# Patient Record
Sex: FEMALE | Race: WHITE | NOT HISPANIC OR LATINO | Employment: OTHER | URBAN - METROPOLITAN AREA
[De-identification: names, ages, dates, MRNs, and addresses within clinical notes are randomized per-mention and may not be internally consistent; named-entity substitution may affect disease eponyms.]

---

## 2017-01-03 ENCOUNTER — RESULTS ENCOUNTER (OUTPATIENT)
Dept: FAMILY MEDICINE CLINIC | Facility: CLINIC | Age: 82
End: 2017-01-03

## 2017-01-03 DIAGNOSIS — D63.8 ANEMIA OF CHRONIC ILLNESS: ICD-10-CM

## 2017-01-17 ENCOUNTER — OFFICE VISIT (OUTPATIENT)
Dept: GASTROENTEROLOGY | Facility: CLINIC | Age: 82
End: 2017-01-17

## 2017-01-17 VITALS
SYSTOLIC BLOOD PRESSURE: 118 MMHG | BODY MASS INDEX: 24.92 KG/M2 | DIASTOLIC BLOOD PRESSURE: 74 MMHG | HEIGHT: 64 IN | WEIGHT: 146 LBS

## 2017-01-17 DIAGNOSIS — D50.9 IRON DEFICIENCY ANEMIA, UNSPECIFIED IRON DEFICIENCY ANEMIA TYPE: Primary | ICD-10-CM

## 2017-01-17 PROCEDURE — 99203 OFFICE O/P NEW LOW 30 MIN: CPT | Performed by: INTERNAL MEDICINE

## 2017-01-17 NOTE — MR AVS SNAPSHOT
Kristyn Severino   1/17/2017 10:30 AM   Office Visit    Dept Phone:  [phone]   Encounter #:  [id number]    Provider:  Henry Maurer MD   Department:  [facility] GASTROENTEROLOGY                Your Full Care Plan              Today's Medication Changes          These changes are accurate as of: 1/17/17 11:14 AM.  If you have any questions, ask your nurse or doctor.               Stop taking medication(s)listed here:     fish oil 1000 MG capsule capsule   Stopped by:  Henry Maurer MD           vitamin C 500 MG tablet   Commonly known as:  ASCORBIC ACID   Stopped by:  Henry Maurer MD                      Your Updated Medication List          This list is accurate as of: 1/17/17 11:14 AM.  Always use your most recent med list.                aspirin 81 MG EC tablet       Biotin 1 MG capsule       calcium carbonate 600 MG tablet   Commonly known as:  OS-CAMMY       ezetimibe 10 MG tablet   Commonly known as:  ZETIA   Take 1 tablet (10 mg total) by mouth daily. For cholesterol       ferrous sulfate 325 (65 FE) MG tablet   TWO PO daily with food for anemia       glucosamine-chondroitin 500-400 MG per tablet       multivitamin with minerals tablet       Thyroid 90 MG tablet   Commonly known as:  ARMOUR THYROID   Take 1 tablet by mouth Daily. For thyroid (new dose)       VITAMIN B-6 PO       zolpidem 10 MG tablet   Commonly known as:  AMBIEN   1/2-1 PO Q HS prn insomnia               We Performed the Following     CT Abdomen Pelvis With Contrast     Occult Blood, Fecal By Immunoassay       You Were Diagnosed With        Codes Comments    Iron deficiency anemia, unspecified iron deficiency anemia type    -  Primary ICD-10-CM: D50.9  ICD-9-CM: 280.9       Instructions     None    Patient Instructions History      Upcoming Appointments     Visit Type Date Time Department    NEW PATIENT 1/17/2017 10:30 AM [facility]    FOLLOW UP 2/17/2017 10:30 AM  "[facility]      MyChart Signup     Our records indicate that you have declined [facility] MyChart signup. If you would like to sign up for [employer], please email [email or web address] or call [phone] to obtain an activation code.             Other Info from Your Visit           Your Appointments     Feb 17, 2017 10:30 AM EST   Follow Up with Henry Maurer MD   [facility] GASTROENTEROLOGY (--)    [address] [place] [id number] [phone]           Arrive 15 minutes prior to appointment.              Allergies     No Known Allergies      Reason for Visit     Anemia     Weight Loss           Vital Signs     Blood Pressure Height Weight Body Mass Index Smoking Status       118/74 64\" (162.6 cm) 146 lb (66.2 kg) 25.06 kg/m2 Never Smoker       Problems and Diagnoses Noted     Iron deficiency anemia    -  Primary        "

## 2017-01-17 NOTE — LETTER
January 17, 2017     Cary Espinal PA-C  [id number] [address]  [id number]  [facility] [address]    Patient: Kristyn Severino   YOB: 1924   Date of Visit: 1/17/2017       Dear Dr. Teddy PA-C:    Thank you for referring Kristyn Severino to me for evaluation. Below is a copy of my consult note.    If you have questions, please do not hesitate to call me. I look forward to following Kristyn along with you.         Sincerely,        Henry Maurer MD        CC: No Recipients    Chief Complaint   Patient presents with   • Anemia   • Weight Loss     Subjective      HPI     Kristyn Severino is a 89 y.o. female who presents for evaluation of fatigue and anemia.  Pt has noticed decrease in energy over last 6 mos.   She had a CBC performed by her PCP in mid December which showed a hemoglobin of 9.6 and a ferritin level 10.  Interestingly she had a normal MCV and MCHC.  She was started on oral iron supplementation and repeat hemoglobin 2 weeks later showed increased to 9.8 and a ferritin level of 36.  Patient denies any current GI complaints.  She has no nausea or vomiting.  She's had no diarrhea or constipation.  She has noticed some darkening of her stool since starting the iron therapy but prior to that had had no melena or hematochezia.  She does endorse some unintentional weight loss over the last 6 months but is unable to quantify the amount.  Her weight does appear to be stable over the last month from review of the chart.  She tells me she's never had prior lower endoscopic evaluation.  Her past medical history is significant for hypothyroidism for which she takes Synthroid and had recent dose adjustment due to elevated TSH levels.  In addition to her anemia her recent labs showed evidence of a mild hypercalcemia which is being worked up by her primary care physician.  She has normal kidney function.    Past Medical History   Diagnosis Date   • Anemia of chronic illness    • Hirsutism    • History of  bone density study      few years; normal   • History of colonoscopy      never   • HLD (hyperlipidemia)    • Hypothyroidism    • Insomnia    • Osteoarthritis    • Postmenopausal    • Renal insufficiency    • Seborrheic keratosis    • Vitamin D deficiency        Current Outpatient Prescriptions:   •  aspirin 81 MG EC tablet, Take 81 mg by mouth daily., Disp: , Rfl:   •  Biotin 1 MG capsule, Take by mouth., Disp: , Rfl:   •  calcium carbonate (OS-CAMMY) 600 MG tablet, Take 600 mg by mouth daily., Disp: , Rfl:   •  ezetimibe (ZETIA) 10 MG tablet, Take 1 tablet (10 mg total) by mouth daily. For cholesterol, Disp: 90 tablet, Rfl: 3  •  ferrous sulfate 325 (65 FE) MG tablet, TWO PO daily with food for anemia, Disp: 60 tablet, Rfl: 5  •  glucosamine-chondroitin 500-400 MG per tablet, Take 1 tablet by mouth 3 (three) times a day., Disp: , Rfl:   •  Multiple Vitamins-Minerals (MULTIVITAMIN WITH MINERALS) tablet, Take 1 tablet by mouth daily., Disp: , Rfl:   •  Pyridoxine HCl (VITAMIN B-6 PO), Take 100 mg by mouth daily., Disp: , Rfl:   •  Thyroid (ARMOUR THYROID) 90 MG PO tablet, Take 1 tablet by mouth Daily. For thyroid (new dose), Disp: 90 tablet, Rfl: 3  •  zolpidem (AMBIEN) 10 MG tablet, 1/2-1 PO Q HS prn insomnia, Disp: 30 tablet, Rfl: 2     No Known Allergies  Social History     Social History   • Marital status:      Spouse name: N/A   • Number of children: N/A   • Years of education: N/A     Occupational History   • Not on file.     Social History Main Topics   • Smoking status: Never Smoker   • Smokeless tobacco: Not on file   • Alcohol use Yes   • Drug use: No   • Sexual activity: Defer     Other Topics Concern   • Not on file     Social History Narrative     Family History   Problem Relation Age of Onset   • Family history unknown: Yes     Review of Systems   Constitutional: Positive for fatigue and unexpected weight change.   Gastrointestinal: Negative.    Musculoskeletal: Positive for arthralgias.    Psychiatric/Behavioral: Positive for sleep disturbance.   All other systems reviewed and are negative.       Objective   Vitals:    01/17/17 1031   BP: 118/74     Physical Exam   Constitutional: She is oriented to person, place, and time. She appears well-developed and well-nourished.   Elderly WF   HENT:   Head: Normocephalic and atraumatic.   Mouth/Throat: Oropharynx is clear and moist.   Eyes: EOM are normal. No scleral icterus.   Neck: Normal range of motion. Neck supple. No thyromegaly present.   Cardiovascular: Normal rate, regular rhythm and normal heart sounds.  Exam reveals no gallop and no friction rub.    No murmur heard.  Pulmonary/Chest: Effort normal and breath sounds normal. She has no wheezes. She has no rales. She exhibits no tenderness.   Abdominal: Soft. Bowel sounds are normal. She exhibits no distension. There is no tenderness. There is no rebound and no guarding. No hernia.   Musculoskeletal: Normal range of motion. She exhibits no edema.   Lymphadenopathy:     She has no cervical adenopathy.   Neurological: She is alert and oriented to person, place, and time.   Skin: Skin is warm and dry.   Multiple SKs on abdomen/back   Psychiatric: She has a normal mood and affect. Her behavior is normal. Judgment and thought content normal.   Vitals reviewed.       Assessment/Plan      Kristyn was seen today for anemia and weight loss.    Diagnoses and all orders for this visit:    Iron deficiency anemia, unspecified iron deficiency anemia type  -     CT Abdomen Pelvis With Contrast  -     Occult Blood, Fecal By Immunoassay    This very pleasant elderly lady has what appears to be acute on chronic anemia with some evidence of hypoferritinemia.  She's had no evidence of overt GI bleeding.  She has had a relatively good response to her ferritin levels with only a few weeks of iron supplementation.  I did discuss with her the possibility of proceeding with endoscopic evaluation, but she tells me her family  has been very reluctant in the past for her to have colonoscopy due to her advanced age.  She has asked about possible non-invasive testing.  We discussed the option of assessing stool for microscopic blood and performing at CT scan of the abdomen/pelvis, which she is agreeable to.  In the end I still think we will need to readdress endoscopy with her and possible with her family, but we will start here.  She should continue oral iron supplement and she will follow up in 4 weeks with repeat hemoglobin.

## 2017-01-17 NOTE — PROGRESS NOTES
Chief Complaint   Patient presents with   • Anemia   • Weight Loss     Subjective      HPI     Kristyn Severino is a 89 y.o. female who presents for evaluation of fatigue and anemia.  Pt has noticed decrease in energy over last 6 mos.   She had a CBC performed by her PCP in mid December which showed a hemoglobin of 9.6 and a ferritin level 10.  Interestingly she had a normal MCV and MCHC.  She was started on oral iron supplementation and repeat hemoglobin 2 weeks later showed increased to 9.8 and a ferritin level of 36.  Patient denies any current GI complaints.  She has no nausea or vomiting.  She's had no diarrhea or constipation.  She has noticed some darkening of her stool since starting the iron therapy but prior to that had had no melena or hematochezia.  She does endorse some unintentional weight loss over the last 6 months but is unable to quantify the amount.  Her weight does appear to be stable over the last month from review of the chart.  She tells me she's never had prior lower endoscopic evaluation.  Her past medical history is significant for hypothyroidism for which she takes Synthroid and had recent dose adjustment due to elevated TSH levels.  In addition to her anemia her recent labs showed evidence of a mild hypercalcemia which is being worked up by her primary care physician.  She has normal kidney function.    Past Medical History   Diagnosis Date   • Anemia of chronic illness    • Hirsutism    • History of bone density study      few years; normal   • History of colonoscopy      never   • HLD (hyperlipidemia)    • Hypothyroidism    • Insomnia    • Osteoarthritis    • Postmenopausal    • Renal insufficiency    • Seborrheic keratosis    • Vitamin D deficiency        Current Outpatient Prescriptions:   •  aspirin 81 MG EC tablet, Take 81 mg by mouth daily., Disp: , Rfl:   •  Biotin 1 MG capsule, Take by mouth., Disp: , Rfl:   •  calcium carbonate (OS-CAMMY) 600 MG tablet, Take 600 mg by mouth  daily., Disp: , Rfl:   •  ezetimibe (ZETIA) 10 MG tablet, Take 1 tablet (10 mg total) by mouth daily. For cholesterol, Disp: 90 tablet, Rfl: 3  •  ferrous sulfate 325 (65 FE) MG tablet, TWO PO daily with food for anemia, Disp: 60 tablet, Rfl: 5  •  glucosamine-chondroitin 500-400 MG per tablet, Take 1 tablet by mouth 3 (three) times a day., Disp: , Rfl:   •  Multiple Vitamins-Minerals (MULTIVITAMIN WITH MINERALS) tablet, Take 1 tablet by mouth daily., Disp: , Rfl:   •  Pyridoxine HCl (VITAMIN B-6 PO), Take 100 mg by mouth daily., Disp: , Rfl:   •  Thyroid (ARMOUR THYROID) 90 MG PO tablet, Take 1 tablet by mouth Daily. For thyroid (new dose), Disp: 90 tablet, Rfl: 3  •  zolpidem (AMBIEN) 10 MG tablet, 1/2-1 PO Q HS prn insomnia, Disp: 30 tablet, Rfl: 2     No Known Allergies  Social History     Social History   • Marital status:      Spouse name: N/A   • Number of children: N/A   • Years of education: N/A     Occupational History   • Not on file.     Social History Main Topics   • Smoking status: Never Smoker   • Smokeless tobacco: Not on file   • Alcohol use Yes   • Drug use: No   • Sexual activity: Defer     Other Topics Concern   • Not on file     Social History Narrative     Family History   Problem Relation Age of Onset   • Family history unknown: Yes     Review of Systems   Constitutional: Positive for fatigue and unexpected weight change.   Gastrointestinal: Negative.    Musculoskeletal: Positive for arthralgias.   Psychiatric/Behavioral: Positive for sleep disturbance.   All other systems reviewed and are negative.       Objective   Vitals:    01/17/17 1031   BP: 118/74     Physical Exam   Constitutional: She is oriented to person, place, and time. She appears well-developed and well-nourished.   Elderly WF   HENT:   Head: Normocephalic and atraumatic.   Mouth/Throat: Oropharynx is clear and moist.   Eyes: EOM are normal. No scleral icterus.   Neck: Normal range of motion. Neck supple. No thyromegaly  present.   Cardiovascular: Normal rate, regular rhythm and normal heart sounds.  Exam reveals no gallop and no friction rub.    No murmur heard.  Pulmonary/Chest: Effort normal and breath sounds normal. She has no wheezes. She has no rales. She exhibits no tenderness.   Abdominal: Soft. Bowel sounds are normal. She exhibits no distension. There is no tenderness. There is no rebound and no guarding. No hernia.   Musculoskeletal: Normal range of motion. She exhibits no edema.   Lymphadenopathy:     She has no cervical adenopathy.   Neurological: She is alert and oriented to person, place, and time.   Skin: Skin is warm and dry.   Multiple SKs on abdomen/back   Psychiatric: She has a normal mood and affect. Her behavior is normal. Judgment and thought content normal.   Vitals reviewed.       Assessment/Plan      Kristyn was seen today for anemia and weight loss.    Diagnoses and all orders for this visit:    Iron deficiency anemia, unspecified iron deficiency anemia type  -     CT Abdomen Pelvis With Contrast  -     Occult Blood, Fecal By Immunoassay    This very pleasant elderly lady has what appears to be acute on chronic anemia with some evidence of hypoferritinemia.  She's had no evidence of overt GI bleeding.  She has had a relatively good response to her ferritin levels with only a few weeks of iron supplementation.  I did discuss with her the possibility of proceeding with endoscopic evaluation, but she tells me her family has been very reluctant in the past for her to have colonoscopy due to her advanced age.  She has asked about possible non-invasive testing.  We discussed the option of assessing stool for microscopic blood and performing at CT scan of the abdomen/pelvis, which she is agreeable to.  In the end I still think we will need to readdress endoscopy with her and possible with her family, but we will start here.  She should continue oral iron supplement and she will follow up in 4 weeks with repeat  hemoglobin.

## 2017-01-20 ENCOUNTER — CONVERSION ENCOUNTER (OUTPATIENT)
Dept: GASTROENTEROLOGY | Facility: CLINIC | Age: 82
End: 2017-01-20

## 2017-01-21 LAB — HEMOCCULT STL QL IA: NEGATIVE

## 2017-01-23 ENCOUNTER — HOSPITAL ENCOUNTER (OUTPATIENT)
Dept: CT IMAGING | Facility: HOSPITAL | Age: 82
Discharge: HOME | End: 2017-01-23
Attending: INTERNAL MEDICINE | Admitting: INTERNAL MEDICINE

## 2017-01-23 LAB — CREAT BLDA-MCNC: 1 MG/DL (ref 0.6–1.3)

## 2017-01-23 PROCEDURE — 0 IOPAMIDOL 61 % SOLUTION: Performed by: INTERNAL MEDICINE

## 2017-01-23 PROCEDURE — 25510000001 DIATRIZOATE MEGLUMINE & SODIUM PER 1 ML: Performed by: INTERNAL MEDICINE

## 2017-01-23 PROCEDURE — 74177 CT ABD & PELVIS W/CONTRAST: CPT

## 2017-01-23 PROCEDURE — 82565 ASSAY OF CREATININE: CPT

## 2017-01-23 RX ADMIN — IOPAMIDOL 85 ML: 612 INJECTION, SOLUTION INTRAVENOUS at 10:30

## 2017-01-23 RX ADMIN — DIATRIZOATE MEGLUMINE AND DIATRIZOATE SODIUM 30 ML: 660; 100 LIQUID ORAL; RECTAL at 10:30

## 2017-01-25 RX ORDER — EZETIMIBE 10 MG/1
10 TABLET ORAL DAILY
Qty: 90 TABLET | Refills: 3 | Status: ON HOLD | OUTPATIENT
Start: 2017-01-25 | End: 2018-09-21

## 2017-01-28 ENCOUNTER — RESULTS ENCOUNTER (OUTPATIENT)
Dept: FAMILY MEDICINE CLINIC | Facility: CLINIC | Age: 82
End: 2017-01-28

## 2017-01-28 DIAGNOSIS — E03.9 ACQUIRED HYPOTHYROIDISM: ICD-10-CM

## 2017-01-28 DIAGNOSIS — E83.52 HYPERCALCEMIA: ICD-10-CM

## 2017-01-28 DIAGNOSIS — E55.9 VITAMIN D DEFICIENCY: ICD-10-CM

## 2017-01-28 DIAGNOSIS — G47.00 INSOMNIA, UNSPECIFIED TYPE: ICD-10-CM

## 2017-01-28 DIAGNOSIS — D63.8 ANEMIA OF CHRONIC ILLNESS: ICD-10-CM

## 2017-01-28 DIAGNOSIS — E78.2 MIXED HYPERLIPIDEMIA: ICD-10-CM

## 2017-02-01 ENCOUNTER — TELEPHONE (OUTPATIENT)
Dept: FAMILY MEDICINE CLINIC | Facility: CLINIC | Age: 82
End: 2017-02-01

## 2017-02-01 NOTE — TELEPHONE ENCOUNTER
She can ask what is required from the pharmacy or [facility];  It may require appt for face to face

## 2017-02-09 ENCOUNTER — TELEPHONE (OUTPATIENT)
Dept: GASTROENTEROLOGY | Facility: CLINIC | Age: 82
End: 2017-02-09

## 2017-02-09 NOTE — TELEPHONE ENCOUNTER
Per Dr. Maurer: CT scan was reassuringly normal. We await her fecal occult blood test.  Patient called advised her stool tests for blood were negative and her CT scan was normal.  She verb understanding.

## 2017-02-17 ENCOUNTER — OFFICE VISIT (OUTPATIENT)
Dept: GASTROENTEROLOGY | Facility: CLINIC | Age: 82
End: 2017-02-17

## 2017-02-17 VITALS
DIASTOLIC BLOOD PRESSURE: 74 MMHG | HEIGHT: 64 IN | BODY MASS INDEX: 25.44 KG/M2 | SYSTOLIC BLOOD PRESSURE: 118 MMHG | WEIGHT: 149 LBS

## 2017-02-17 DIAGNOSIS — D50.9 IRON DEFICIENCY ANEMIA, UNSPECIFIED IRON DEFICIENCY ANEMIA TYPE: Primary | ICD-10-CM

## 2017-02-17 LAB
BASOPHILS # BLD AUTO: 0.03 10*3/MM3 (ref 0–0.2)
BASOPHILS NFR BLD AUTO: 0.4 % (ref 0–1.5)
EOSINOPHIL # BLD AUTO: 0.21 10*3/MM3 (ref 0–0.7)
EOSINOPHIL NFR BLD AUTO: 2.9 % (ref 0.3–6.2)
ERYTHROCYTE [DISTWIDTH] IN BLOOD BY AUTOMATED COUNT: 16.1 % (ref 11.7–13)
FERRITIN SERPL-MCNC: 29.1 NG/ML (ref 13–150)
HCT VFR BLD AUTO: 32.8 % (ref 35.6–45.5)
HGB BLD-MCNC: 10.6 G/DL (ref 11.9–15.5)
IMM GRANULOCYTES # BLD: 0 10*3/MM3 (ref 0–0.03)
IMM GRANULOCYTES NFR BLD: 0 % (ref 0–0.5)
LYMPHOCYTES # BLD AUTO: 2.25 10*3/MM3 (ref 0.9–4.8)
LYMPHOCYTES NFR BLD AUTO: 31.5 % (ref 19.6–45.3)
MCH RBC QN AUTO: 30.9 PG (ref 26.9–32)
MCHC RBC AUTO-ENTMCNC: 32.3 G/DL (ref 32.4–36.3)
MCV RBC AUTO: 95.6 FL (ref 80.5–98.2)
MONOCYTES # BLD AUTO: 0.9 10*3/MM3 (ref 0.2–1.2)
MONOCYTES NFR BLD AUTO: 12.6 % (ref 5–12)
NEUTROPHILS # BLD AUTO: 3.76 10*3/MM3 (ref 1.9–8.1)
NEUTROPHILS NFR BLD AUTO: 52.6 % (ref 42.7–76)
PLATELET # BLD AUTO: 376 10*3/MM3 (ref 140–500)
RBC # BLD AUTO: 3.43 10*6/MM3 (ref 3.9–5.2)
WBC # BLD AUTO: 7.15 10*3/MM3 (ref 4.5–10.7)

## 2017-02-17 PROCEDURE — 99213 OFFICE O/P EST LOW 20 MIN: CPT | Performed by: INTERNAL MEDICINE

## 2017-02-17 RX ORDER — LANOLIN ALCOHOL/MO/W.PET/CERES
400 CREAM (GRAM) TOPICAL DAILY
COMMUNITY
End: 2019-07-09

## 2017-02-17 NOTE — PROGRESS NOTES
Chief Complaint   Patient presents with   • Follow-up     Subjective     HPI     Kristyn Severino is a 89 y.o. female who presents for follow-up of her anemia.  Overall she is doing very well since her last visit.  She reports less fatigue.  Weight is up a few pounds.  She continues to deny evidence of overt GI bleeding.  Due to her reluctance to undergo endoscopic evaluation we proceeded after her last visit with a CT scan of the abdomen and pelvis as well as fecal occult blood testing both of which have returned reassuringly normal/negative.  Patient remains on daily iron supplementation.      Past Medical History   Diagnosis Date   • Anemia of chronic illness    • Hirsutism    • History of bone density study      few years; normal   • History of colonoscopy      never   • HLD (hyperlipidemia)    • Hypothyroidism    • Insomnia    • Osteoarthritis    • Postmenopausal    • Renal insufficiency    • Seborrheic keratosis    • Vitamin D deficiency        Social History     Social History   • Marital status:      Spouse name: N/A   • Number of children: N/A   • Years of education: N/A     Social History Main Topics   • Smoking status: Never Smoker   • Smokeless tobacco: None   • Alcohol use Yes   • Drug use: No   • Sexual activity: Defer     Other Topics Concern   • None     Social History Narrative         Current Outpatient Prescriptions:   •  aspirin 81 MG EC tablet, Take 81 mg by mouth daily., Disp: , Rfl:   •  Biotin 1 MG capsule, Take by mouth., Disp: , Rfl:   •  calcium carbonate (OS-CAMMY) 600 MG tablet, Take 600 mg by mouth daily., Disp: , Rfl:   •  ezetimibe (ZETIA) 10 MG tablet, Take 1 tablet by mouth Daily. For cholesterol, Disp: 90 tablet, Rfl: 3  •  ferrous sulfate 325 (65 FE) MG tablet, TWO PO daily with food for anemia, Disp: 60 tablet, Rfl: 5  •  folic acid (FOLVITE) 400 MCG tablet, Take 400 mcg by mouth Daily., Disp: , Rfl:   •  glucosamine-chondroitin 500-400 MG per tablet, Take 1 tablet by mouth  3 (three) times a day., Disp: , Rfl:   •  Lactobacillus (ACIDOPHILUS PO), Take  by mouth., Disp: , Rfl:   •  Multiple Vitamins-Minerals (MULTIVITAMIN WITH MINERALS) tablet, Take 1 tablet by mouth daily., Disp: , Rfl:   •  Pyridoxine HCl (VITAMIN B-6 PO), Take 100 mg by mouth daily., Disp: , Rfl:   •  Thyroid (ARMOUR THYROID) 90 MG PO tablet, Take 1 tablet by mouth Daily. For thyroid (new dose), Disp: 90 tablet, Rfl: 3  •  zolpidem (AMBIEN) 10 MG tablet, 1/2-1 PO Q HS prn insomnia, Disp: 30 tablet, Rfl: 2    Review of Systems   Constitutional: Negative.    Respiratory: Negative.    Cardiovascular: Negative.    Gastrointestinal: Negative.    Psychiatric/Behavioral: Negative.      Objective   Vitals:    02/17/17 1034   BP: 118/74       Physical Exam   Constitutional: She is oriented to person, place, and time. She appears well-developed and well-nourished.   HENT:   Head: Normocephalic and atraumatic.   Cardiovascular: Normal rate, regular rhythm and normal heart sounds.    Pulmonary/Chest: Effort normal and breath sounds normal. No respiratory distress. She has no wheezes. She has no rales.   Abdominal: Soft. Bowel sounds are normal. She exhibits no distension and no mass. There is no tenderness. No hernia.   Neurological: She is alert and oriented to person, place, and time.   Skin: Skin is warm and dry.   Psychiatric: She has a normal mood and affect. Her behavior is normal. Judgment and thought content normal.       Assessment/Plan      Kristyn was seen today for follow-up.    Diagnoses and all orders for this visit:    Iron deficiency anemia, unspecified iron deficiency anemia type  -     CBC & Differential  -     Ferritin    Kristyn has had recent negative fecal occult blood testing and a CT of the abdomen and pelvis which was overall reassuring.  She continues to decline endoscopic evaluation.  I'll arrange to recheck her CBC and ferritin today but suspect she'll probably need at least 3 months of oral iron  supplementation.  If she has recurrence of hypoferritinemia or intolerance to oral iron she could have consideration for IV iron infusions.  Should she like to readdress the option of endoscopic evaluation I would be happy to see her again in the office.  Otherwise she can follow-up with her PCP as scheduled.

## 2017-02-23 NOTE — PROGRESS NOTES
Her Hb is up about 1 point since last check.    I would continue iron and follow up with PCP for repeat hemoglobin in 1 month

## 2017-03-15 ENCOUNTER — TELEPHONE (OUTPATIENT)
Dept: GASTROENTEROLOGY | Facility: CLINIC | Age: 82
End: 2017-03-15

## 2017-03-15 NOTE — TELEPHONE ENCOUNTER
----- Message from Henry Maurer MD sent at 2/22/2017  7:22 PM EST -----  Her Hb is up about 1 point since last check.    I would continue iron and follow up with PCP for repeat hemoglobin in 1 month

## 2017-03-20 NOTE — TELEPHONE ENCOUNTER
Patient returned call. Advised as per Dr. Coffey's note her Hbg is up 1 point from her last check. Advised she will need to repeat her Hgb in one month at her PCP's office.  She verb understanding.

## 2017-03-30 RX ORDER — SPIRONOLACTONE 50 MG/1
TABLET, FILM COATED ORAL
Qty: 90 TABLET | Refills: 0 | Status: SHIPPED | OUTPATIENT
Start: 2017-03-30 | End: 2017-07-25

## 2017-04-28 ENCOUNTER — OFFICE VISIT (OUTPATIENT)
Dept: FAMILY MEDICINE CLINIC | Facility: CLINIC | Age: 82
End: 2017-04-28

## 2017-04-28 VITALS
TEMPERATURE: 97.9 F | WEIGHT: 147 LBS | SYSTOLIC BLOOD PRESSURE: 116 MMHG | HEIGHT: 64 IN | DIASTOLIC BLOOD PRESSURE: 60 MMHG | HEART RATE: 73 BPM | BODY MASS INDEX: 25.1 KG/M2 | RESPIRATION RATE: 16 BRPM | OXYGEN SATURATION: 96 %

## 2017-04-28 DIAGNOSIS — E78.2 MIXED HYPERLIPIDEMIA: ICD-10-CM

## 2017-04-28 DIAGNOSIS — G47.00 INSOMNIA, UNSPECIFIED TYPE: Primary | ICD-10-CM

## 2017-04-28 DIAGNOSIS — D63.8 ANEMIA OF CHRONIC ILLNESS: ICD-10-CM

## 2017-04-28 DIAGNOSIS — R06.09 DYSPNEA ON EXERTION: ICD-10-CM

## 2017-04-28 DIAGNOSIS — N28.9 RENAL INSUFFICIENCY: ICD-10-CM

## 2017-04-28 DIAGNOSIS — R71.0 PRECIPITOUS DROP IN HEMATOCRIT: ICD-10-CM

## 2017-04-28 DIAGNOSIS — E03.9 ACQUIRED HYPOTHYROIDISM: ICD-10-CM

## 2017-04-28 PROCEDURE — 99214 OFFICE O/P EST MOD 30 MIN: CPT | Performed by: PHYSICIAN ASSISTANT

## 2017-04-28 RX ORDER — TRAZODONE HYDROCHLORIDE 50 MG/1
50 TABLET ORAL NIGHTLY
Qty: 30 TABLET | Refills: 5 | Status: SHIPPED | OUTPATIENT
Start: 2017-04-28 | End: 2017-07-24

## 2017-04-28 RX ORDER — ZOLPIDEM TARTRATE 5 MG/1
5 TABLET ORAL NIGHTLY PRN
Qty: 30 TABLET | Refills: 2 | Status: CANCELLED
Start: 2017-04-28

## 2017-04-28 NOTE — PROGRESS NOTES
Subjective   Kristyn Severino is a 89 y.o. female.     History of Present Illness   Kristyn Severino 89 y.o. female presents for follow up of insomnia with onset of symptoms years ago. Patient describes symptoms as early morning awakening, frequent night time awakening, difficulty falling asleep and non-restful sleep. Patient has found complete relief with Ambien and Dr Vigil will agree to Rx only 5mg;  I will have to try Trazodone.  She will have to try non controlled Rx for sleep.. Associated symptoms include: fatigue if unable to take Rx . Patient denies history of addiction Symptoms have been well-controlled with taking current prescription medication.  The patient has failed multiple OTC medications for insomnia.  They are well controlled on current Rx and will continue to try to take Rx PRN.  She will use the lowest effective dose.  The patient has read and signed the [facility] Controlled Substance Contract.  I will continue to see patient for regular follow up appointments and be prescribed the lowest effective dose.  JAXON has been reviewed by me and is updated every 3 months. The patient is aware of the potential for addiction and dependence.  She denies that Ambien cause excessive daytime drowsiness and sleep walking.      I did have her see GI doc for low hgb  I do have her on iron and will do f/u CBC;    Lab Results   Component Value Date    FERRITIN 33.36 04/28/2017     I will let her go down to one iron tab daily  Lab Results   Component Value Date    TSH 0.432 04/28/2017     Need labs to check on thyroid  No results found for: CHOL  Lab Results   Component Value Date    TRIG 98 12/16/2016    TRIG 111 01/12/2016     Lab Results   Component Value Date    HDL 72 (H) 12/16/2016    HDL 68 (H) 01/12/2016     No results found for: LDLCALC  Lab Results   Component Value Date     (H) 12/16/2016     (H) 01/12/2016     No results found for: HDLLDLRATIO  No components found for: CHOLHDL      Having feeling of heart pounding harder and SOA with exertion.  This is more with longer distances.  Her mom had angina.  NO chest pain at rest.  No chest pain with exertion.  Needs cardiac consult  The following portions of the patient's history were reviewed and updated as appropriate: allergies, current medications, past family history, past medical history, past social history, past surgical history and problem list.    Review of Systems   Constitutional: Positive for fatigue. Negative for activity change, appetite change and unexpected weight change.   HENT: Positive for rhinorrhea. Negative for nosebleeds and trouble swallowing.    Eyes: Negative for pain and visual disturbance.   Respiratory: Negative for chest tightness, shortness of breath and wheezing.    Cardiovascular: Negative for chest pain and palpitations.   Gastrointestinal: Negative for abdominal pain and blood in stool.   Endocrine: Negative.    Genitourinary: Negative for difficulty urinating and hematuria.   Musculoskeletal: Positive for back pain. Negative for joint swelling.   Skin: Negative for color change and rash.   Allergic/Immunologic: Negative.    Neurological: Positive for light-headedness. Negative for syncope and speech difficulty.   Hematological: Negative for adenopathy.   Psychiatric/Behavioral: Negative for agitation and confusion.   All other systems reviewed and are negative.      Objective   Physical Exam   Constitutional: She is oriented to person, place, and time. She appears well-developed and well-nourished. No distress.   HENT:   Head: Normocephalic and atraumatic.   Eyes: Conjunctivae and EOM are normal. Pupils are equal, round, and reactive to light. Right eye exhibits no discharge. Left eye exhibits no discharge. No scleral icterus.   Neck: Normal range of motion. Neck supple. No tracheal deviation present. No thyromegaly present.   Cardiovascular: Normal rate, regular rhythm, normal heart sounds, intact distal  pulses and normal pulses.  Exam reveals no gallop.    No murmur heard.  Pulmonary/Chest: Effort normal and breath sounds normal. No respiratory distress. She has no wheezes. She has no rales.   Musculoskeletal: Normal range of motion.   Neurological: She is alert and oriented to person, place, and time. She exhibits normal muscle tone. Coordination normal.   Skin: Skin is warm. No rash noted. No erythema. No pallor.   Psychiatric: She has a normal mood and affect. Her behavior is normal. Judgment and thought content normal.   Nursing note and vitals reviewed.      Assessment/Plan   Problems Addressed this Visit        Cardiovascular and Mediastinum    HLD (hyperlipidemia)    Relevant Orders    Comprehensive Metabolic Panel (Completed)    TSH (Completed)    T4, Free (Completed)    CBC & Differential (Completed)    Ferritin (Completed)       Endocrine    Hypothyroidism    Relevant Orders    Comprehensive Metabolic Panel (Completed)    TSH (Completed)    T4, Free (Completed)    CBC & Differential (Completed)    Ferritin (Completed)    TSH       Genitourinary    Renal insufficiency    Relevant Orders    Comprehensive Metabolic Panel (Completed)    TSH (Completed)    T4, Free (Completed)    CBC & Differential (Completed)    Ferritin (Completed)       Hematopoietic and Hemostatic    Anemia of chronic illness       Other    Insomnia - Primary    Relevant Orders    Comprehensive Metabolic Panel (Completed)    TSH (Completed)    T4, Free (Completed)    CBC & Differential (Completed)    Ferritin (Completed)      Other Visit Diagnoses     Precipitous drop in hematocrit         Relevant Orders    Ferritin (Completed)    Dyspnea on exertion        Relevant Orders    Ambulatory Referral to Cardiology                I have reviewed the notes, assessments, and/or procedures performed by Cary Espinal PA-C, I concur with her/his documentation of Kristyn Severino.

## 2017-04-28 NOTE — PATIENT INSTRUCTIONS
Warned patient that this medication can cause drowsiness and impair them operating machinery, including driving a car.  Caution is advised.    Stop Ambien if you develop excessive daytime drowsiness and/or sleep walking.  Avoid driving if drowsy.  Do not drink alcohol with this medication.  Ambien is a controlled substance and requires you to be seen for an appointment every 3 months for a medication check refill.  Use the lowest effective dose.    Low glycemic index diet  Exercise 30 minutes most days of the week  Make sure you get results on any labs or tests we ordered today  We discussed medications and how to take them as prescribed  Sleep 6-8 hours each night if possible  If you have not signed up for B[employer], please activate your code ASAP from your After Visit Summary today    LDL goal <100  LDL goal if heart disease <70  HDL goal >60  Triglyceride goal <150  BP goal =<130/80  Fasting glucose <100    Call 911 if SOA worse or chest pain  See cardio

## 2017-04-29 LAB
ALBUMIN SERPL-MCNC: 3.9 G/DL (ref 3.5–5.2)
ALBUMIN/GLOB SERPL: 1.2 G/DL
ALP SERPL-CCNC: 51 U/L (ref 39–117)
ALT SERPL-CCNC: 12 U/L (ref 1–33)
AST SERPL-CCNC: 19 U/L (ref 1–32)
BASOPHILS # BLD AUTO: 0.03 10*3/MM3 (ref 0–0.2)
BASOPHILS NFR BLD AUTO: 0.3 % (ref 0–1.5)
BILIRUB SERPL-MCNC: 0.2 MG/DL (ref 0.1–1.2)
BUN SERPL-MCNC: 25 MG/DL (ref 8–23)
BUN/CREAT SERPL: 30.1 (ref 7–25)
CALCIUM SERPL-MCNC: 10 MG/DL (ref 8.2–9.6)
CHLORIDE SERPL-SCNC: 101 MMOL/L (ref 98–107)
CO2 SERPL-SCNC: 26 MMOL/L (ref 22–29)
CREAT SERPL-MCNC: 0.83 MG/DL (ref 0.57–1)
EOSINOPHIL # BLD AUTO: 0.17 10*3/MM3 (ref 0–0.7)
EOSINOPHIL NFR BLD AUTO: 1.9 % (ref 0.3–6.2)
ERYTHROCYTE [DISTWIDTH] IN BLOOD BY AUTOMATED COUNT: 13.9 % (ref 11.7–13)
FERRITIN SERPL-MCNC: 33.36 NG/ML (ref 13–150)
GLOBULIN SER CALC-MCNC: 3.2 GM/DL
GLUCOSE SERPL-MCNC: 130 MG/DL (ref 65–99)
HCT VFR BLD AUTO: 34.4 % (ref 35.6–45.5)
HGB BLD-MCNC: 10.8 G/DL (ref 11.9–15.5)
IMM GRANULOCYTES # BLD: 0.02 10*3/MM3 (ref 0–0.03)
IMM GRANULOCYTES NFR BLD: 0.2 % (ref 0–0.5)
LYMPHOCYTES # BLD AUTO: 2.5 10*3/MM3 (ref 0.9–4.8)
LYMPHOCYTES NFR BLD AUTO: 28.5 % (ref 19.6–45.3)
MCH RBC QN AUTO: 30.3 PG (ref 26.9–32)
MCHC RBC AUTO-ENTMCNC: 31.4 G/DL (ref 32.4–36.3)
MCV RBC AUTO: 96.4 FL (ref 80.5–98.2)
MONOCYTES # BLD AUTO: 0.68 10*3/MM3 (ref 0.2–1.2)
MONOCYTES NFR BLD AUTO: 7.8 % (ref 5–12)
NEUTROPHILS # BLD AUTO: 5.37 10*3/MM3 (ref 1.9–8.1)
NEUTROPHILS NFR BLD AUTO: 61.3 % (ref 42.7–76)
PLATELET # BLD AUTO: 377 10*3/MM3 (ref 140–500)
POTASSIUM SERPL-SCNC: 4.3 MMOL/L (ref 3.5–5.2)
PROT SERPL-MCNC: 7.1 G/DL (ref 6–8.5)
RBC # BLD AUTO: 3.57 10*6/MM3 (ref 3.9–5.2)
SODIUM SERPL-SCNC: 142 MMOL/L (ref 136–145)
T4 FREE SERPL-MCNC: 0.69 NG/DL (ref 0.93–1.7)
TSH SERPL DL<=0.005 MIU/L-ACNC: 0.43 MIU/ML (ref 0.27–4.2)
WBC # BLD AUTO: 8.77 10*3/MM3 (ref 4.5–10.7)

## 2017-05-02 LAB
HBA1C MFR BLD: 5 % (ref 4.8–5.6)
WRITTEN AUTHORIZATION: NORMAL

## 2017-05-30 ENCOUNTER — OFFICE VISIT (OUTPATIENT)
Dept: CARDIOLOGY | Facility: CLINIC | Age: 82
End: 2017-05-30

## 2017-05-30 ENCOUNTER — TRANSCRIBE ORDERS (OUTPATIENT)
Dept: CARDIOLOGY | Facility: CLINIC | Age: 82
End: 2017-05-30

## 2017-05-30 VITALS
BODY MASS INDEX: 24.66 KG/M2 | HEART RATE: 69 BPM | HEIGHT: 65 IN | WEIGHT: 148 LBS | DIASTOLIC BLOOD PRESSURE: 82 MMHG | SYSTOLIC BLOOD PRESSURE: 124 MMHG

## 2017-05-30 DIAGNOSIS — R00.2 PALPITATIONS: Primary | ICD-10-CM

## 2017-05-30 DIAGNOSIS — R06.02 SHORTNESS OF BREATH: Primary | ICD-10-CM

## 2017-05-30 DIAGNOSIS — R00.2 PALPITATION: ICD-10-CM

## 2017-05-30 PROCEDURE — 99204 OFFICE O/P NEW MOD 45 MIN: CPT | Performed by: INTERNAL MEDICINE

## 2017-05-30 PROCEDURE — 93000 ELECTROCARDIOGRAM COMPLETE: CPT | Performed by: INTERNAL MEDICINE

## 2017-06-09 ENCOUNTER — HOSPITAL ENCOUNTER (OUTPATIENT)
Dept: CARDIOLOGY | Facility: HOSPITAL | Age: 82
Discharge: HOME | End: 2017-06-09
Attending: INTERNAL MEDICINE | Admitting: INTERNAL MEDICINE

## 2017-06-09 VITALS
DIASTOLIC BLOOD PRESSURE: 62 MMHG | HEIGHT: 65 IN | HEART RATE: 66 BPM | WEIGHT: 148 LBS | SYSTOLIC BLOOD PRESSURE: 118 MMHG | BODY MASS INDEX: 24.66 KG/M2

## 2017-06-09 DIAGNOSIS — R00.2 PALPITATION: ICD-10-CM

## 2017-06-09 DIAGNOSIS — R06.02 SHORTNESS OF BREATH: ICD-10-CM

## 2017-06-09 LAB
ASCENDING AORTA: 2.6 CM
BH CV ECHO MEAS - ACS: 2.4 CM
BH CV ECHO MEAS - AO MAX PG (FULL): 2.3 MMHG
BH CV ECHO MEAS - AO MAX PG: 3.6 MMHG
BH CV ECHO MEAS - AO MEAN PG (FULL): 1.1 MMHG
BH CV ECHO MEAS - AO MEAN PG: 2 MMHG
BH CV ECHO MEAS - AO ROOT AREA (BSA CORRECTED): 2
BH CV ECHO MEAS - AO ROOT AREA: 9.6 CM^2
BH CV ECHO MEAS - AO ROOT DIAM: 3.5 CM
BH CV ECHO MEAS - AO V2 MAX: 94.5 CM/SEC
BH CV ECHO MEAS - AO V2 MEAN: 64.4 CM/SEC
BH CV ECHO MEAS - AO V2 VTI: 23.1 CM
BH CV ECHO MEAS - AVA(I,A): 2.1 CM^2
BH CV ECHO MEAS - AVA(I,D): 2.1 CM^2
BH CV ECHO MEAS - AVA(V,A): 1.9 CM^2
BH CV ECHO MEAS - AVA(V,D): 1.9 CM^2
BH CV ECHO MEAS - BSA(HAYCOCK): 1.8 M^2
BH CV ECHO MEAS - BSA: 1.7 M^2
BH CV ECHO MEAS - BZI_BMI: 24.6 KILOGRAMS/M^2
BH CV ECHO MEAS - BZI_METRIC_HEIGHT: 165.1 CM
BH CV ECHO MEAS - BZI_METRIC_WEIGHT: 67.1 KG
BH CV ECHO MEAS - CONTRAST EF (2CH): 52.3 ML/M^2
BH CV ECHO MEAS - CONTRAST EF 4CH: 59.1 ML/M^2
BH CV ECHO MEAS - EDV(MOD-SP2): 88 ML
BH CV ECHO MEAS - EDV(MOD-SP4): 88 ML
BH CV ECHO MEAS - EDV(TEICH): 222.9 ML
BH CV ECHO MEAS - EF(CUBED): 49.5 %
BH CV ECHO MEAS - EF(MOD-SP2): 52.3 %
BH CV ECHO MEAS - EF(MOD-SP4): 59.1 %
BH CV ECHO MEAS - EF(TEICH): 40.6 %
BH CV ECHO MEAS - ESV(MOD-SP2): 42 ML
BH CV ECHO MEAS - ESV(MOD-SP4): 36 ML
BH CV ECHO MEAS - ESV(TEICH): 132.3 ML
BH CV ECHO MEAS - FS: 20.4 %
BH CV ECHO MEAS - IVS/LVPW: 1
BH CV ECHO MEAS - IVSD: 1.1 CM
BH CV ECHO MEAS - LAT PEAK E' VEL: 6 CM/SEC
BH CV ECHO MEAS - LV DIASTOLIC VOL/BSA (35-75): 50.6 ML/M^2
BH CV ECHO MEAS - LV MASS(C)D: 311.5 GRAMS
BH CV ECHO MEAS - LV MASS(C)DI: 178.9 GRAMS/M^2
BH CV ECHO MEAS - LV MAX PG: 1.3 MMHG
BH CV ECHO MEAS - LV MEAN PG: 0.83 MMHG
BH CV ECHO MEAS - LV SYSTOLIC VOL/BSA (12-30): 20.7 ML/M^2
BH CV ECHO MEAS - LV V1 MAX: 56.8 CM/SEC
BH CV ECHO MEAS - LV V1 MEAN: 44 CM/SEC
BH CV ECHO MEAS - LV V1 VTI: 15.2 CM
BH CV ECHO MEAS - LVIDD: 6.6 CM
BH CV ECHO MEAS - LVIDS: 5.2 CM
BH CV ECHO MEAS - LVLD AP2: 7.1 CM
BH CV ECHO MEAS - LVLD AP4: 7 CM
BH CV ECHO MEAS - LVLS AP2: 6.3 CM
BH CV ECHO MEAS - LVLS AP4: 6.1 CM
BH CV ECHO MEAS - LVOT AREA (M): 3.1 CM^2
BH CV ECHO MEAS - LVOT AREA: 3.2 CM^2
BH CV ECHO MEAS - LVOT DIAM: 2 CM
BH CV ECHO MEAS - LVPWD: 1.1 CM
BH CV ECHO MEAS - MED PEAK E' VEL: 8 CM/SEC
BH CV ECHO MEAS - MR MAX PG: 142.7 MMHG
BH CV ECHO MEAS - MR MAX VEL: 597.2 CM/SEC
BH CV ECHO MEAS - MV A DUR: 0.12 SEC
BH CV ECHO MEAS - MV A MAX VEL: 59.7 CM/SEC
BH CV ECHO MEAS - MV DEC SLOPE: 641.4 CM/SEC^2
BH CV ECHO MEAS - MV DEC TIME: 0.12 SEC
BH CV ECHO MEAS - MV E MAX VEL: 75.2 CM/SEC
BH CV ECHO MEAS - MV E/A: 1.3
BH CV ECHO MEAS - MV MAX PG: 3.1 MMHG
BH CV ECHO MEAS - MV MEAN PG: 1.1 MMHG
BH CV ECHO MEAS - MV P1/2T MAX VEL: 75.2 CM/SEC
BH CV ECHO MEAS - MV P1/2T: 34.3 MSEC
BH CV ECHO MEAS - MV V2 MAX: 88 CM/SEC
BH CV ECHO MEAS - MV V2 MEAN: 49.1 CM/SEC
BH CV ECHO MEAS - MV V2 VTI: 32.5 CM
BH CV ECHO MEAS - MVA P1/2T LCG: 2.9 CM^2
BH CV ECHO MEAS - MVA(P1/2T): 6.4 CM^2
BH CV ECHO MEAS - MVA(VTI): 1.5 CM^2
BH CV ECHO MEAS - PA ACC TIME: 0.12 SEC
BH CV ECHO MEAS - PA MAX PG (FULL): 1.6 MMHG
BH CV ECHO MEAS - PA MAX PG: 2.4 MMHG
BH CV ECHO MEAS - PA PR(ACCEL): 25.1 MMHG
BH CV ECHO MEAS - PA V2 MAX: 76.9 CM/SEC
BH CV ECHO MEAS - PULM A REVS DUR: 0.13 SEC
BH CV ECHO MEAS - PULM A REVS VEL: 33.6 CM/SEC
BH CV ECHO MEAS - PULM DIAS VEL: 43 CM/SEC
BH CV ECHO MEAS - PULM S/D: 1.1
BH CV ECHO MEAS - PULM SYS VEL: 49.4 CM/SEC
BH CV ECHO MEAS - PVA(V,A): 2.2 CM^2
BH CV ECHO MEAS - PVA(V,D): 2.2 CM^2
BH CV ECHO MEAS - QP/QS: 0.91
BH CV ECHO MEAS - RAP SYSTOLE: 3 MMHG
BH CV ECHO MEAS - RV MAX PG: 0.73 MMHG
BH CV ECHO MEAS - RV MEAN PG: 0.53 MMHG
BH CV ECHO MEAS - RV V1 MAX: 42.7 CM/SEC
BH CV ECHO MEAS - RV V1 MEAN: 34.8 CM/SEC
BH CV ECHO MEAS - RV V1 VTI: 11.2 CM
BH CV ECHO MEAS - RVOT AREA: 3.9 CM^2
BH CV ECHO MEAS - RVOT DIAM: 2.2 CM
BH CV ECHO MEAS - RVSP: 28 MMHG
BH CV ECHO MEAS - SI(AO): 127.7 ML/M^2
BH CV ECHO MEAS - SI(CUBED): 81.4 ML/M^2
BH CV ECHO MEAS - SI(LVOT): 27.8 ML/M^2
BH CV ECHO MEAS - SI(MOD-SP2): 26.4 ML/M^2
BH CV ECHO MEAS - SI(MOD-SP4): 29.9 ML/M^2
BH CV ECHO MEAS - SI(TEICH): 52 ML/M^2
BH CV ECHO MEAS - SV(AO): 222.2 ML
BH CV ECHO MEAS - SV(CUBED): 141.7 ML
BH CV ECHO MEAS - SV(LVOT): 48.3 ML
BH CV ECHO MEAS - SV(MOD-SP2): 46 ML
BH CV ECHO MEAS - SV(MOD-SP4): 52 ML
BH CV ECHO MEAS - SV(RVOT): 43.8 ML
BH CV ECHO MEAS - SV(TEICH): 90.6 ML
BH CV ECHO MEAS - TAPSE (>1.6): 1.9 CM2
BH CV ECHO MEAS - TR MAX VEL: 247.6 CM/SEC
BH CV XLRA - RV BASE: 3.4 CM
BH CV XLRA - TDI S': 13 CM/SEC
E/E' RATIO: 11
LEFT ATRIUM VOLUME INDEX: 47 ML/M2
LV EF 2D ECHO EST: 59 %
SINUS: 3.6 CM
STJ: 2.8 CM

## 2017-06-09 PROCEDURE — 93306 TTE W/DOPPLER COMPLETE: CPT

## 2017-06-09 PROCEDURE — 93306 TTE W/DOPPLER COMPLETE: CPT | Performed by: INTERNAL MEDICINE

## 2017-07-24 ENCOUNTER — OFFICE VISIT (OUTPATIENT)
Dept: FAMILY MEDICINE CLINIC | Facility: CLINIC | Age: 82
End: 2017-07-24

## 2017-07-24 VITALS
RESPIRATION RATE: 16 BRPM | OXYGEN SATURATION: 98 % | WEIGHT: 146 LBS | TEMPERATURE: 97.8 F | DIASTOLIC BLOOD PRESSURE: 80 MMHG | SYSTOLIC BLOOD PRESSURE: 120 MMHG | HEIGHT: 65 IN | HEART RATE: 64 BPM | BODY MASS INDEX: 24.32 KG/M2

## 2017-07-24 DIAGNOSIS — D63.8 ANEMIA OF CHRONIC ILLNESS: ICD-10-CM

## 2017-07-24 DIAGNOSIS — G47.00 INSOMNIA, UNSPECIFIED TYPE: Primary | ICD-10-CM

## 2017-07-24 DIAGNOSIS — E03.9 ACQUIRED HYPOTHYROIDISM: ICD-10-CM

## 2017-07-24 DIAGNOSIS — L68.0 HIRSUTISM: ICD-10-CM

## 2017-07-24 DIAGNOSIS — E83.52 HYPERCALCEMIA: ICD-10-CM

## 2017-07-24 PROCEDURE — 99213 OFFICE O/P EST LOW 20 MIN: CPT | Performed by: PHYSICIAN ASSISTANT

## 2017-07-24 NOTE — PROGRESS NOTES
Subjective   Kristyn Severino is a 89 y.o. female.     History of Present Illness   Kristyn Severino 89 y.o. female presents for follow up of insomnia with onset of symptoms years ago. Patient describes symptoms as early morning awakening, frequent night time awakening, difficulty falling asleep and non-restful sleep. Patient has found no relief with prescription Trazodone, decreasing caffeine consumption, over the counter diphenhydramine and melatonin use. Associated symptoms include: fatigue if unable to take Rx . Patient denies history of addiction Symptoms have been well-controlled with taking current prescription medication.  The patient has failed multiple OTC medications for insomnia.  They are well controlled on current Rx and will continue to try to take Rx PRN.  She will use the lowest effective dose.  The patient has read and signed the [facility] Controlled Substance Contract.  I will continue to see patient for regular follow up appointments and be prescribed the lowest effective dose.  JAXON has been reviewed by me and is updated every 3 months. The patient is aware of the potential for addiction and dependence.  She denies that Ambien cause excessive daytime drowsiness and sleep walking.  She was very dizzy after taking Trazodone and no help.  Had few episodes of diarrhea in last few weeks;  Resolved with Imodium;  She is eating icecream    I have had cardiac work up and was negative  GI was negative also    I will need to update labs and see about iron;  Ca+ was 10 and will want PTH and ionized Ca+  I did these 2015 and in range;  Need this repeated and not done on last labs  She is taking iron once daily  She could have high Ca from Aldactone    She is on this for hair growth  The following portions of the patient's history were reviewed and updated as appropriate: allergies, current medications, past family history, past medical history, past social history, past surgical history and problem  list.    Review of Systems   Constitutional: Negative for activity change, appetite change and unexpected weight change.   HENT: Negative for nosebleeds and trouble swallowing.    Eyes: Negative for pain and visual disturbance.   Respiratory: Negative for chest tightness, shortness of breath and wheezing.    Cardiovascular: Negative for chest pain and palpitations.   Gastrointestinal: Negative for abdominal pain and blood in stool.   Endocrine: Negative.    Genitourinary: Negative for difficulty urinating and hematuria.   Musculoskeletal: Positive for back pain. Negative for joint swelling.   Skin: Negative for color change and rash.   Allergic/Immunologic: Negative.    Neurological: Positive for light-headedness. Negative for syncope and speech difficulty.   Hematological: Negative for adenopathy.   Psychiatric/Behavioral: Negative for agitation and confusion.   All other systems reviewed and are negative.      Objective   Physical Exam   Constitutional: She is oriented to person, place, and time. She appears well-developed and well-nourished. No distress.   HENT:   Head: Normocephalic and atraumatic.   Eyes: Conjunctivae and EOM are normal. Pupils are equal, round, and reactive to light. Right eye exhibits no discharge. Left eye exhibits no discharge. No scleral icterus.   Neck: Normal range of motion. Neck supple. No tracheal deviation present. No thyromegaly present.   Cardiovascular: Normal rate, regular rhythm, normal heart sounds, intact distal pulses and normal pulses.  Exam reveals no gallop.    No murmur heard.  Pulmonary/Chest: Effort normal and breath sounds normal. No respiratory distress. She has no wheezes. She has no rales.   Musculoskeletal: Normal range of motion.   Neurological: She is alert and oriented to person, place, and time. She exhibits normal muscle tone. Coordination normal.   Skin: Skin is warm. No rash noted. No erythema. No pallor.   Psychiatric: She has a normal mood and affect. Her  behavior is normal. Judgment and thought content normal.   Nursing note and vitals reviewed.      Assessment/Plan   Problems Addressed this Visit        Endocrine    Hypothyroidism       Hematopoietic and Hemostatic    Anemia of chronic illness    Relevant Orders    Phosphorus    Comprehensive Metabolic Panel    CBC & Differential    T4, Free    TSH    PTH, Intact    Calcium, Ionized    Ferritin    Iron Profile       Other    Insomnia - Primary    Relevant Orders    Phosphorus    Comprehensive Metabolic Panel    CBC & Differential    T4, Free    TSH    PTH, Intact    Calcium, Ionized    Ferritin    Iron Profile      Other Visit Diagnoses     Hypercalcemia        Relevant Orders    Phosphorus    Comprehensive Metabolic Panel    CBC & Differential    T4, Free    TSH    PTH, Intact    Calcium, Ionized    Ferritin    Iron Profile

## 2017-07-24 NOTE — PATIENT INSTRUCTIONS
Stop Ambien if you develop excessive daytime drowsiness and/or sleep walking.  Avoid driving if drowsy.  Do not drink alcohol with this medication.  Ambien is a controlled substance and requires you to be seen for an appointment every 3 months for a medication check refill.  Use the lowest effective dose.  Will do labs for Ca and think about d/c Aldactone

## 2017-07-25 DIAGNOSIS — R79.89 ABNORMAL TSH: Primary | ICD-10-CM

## 2017-07-25 DIAGNOSIS — E83.52 HYPERCALCEMIA: ICD-10-CM

## 2017-07-25 LAB
ALBUMIN SERPL-MCNC: 4.3 G/DL (ref 3.5–5.2)
ALBUMIN/GLOB SERPL: 1.4 G/DL
ALP SERPL-CCNC: 51 U/L (ref 39–117)
ALT SERPL-CCNC: 11 U/L (ref 1–33)
AST SERPL-CCNC: 19 U/L (ref 1–32)
BASOPHILS # BLD AUTO: 0.03 10*3/MM3 (ref 0–0.2)
BASOPHILS NFR BLD AUTO: 0.5 % (ref 0–1.5)
BILIRUB SERPL-MCNC: 0.4 MG/DL (ref 0.1–1.2)
BUN SERPL-MCNC: 24 MG/DL (ref 8–23)
BUN/CREAT SERPL: 27.6 (ref 7–25)
CA-I SERPL ISE-MCNC: 5.7 MG/DL (ref 4.5–5.6)
CALCIUM SERPL-MCNC: 10.4 MG/DL (ref 8.2–9.6)
CHLORIDE SERPL-SCNC: 103 MMOL/L (ref 98–107)
CO2 SERPL-SCNC: 23.9 MMOL/L (ref 22–29)
CREAT SERPL-MCNC: 0.87 MG/DL (ref 0.57–1)
EOSINOPHIL # BLD AUTO: 0.15 10*3/MM3 (ref 0–0.7)
EOSINOPHIL NFR BLD AUTO: 2.4 % (ref 0.3–6.2)
ERYTHROCYTE [DISTWIDTH] IN BLOOD BY AUTOMATED COUNT: 13.9 % (ref 11.7–13)
FERRITIN SERPL-MCNC: 30.61 NG/ML (ref 13–150)
GLOBULIN SER CALC-MCNC: 3.1 GM/DL
GLUCOSE SERPL-MCNC: 94 MG/DL (ref 65–99)
HCT VFR BLD AUTO: 35.5 % (ref 35.6–45.5)
HGB BLD-MCNC: 11.2 G/DL (ref 11.9–15.5)
IMM GRANULOCYTES # BLD: 0 10*3/MM3 (ref 0–0.03)
IMM GRANULOCYTES NFR BLD: 0 % (ref 0–0.5)
IRON SATN MFR SERPL: 27 % (ref 20–50)
IRON SERPL-MCNC: 106 MCG/DL (ref 37–145)
LYMPHOCYTES # BLD AUTO: 2.42 10*3/MM3 (ref 0.9–4.8)
LYMPHOCYTES NFR BLD AUTO: 38.9 % (ref 19.6–45.3)
MCH RBC QN AUTO: 31.1 PG (ref 26.9–32)
MCHC RBC AUTO-ENTMCNC: 31.5 G/DL (ref 32.4–36.3)
MCV RBC AUTO: 98.6 FL (ref 80.5–98.2)
MONOCYTES # BLD AUTO: 0.63 10*3/MM3 (ref 0.2–1.2)
MONOCYTES NFR BLD AUTO: 10.1 % (ref 5–12)
NEUTROPHILS # BLD AUTO: 2.99 10*3/MM3 (ref 1.9–8.1)
NEUTROPHILS NFR BLD AUTO: 48.1 % (ref 42.7–76)
PHOSPHATE SERPL-MCNC: 3.5 MG/DL (ref 2.5–4.5)
PLATELET # BLD AUTO: 372 10*3/MM3 (ref 140–500)
POTASSIUM SERPL-SCNC: 4.6 MMOL/L (ref 3.5–5.2)
PROT SERPL-MCNC: 7.4 G/DL (ref 6–8.5)
PTH-INTACT SERPL-MCNC: 23 PG/ML (ref 15–65)
RBC # BLD AUTO: 3.6 10*6/MM3 (ref 3.9–5.2)
SODIUM SERPL-SCNC: 142 MMOL/L (ref 136–145)
T4 FREE SERPL-MCNC: 0.89 NG/DL (ref 0.93–1.7)
TIBC SERPL-MCNC: 399 MCG/DL
TSH SERPL DL<=0.005 MIU/L-ACNC: 0.22 MIU/ML (ref 0.27–4.2)
UIBC SERPL-MCNC: 293 MCG/DL
WBC # BLD AUTO: 6.22 10*3/MM3 (ref 4.5–10.7)

## 2017-07-26 RX ORDER — ZOLPIDEM TARTRATE 5 MG/1
5 TABLET ORAL NIGHTLY PRN
Qty: 30 TABLET | Refills: 2
Start: 2017-07-26 | End: 2017-12-07 | Stop reason: SDUPTHER

## 2017-07-27 ENCOUNTER — OFFICE VISIT (OUTPATIENT)
Dept: FAMILY MEDICINE CLINIC | Facility: CLINIC | Age: 82
End: 2017-07-27

## 2017-07-27 VITALS
HEART RATE: 58 BPM | RESPIRATION RATE: 16 BRPM | SYSTOLIC BLOOD PRESSURE: 116 MMHG | HEIGHT: 65 IN | WEIGHT: 144 LBS | DIASTOLIC BLOOD PRESSURE: 65 MMHG | OXYGEN SATURATION: 97 % | BODY MASS INDEX: 23.99 KG/M2 | TEMPERATURE: 97.8 F

## 2017-07-27 DIAGNOSIS — N63.20 LEFT BREAST MASS: Primary | ICD-10-CM

## 2017-07-27 DIAGNOSIS — Z12.31 ENCOUNTER FOR SCREENING MAMMOGRAM FOR BREAST CANCER: ICD-10-CM

## 2017-07-27 PROCEDURE — 99212 OFFICE O/P EST SF 10 MIN: CPT | Performed by: PHYSICIAN ASSISTANT

## 2017-07-27 NOTE — PROGRESS NOTES
Subjective   Kristyn Severino is a 89 y.o. female.     History of Present Illness   Breast Mass  Patient presents for evaluation of lump or mass and pressure. Change was noted 1 year ago. Patient does routinely do self breast exams.  Patient has noted a change on breast exam. Breast cancer risk factors include none--no family hx.  last mammo was 2016 and had u/s.  Patient is . Age of first live birth was at age 23. Patient did breast feed.  Patient reports they are postmenopausal.   Patient denies to previous breast biopsy. Patient denies a personal history of breast cancer.  NO family hx breast cancer    The following portions of the patient's history were reviewed and updated as appropriate: allergies, current medications, past family history, past medical history, past social history, past surgical history and problem list.    Review of Systems   Constitutional: Negative for activity change, appetite change and unexpected weight change.   HENT: Negative for nosebleeds and trouble swallowing.    Eyes: Negative for pain and visual disturbance.   Respiratory: Negative for chest tightness, shortness of breath and wheezing.    Cardiovascular: Negative for chest pain and palpitations.   Gastrointestinal: Negative for abdominal pain and blood in stool.   Endocrine: Negative.    Genitourinary: Negative for difficulty urinating and hematuria.   Musculoskeletal: Positive for back pain. Negative for joint swelling.   Skin: Negative for color change and rash.   Allergic/Immunologic: Negative.    Neurological: Positive for light-headedness. Negative for syncope and speech difficulty.   Hematological: Negative for adenopathy.   Psychiatric/Behavioral: Negative for agitation and confusion.   All other systems reviewed and are negative.      Objective   Physical Exam   Constitutional: She is oriented to person, place, and time. She appears well-developed and well-nourished. No distress.   HENT:   Head: Normocephalic.    Eyes: Conjunctivae and EOM are normal. Pupils are equal, round, and reactive to light.   Neck: Normal range of motion. Neck supple.   Cardiovascular: Normal rate, regular rhythm and normal heart sounds.    No murmur heard.  Pulmonary/Chest: Effort normal and breath sounds normal. Right breast exhibits tenderness. Left breast exhibits mass and tenderness.       Lumpy area LUQ at 12 o'clock 1cm X 5mm; firm, mobile, not red or hot   Musculoskeletal: Normal range of motion.   Lymphadenopathy:     She has no cervical adenopathy.     She has no axillary adenopathy.   Neurological: She is alert and oriented to person, place, and time.   Skin: Skin is warm and dry. No rash noted. She is not diaphoretic.   Psychiatric: She has a normal mood and affect. Her behavior is normal. Judgment and thought content normal. Her affect is not inappropriate. She is not actively hallucinating. Cognition and memory are normal.   Nursing note and vitals reviewed.      Assessment/Plan   Problems Addressed this Visit     None      Visit Diagnoses     Left breast mass    -  Primary    Relevant Orders    Mammo Screening Bilateral With CAD    US Breast Left Complete    Ambulatory Referral to Breast Surgery    Encounter for screening mammogram for breast cancer        Relevant Orders    Mammo Screening Bilateral With CAD    US Breast Left Complete    Ambulatory Referral to Breast Surgery

## 2017-08-08 DIAGNOSIS — N63.0 BREAST MASS: Primary | ICD-10-CM

## 2017-08-09 ENCOUNTER — HOSPITAL ENCOUNTER (OUTPATIENT)
Dept: ULTRASOUND IMAGING | Facility: HOSPITAL | Age: 82
Discharge: HOME | End: 2017-08-09

## 2017-08-09 ENCOUNTER — HOSPITAL ENCOUNTER (OUTPATIENT)
Dept: MAMMOGRAPHY | Facility: HOSPITAL | Age: 82
Discharge: HOME | End: 2017-08-09
Admitting: PHYSICIAN ASSISTANT

## 2017-08-09 DIAGNOSIS — Z12.31 ENCOUNTER FOR SCREENING MAMMOGRAM FOR BREAST CANCER: ICD-10-CM

## 2017-08-09 DIAGNOSIS — N63.20 LEFT BREAST MASS: ICD-10-CM

## 2017-08-09 PROCEDURE — G0204 DX MAMMO INCL CAD BI: HCPCS

## 2017-08-09 PROCEDURE — 76642 ULTRASOUND BREAST LIMITED: CPT

## 2017-08-24 ENCOUNTER — OFFICE VISIT (OUTPATIENT)
Dept: ENDOCRINOLOGY | Age: 82
End: 2017-08-24

## 2017-08-24 VITALS
RESPIRATION RATE: 16 BRPM | BODY MASS INDEX: 24.53 KG/M2 | DIASTOLIC BLOOD PRESSURE: 64 MMHG | WEIGHT: 147.2 LBS | SYSTOLIC BLOOD PRESSURE: 112 MMHG | HEIGHT: 65 IN

## 2017-08-24 DIAGNOSIS — E55.9 VITAMIN D DEFICIENCY: ICD-10-CM

## 2017-08-24 DIAGNOSIS — E78.2 MIXED HYPERLIPIDEMIA: ICD-10-CM

## 2017-08-24 DIAGNOSIS — E06.3 HYPOTHYROIDISM DUE TO HASHIMOTO'S THYROIDITIS: Primary | ICD-10-CM

## 2017-08-24 DIAGNOSIS — E03.8 HYPOTHYROIDISM DUE TO HASHIMOTO'S THYROIDITIS: Primary | ICD-10-CM

## 2017-08-24 DIAGNOSIS — E83.52 HYPERCALCEMIA: ICD-10-CM

## 2017-08-24 PROCEDURE — 99204 OFFICE O/P NEW MOD 45 MIN: CPT | Performed by: INTERNAL MEDICINE

## 2017-08-24 NOTE — PROGRESS NOTES
"Subjective   Kristyn Severino is a 89 y.o. female seen as a new patient for hypothyroidism, hypercalcemia. Patient is having weakness but denies any other problems or concerns.    History of Present Illness 's is a 89-year-old female who has been referred for further evaluation and treatment of her height oh thyroidism as well as recently discovered hypercalcemia.  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  She was placed on [place] thyroid 180 mg but as time went on her need for thyroid hormone replacement and diminished to the point that at this time she is on 90 mg per day of [place] Thyroid.  Her around the symptoms is that she is feeling somewhat tired and weak.  She denies having had any kidney stone or pathologic fracture.  She does not have any siblings and her parents  when they were young and therefore she knows very little about her family history.    /64  Resp 16  Ht 65\" (165.1 cm)  Wt 147 lb 3.2 oz (66.8 kg)  BMI 24.5 kg/m2     No Known Allergies    Current Outpatient Prescriptions:   •  aspirin 81 MG EC tablet, Take 81 mg by mouth daily., Disp: , Rfl:   •  Biotin 1 MG capsule, Take by mouth., Disp: , Rfl:   •  calcium carbonate (OS-CAMMY) 600 MG tablet, Take 600 mg by mouth daily., Disp: , Rfl:   •  ezetimibe (ZETIA) 10 MG tablet, Take 1 tablet by mouth Daily. For cholesterol, Disp: 90 tablet, Rfl: 3  •  ferrous sulfate 325 (65 FE) MG tablet, TWO PO daily with food for anemia, Disp: 60 tablet, Rfl: 5  •  folic acid (FOLVITE) 400 MCG tablet, Take 400 mcg by mouth Daily., Disp: , Rfl:   •  glucosamine-chondroitin 500-400 MG per tablet, Take 1 tablet by mouth 3 (three) times a day., Disp: , Rfl:   •  Lactobacillus (ACIDOPHILUS PO), Take  by mouth., Disp: , Rfl:   •  Multiple Vitamins-Minerals (MULTIVITAMIN WITH MINERALS) tablet, Take 1 tablet by mouth daily., Disp: , Rfl:   •  Pyridoxine HCl (VITAMIN B-6 PO), Take 100 mg " by mouth daily., Disp: , Rfl:   •  Thyroid (ARMOUR THYROID) 90 MG PO tablet, Take 1 tablet by mouth Daily. For thyroid (new dose), Disp: 90 tablet, Rfl: 3  •  zolpidem (AMBIEN) 5 MG tablet, Take 1 tablet by mouth At Night As Needed for Sleep., Disp: 30 tablet, Rfl: 2    The following portions of the patient's history were reviewed and updated as appropriate: allergies, current medications, past family history, past medical history, past social history, past surgical history and problem list.    Review of Systems   HENT: Negative.    Eyes: Negative.    Respiratory: Negative.    Cardiovascular: Negative.    Gastrointestinal: Negative.    Endocrine: Negative.    Genitourinary: Negative.    Musculoskeletal: Negative.    Skin: Negative.    Allergic/Immunologic: Negative.    Neurological: Positive for weakness.   Hematological: Negative.    Psychiatric/Behavioral: Negative.        Objective   Physical Exam   Constitutional: She is oriented to person, place, and time. She appears well-developed and well-nourished. No distress.   HENT:   Head: Normocephalic and atraumatic.   Right Ear: External ear normal.   Left Ear: External ear normal.   Nose: Nose normal.   Mouth/Throat: Oropharynx is clear and moist. No oropharyngeal exudate.   Eyes: Conjunctivae and EOM are normal. Pupils are equal, round, and reactive to light. Right eye exhibits no discharge. Left eye exhibits no discharge. No scleral icterus.   Neck: Normal range of motion. Neck supple. No JVD present. No tracheal deviation present. No thyromegaly present.   Cardiovascular: Normal rate, regular rhythm, normal heart sounds and intact distal pulses.  Exam reveals no gallop and no friction rub.    No murmur heard.  Pulmonary/Chest: Effort normal and breath sounds normal. No stridor. No respiratory distress. She has no wheezes. She has no rales. She exhibits no tenderness.   Abdominal: Soft. Bowel sounds are normal. She exhibits no distension and no mass. There is no  tenderness. There is no rebound and no guarding. No hernia.   Musculoskeletal: Normal range of motion. She exhibits no edema, tenderness or deformity.   Lymphadenopathy:     She has no cervical adenopathy.   Neurological: She is alert and oriented to person, place, and time. She has normal reflexes. She displays normal reflexes. No cranial nerve deficit. She exhibits normal muscle tone. Coordination normal.   Skin: Skin is warm and dry. No rash noted. She is not diaphoretic. No erythema. No pallor.   Psychiatric: She has a normal mood and affect. Her behavior is normal. Judgment and thought content normal.   Nursing note and vitals reviewed.        Assessment/Plan   Diagnoses and all orders for this visit:    Hypothyroidism due to Hashimoto's thyroiditis  -     T3, Free  -     T4 & TSH ([facility])  -     T4, Free  -     Thyroglobulin With Anti-TG  -     Comprehensive Metabolic Panel  -     Comprehensive Thyroglobulin  -     Uric Acid  -     Vitamin D 25 Hydroxy  -     Lipid Panel  -     ACTH  -     Cortisol  -     Calcium, Ionized  -     Phosphorus  -     PTH, Intact  -     Magnesium    Mixed hyperlipidemia  -     T3, Free  -     T4 & TSH ([facility])  -     T4, Free  -     Thyroglobulin With Anti-TG  -     Comprehensive Metabolic Panel  -     Comprehensive Thyroglobulin  -     Uric Acid  -     Vitamin D 25 Hydroxy  -     Lipid Panel  -     ACTH  -     Cortisol  -     Calcium, Ionized  -     Phosphorus  -     PTH, Intact  -     Magnesium    Vitamin D deficiency  -     T3, Free  -     T4 & TSH ([facility])  -     T4, Free  -     Thyroglobulin With Anti-TG  -     Comprehensive Metabolic Panel  -     Comprehensive Thyroglobulin  -     Uric Acid  -     Vitamin D 25 Hydroxy  -     Lipid Panel  -     ACTH  -     Cortisol  -     Calcium, Ionized  -     Phosphorus  -     PTH, Intact  -     Magnesium    Hypercalcemia  -     T3, Free  -     T4 & TSH ([facility])  -     T4, Free  -     Thyroglobulin With Anti-TG  -     Comprehensive  Metabolic Panel  -     Comprehensive Thyroglobulin  -     Uric Acid  -     Vitamin D 25 Hydroxy  -     Lipid Panel  -     ACTH  -     Cortisol  -     Calcium, Ionized  -     Phosphorus  -     PTH, Intact  -     Magnesium               In summary I saw and examined this very bright and alert 89-year-old female.  I reviewed her laboratory evaluation of 07/24/2017 which shows her total calcium home of 10.4 and ionized calcium of 5.7 and a PTH of 23 with a phosphorus of 3.5.  Her TSH is also suppressed at 0.217 with a free T4 of 0.89 both of which are low.  Although this can be seen in patients who are on [place] thyroid we will go ahead and work her up for any pituitary problem that may have caused this.  Additionally since she is on calcium supplement I asked her to stop taking the calcium supplement so in future when we repeat her labs we will have a better idea of her hypercalcemia or absence of it.  I ask her to continue all her current prescriptions.  Once the results of her laboratory evaluation comes back we will contact her for any possible modifications or further evaluations.  She will see Ms. Paz Lozano in 6 months or sooner if needed with laboratory evaluation prior to each office visit.

## 2017-08-28 LAB
25(OH)D3+25(OH)D2 SERPL-MCNC: 28.2 NG/ML (ref 30–100)
ACTH PLAS-MCNC: 8.5 PG/ML (ref 7.2–63.3)
ALBUMIN SERPL-MCNC: 4 G/DL (ref 3.5–5.2)
ALBUMIN/GLOB SERPL: 1.4 G/DL
ALP SERPL-CCNC: 48 U/L (ref 39–117)
ALT SERPL-CCNC: 13 U/L (ref 1–33)
AST SERPL-CCNC: 18 U/L (ref 1–32)
BILIRUB SERPL-MCNC: 0.4 MG/DL (ref 0.1–1.2)
BUN SERPL-MCNC: 19 MG/DL (ref 8–23)
BUN/CREAT SERPL: 21.3 (ref 7–25)
CA-I SERPL ISE-MCNC: 5.4 MG/DL (ref 4.5–5.6)
CALCIUM SERPL-MCNC: 9.7 MG/DL (ref 8.2–9.6)
CHLORIDE SERPL-SCNC: 102 MMOL/L (ref 98–107)
CHOLEST SERPL-MCNC: 207 MG/DL (ref 0–200)
CO2 SERPL-SCNC: 23.1 MMOL/L (ref 22–29)
CORTIS SERPL-MCNC: 7 UG/DL
CREAT SERPL-MCNC: 0.89 MG/DL (ref 0.57–1)
GLOBULIN SER CALC-MCNC: 2.9 GM/DL
GLUCOSE SERPL-MCNC: 95 MG/DL (ref 65–99)
HDLC SERPL-MCNC: 61 MG/DL (ref 40–60)
LDLC SERPL CALC-MCNC: 122 MG/DL (ref 0–100)
MAGNESIUM SERPL-MCNC: 2.2 MG/DL (ref 1.7–2.3)
PHOSPHATE SERPL-MCNC: 3.4 MG/DL (ref 2.5–4.5)
POTASSIUM SERPL-SCNC: 4.7 MMOL/L (ref 3.5–5.2)
PROT SERPL-MCNC: 6.9 G/DL (ref 6–8.5)
PTH-INTACT SERPL-MCNC: 48 PG/ML (ref 15–65)
SODIUM SERPL-SCNC: 140 MMOL/L (ref 136–145)
T3FREE SERPL-MCNC: 4.1 PG/ML (ref 2–4.4)
T4 FREE SERPL-MCNC: 0.94 NG/DL (ref 0.93–1.7)
T4 SERPL-MCNC: 4.27 MCG/DL (ref 4.5–11.7)
THYROGLOB AB SERPL-ACNC: <1 IU/ML
THYROGLOB AB SERPL-ACNC: <1 IU/ML (ref 0–0.9)
THYROGLOB SERPL-MCNC: 0.9 NG/ML (ref 1.5–38.5)
THYROGLOB SERPL-MCNC: 1 NG/ML
THYROGLOB SERPL-MCNC: NORMAL NG/ML
TRIGL SERPL-MCNC: 119 MG/DL (ref 0–150)
TSH SERPL DL<=0.005 MIU/L-ACNC: 0.12 MIU/ML (ref 0.27–4.2)
URATE SERPL-MCNC: 4.6 MG/DL (ref 2.4–5.7)
VLDLC SERPL CALC-MCNC: 23.8 MG/DL (ref 5–40)

## 2017-08-28 RX ORDER — ERGOCALCIFEROL 1.25 MG/1
50000 CAPSULE ORAL WEEKLY
Qty: 13 CAPSULE | Refills: 3 | Status: SHIPPED | OUTPATIENT
Start: 2017-08-28 | End: 2018-11-12 | Stop reason: SDUPTHER

## 2017-09-15 ENCOUNTER — OFFICE VISIT (OUTPATIENT)
Dept: FAMILY MEDICINE CLINIC | Facility: CLINIC | Age: 82
End: 2017-09-15

## 2017-09-15 VITALS
HEART RATE: 77 BPM | DIASTOLIC BLOOD PRESSURE: 60 MMHG | RESPIRATION RATE: 16 BRPM | HEIGHT: 65 IN | TEMPERATURE: 98.6 F | BODY MASS INDEX: 24.16 KG/M2 | SYSTOLIC BLOOD PRESSURE: 120 MMHG | WEIGHT: 145 LBS | OXYGEN SATURATION: 98 %

## 2017-09-15 DIAGNOSIS — E83.52 HYPERCALCEMIA: ICD-10-CM

## 2017-09-15 DIAGNOSIS — N28.9 RENAL INSUFFICIENCY: ICD-10-CM

## 2017-09-15 DIAGNOSIS — D63.8 ANEMIA OF CHRONIC ILLNESS: Primary | ICD-10-CM

## 2017-09-15 DIAGNOSIS — G47.00 INSOMNIA, UNSPECIFIED TYPE: ICD-10-CM

## 2017-09-15 DIAGNOSIS — Z23 ENCOUNTER FOR IMMUNIZATION: ICD-10-CM

## 2017-09-15 DIAGNOSIS — D50.9 IRON DEFICIENCY ANEMIA, UNSPECIFIED IRON DEFICIENCY ANEMIA TYPE: ICD-10-CM

## 2017-09-15 PROCEDURE — G0008 ADMIN INFLUENZA VIRUS VAC: HCPCS | Performed by: PHYSICIAN ASSISTANT

## 2017-09-15 PROCEDURE — 90686 IIV4 VACC NO PRSV 0.5 ML IM: CPT | Performed by: PHYSICIAN ASSISTANT

## 2017-09-15 PROCEDURE — 99213 OFFICE O/P EST LOW 20 MIN: CPT | Performed by: PHYSICIAN ASSISTANT

## 2017-09-15 NOTE — PATIENT INSTRUCTIONS
Flu shot today  Labs again in Oct to check iron and the Calcium  Low glycemic index diet  Exercise 30 minutes most days of the week  Make sure you get results on any labs or tests we ordered today  We discussed medications and how to take them as prescribed  Sleep 6-8 hours each night if possible  If you have not signed up for [employer]Vhart, please activate your code ASAP from your After Visit Summary today    LDL goal <100  LDL goal if heart disease <70  HDL goal >60  Triglyceride goal <150  BP goal =<130/80  Fasting glucose <100    Stop Ambien if you develop excessive daytime drowsiness and/or sleep walking.  Avoid driving if drowsy.  Do not drink alcohol with this medication.  Ambien is a controlled substance and requires you to be seen for an appointment every 3 months for a medication check refill.  Use the lowest effective dose.

## 2017-09-15 NOTE — PROGRESS NOTES
Subjective   Kristyn Severino is a 89 y.o. female.     History of Present Illness   Kristyn Severino 89 y.o. female who presents today for f/u DR Olivo.  I sent her for low TSH and low free T4.   I was worried that TSH was low and not high as you would expect.  He made these notes:     In summary I saw and examined this very bright and alert 89-year-old female.  I reviewed her laboratory evaluation of 07/24/2017 which shows her total calcium home of 10.4 and ionized calcium of 5.7 and a PTH of 23 with a phosphorus of 3.5.  Her TSH is also suppressed at 0.217 with a free T4 of 0.89 both of which are low.  Although this can be seen in patients who are on [place] thyroid we will go ahead and work her up for any pituitary problem that may have caused this.  Additionally since she is on calcium supplement I asked her to stop taking the calcium supplement so in future when we repeat her labs we will have a better idea of her hypercalcemia or absence of it.  I ask her to continue all her current prescriptions.  Once the results of her laboratory evaluation comes back we will contact her for any possible modifications or further evaluations.  She will see MsDeven Paz Changmel in 6 months or sooner if needed with laboratory evaluation prior to each office visit.    He did additional labs to check on thyroid.  He also repeated ionized Ca+ and was normal this time.  I had her stop Aldactone several weeks prior to this appt to see if was effecting her Ca+.  He also says to stop OTC Ca  I agree that she should f/u 6mos    Last ionzed Ca+ was normal 8-24-17 at 5.4  Last free T4 was 0.94    I was also working on anemia and had her see GI:    Henry Maurer MD   Gastroenterology    Iron deficiency anemia, unspecified iron deficiency anemia type   Dx    Follow-up   Reason for visit    Progress Notes      Her Hb is up about 1 point since last check.     I would continue iron and follow up with PCP for repeat hemoglobin in 1  month               she has a history of   Patient Active Problem List   Diagnosis   • Anemia of chronic illness   • Hirsutism   • HLD (hyperlipidemia)   • Hypothyroidism   • Insomnia   • Osteoarthritis   • Renal insufficiency   • Seborrheic keratosis   • Vitamin D deficiency   • Hypercalcemia   • Iron deficiency anemia   .    Needs flu shot  I will have her continue the iron and per GI;  I will order labs to f/u in 2 mos:    She still has Ambien and just did refill  Kristyn A Maycol 89y.o. female presents for follow up of insomnia with onset of symptoms years ago. Patient describes symptoms as early morning awakening, frequent night time awakening, difficulty falling asleep and non-restful sleep. Patient has found no relief with prescription Trazodone, decreasing caffeine consumption, over the counter diphenhydramine and melatonin use. Associated symptoms include: fatigue if unable to take Rx . Patient denies history of addiction Symptoms have been well-controlled with taking current prescription medication.  The patient has failed multiple OTC medications for insomnia.  They are well controlled on current Rx and will continue to try to take Rx PRN.  She will use the lowest effective dose.  The patient has read and signed the [facility] Controlled Substance Contract.  I will continue to see patient for regular follow up appointments and be prescribed the lowest effective dose.  JAXON has been reviewed by me and is updated every 3 months. The patient is aware of the potential for addiction and dependence.  She denies that Ambien cause excessive daytime drowsiness and sleep walking.  She was very dizzy after taking Trazodone and no help.  The following portions of the patient's history were reviewed and updated as appropriate: allergies, current medications, past family history, past medical history, past social history, past surgical history and problem list.    Review of Systems   Constitutional: Negative for  activity change, appetite change and unexpected weight change.   HENT: Negative for nosebleeds and trouble swallowing.    Eyes: Negative for pain and visual disturbance.   Respiratory: Negative for chest tightness, shortness of breath and wheezing.    Cardiovascular: Negative for chest pain and palpitations.   Gastrointestinal: Negative for abdominal pain and blood in stool.   Endocrine: Negative.    Genitourinary: Negative for difficulty urinating and hematuria.   Musculoskeletal: Positive for back pain. Negative for joint swelling.   Skin: Negative for color change and rash.   Allergic/Immunologic: Negative.    Neurological: Positive for light-headedness. Negative for syncope and speech difficulty.   Hematological: Negative for adenopathy.   Psychiatric/Behavioral: Positive for sleep disturbance. Negative for agitation and confusion.   All other systems reviewed and are negative.      Objective   Physical Exam   Constitutional: She is oriented to person, place, and time. She appears well-developed and well-nourished. No distress.   HENT:   Head: Normocephalic and atraumatic.   Eyes: Conjunctivae and EOM are normal. Pupils are equal, round, and reactive to light. Right eye exhibits no discharge. Left eye exhibits no discharge. No scleral icterus.   Neck: Normal range of motion. Neck supple. No tracheal deviation present. No thyromegaly present.   Cardiovascular: Normal rate, regular rhythm, normal heart sounds, intact distal pulses and normal pulses.  Exam reveals no gallop.    No murmur heard.  Pulmonary/Chest: Effort normal and breath sounds normal. No respiratory distress. She has no wheezes. She has no rales.   Musculoskeletal: Normal range of motion.   Neurological: She is alert and oriented to person, place, and time. She exhibits normal muscle tone. Coordination normal.   Skin: Skin is warm. No rash noted. No erythema. No pallor.   Psychiatric: She has a normal mood and affect. Her behavior is normal.  Judgment and thought content normal.   Nursing note and vitals reviewed.      Assessment/Plan   Kristyn was seen today for hypothyroidism.    Diagnoses and all orders for this visit:    Anemia of chronic illness  -     Comprehensive Metabolic Panel; Future  -     Calcium, Ionized; Future  -     Ferritin; Future  -     CBC & Differential; Future  -     Iron Profile; Future    Iron deficiency anemia, unspecified iron deficiency anemia type  -     Comprehensive Metabolic Panel; Future  -     Calcium, Ionized; Future  -     Ferritin; Future  -     CBC & Differential; Future  -     Iron Profile; Future    Hypercalcemia  -     Comprehensive Metabolic Panel; Future  -     Calcium, Ionized; Future  -     Ferritin; Future  -     CBC & Differential; Future  -     Iron Profile; Future    Renal insufficiency  -     Comprehensive Metabolic Panel; Future  -     Calcium, Ionized; Future  -     Ferritin; Future  -     CBC & Differential; Future  -     Iron Profile; Future    Insomnia, unspecified type

## 2017-10-02 ENCOUNTER — RESULTS ENCOUNTER (OUTPATIENT)
Dept: FAMILY MEDICINE CLINIC | Facility: CLINIC | Age: 82
End: 2017-10-02

## 2017-10-02 DIAGNOSIS — N28.9 RENAL INSUFFICIENCY: ICD-10-CM

## 2017-10-02 DIAGNOSIS — E83.52 HYPERCALCEMIA: ICD-10-CM

## 2017-10-02 DIAGNOSIS — D63.8 ANEMIA OF CHRONIC ILLNESS: ICD-10-CM

## 2017-10-02 DIAGNOSIS — D50.9 IRON DEFICIENCY ANEMIA, UNSPECIFIED IRON DEFICIENCY ANEMIA TYPE: ICD-10-CM

## 2017-11-18 ENCOUNTER — TELEPHONE (OUTPATIENT)
Dept: FAMILY MEDICINE CLINIC | Facility: CLINIC | Age: 82
End: 2017-11-18

## 2017-11-18 NOTE — TELEPHONE ENCOUNTER
Pt has been having intermittent abdominal pains. Last night was worse. She has gi appointment. This is to work up anemia and weight change according to patient. Right now she is ok. I told her to go to er if sx worsen over the weekend.

## 2017-12-07 RX ORDER — ZOLPIDEM TARTRATE 5 MG/1
5 TABLET ORAL NIGHTLY PRN
Qty: 30 TABLET | Refills: 2 | Status: SHIPPED | OUTPATIENT
Start: 2017-12-07 | End: 2018-03-04 | Stop reason: SDUPTHER

## 2017-12-07 RX ORDER — ZOLPIDEM TARTRATE 5 MG/1
TABLET ORAL
Qty: 30 TABLET | Refills: 2 | OUTPATIENT
Start: 2017-12-07

## 2017-12-08 ENCOUNTER — OFFICE VISIT (OUTPATIENT)
Dept: FAMILY MEDICINE CLINIC | Facility: CLINIC | Age: 82
End: 2017-12-08

## 2017-12-08 VITALS
BODY MASS INDEX: 22.82 KG/M2 | RESPIRATION RATE: 16 BRPM | HEIGHT: 65 IN | OXYGEN SATURATION: 100 % | SYSTOLIC BLOOD PRESSURE: 120 MMHG | HEART RATE: 69 BPM | DIASTOLIC BLOOD PRESSURE: 72 MMHG | TEMPERATURE: 97.9 F | WEIGHT: 137 LBS

## 2017-12-08 DIAGNOSIS — D63.8 ANEMIA OF CHRONIC ILLNESS: ICD-10-CM

## 2017-12-08 DIAGNOSIS — R14.0 BLOATING SYMPTOM: ICD-10-CM

## 2017-12-08 DIAGNOSIS — N28.9 RENAL INSUFFICIENCY: Primary | ICD-10-CM

## 2017-12-08 DIAGNOSIS — R63.4 WEIGHT LOSS: ICD-10-CM

## 2017-12-08 DIAGNOSIS — G47.00 INSOMNIA, UNSPECIFIED TYPE: ICD-10-CM

## 2017-12-08 PROBLEM — E83.52 HYPERCALCEMIA: Status: RESOLVED | Noted: 2017-08-24 | Resolved: 2017-12-08

## 2017-12-08 PROCEDURE — 71020 XR CHEST PA AND LATERAL: CPT | Performed by: PHYSICIAN ASSISTANT

## 2017-12-08 PROCEDURE — G0438 PPPS, INITIAL VISIT: HCPCS | Performed by: PHYSICIAN ASSISTANT

## 2017-12-08 PROCEDURE — 99213 OFFICE O/P EST LOW 20 MIN: CPT | Performed by: PHYSICIAN ASSISTANT

## 2017-12-08 RX ORDER — SIMETHICONE 125 MG
125 TABLET,CHEWABLE ORAL EVERY 6 HOURS PRN
COMMUNITY
End: 2018-10-17 | Stop reason: HOSPADM

## 2017-12-08 NOTE — PROGRESS NOTES
Subjective   Kristyn Severino is a 89 y.o. female.     History of Present Illness   Kristyn Severino 89 y.o. female who presents today for further weight loss and refill meds.  She has been having abd bloating and has appt Tues with GI.  She had CT abd and pelvis 1-23-17 and no acute issues.   Had mammo 8-8-17 and neg.  I will do CXR today to check.  I will make sure labs are up to date.  she has a history of   Patient Active Problem List   Diagnosis   • Anemia of chronic illness   • Hirsutism   • HLD (hyperlipidemia)   • Hypothyroidism   • Insomnia   • Osteoarthritis   • Renal insufficiency   • Seborrheic keratosis   • Vitamin D deficiency   .  X-Ray  Interpretation report in house X-rays that I personally viewed    Relevant Clinical Issues/Diagnoses/Indications:  Weight loss        Clinical Findings:  No mass or cardiomegaly          Comparative Data:  none          Date of Previous X-ray:  Change on current X-ray    I have her seeing DR Olivo for thyroid and did labs  Lab Results   Component Value Date    BUN 19 08/24/2017    CREATININE 0.89 08/24/2017    EGFRIFNONA 59 (L) 08/24/2017    EGFRIFAFRI 72 08/24/2017    BCR 21.3 08/24/2017    K 4.7 08/24/2017    CO2 23.1 08/24/2017    CALCIUM 9.7 (H) 08/24/2017    PROTENTOTREF 6.9 08/24/2017    ALBUMIN 4.00 08/24/2017    LABIL2 1.4 08/24/2017    AST 18 08/24/2017    ALT 13 08/24/2017     Lab Results   Component Value Date    WBC 6.22 07/24/2017    HGB 11.2 (L) 07/24/2017    HCT 35.5 (L) 07/24/2017    MCV 98.6 (H) 07/24/2017     07/24/2017     Lab Results   Component Value Date    TSH 0.116 (L) 08/24/2017       Her ionized Ca+ was normal per Dr Olivo lab;  Stopped her Aldactone prior to this.    Kristyn Severino 89y.o. female presents for follow up of insomnia with onset of symptoms years ago. Patient describes symptoms as early morning awakening, frequent night time awakening, difficulty falling asleep and non-restful sleep. Patient has found no relief with  prescription Trazodone, decreasing caffeine consumption, over the counter diphenhydramine and melatonin use. Associated symptoms include: fatigue if unable to take Rx . Patient denies history of addiction Symptoms have been well-controlled with taking current prescription medication.  The patient has failed multiple OTC medications for insomnia.  They are well controlled on current Rx and will continue to try to take Rx PRN.  She will use the lowest effective dose.  The patient has read and signed the [facility] Controlled Substance Contract.  I will continue to see patient for regular follow up appointments and be prescribed the lowest effective dose.  JAXON has been reviewed by me and is updated every 3 months. The patient is aware of the potential for addiction and dependence.  She denies that Ambien cause excessive daytime drowsiness and sleep walking.  She was very dizzy after taking Trazodone and no help.  I will have her try Ambien at 2.5mg and watch risk of fall    The following portions of the patient's history were reviewed and updated as appropriate: allergies, current medications, past family history, past medical history, past social history, past surgical history and problem list.    Review of Systems   Constitutional: Negative for activity change, appetite change and unexpected weight change.   HENT: Negative for nosebleeds and trouble swallowing.    Eyes: Negative for pain and visual disturbance.   Respiratory: Negative for chest tightness, shortness of breath and wheezing.    Cardiovascular: Negative for chest pain and palpitations.   Gastrointestinal: Positive for abdominal pain. Negative for blood in stool.   Endocrine: Negative.    Genitourinary: Negative for difficulty urinating and hematuria.   Musculoskeletal: Negative for joint swelling.   Skin: Negative for color change and rash.   Allergic/Immunologic: Negative.    Neurological: Negative for syncope and speech difficulty.    Hematological: Negative for adenopathy.   Psychiatric/Behavioral: Negative for agitation and confusion.   All other systems reviewed and are negative.      Objective   Physical Exam   Constitutional: She is oriented to person, place, and time. She appears well-developed and well-nourished. No distress.   HENT:   Head: Normocephalic and atraumatic.   Eyes: Conjunctivae and EOM are normal. Pupils are equal, round, and reactive to light. Right eye exhibits no discharge. Left eye exhibits no discharge. No scleral icterus.   Neck: Normal range of motion. Neck supple. No tracheal deviation present. No thyromegaly present.   Cardiovascular: Normal rate, regular rhythm, normal heart sounds, intact distal pulses and normal pulses.  Exam reveals no gallop.    No murmur heard.  Pulmonary/Chest: Effort normal and breath sounds normal. No respiratory distress. She has no wheezes. She has no rales.   Musculoskeletal: Normal range of motion.   Neurological: She is alert and oriented to person, place, and time. She exhibits normal muscle tone. Coordination normal.   Skin: Skin is warm. No rash noted. No erythema. No pallor.   Psychiatric: She has a normal mood and affect. Her behavior is normal. Judgment and thought content normal.   Nursing note and vitals reviewed.      Assessment/Plan   Problems Addressed this Visit        Hematopoietic and Hemostatic    Anemia of chronic illness       Other    Insomnia    Renal insufficiency - Primary      Other Visit Diagnoses     Weight loss        Relevant Orders    XR Chest PA & Lateral    Bloating symptom                    I have reviewed the notes, assessments, and/or procedures performed by Cary Espinal PA-C, I concur with her/his documentation of Kristyn Severino.

## 2017-12-08 NOTE — PATIENT INSTRUCTIONS
Low glycemic index diet  Exercise 30 minutes most days of the week  Make sure you get results on any labs or tests we ordered today  We discussed medications and how to take them as prescribed  Sleep 6-8 hours each night if possible  If you have not signed up for [employer], please activate your code ASAP from your After Visit Summary today    LDL goal <100  LDL goal if heart disease <70  HDL goal >60  Triglyceride goal <150  BP goal =<130/80  Fasting glucose <100    'Stop Ambien if you develop excessive daytime drowsiness and/or sleep walking.  Avoid driving if drowsy.  Do not drink alcohol with this medication.  Ambien is a controlled substance and requires you to be seen for an appointment every 3 months for a medication check refill.  Use the lowest effective dose.  \  See GI    Exercising to Stay Healthy  Exercising regularly is important. It has many health benefits, such as:  · Improving your overall fitness, flexibility, and endurance.  · Increasing your bone density.  · Helping with weight control.  · Decreasing your body fat.  · Increasing your muscle strength.  · Reducing stress and tension.  · Improving your overall health.  In order to become healthy and stay healthy, it is recommended that you do moderate-intensity and vigorous-intensity exercise. You can tell that you are exercising at a moderate intensity if you have a higher heart rate and faster breathing, but you are still able to hold a conversation. You can tell that you are exercising at a vigorous intensity if you are breathing much harder and faster and cannot hold a conversation while exercising.  HOW OFTEN SHOULD I EXERCISE?  Choose an activity that you enjoy and set realistic goals. Your health care provider can help you to make an activity plan that works for you. Exercise regularly as directed by your health care provider. This may include:   · Doing resistance training twice each week, such as:    Push-ups.    Sit-ups.    Lifting  weights.    Using resistance bands.  · Doing a given intensity of exercise for a given amount of time. Choose from these options:    150 minutes of moderate-intensity exercise every week.    75 minutes of vigorous-intensity exercise every week.    A mix of moderate-intensity and vigorous-intensity exercise every week.  Children, pregnant women, people who are out of shape, people who are overweight, and older adults may need to consult a health care provider for individual recommendations. If you have any sort of medical condition, be sure to consult your health care provider before starting a new exercise program.   WHAT ARE SOME EXERCISE IDEAS?  Some moderate-intensity exercise ideas include:   · Walking at a rate of 1 mile in 15 minutes.  · Biking.  · Hiking.  · Golfing.  · Dancing.  Some vigorous-intensity exercise ideas include:   · Walking at a rate of at least 4.5 miles per hour.  · Jogging or running at a rate of 5 miles per hour.  · Biking at a rate of at least 10 miles per hour.  · Lap swimming.  · Roller-skating or in-line skating.  · Cross-country skiing.  · Vigorous competitive sports, such as football, basketball, and soccer.  · Jumping rope.  · Aerobic dancing.  WHAT ARE SOME EVERYDAY ACTIVITIES THAT CAN HELP ME TO GET EXERCISE?  · Yard work, such as:    Pushing a .    Raking and bagging leaves.  · Washing and waxing your car.  · Pushing a stroller.  · Shoveling snow.  · Gardening.  · Washing windows or floors.  HOW CAN I BE MORE ACTIVE IN MY DAY-TO-DAY ACTIVITIES?  · Use the stairs instead of the elevator.  · Take a walk during your lunch break.  · If you drive, park your car farther away from work or school.  · If you take public transportation, get off one stop early and walk the rest of the way.  · Make all of your phone calls while standing up and walking around.  · Get up, stretch, and walk around every 30 minutes throughout the day.  WHAT GUIDELINES SHOULD I FOLLOW WHILE  EXERCISING?  · Do not exercise so much that you hurt yourself, feel dizzy, or get very short of breath.  · Consult your health care provider before starting a new exercise program.  · Wear comfortable clothes and shoes with good support.  · Drink plenty of water while you exercise to prevent dehydration or heat stroke. Body water is lost during exercise and must be replaced.  · Work out until you breathe faster and your heart beats faster.     This information is not intended to replace advice given to you by your health care provider. Make sure you discuss any questions you have with your health care provider.     Document Released: 01/20/2012 Document Revised: 01/08/2016 Document Reviewed: 05/21/2015  [email or web address] Interactive Patient Education ©2017 [email or web address]      Fall Prevention in the Home   Falls can cause injuries. They can happen to people of all ages. There are many things you can do to make your home safe and to help prevent falls.   WHAT CAN I DO ON THE OUTSIDE OF MY HOME?  · Regularly fix the edges of walkways and driveways and fix any cracks.  · Remove anything that might make you trip as you walk through a door, such as a raised step or threshold.  · Trim any bushes or trees on the path to your home.  · Use bright outdoor lighting.  · Clear any walking paths of anything that might make someone trip, such as rocks or tools.  · Regularly check to see if handrails are loose or broken. Make sure that both sides of any steps have handrails.  · Any raised decks and porches should have guardrails on the edges.  · Have any leaves, snow, or ice cleared regularly.  · Use sand or salt on walking paths during winter.  · Clean up any spills in your garage right away. This includes oil or grease spills.  WHAT CAN I DO IN THE BATHROOM?   · Use night lights.  · Install grab bars by the toilet and in the tub and shower. Do not use towel bars as grab bars.  · Use non-skid mats or decals in the tub or shower.  · If you  need to sit down in the shower, use a plastic, non-slip stool.  · Keep the floor dry. Clean up any water that spills on the floor as soon as it happens.  · Remove soap buildup in the tub or shower regularly.  · Attach bath mats securely with double-sided non-slip rug tape.  · Do not have throw rugs and other things on the floor that can make you trip.  WHAT CAN I DO IN THE BEDROOM?  · Use night lights.  · Make sure that you have a light by your bed that is easy to reach.  · Do not use any sheets or blankets that are too big for your bed. They should not hang down onto the floor.  · Have a firm chair that has side arms. You can use this for support while you get dressed.  · Do not have throw rugs and other things on the floor that can make you trip.  WHAT CAN I DO IN THE KITCHEN?  · Clean up any spills right away.  · Avoid walking on wet floors.  · Keep items that you use a lot in easy-to-reach places.  · If you need to reach something above you, use a strong step stool that has a grab bar.  · Keep electrical cords out of the way.  · Do not use floor polish or wax that makes floors slippery. If you must use wax, use non-skid floor wax.  · Do not have throw rugs and other things on the floor that can make you trip.  WHAT CAN I DO WITH MY STAIRS?  · Do not leave any items on the stairs.  · Make sure that there are handrails on both sides of the stairs and use them. Fix handrails that are broken or loose. Make sure that handrails are as long as the stairways.  · Check any carpeting to make sure that it is firmly attached to the stairs. Fix any carpet that is loose or worn.  · Avoid having throw rugs at the top or bottom of the stairs. If you do have throw rugs, attach them to the floor with carpet tape.  · Make sure that you have a light switch at the top of the stairs and the bottom of the stairs. If you do not have them, ask someone to add them for you.  WHAT ELSE CAN I DO TO HELP PREVENT FALLS?  · Wear shoes that:     Do not have high heels.    Have rubber bottoms.    Are comfortable and fit you well.    Are closed at the toe. Do not wear sandals.  · If you use a stepladder:    Make sure that it is fully opened. Do not climb a closed stepladder.    Make sure that both sides of the stepladder are locked into place.    Ask someone to hold it for you, if possible.  · Clearly rasheeda and make sure that you can see:    Any grab bars or handrails.    First and last steps.    Where the edge of each step is.  · Use tools that help you move around (mobility aids) if they are needed. These include:    Canes.    Walkers.    Scooters.    Crutches.  · Turn on the lights when you go into a dark area. Replace any light bulbs as soon as they burn out.  · Set up your furniture so you have a clear path. Avoid moving your furniture around.  · If any of your floors are uneven, fix them.  · If there are any pets around you, be aware of where they are.  · Review your medicines with your doctor. Some medicines can make you feel dizzy. This can increase your chance of falling.  Ask your doctor what other things that you can do to help prevent falls.     This information is not intended to replace advice given to you by your health care provider. Make sure you discuss any questions you have with your health care provider.     Document Released: 10/14/2010 Document Revised: 05/03/2016 Document Reviewed: 01/22/2016  [employer] Interactive Patient Education ©2017 [employer]

## 2017-12-08 NOTE — PROGRESS NOTES
QUICK REFERENCE INFORMATION:  The ABCs of the Annual Wellness Visit    Subsequent Medicare Wellness Visit    HEALTH RISK ASSESSMENT    9/7/1924    Recent Hospitalizations:  No hospitalization(s) within the last year..        Current Medical Providers:  Patient Care Team:  Cary Espinal PA-C as PCP - General (Family Medicine)  Henry Maurer MD as Consulting Physician (Gastroenterology)  Marty Gold MD as Consulting Physician (Orthopedic Surgery)  Yumiko Olivo MD as Consulting Physician (Endocrinology)  Viktor Melgar MD as Consulting Physician (Cardiology)        Smoking Status:  History   Smoking Status   • Never Smoker   Smokeless Tobacco   • Never Used       Alcohol Consumption:  History   Alcohol Use   • Yes       Depression Screen:   PHQ-2/PHQ-9 Depression Screening 12/8/2017   Little interest or pleasure in doing things 0   Feeling down, depressed, or hopeless 0   Total Score 0       Health Habits and Functional and Cognitive Screening:  Functional & Cognitive Status 12/8/2017   Do you have difficulty preparing food and eating? No   Do you have difficulty bathing yourself, getting dressed or grooming yourself? No   Do you have difficulty using the toilet? No   Do you have difficulty moving around from place to place? No   Do you have trouble with steps or getting out of a bed or a chair? No   In the past year have you fallen or experienced a near fall? No   Current Diet Well Balanced Diet   Dental Exam Up to date   Eye Exam Up to date   Exercise (times per week) 2 times per week   Current Exercise Activities Include Walking   Do you need help using the phone?  No   Are you deaf or do you have serious difficulty hearing?  No   Do you need help with transportation? No   Do you need help shopping? No   Do you need help preparing meals?  No   Do you need help with housework?  Yes   Do you need help with laundry? No   Do you need help taking your medications? No   Do you need help managing  money? No   Have you felt unusual stress, anger or loneliness in the last month? No   Who do you live with? Alone   If you need help, do you have trouble finding someone available to you? Yes   Have you been bothered in the last four weeks by sexual problems? No   Do you have difficulty concentrating, remembering or making decisions? No           Does the patient have evidence of cognitive impairment? No    Aspirin use counseling: Does not need ASA (and currently is not on it)      Recent Lab Results:  CMP:  Lab Results   Component Value Date    GLU 95 08/24/2017    BUN 19 08/24/2017    CREATININE 0.89 08/24/2017    EGFRIFNONA 59 (L) 08/24/2017    EGFRIFAFRI 72 08/24/2017    BCR 21.3 08/24/2017     08/24/2017    K 4.7 08/24/2017    CO2 23.1 08/24/2017    CALCIUM 9.7 (H) 08/24/2017    PROTENTOTREF 6.9 08/24/2017    ALBUMIN 4.00 08/24/2017    LABGLOBREF 2.9 08/24/2017    LABIL2 1.4 08/24/2017    BILITOT 0.4 08/24/2017    ALKPHOS 48 08/24/2017    AST 18 08/24/2017    ALT 13 08/24/2017     Lipid Panel:  Lab Results   Component Value Date    TRIG 119 08/24/2017    HDL 61 (H) 08/24/2017    VLDL 23.8 08/24/2017     HbA1c:  Lab Results   Component Value Date    HGBA1C 5.00 04/28/2017       Visual Acuity:  No exam data present    Age-appropriate Screening Schedule:  Refer to the list below for future screening recommendations based on patient's age, sex and/or medical conditions. Orders for these recommended tests are listed in the plan section. The patient has been provided with a written plan.    Health Maintenance   Topic Date Due   • PNEUMOCOCCAL VACCINES (65+ LOW/MEDIUM RISK) (2 of 2 - PPSV23) 08/01/2017   • MAMMOGRAM  08/09/2018   • LIPID PANEL  08/24/2018   • DXA SCAN  04/28/2019   • TDAP/TD VACCINES (2 - Td) 08/01/2026   • INFLUENZA VACCINE  Addressed   • ZOSTER VACCINE  Addressed        Subjective   History of Present Illness    Kristyn Severino is a 89 y.o. female who presents for an Subsequent Wellness  Visit.    The following portions of the patient's history were reviewed and updated as appropriate: allergies, current medications, past family history, past medical history, past social history, past surgical history and problem list.    Outpatient Medications Prior to Visit   Medication Sig Dispense Refill   • aspirin 81 MG EC tablet Take 81 mg by mouth daily.     • Biotin 1 MG capsule Take by mouth.     • ergocalciferol (DRISDOL) 94032 units capsule Take 1 capsule by mouth 1 (One) Time Per Week. 13 capsule 3   • ezetimibe (ZETIA) 10 MG tablet Take 1 tablet by mouth Daily. For cholesterol 90 tablet 3   • ferrous sulfate 325 (65 FE) MG tablet TWO PO daily with food for anemia 60 tablet 5   • folic acid (FOLVITE) 400 MCG tablet Take 400 mcg by mouth Daily.     • glucosamine-chondroitin 500-400 MG per tablet Take 1 tablet by mouth 3 (three) times a day.     • Lactobacillus (ACIDOPHILUS PO) Take  by mouth.     • Multiple Vitamins-Minerals (MULTIVITAMIN WITH MINERALS) tablet Take 1 tablet by mouth daily.     • Pyridoxine HCl (VITAMIN B-6 PO) Take 100 mg by mouth daily.     • Thyroid (ARMOUR THYROID) 90 MG PO tablet Take 1 tablet by mouth Daily. For thyroid (new dose) 90 tablet 3   • zolpidem (AMBIEN) 5 MG tablet Take 1 tablet by mouth At Night As Needed for Sleep. 1/2 -1 tabs PO Q HS PRN insomnia 30 tablet 2     No facility-administered medications prior to visit.        Patient Active Problem List   Diagnosis   • Anemia of chronic illness   • Hirsutism   • HLD (hyperlipidemia)   • Hypothyroidism   • Insomnia   • Osteoarthritis   • Renal insufficiency   • Seborrheic keratosis   • Vitamin D deficiency       Advance Care Planning:  has an advance directive - a copy HAS NOT been provided. Have asked the patient to send this to us to add to record.    Identification of Risk Factors:  Risk factors include: hearing limitations.    Review of Systems    Compared to one year ago, the patient feels her physical health is the  "same.  Compared to one year ago, the patient feels her mental health is the same.    Objective     Physical Exam    Vitals:    12/08/17 0943   BP: 120/72   BP Location: Left arm   Patient Position: Sitting   Cuff Size: Large Adult   Pulse: 69   Resp: 16   Temp: 97.9 °F (36.6 °C)   TempSrc: Oral   SpO2: 100%   Weight: 62.1 kg (137 lb)   Height: 165.1 cm (65\")   PainSc: 0-No pain       Body mass index is 22.8 kg/(m^2).  Discussed the patient's BMI with her. BMI is within normal parameters. No follow-up required.    Assessment/Plan   Patient Self-Management and Personalized Health Advice  The patient has been provided with information about: diet, exercise and fall prevention and preventive services including:   · age is 89 and up to her.    Visit Diagnoses:    ICD-10-CM ICD-9-CM   1. Renal insufficiency N28.9 593.9   2. Anemia of chronic illness D63.8 285.29   3. Insomnia, unspecified type G47.00 780.52   4. Weight loss R63.4 783.21   5. Bloating symptom R14.0 787.3       Orders Placed This Encounter   Procedures   • XR Chest PA & Lateral     Order Specific Question:   Reason for Exam:     Answer:   weight loss       Outpatient Encounter Prescriptions as of 12/8/2017   Medication Sig Dispense Refill   • aspirin 81 MG EC tablet Take 81 mg by mouth daily.     • Biotin 1 MG capsule Take by mouth.     • ergocalciferol (DRISDOL) 68178 units capsule Take 1 capsule by mouth 1 (One) Time Per Week. 13 capsule 3   • ezetimibe (ZETIA) 10 MG tablet Take 1 tablet by mouth Daily. For cholesterol 90 tablet 3   • ferrous sulfate 325 (65 FE) MG tablet TWO PO daily with food for anemia 60 tablet 5   • folic acid (FOLVITE) 400 MCG tablet Take 400 mcg by mouth Daily.     • glucosamine-chondroitin 500-400 MG per tablet Take 1 tablet by mouth 3 (three) times a day.     • Lactobacillus (ACIDOPHILUS PO) Take  by mouth.     • Multiple Vitamins-Minerals (MULTIVITAMIN WITH MINERALS) tablet Take 1 tablet by mouth daily.     • Pyridoxine HCl " (VITAMIN B-6 PO) Take 100 mg by mouth daily.     • simethicone (MYLICON) 125 MG chewable tablet Chew 125 mg Every 6 (Six) Hours As Needed for Flatulence.     • Thyroid (ARMOUR THYROID) 90 MG PO tablet Take 1 tablet by mouth Daily. For thyroid (new dose) 90 tablet 3   • zolpidem (AMBIEN) 5 MG tablet Take 1 tablet by mouth At Night As Needed for Sleep. 1/2 -1 tabs PO Q HS PRN insomnia 30 tablet 2     No facility-administered encounter medications on file as of 12/8/2017.        Reviewed use of high risk medication in the elderly: yes  Reviewed for potential of harmful drug interactions in the elderly: yes    Follow Up:  No Follow-up on file.     An After Visit Summary and PPPS with all of these plans were given to the patient.

## 2017-12-09 LAB
ALBUMIN SERPL-MCNC: 4 G/DL (ref 3.5–5.2)
ALBUMIN/GLOB SERPL: 1.3 G/DL
ALP SERPL-CCNC: 54 U/L (ref 39–117)
ALT SERPL-CCNC: 8 U/L (ref 1–33)
AST SERPL-CCNC: 14 U/L (ref 1–32)
BASOPHILS # BLD AUTO: 0.04 10*3/MM3 (ref 0–0.2)
BASOPHILS NFR BLD AUTO: 0.6 % (ref 0–1.5)
BILIRUB SERPL-MCNC: 0.3 MG/DL (ref 0.1–1.2)
BUN SERPL-MCNC: 23 MG/DL (ref 8–23)
BUN/CREAT SERPL: 26.1 (ref 7–25)
CA-I SERPL ISE-MCNC: 5.5 MG/DL (ref 4.5–5.6)
CALCIUM SERPL-MCNC: 9.9 MG/DL (ref 8.2–9.6)
CHLORIDE SERPL-SCNC: 101 MMOL/L (ref 98–107)
CO2 SERPL-SCNC: 25 MMOL/L (ref 22–29)
CREAT SERPL-MCNC: 0.88 MG/DL (ref 0.57–1)
EOSINOPHIL # BLD AUTO: 0.17 10*3/MM3 (ref 0–0.7)
EOSINOPHIL NFR BLD AUTO: 2.4 % (ref 0.3–6.2)
ERYTHROCYTE [DISTWIDTH] IN BLOOD BY AUTOMATED COUNT: 14.3 % (ref 11.7–13)
FERRITIN SERPL-MCNC: 28.51 NG/ML (ref 13–150)
GFR SERPLBLD CREATININE-BSD FMLA CKD-EPI: 60 ML/MIN/1.73
GFR SERPLBLD CREATININE-BSD FMLA CKD-EPI: 73 ML/MIN/1.73
GLOBULIN SER CALC-MCNC: 3.2 GM/DL
GLUCOSE SERPL-MCNC: 93 MG/DL (ref 65–99)
HCT VFR BLD AUTO: 33.6 % (ref 35.6–45.5)
HGB BLD-MCNC: 10.8 G/DL (ref 11.9–15.5)
IMM GRANULOCYTES # BLD: 0.02 10*3/MM3 (ref 0–0.03)
IMM GRANULOCYTES NFR BLD: 0.3 % (ref 0–0.5)
IRON SATN MFR SERPL: 11 % (ref 20–50)
IRON SERPL-MCNC: 49 MCG/DL (ref 37–145)
LYMPHOCYTES # BLD AUTO: 2.61 10*3/MM3 (ref 0.9–4.8)
LYMPHOCYTES NFR BLD AUTO: 36.4 % (ref 19.6–45.3)
MCH RBC QN AUTO: 30.3 PG (ref 26.9–32)
MCHC RBC AUTO-ENTMCNC: 32.1 G/DL (ref 32.4–36.3)
MCV RBC AUTO: 94.4 FL (ref 80.5–98.2)
MONOCYTES # BLD AUTO: 0.72 10*3/MM3 (ref 0.2–1.2)
MONOCYTES NFR BLD AUTO: 10 % (ref 5–12)
NEUTROPHILS # BLD AUTO: 3.61 10*3/MM3 (ref 1.9–8.1)
NEUTROPHILS NFR BLD AUTO: 50.3 % (ref 42.7–76)
PLATELET # BLD AUTO: 409 10*3/MM3 (ref 140–500)
POTASSIUM SERPL-SCNC: 4.3 MMOL/L (ref 3.5–5.2)
PROT SERPL-MCNC: 7.2 G/DL (ref 6–8.5)
RBC # BLD AUTO: 3.56 10*6/MM3 (ref 3.9–5.2)
SODIUM SERPL-SCNC: 139 MMOL/L (ref 136–145)
TIBC SERPL-MCNC: 445 MCG/DL
UIBC SERPL-MCNC: 396 MCG/DL
WBC # BLD AUTO: 7.17 10*3/MM3 (ref 4.5–10.7)

## 2017-12-11 ENCOUNTER — OFFICE VISIT (OUTPATIENT)
Dept: GASTROENTEROLOGY | Facility: CLINIC | Age: 82
End: 2017-12-11

## 2017-12-11 VITALS
TEMPERATURE: 97.8 F | DIASTOLIC BLOOD PRESSURE: 72 MMHG | WEIGHT: 139 LBS | SYSTOLIC BLOOD PRESSURE: 124 MMHG | BODY MASS INDEX: 23.16 KG/M2 | HEIGHT: 65 IN

## 2017-12-11 DIAGNOSIS — R14.1 GAS PAIN: ICD-10-CM

## 2017-12-11 DIAGNOSIS — D63.8 ANEMIA OF CHRONIC ILLNESS: Primary | ICD-10-CM

## 2017-12-11 PROCEDURE — 99213 OFFICE O/P EST LOW 20 MIN: CPT | Performed by: INTERNAL MEDICINE

## 2017-12-11 NOTE — PROGRESS NOTES
Chief Complaint   Patient presents with   • Follow-up     Labs results   • Gas     Subjective     HPI     Kristyn Severino is a 89 y.o. female who presents today for follow up.  Kristyn was seen earlier this year for evaluation of anemia.  She had FOBT testing which was negative and a CT scan of the abdomen and pelvis which was unremarkable.  She remains on oral iron supplementation although her iron indices are more suggestive of anemia of chronic disease.  Her hemoglobin has been relatively stable over this period of time.  She does report that she had a few weeks of some abdominal bloating and cramping as was typically in the evening and sometimes woke her from sleep.  She began taking as needed simethicone and has not really had any symptoms in the last 1-2 weeks.  Her weight tends to fluctuate but does appear to be up a few pounds from her last visit.  She reports her appetite is overall intact.    Past Medical History:   Diagnosis Date   • Anemia of chronic illness    • PALACIOS (dyspnea on exertion)    • Fatigue    • Hirsutism    • History of bone density study     few years; normal   • History of colonoscopy     never   • HLD (hyperlipidemia)    • Hypothyroidism    • Insomnia    • Lightheadedness    • Osteoarthritis    • Postmenopausal    • Renal insufficiency    • Rhinorrhea    • Seborrheic keratosis    • Vitamin D deficiency        Social History     Social History   • Marital status:      Spouse name: N/A   • Number of children: N/A   • Years of education: N/A     Social History Main Topics   • Smoking status: Never Smoker   • Smokeless tobacco: Never Used   • Alcohol use Yes   • Drug use: No   • Sexual activity: Defer     Other Topics Concern   • None     Social History Narrative         Current Outpatient Prescriptions:   •  aspirin 81 MG EC tablet, Take 81 mg by mouth daily., Disp: , Rfl:   •  Biotin 1 MG capsule, Take by mouth., Disp: , Rfl:   •  ergocalciferol (DRISDOL) 45018 units capsule, Take 1  capsule by mouth 1 (One) Time Per Week., Disp: 13 capsule, Rfl: 3  •  ezetimibe (ZETIA) 10 MG tablet, Take 1 tablet by mouth Daily. For cholesterol, Disp: 90 tablet, Rfl: 3  •  ferrous sulfate 325 (65 FE) MG tablet, TWO PO daily with food for anemia, Disp: 60 tablet, Rfl: 5  •  folic acid (FOLVITE) 400 MCG tablet, Take 400 mcg by mouth Daily., Disp: , Rfl:   •  glucosamine-chondroitin 500-400 MG per tablet, Take 1 tablet by mouth 3 (three) times a day., Disp: , Rfl:   •  Lactobacillus (ACIDOPHILUS PO), Take  by mouth., Disp: , Rfl:   •  Multiple Vitamins-Minerals (MULTIVITAMIN WITH MINERALS) tablet, Take 1 tablet by mouth daily., Disp: , Rfl:   •  Pyridoxine HCl (VITAMIN B-6 PO), Take 100 mg by mouth daily., Disp: , Rfl:   •  simethicone (MYLICON) 125 MG chewable tablet, Chew 125 mg Every 6 (Six) Hours As Needed for Flatulence., Disp: , Rfl:   •  Thyroid (ARMOUR THYROID) 90 MG PO tablet, Take 1 tablet by mouth Daily. For thyroid (new dose), Disp: 90 tablet, Rfl: 3  •  zolpidem (AMBIEN) 5 MG tablet, Take 1 tablet by mouth At Night As Needed for Sleep. 1/2 -1 tabs PO Q HS PRN insomnia, Disp: 30 tablet, Rfl: 2    Review of Systems   Constitutional: Negative.    Respiratory: Negative.    Cardiovascular: Negative.    Gastrointestinal: Positive for abdominal pain.       Objective   Vitals:    12/11/17 1330   BP: 124/72   Temp: 97.8 °F (36.6 °C)       Physical Exam   Constitutional: She is oriented to person, place, and time. She appears well-developed and well-nourished.   Elderly WF, appears younger than stated age   HENT:   Head: Normocephalic and atraumatic.   Abdominal: Soft. Bowel sounds are normal. She exhibits no distension and no mass. There is no tenderness. No hernia.   Neurological: She is alert and oriented to person, place, and time.   Skin: Skin is warm and dry.   Psychiatric: She has a normal mood and affect. Her behavior is normal. Judgment and thought content normal.   Vitals  reviewed.      Assessment/Plan   Assessment:     1. Anemia of chronic illness    2. Gas pain        Plan:   Continue as needed simethicone  Trial of IBgard  Continue oral iron supplement although clinical picture is more of AoCD.      She can follow up as needed.  We again discussed the topic of endoscopic evaluation but pt does not wish to undergo any invasive procedures at her advanced age, and I agree with this conservative approach.          Henry Maurer M.D.  [facility]  [address] [place] [address]  Office: [phone]

## 2018-01-17 DIAGNOSIS — D63.8 ANEMIA OF CHRONIC ILLNESS: ICD-10-CM

## 2018-01-17 RX ORDER — FERROUS SULFATE 325(65) MG
TABLET ORAL
Qty: 60 TABLET | Refills: 5 | Status: SHIPPED | OUTPATIENT
Start: 2018-01-17 | End: 2018-06-13 | Stop reason: SDUPTHER

## 2018-02-19 DIAGNOSIS — E03.8 HYPOTHYROIDISM DUE TO HASHIMOTO'S THYROIDITIS: ICD-10-CM

## 2018-02-19 DIAGNOSIS — E55.9 VITAMIN D DEFICIENCY: ICD-10-CM

## 2018-02-19 DIAGNOSIS — E06.3 HYPOTHYROIDISM DUE TO HASHIMOTO'S THYROIDITIS: ICD-10-CM

## 2018-02-19 DIAGNOSIS — E78.2 MIXED HYPERLIPIDEMIA: Primary | ICD-10-CM

## 2018-03-02 RX ORDER — ZOLPIDEM TARTRATE 5 MG/1
TABLET ORAL
Qty: 30 TABLET | Refills: 2 | OUTPATIENT
Start: 2018-03-02

## 2018-03-04 RX ORDER — ZOLPIDEM TARTRATE 5 MG/1
5 TABLET ORAL NIGHTLY PRN
Qty: 30 TABLET | Refills: 2 | Status: SHIPPED | OUTPATIENT
Start: 2018-03-04 | End: 2018-06-01 | Stop reason: SDUPTHER

## 2018-03-06 ENCOUNTER — OFFICE VISIT (OUTPATIENT)
Dept: FAMILY MEDICINE CLINIC | Facility: CLINIC | Age: 83
End: 2018-03-06

## 2018-03-06 VITALS
HEIGHT: 65 IN | RESPIRATION RATE: 16 BRPM | OXYGEN SATURATION: 100 % | WEIGHT: 137 LBS | DIASTOLIC BLOOD PRESSURE: 72 MMHG | SYSTOLIC BLOOD PRESSURE: 120 MMHG | TEMPERATURE: 97.5 F | HEART RATE: 63 BPM | BODY MASS INDEX: 22.82 KG/M2

## 2018-03-06 DIAGNOSIS — D63.8 ANEMIA OF CHRONIC ILLNESS: ICD-10-CM

## 2018-03-06 DIAGNOSIS — G47.00 INSOMNIA, UNSPECIFIED TYPE: Primary | ICD-10-CM

## 2018-03-06 DIAGNOSIS — N28.9 RENAL INSUFFICIENCY: ICD-10-CM

## 2018-03-06 PROCEDURE — 99213 OFFICE O/P EST LOW 20 MIN: CPT | Performed by: PHYSICIAN ASSISTANT

## 2018-03-06 RX ORDER — TRAVOPROST 0.004 %
DROPS OPHTHALMIC (EYE)
Status: ON HOLD | COMMUNITY
Start: 2018-02-18 | End: 2018-09-21

## 2018-03-06 NOTE — PROGRESS NOTES
Subjective   Kristyn Severino is a 89 y.o. female.     History of Present Illness   Kristyn Severino 89 y.o. female who presents today for routine follow up check and medication refills.  she has a history of   Patient Active Problem List   Diagnosis   • Anemia of chronic illness   • Hirsutism   • HLD (hyperlipidemia)   • Hypothyroidism   • Insomnia   • Osteoarthritis   • Renal insufficiency   • Seborrheic keratosis   • Vitamin D deficiency   .  Since the last visit, she has overall felt fairly well.  She has hyperlipidemia and is on Zetia d/t tolerance to statins; she sees Dr Olivo for thyroid.  .  she has been compliant with current medications have reviewed them.  The patient denies medication side effects.    Results for orders placed or performed in visit on 10/02/17   Comprehensive Metabolic Panel   Result Value Ref Range    Glucose 93 65 - 99 mg/dL    BUN 23 8 - 23 mg/dL    Creatinine 0.88 0.57 - 1.00 mg/dL    eGFR Non African Am 60 (L) >60 mL/min/1.73    eGFR African Am 73 >60 mL/min/1.73    BUN/Creatinine Ratio 26.1 (H) 7.0 - 25.0    Sodium 139 136 - 145 mmol/L    Potassium 4.3 3.5 - 5.2 mmol/L    Chloride 101 98 - 107 mmol/L    Total CO2 25.0 22.0 - 29.0 mmol/L    Calcium 9.9 (H) 8.2 - 9.6 mg/dL    Total Protein 7.2 6.0 - 8.5 g/dL    Albumin 4.00 3.50 - 5.20 g/dL    Globulin 3.2 gm/dL    A/G Ratio 1.3 g/dL    Total Bilirubin 0.3 0.1 - 1.2 mg/dL    Alkaline Phosphatase 54 39 - 117 U/L    AST (SGOT) 14 1 - 32 U/L    ALT (SGPT) 8 1 - 33 U/L   Calcium, Ionized   Result Value Ref Range    Ionized Calcium 5.5 4.5 - 5.6 mg/dL   Ferritin   Result Value Ref Range    Ferritin 28.51 13.00 - 150.00 ng/mL   Iron Profile   Result Value Ref Range    TIBC 445 mcg/dL    UIBC 396 mcg/dL    Iron 49 37 - 145 mcg/dL    Iron Saturation 11 (L) 20 - 50 %   CBC & Differential   Result Value Ref Range    WBC 7.17 4.50 - 10.70 10*3/mm3    RBC 3.56 (L) 3.90 - 5.20 10*6/mm3    Hemoglobin 10.8 (L) 11.9 - 15.5 g/dL    Hematocrit 33.6  (L) 35.6 - 45.5 %    MCV 94.4 80.5 - 98.2 fL    MCH 30.3 26.9 - 32.0 pg    MCHC 32.1 (L) 32.4 - 36.3 g/dL    RDW 14.3 (H) 11.7 - 13.0 %    Platelets 409 140 - 500 10*3/mm3    Neutrophil Rel % 50.3 42.7 - 76.0 %    Lymphocyte Rel % 36.4 19.6 - 45.3 %    Monocyte Rel % 10.0 5.0 - 12.0 %    Eosinophil Rel % 2.4 0.3 - 6.2 %    Basophil Rel % 0.6 0.0 - 1.5 %    Neutrophils Absolute 3.61 1.90 - 8.10 10*3/mm3    Lymphocytes Absolute 2.61 0.90 - 4.80 10*3/mm3    Monocytes Absolute 0.72 0.20 - 1.20 10*3/mm3    Eosinophils Absolute 0.17 0.00 - 0.70 10*3/mm3    Basophils Absolute 0.04 0.00 - 0.20 10*3/mm3    Immature Granulocyte Rel % 0.3 0.0 - 0.5 %    Immature Grans Absolute 0.02 0.00 - 0.03 10*3/mm3     She did see GI last year and IBGuard suggested; f/u prn  Her Ca has been normal since stopping Aldactone  She is taking iron  I will do labs next visit;  Just had CBC Dec  She is seeing Endocrine in few weeks  Sees eye doc  Kristyn Severino 89 y.o. female presents for follow up of insomnia with onset of symptoms years ago. Patient describes symptoms as early morning awakening, frequent night time awakening, difficulty falling asleep and non-restful sleep. Patient has found no relief with Trazodone, Melatonin, going to bed at the same time every night and getting up at the same time and avoiding naps. Associated symptoms include: fatigue if unable to take Rx . Patient denies history of addiction Symptoms have been well-controlled with taking current prescription medication.  The patient has failed multiple OTC medications for insomnia.  They are well controlled on current Rx and will continue to try to take Rx PRN.  She will use the lowest effective dose.  The patient has read and signed the [facility] Controlled Substance Contract.  I will continue to see patient for regular follow up appointments and be prescribed the lowest effective dose.  JAXON has been reviewed by me and is updated every 3 months. The patient is  aware of the potential for addiction and dependence.  She denies that Ambien (Zolpidem) causes excessive daytime drowsiness and sleep walking.  Patient voices understanding to take Ambien (Zolpidem) and go straight to bed. Patient must be able to sleep 7 hours or more when taking this.  Patient will hold Rx and contact me if they experience any impaired mental alertness the next day.        The following portions of the patient's history were reviewed and updated as appropriate: allergies, current medications, past family history, past medical history, past social history, past surgical history and problem list.    Review of Systems   Constitutional: Negative for activity change, appetite change and unexpected weight change.   HENT: Positive for hearing loss. Negative for nosebleeds and trouble swallowing.    Eyes: Negative for pain and visual disturbance.   Respiratory: Negative for chest tightness, shortness of breath and wheezing.    Cardiovascular: Negative for chest pain and palpitations.   Gastrointestinal: Positive for abdominal distention and abdominal pain. Negative for blood in stool.   Endocrine: Negative.    Genitourinary: Negative for difficulty urinating and hematuria.   Musculoskeletal: Positive for back pain. Negative for joint swelling.   Skin: Negative for color change and rash.   Allergic/Immunologic: Negative.    Neurological: Positive for light-headedness. Negative for syncope and speech difficulty.   Hematological: Negative for adenopathy.   Psychiatric/Behavioral: Negative for agitation and confusion.   All other systems reviewed and are negative.      Objective   Physical Exam   Constitutional: She is oriented to person, place, and time. She appears well-developed and well-nourished. No distress.   HENT:   Head: Normocephalic and atraumatic.   Eyes: Conjunctivae and EOM are normal. Pupils are equal, round, and reactive to light. Right eye exhibits no discharge. Left eye exhibits no discharge.  No scleral icterus.   Neck: Normal range of motion. Neck supple. No tracheal deviation present. No thyromegaly present.   Cardiovascular: Normal rate, regular rhythm, normal heart sounds, intact distal pulses and normal pulses.  Exam reveals no gallop.    No murmur heard.  Pulmonary/Chest: Effort normal and breath sounds normal. No respiratory distress. She has no wheezes. She has no rales.   Musculoskeletal: Normal range of motion.   Neurological: She is alert and oriented to person, place, and time. She exhibits normal muscle tone. Coordination normal.   Skin: Skin is warm. No rash noted. No erythema. No pallor.   Psychiatric: She has a normal mood and affect. Her behavior is normal. Judgment and thought content normal.   Nursing note and vitals reviewed.      Assessment/Plan   Problems Addressed this Visit        Hematopoietic and Hemostatic    Anemia of chronic illness       Other    Insomnia - Primary    Renal insufficiency

## 2018-03-06 NOTE — PATIENT INSTRUCTIONS
Stop Ambien if you develop excessive daytime drowsiness and/or sleep walking.  Avoid driving if drowsy.  Do not drink alcohol with this medication.  Ambien is a controlled substance and requires you to be seen for an appointment every 3 months for a medication check refill.  Use the lowest effective dose.    Low glycemic index diet  Exercise 30 minutes most days of the week  Make sure you get results on any labs or tests we ordered today  We discussed medications and how to take them as prescribed  Sleep 6-8 hours each night if possible  If you have not signed up for [employer], please activate your code ASAP from your After Visit Summary today    LDL goal <100  LDL goal if heart disease <70  HDL goal >60  Triglyceride goal <150  BP goal =<130/80  Fasting glucose <100

## 2018-03-18 RX ORDER — THYROID,PORK 90 MG
90 TABLET ORAL DAILY
Qty: 90 TABLET | Refills: 3 | Status: SHIPPED | OUTPATIENT
Start: 2018-03-18 | End: 2018-05-07

## 2018-03-19 ENCOUNTER — RESULTS ENCOUNTER (OUTPATIENT)
Dept: ENDOCRINOLOGY | Age: 83
End: 2018-03-19

## 2018-03-19 DIAGNOSIS — E06.3 HYPOTHYROIDISM DUE TO HASHIMOTO'S THYROIDITIS: ICD-10-CM

## 2018-03-19 DIAGNOSIS — E55.9 VITAMIN D DEFICIENCY: ICD-10-CM

## 2018-03-19 DIAGNOSIS — E03.8 HYPOTHYROIDISM DUE TO HASHIMOTO'S THYROIDITIS: ICD-10-CM

## 2018-03-19 DIAGNOSIS — E78.2 MIXED HYPERLIPIDEMIA: ICD-10-CM

## 2018-04-30 LAB
25(OH)D3+25(OH)D2 SERPL-MCNC: 50.1 NG/ML (ref 30–100)
ALBUMIN SERPL-MCNC: 3.9 G/DL (ref 3.5–5.2)
ALBUMIN/GLOB SERPL: 1.4 G/DL
ALP SERPL-CCNC: 49 U/L (ref 39–117)
ALT SERPL-CCNC: 9 U/L (ref 1–33)
AST SERPL-CCNC: 15 U/L (ref 1–32)
BILIRUB SERPL-MCNC: 0.2 MG/DL (ref 0.1–1.2)
BUN SERPL-MCNC: 19 MG/DL (ref 8–23)
BUN/CREAT SERPL: 20.7 (ref 7–25)
CALCIUM SERPL-MCNC: 9.8 MG/DL (ref 8.2–9.6)
CHLORIDE SERPL-SCNC: 104 MMOL/L (ref 98–107)
CHOLEST SERPL-MCNC: 190 MG/DL (ref 0–200)
CO2 SERPL-SCNC: 26.7 MMOL/L (ref 22–29)
CREAT SERPL-MCNC: 0.92 MG/DL (ref 0.57–1)
FT4I SERPL CALC-MCNC: 1 (ref 1.2–4.9)
GFR SERPLBLD CREATININE-BSD FMLA CKD-EPI: 57 ML/MIN/1.73
GFR SERPLBLD CREATININE-BSD FMLA CKD-EPI: 69 ML/MIN/1.73
GLOBULIN SER CALC-MCNC: 2.8 GM/DL
GLUCOSE SERPL-MCNC: 93 MG/DL (ref 65–99)
HDLC SERPL-MCNC: 63 MG/DL (ref 40–60)
INTERPRETATION: NORMAL
LDLC SERPL CALC-MCNC: 107 MG/DL (ref 0–100)
POTASSIUM SERPL-SCNC: 4.7 MMOL/L (ref 3.5–5.2)
PROT SERPL-MCNC: 6.7 G/DL (ref 6–8.5)
SODIUM SERPL-SCNC: 142 MMOL/L (ref 136–145)
T3FREE SERPL-MCNC: 1.9 PG/ML (ref 2–4.4)
T3RU NFR SERPL: 28 % (ref 24–39)
T4 FREE SERPL-MCNC: 0.72 NG/DL (ref 0.93–1.7)
T4 SERPL-MCNC: 3.7 UG/DL (ref 4.5–12)
THYROGLOB AB SERPL-ACNC: <1 IU/ML (ref 0–0.9)
THYROGLOB SERPL-MCNC: <2 NG/ML
TRIGL SERPL-MCNC: 100 MG/DL (ref 0–150)
TSH SERPL DL<=0.005 MIU/L-ACNC: 0.2 UIU/ML (ref 0.45–4.5)
VLDLC SERPL CALC-MCNC: 20 MG/DL (ref 5–40)

## 2018-05-07 ENCOUNTER — OFFICE VISIT (OUTPATIENT)
Dept: ENDOCRINOLOGY | Age: 83
End: 2018-05-07

## 2018-05-07 VITALS
HEIGHT: 65 IN | BODY MASS INDEX: 22.99 KG/M2 | SYSTOLIC BLOOD PRESSURE: 118 MMHG | WEIGHT: 138 LBS | DIASTOLIC BLOOD PRESSURE: 70 MMHG

## 2018-05-07 DIAGNOSIS — E55.9 VITAMIN D DEFICIENCY: ICD-10-CM

## 2018-05-07 DIAGNOSIS — E06.3 HYPOTHYROIDISM DUE TO HASHIMOTO'S THYROIDITIS: Primary | ICD-10-CM

## 2018-05-07 DIAGNOSIS — E83.52 HYPERCALCEMIA: ICD-10-CM

## 2018-05-07 DIAGNOSIS — E03.8 HYPOTHYROIDISM DUE TO HASHIMOTO'S THYROIDITIS: Primary | ICD-10-CM

## 2018-05-07 DIAGNOSIS — E78.2 MIXED HYPERLIPIDEMIA: ICD-10-CM

## 2018-05-07 PROCEDURE — 99214 OFFICE O/P EST MOD 30 MIN: CPT | Performed by: NURSE PRACTITIONER

## 2018-05-07 RX ORDER — LEVOTHYROXINE SODIUM 88 MCG
88 TABLET ORAL DAILY
Qty: 30 TABLET | Refills: 3 | Status: SHIPPED | OUTPATIENT
Start: 2018-05-07 | End: 2018-08-20 | Stop reason: SDUPTHER

## 2018-05-07 NOTE — PROGRESS NOTES
Subjective    Kristyn Severino is a 89 y.o. female. she is here for hypothyroidism.     Hypothyroid secondary to Hashimoto's diagnosed originally at the age of 19 has been on [place] since diagnosis      Hypothyroidism   This is a chronic problem. The current episode started more than 1 month ago. The problem occurs constantly. The problem has been unchanged. Associated symptoms include fatigue and weakness. Pertinent negatives include no headaches, myalgias or rash. Nothing aggravates the symptoms. Treatments tried: Labs and medication. The treatment provided no relief.        Evaluation history:  TSH   Date Value Ref Range Status   04/23/2018 0.200 (L) 0.450 - 4.500 uIU/mL Final     Free T4   Date Value Ref Range Status   04/23/2018 0.72 (L) 0.93 - 1.70 ng/dL Final     T3, Free   Date Value Ref Range Status   04/23/2018 1.9 (L) 2.0 - 4.4 pg/mL Final       Current medications:  Current Outpatient Prescriptions   Medication Sig Dispense Refill   • aspirin 81 MG EC tablet Take 81 mg by mouth daily.     • Biotin 1 MG capsule Take by mouth.     • ergocalciferol (DRISDOL) 51406 units capsule Take 1 capsule by mouth 1 (One) Time Per Week. 13 capsule 3   • ezetimibe (ZETIA) 10 MG tablet Take 1 tablet by mouth Daily. For cholesterol 90 tablet 3   • ferrous sulfate 325 (65 FE) MG tablet TAKE 2 TABLETS BY MOUTH EVERY DAY WITH FOOD 60 tablet 5   • folic acid (FOLVITE) 400 MCG tablet Take 400 mcg by mouth Daily.     • glucosamine-chondroitin 500-400 MG per tablet Take 1 tablet by mouth 3 (three) times a day.     • Lactobacillus (ACIDOPHILUS PO) Take  by mouth.     • Multiple Vitamins-Minerals (MULTIVITAMIN WITH MINERALS) tablet Take 1 tablet by mouth daily.     • Pyridoxine HCl (VITAMIN B-6 PO) Take 100 mg by mouth daily.     • simethicone (MYLICON) 125 MG chewable tablet Chew 125 mg Every 6 (Six) Hours As Needed for Flatulence.     • TRAVATAN Z 0.004 % solution ophthalmic solution      • ultra-purified peppermint oil  (IBGARD) 90 mg capsule capsule Take  by mouth.     • zolpidem (AMBIEN) 5 MG tablet Take 1 tablet by mouth At Night As Needed for Sleep. 1/2 -1 tabs PO Q HS PRN insomnia 30 tablet 2   • SYNTHROID 88 MCG tablet Take 1 tablet by mouth Daily.  on an empty stomach 30 tablet 3     No current facility-administered medications for this visit.        The following portions of the patient's history were reviewed and updated as appropriate:   Past Medical History:   Diagnosis Date   • Anemia of chronic illness    • PALACIOS (dyspnea on exertion)    • Fatigue    • Hirsutism    • History of bone density study     few years; normal   • History of colonoscopy     never   • HLD (hyperlipidemia)    • Hypothyroidism    • Insomnia    • Lightheadedness    • Osteoarthritis    • Postmenopausal    • Renal insufficiency    • Rhinorrhea    • Seborrheic keratosis    • Vitamin D deficiency      Past Surgical History:   Procedure Laterality Date   • APPENDECTOMY  2010    Dr. De Oliveira   • BREAST BIOPSY     • JOINT REPLACEMENT Left 2011    Dr. Gold   • MAMMO BILATERAL      many years ago   • PAP SMEAR      many years ago     Family History   Problem Relation Age of Onset   • Family history unknown: Yes     OB History      Para Term  AB Living    4 4 4       SAB TAB Ectopic Molar Multiple Live Births                 No Known Allergies  Social History     Social History   • Marital status:      Social History Main Topics   • Smoking status: Never Smoker   • Smokeless tobacco: Never Used   • Alcohol use Yes   • Drug use: No   • Sexual activity: Defer     Other Topics Concern   • Not on file       Review of Systems  Review of Systems   Constitutional: Positive for fatigue.   HENT: Negative for trouble swallowing.    Eyes: Negative for visual disturbance.   Respiratory: Negative for choking.    Cardiovascular: Negative for palpitations.   Gastrointestinal: Negative for constipation.   Endocrine: Negative for cold intolerance.  "  Genitourinary: Negative for difficulty urinating.   Musculoskeletal: Negative for myalgias.   Skin: Negative for rash.   Allergic/Immunologic: Negative.    Neurological: Positive for weakness. Negative for headaches.   Hematological: Bruises/bleeds easily.   Psychiatric/Behavioral: Positive for confusion and decreased concentration. The patient is not nervous/anxious.    All other systems reviewed and are negative.       Objective    /70   Ht 165.1 cm (65\")   Wt 62.6 kg (138 lb)   BMI 22.96 kg/m²   Physical Exam   Constitutional: She is oriented to person, place, and time. She appears well-developed and well-nourished.   HENT:   Head: Atraumatic.   Eyes: EOM are normal. Pupils are equal, round, and reactive to light.   Neck: Normal range of motion. Neck supple. No thyromegaly present.   Cardiovascular: Normal rate, regular rhythm, normal heart sounds and intact distal pulses.  Exam reveals no gallop.    No murmur heard.  Pulmonary/Chest: Effort normal and breath sounds normal.   Abdominal: Soft. Bowel sounds are normal.   Musculoskeletal: Normal range of motion.   Neurological: She is alert and oriented to person, place, and time.   Skin: Skin is warm and dry. Capillary refill takes 2 to 3 seconds.   Psychiatric: She has a normal mood and affect. Her behavior is normal. Judgment and thought content normal.   Nursing note and vitals reviewed.      Lab Review  Lab Results   Component Value Date    TSH 0.200 (L) 04/23/2018     Lab Results   Component Value Date    FREET4 0.72 (L) 04/23/2018     Results Encounter on 03/19/2018   Component Date Value Ref Range Status   • Glucose 04/30/2018 93  65 - 99 mg/dL Final   • BUN 04/30/2018 19  8 - 23 mg/dL Final   • Creatinine 04/30/2018 0.92  0.57 - 1.00 mg/dL Final   • eGFR Non African Am 04/30/2018 57* >60 mL/min/1.73 Final    Comment: The MDRD GFR formula is only valid for adults with stable  renal function between ages 18 and 70.     • eGFR  Am 04/30/2018 " 69  >60 mL/min/1.73 Final   • BUN/Creatinine Ratio 04/30/2018 20.7  7.0 - 25.0 Final   • Sodium 04/30/2018 142  136 - 145 mmol/L Final   • Potassium 04/30/2018 4.7  3.5 - 5.2 mmol/L Final   • Chloride 04/30/2018 104  98 - 107 mmol/L Final   • Total CO2 04/30/2018 26.7  22.0 - 29.0 mmol/L Final   • Calcium 04/30/2018 9.8* 8.2 - 9.6 mg/dL Final   • Total Protein 04/30/2018 6.7  6.0 - 8.5 g/dL Final   • Albumin 04/30/2018 3.90  3.50 - 5.20 g/dL Final   • Globulin 04/30/2018 2.8  gm/dL Final   • A/G Ratio 04/30/2018 1.4  g/dL Final   • Total Bilirubin 04/30/2018 0.2  0.1 - 1.2 mg/dL Final   • Alkaline Phosphatase 04/30/2018 49  39 - 117 U/L Final   • AST (SGOT) 04/30/2018 15  1 - 32 U/L Final   • ALT (SGPT) 04/30/2018 9  1 - 33 U/L Final   • Free T4 04/30/2018 0.72* 0.93 - 1.70 ng/dL Final   • T3, Free 04/30/2018 1.9* 2.0 - 4.4 pg/mL Final   • TSH 04/30/2018 0.200* 0.450 - 4.500 uIU/mL Final   • T4, Total 04/30/2018 3.7* 4.5 - 12.0 ug/dL Final   • T3 Uptake 04/30/2018 28  24 - 39 % Final   • Free Thyroxine Index 04/30/2018 1.0* 1.2 - 4.9 Final   • Thyroglobulin (TG-LIBBY) 04/30/2018 <2.0  ng/mL Final    Comment: Reference Range:  Pubertal Children  and Adults: <40  According to the National Academy of Clinical Biochemistry,  the reference interval for Thyroglobulin (TG) should be  related to euthyroid patients and not for patients who  underwent thyroidectomy.  TG reference intervals for these  patients depend on the residual mass of the thyroid tissue  left after surgery.  Establishing a post-operative baseline  is recommended.  The assay quantitation limit is 2.0 ng/mL.     • Thyroglobulin Ab 04/30/2018 <1.0  0.0 - 0.9 IU/mL Final   • 25 Hydroxy, Vitamin D 04/30/2018 50.1  30.0 - 100.0 ng/ml Final    Comment: Reference Range for Total Vitamin D 25(OH)  Deficiency    <20.0 ng/mL  Insufficiency 21-29 ng/mL  Sufficiency    ng/mL  Toxicity      >100 ng/ml        • Total Cholesterol 04/30/2018 190  0 - 200 mg/dL Final    • Triglycerides 04/30/2018 100  0 - 150 mg/dL Final   • HDL Cholesterol 04/30/2018 63* 40 - 60 mg/dL Final   • VLDL Cholesterol 04/30/2018 20  5 - 40 mg/dL Final   • LDL Cholesterol  04/30/2018 107* 0 - 100 mg/dL Final   • Interpretation 04/30/2018 Note   Final        Assessment/Plan      1. Hypothyroidism due to Hashimoto's thyroiditis    2. Vitamin D deficiency    3. Mixed hyperlipidemia    4. Hypercalcemia    . This diagnosis was discussed and reviewed with the patient including the advantages of drug therapy.     1. Orders placed during this encounter include:  Orders Placed This Encounter   Procedures   • TSH     Standing Status:   Future   • Thyroid Antibodies     Standing Status:   Future   • T4, Free     Standing Status:   Future   • T4     Standing Status:   Future   • T3, Free     Standing Status:   Future   • T3     Standing Status:   Future   • Calcium     Standing Status:   Future     Standing Expiration Date:   5/7/2019   • Calcium, Ionized     Standing Status:   Future     Standing Expiration Date:   5/7/2019   • PTH, Intact     Standing Status:   Future     Standing Expiration Date:   5/7/2019   • Vitamin D 25 Hydroxy     Standing Status:   Future     Standing Expiration Date:   5/7/2019   • Comprehensive Metabolic Panel     Standing Status:   Future   • Lipid Panel     Standing Status:   Future     Standing Expiration Date:   5/7/2019     Order Specific Question:   [facility] Has the patient fasted?     Answer:   No   • Microalbumin / Creatinine Urine Ratio - Urine, Clean Catch     Standing Status:   Future   • Uric Acid     Standing Status:   Future   • Vitamin D 25 Hydroxy     Standing Status:   Future   • TSH     Standing Status:   Future   • Thyroid Antibodies     Standing Status:   Future   • T4, Free     Standing Status:   Future   • T3, Free     Standing Status:   Future   • T3     Standing Status:   Future   • T4     Standing Status:   Future   • CBC & Differential     Standing Status:    Future     Order Specific Question:   Manual Differential     Answer:   No       Medications prescribed:  Outpatient Encounter Prescriptions as of 5/7/2018   Medication Sig Dispense Refill   • aspirin 81 MG EC tablet Take 81 mg by mouth daily.     • Biotin 1 MG capsule Take by mouth.     • ergocalciferol (DRISDOL) 24205 units capsule Take 1 capsule by mouth 1 (One) Time Per Week. 13 capsule 3   • ezetimibe (ZETIA) 10 MG tablet Take 1 tablet by mouth Daily. For cholesterol 90 tablet 3   • ferrous sulfate 325 (65 FE) MG tablet TAKE 2 TABLETS BY MOUTH EVERY DAY WITH FOOD 60 tablet 5   • folic acid (FOLVITE) 400 MCG tablet Take 400 mcg by mouth Daily.     • glucosamine-chondroitin 500-400 MG per tablet Take 1 tablet by mouth 3 (three) times a day.     • Lactobacillus (ACIDOPHILUS PO) Take  by mouth.     • Multiple Vitamins-Minerals (MULTIVITAMIN WITH MINERALS) tablet Take 1 tablet by mouth daily.     • Pyridoxine HCl (VITAMIN B-6 PO) Take 100 mg by mouth daily.     • simethicone (MYLICON) 125 MG chewable tablet Chew 125 mg Every 6 (Six) Hours As Needed for Flatulence.     • TRAVATAN Z 0.004 % solution ophthalmic solution      • ultra-purified peppermint oil (IBGARD) 90 mg capsule capsule Take  by mouth.     • zolpidem (AMBIEN) 5 MG tablet Take 1 tablet by mouth At Night As Needed for Sleep. 1/2 -1 tabs PO Q HS PRN insomnia 30 tablet 2   • [DISCONTINUED] ARMOUR THYROID 90 MG tablet TAKE 1 TABLET BY MOUTH DAILY. FOR THYROID (NEW DOSE) 90 tablet 3   • SYNTHROID 88 MCG tablet Take 1 tablet by mouth Daily.  on an empty stomach 30 tablet 3     No facility-administered encounter medications on file as of 5/7/2018.        2. Repeat s-TSH in 6-8 weeks with PTH, CA and ionized CA.    3. The risks and benefits of my recommendations, as well as other treatment options were discussed with the patient today. Questions were answered.  Stop [place] thyroid    Start Synthroid 88 MCG daily and recheck labs in 6 weeks    4. Return in about  3 months (around 8/7/2018), or if symptoms worsen or fail to improve, for Recheck.

## 2018-05-29 RX ORDER — ZOLPIDEM TARTRATE 5 MG/1
TABLET ORAL
Qty: 30 TABLET | Refills: 2 | OUTPATIENT
Start: 2018-05-29

## 2018-06-01 ENCOUNTER — OFFICE VISIT (OUTPATIENT)
Dept: FAMILY MEDICINE CLINIC | Facility: CLINIC | Age: 83
End: 2018-06-01

## 2018-06-01 VITALS
HEIGHT: 65 IN | WEIGHT: 133 LBS | DIASTOLIC BLOOD PRESSURE: 74 MMHG | BODY MASS INDEX: 22.16 KG/M2 | HEART RATE: 74 BPM | TEMPERATURE: 98.1 F | SYSTOLIC BLOOD PRESSURE: 110 MMHG | OXYGEN SATURATION: 98 % | RESPIRATION RATE: 16 BRPM

## 2018-06-01 DIAGNOSIS — G47.00 INSOMNIA, UNSPECIFIED TYPE: Primary | ICD-10-CM

## 2018-06-01 DIAGNOSIS — N28.9 RENAL INSUFFICIENCY: ICD-10-CM

## 2018-06-01 PROCEDURE — 99213 OFFICE O/P EST LOW 20 MIN: CPT | Performed by: PHYSICIAN ASSISTANT

## 2018-06-01 RX ORDER — ZOLPIDEM TARTRATE 5 MG/1
TABLET ORAL
Qty: 30 TABLET | Refills: 2 | Status: SHIPPED | OUTPATIENT
Start: 2018-06-01 | End: 2018-09-04 | Stop reason: SDUPTHER

## 2018-06-01 NOTE — PATIENT INSTRUCTIONS
Stop Ambien if you develop excessive daytime drowsiness and/or sleep walking.  Avoid driving if drowsy.  Do not drink alcohol with this medication.  Ambien is a controlled substance and requires you to be seen for an appointment every 3 months for a medication check refill.  Use the lowest effective dose.  Low glycemic index diet  Exercise 30 minutes most days of the week  Make sure you get results on any labs or tests we ordered today  We discussed medications and how to take them as prescribed  Sleep 6-8 hours each night if possible  If you have not signed up for [employer], please activate your code ASAP from your After Visit Summary today    LDL goal <100  LDL goal if heart disease <70  HDL goal >60  Triglyceride goal <150  BP goal =<130/80  Fasting glucose <100

## 2018-06-01 NOTE — PROGRESS NOTES
Subjective   Kristyn Severino is a 89 y.o. female.     History of Present Illness   Kristyn Severino 89 y.o. female presents for follow up of insomnia with onset of symptoms years ago. Patient describes symptoms as early morning awakening, frequent night time awakening, difficulty falling asleep and non-restful sleep. Patient has found no relief with Trazodone, Melatonin, going to bed at the same time every night and getting up at the same time and avoiding naps. Associated symptoms include: fatigue if unable to take Rx . Patient denies history of addiction Symptoms have been well-controlled with taking current prescription medication.  The patient has failed multiple OTC medications for insomnia.  They are well controlled on current Rx and will continue to try to take Rx PRN.  She will use the lowest effective dose.  The patient has read and signed the [facility] Controlled Substance Contract.  I will continue to see patient for regular follow up appointments and be prescribed the lowest effective dose.  JAXON has been reviewed by me and is updated every 3 months. The patient is aware of the potential for addiction and dependence.  She denies that Ambien (Zolpidem) causes excessive daytime drowsiness and sleep walking.  Patient voices understanding to take Ambien (Zolpidem) and go straight to bed. Patient must be able to sleep 7 hours or more when taking this.  Patient will hold Rx and contact me if they experience any impaired mental alertness the next day.     I really want her to go down to 1/2 tab and stop; worried about fall risk    I want to follow her CBC and does occas take iron;  Has CBC ordered for August    Weight is down some and has had GI virus and getting better; was 131lbs  Sees endocrine for thyroid and watching Ca+  Watching doses of meds d/t impaired renal functions  Est CC 35  The following portions of the patient's history were reviewed and updated as appropriate: allergies, current  medications, past family history, past medical history, past social history, past surgical history and problem list.    Review of Systems   Constitutional: Negative for activity change, appetite change and unexpected weight change.   HENT: Positive for hearing loss. Negative for nosebleeds and trouble swallowing.    Eyes: Negative for pain and visual disturbance.   Respiratory: Negative for chest tightness, shortness of breath and wheezing.    Cardiovascular: Negative for chest pain and palpitations.   Gastrointestinal: Positive for diarrhea. Negative for abdominal pain and blood in stool.   Endocrine: Negative.    Genitourinary: Negative for difficulty urinating and hematuria.   Musculoskeletal: Negative for joint swelling.   Skin: Negative for color change and rash.   Allergic/Immunologic: Negative.    Neurological: Negative for syncope and speech difficulty.   Hematological: Negative for adenopathy.   Psychiatric/Behavioral: Positive for sleep disturbance. Negative for agitation and confusion.   All other systems reviewed and are negative.      Objective   Physical Exam   Constitutional: She is oriented to person, place, and time. She appears well-developed and well-nourished. No distress.   HENT:   Head: Normocephalic and atraumatic.   Eyes: Conjunctivae and EOM are normal. Pupils are equal, round, and reactive to light. Right eye exhibits no discharge. Left eye exhibits no discharge. No scleral icterus.   Neck: Normal range of motion. Neck supple. No tracheal deviation present. No thyromegaly present.   Cardiovascular: Normal rate, regular rhythm, normal heart sounds, intact distal pulses and normal pulses.  Exam reveals no gallop.    No murmur heard.  Pulmonary/Chest: Effort normal and breath sounds normal. No respiratory distress. She has no wheezes. She has no rales.   Musculoskeletal: Normal range of motion.   Neurological: She is alert and oriented to person, place, and time. She exhibits normal muscle  tone. Coordination normal.   Skin: Skin is warm. No rash noted. No erythema. No pallor.   Psychiatric: She has a normal mood and affect. Her behavior is normal. Judgment and thought content normal.   Nursing note and vitals reviewed.      Assessment/Plan   Kristyn was seen today for insomnia.    Diagnoses and all orders for this visit:    Insomnia, unspecified type    Renal insufficiency

## 2018-06-13 ENCOUNTER — TELEPHONE (OUTPATIENT)
Dept: FAMILY MEDICINE CLINIC | Facility: CLINIC | Age: 83
End: 2018-06-13

## 2018-06-13 DIAGNOSIS — D63.8 ANEMIA OF CHRONIC ILLNESS: ICD-10-CM

## 2018-06-13 RX ORDER — FERROUS SULFATE 325(65) MG
650 TABLET ORAL
Qty: 60 TABLET | Refills: 5 | Status: SHIPPED | OUTPATIENT
Start: 2018-06-13 | End: 2018-09-18

## 2018-06-18 ENCOUNTER — RESULTS ENCOUNTER (OUTPATIENT)
Dept: ENDOCRINOLOGY | Age: 83
End: 2018-06-18

## 2018-06-18 DIAGNOSIS — E06.3 HYPOTHYROIDISM DUE TO HASHIMOTO'S THYROIDITIS: ICD-10-CM

## 2018-06-18 DIAGNOSIS — E03.8 HYPOTHYROIDISM DUE TO HASHIMOTO'S THYROIDITIS: ICD-10-CM

## 2018-06-18 DIAGNOSIS — E55.9 VITAMIN D DEFICIENCY: ICD-10-CM

## 2018-07-18 DIAGNOSIS — R63.4 WEIGHT LOSS: Primary | ICD-10-CM

## 2018-07-18 RX ORDER — GASTROSTOMY TUBE 18 FR
1 KIT MISCELLANEOUS 4 TIMES DAILY
Qty: 120 CAN | Refills: 5 | Status: SHIPPED | OUTPATIENT
Start: 2018-07-18 | End: 2021-07-19 | Stop reason: ALTCHOICE

## 2018-07-27 LAB
25(OH)D3+25(OH)D2 SERPL-MCNC: 36.5 NG/ML (ref 30–100)
CA-I SERPL ISE-MCNC: 5.3 MG/DL (ref 4.5–5.6)
CALCIUM SERPL-MCNC: 9.3 MG/DL (ref 8.2–9.6)
PTH-INTACT SERPL-MCNC: 45 PG/ML (ref 15–65)
T3 SERPL-MCNC: 77.5 NG/DL (ref 80–200)
T3FREE SERPL-MCNC: 2.5 PG/ML (ref 2–4.4)
T4 FREE SERPL-MCNC: 1.64 NG/DL (ref 0.93–1.7)
T4 SERPL-MCNC: 6.43 MCG/DL (ref 4.5–11.7)
THYROGLOB AB SERPL-ACNC: <1 IU/ML (ref 0–0.9)
THYROPEROXIDASE AB SERPL-ACNC: 10 IU/ML (ref 0–34)
TSH SERPL DL<=0.005 MIU/L-ACNC: 0.02 MIU/ML (ref 0.27–4.2)

## 2018-08-07 ENCOUNTER — RESULTS ENCOUNTER (OUTPATIENT)
Dept: ENDOCRINOLOGY | Age: 83
End: 2018-08-07

## 2018-08-07 DIAGNOSIS — E83.52 HYPERCALCEMIA: ICD-10-CM

## 2018-08-07 DIAGNOSIS — E78.2 MIXED HYPERLIPIDEMIA: ICD-10-CM

## 2018-08-07 DIAGNOSIS — E55.9 VITAMIN D DEFICIENCY: ICD-10-CM

## 2018-08-07 DIAGNOSIS — E06.3 HYPOTHYROIDISM DUE TO HASHIMOTO'S THYROIDITIS: ICD-10-CM

## 2018-08-07 DIAGNOSIS — E03.8 HYPOTHYROIDISM DUE TO HASHIMOTO'S THYROIDITIS: ICD-10-CM

## 2018-08-20 DIAGNOSIS — E03.8 HYPOTHYROIDISM DUE TO HASHIMOTO'S THYROIDITIS: ICD-10-CM

## 2018-08-20 DIAGNOSIS — E06.3 HYPOTHYROIDISM DUE TO HASHIMOTO'S THYROIDITIS: ICD-10-CM

## 2018-08-20 RX ORDER — LEVOTHYROXINE SODIUM 88 MCG
88 TABLET ORAL DAILY
Qty: 30 TABLET | Refills: 3 | Status: SHIPPED | OUTPATIENT
Start: 2018-08-20 | End: 2018-09-07

## 2018-08-24 ENCOUNTER — LAB (OUTPATIENT)
Dept: ENDOCRINOLOGY | Age: 83
End: 2018-08-24

## 2018-08-24 DIAGNOSIS — E06.3 HYPOTHYROIDISM DUE TO HASHIMOTO'S THYROIDITIS: ICD-10-CM

## 2018-08-24 DIAGNOSIS — E03.8 HYPOTHYROIDISM DUE TO HASHIMOTO'S THYROIDITIS: ICD-10-CM

## 2018-08-27 LAB
25(OH)D3+25(OH)D2 SERPL-MCNC: 41.9 NG/ML (ref 30–100)
ALBUMIN SERPL-MCNC: 4 G/DL (ref 3.5–5.2)
ALBUMIN/CREAT UR: 26 MG/G CREAT (ref 0–30)
ALBUMIN/GLOB SERPL: 1.7 G/DL
ALP SERPL-CCNC: 54 U/L (ref 39–117)
ALT SERPL-CCNC: 7 U/L (ref 1–33)
AST SERPL-CCNC: 14 U/L (ref 1–32)
BASOPHILS # BLD AUTO: 0.03 10*3/MM3 (ref 0–0.2)
BASOPHILS NFR BLD AUTO: 0.6 % (ref 0–1.5)
BILIRUB SERPL-MCNC: 0.3 MG/DL (ref 0.1–1.2)
BUN SERPL-MCNC: 18 MG/DL (ref 8–23)
BUN/CREAT SERPL: 20.9 (ref 7–25)
CALCIUM SERPL-MCNC: 9.1 MG/DL (ref 8.2–9.6)
CHLORIDE SERPL-SCNC: 106 MMOL/L (ref 98–107)
CHOLEST SERPL-MCNC: 167 MG/DL (ref 0–200)
CO2 SERPL-SCNC: 25.9 MMOL/L (ref 22–29)
CREAT SERPL-MCNC: 0.86 MG/DL (ref 0.57–1)
CREAT UR-MCNC: 128.4 MG/DL
EOSINOPHIL # BLD AUTO: 0.19 10*3/MM3 (ref 0–0.7)
EOSINOPHIL NFR BLD AUTO: 3.7 % (ref 0.3–6.2)
ERYTHROCYTE [DISTWIDTH] IN BLOOD BY AUTOMATED COUNT: 14.9 % (ref 11.7–13)
GLOBULIN SER CALC-MCNC: 2.4 GM/DL
GLUCOSE SERPL-MCNC: 111 MG/DL (ref 65–99)
HCT VFR BLD AUTO: 29.3 % (ref 35.6–45.5)
HDLC SERPL-MCNC: 62 MG/DL (ref 40–60)
HGB BLD-MCNC: 9.3 G/DL (ref 11.9–15.5)
IMM GRANULOCYTES # BLD: 0.01 10*3/MM3 (ref 0–0.03)
IMM GRANULOCYTES NFR BLD: 0.2 % (ref 0–0.5)
INTERPRETATION: NORMAL
LDLC SERPL CALC-MCNC: 88 MG/DL (ref 0–100)
LYMPHOCYTES # BLD AUTO: 1.7 10*3/MM3 (ref 0.9–4.8)
LYMPHOCYTES NFR BLD AUTO: 33.1 % (ref 19.6–45.3)
MCH RBC QN AUTO: 30 PG (ref 26.9–32)
MCHC RBC AUTO-ENTMCNC: 31.7 G/DL (ref 32.4–36.3)
MCV RBC AUTO: 94.5 FL (ref 80.5–98.2)
MICROALBUMIN UR-MCNC: 33.4 UG/ML
MONOCYTES # BLD AUTO: 0.6 10*3/MM3 (ref 0.2–1.2)
MONOCYTES NFR BLD AUTO: 11.7 % (ref 5–12)
NEUTROPHILS # BLD AUTO: 2.62 10*3/MM3 (ref 1.9–8.1)
NEUTROPHILS NFR BLD AUTO: 50.9 % (ref 42.7–76)
PLATELET # BLD AUTO: 385 10*3/MM3 (ref 140–500)
POTASSIUM SERPL-SCNC: 4.6 MMOL/L (ref 3.5–5.2)
PROT SERPL-MCNC: 6.4 G/DL (ref 6–8.5)
RBC # BLD AUTO: 3.1 10*6/MM3 (ref 3.9–5.2)
SODIUM SERPL-SCNC: 143 MMOL/L (ref 136–145)
T3 SERPL-MCNC: 180.5 NG/DL (ref 80–200)
T3FREE SERPL-MCNC: 6.3 PG/ML (ref 2–4.4)
T4 FREE SERPL-MCNC: 1.36 NG/DL (ref 0.93–1.7)
T4 SERPL-MCNC: 5.93 MCG/DL (ref 4.5–11.7)
THYROGLOB AB SERPL-ACNC: <1 IU/ML (ref 0–0.9)
THYROPEROXIDASE AB SERPL-ACNC: 8 IU/ML (ref 0–34)
TRIGL SERPL-MCNC: 86 MG/DL (ref 0–150)
TSH SERPL DL<=0.005 MIU/L-ACNC: 0.02 MIU/ML (ref 0.27–4.2)
URATE SERPL-MCNC: 4.6 MG/DL (ref 2.4–5.7)
VLDLC SERPL CALC-MCNC: 17.2 MG/DL (ref 5–40)
WBC # BLD AUTO: 5.14 10*3/MM3 (ref 4.5–10.7)

## 2018-09-04 ENCOUNTER — OFFICE VISIT (OUTPATIENT)
Dept: FAMILY MEDICINE CLINIC | Facility: CLINIC | Age: 83
End: 2018-09-04

## 2018-09-04 VITALS
HEART RATE: 65 BPM | OXYGEN SATURATION: 98 % | HEIGHT: 65 IN | TEMPERATURE: 98 F | DIASTOLIC BLOOD PRESSURE: 60 MMHG | BODY MASS INDEX: 22.33 KG/M2 | WEIGHT: 134 LBS | SYSTOLIC BLOOD PRESSURE: 110 MMHG | RESPIRATION RATE: 16 BRPM

## 2018-09-04 DIAGNOSIS — D63.8 ANEMIA OF CHRONIC ILLNESS: ICD-10-CM

## 2018-09-04 DIAGNOSIS — N28.9 RENAL INSUFFICIENCY: ICD-10-CM

## 2018-09-04 DIAGNOSIS — F51.01 PRIMARY INSOMNIA: Primary | ICD-10-CM

## 2018-09-04 DIAGNOSIS — D64.9 LOW HEMOGLOBIN: ICD-10-CM

## 2018-09-04 PROCEDURE — 99213 OFFICE O/P EST LOW 20 MIN: CPT | Performed by: PHYSICIAN ASSISTANT

## 2018-09-04 RX ORDER — ZOLPIDEM TARTRATE 5 MG/1
TABLET ORAL
Qty: 30 TABLET | Refills: 2 | OUTPATIENT
Start: 2018-09-04

## 2018-09-04 RX ORDER — ZOLPIDEM TARTRATE 5 MG/1
TABLET ORAL
Qty: 30 TABLET | Refills: 2 | Status: ON HOLD | OUTPATIENT
Start: 2018-09-04 | End: 2018-09-26

## 2018-09-04 NOTE — PROGRESS NOTES
Subjective   Kristyn Severino is a 89 y.o. female.     History of Present Illness     Sees Endocrine for thyroid and had work up for anemia; watching Ca+ and renal; she is on iron; saw Dr Erasto Stack and Dx anemia of chronic disease  I see slight drop in hemoglobin and will update ferritin and iron profile    She is on IBGuard; weight stable  Sees derm and to have skin bx Thurs on feet  Intol to statins  Sees eye doc  Kristyn Severino 89 y.o. female presents for follow up of insomnia with onset of symptoms years ago. Patient describes symptoms as early morning awakening, frequent night time awakening, difficulty falling asleep and non-restful sleep. Patient has found no relief with Trazodone, Melatonin, going to bed at the same time every night and getting up at the same time and avoiding naps. Associated symptoms include: fatigue if unable to take Rx . Patient denies history of addiction Symptoms have been well-controlled with taking current prescription medication.  The patient has failed multiple OTC medications for insomnia.  They are well controlled on current Rx and will continue to try to take Rx PRN.  She will use the lowest effective dose.  The patient has read and signed the [facility] Controlled Substance Contract.  I will continue to see patient for regular follow up appointments and be prescribed the lowest effective dose.  JAXON has been reviewed by me and is updated every 3 months. The patient is aware of the potential for addiction and dependence.  She denies that Ambien (Zolpidem) causes excessive daytime drowsiness and sleep walking.  Patient voices understanding to take Ambien (Zolpidem) and go straight to bed. Patient must be able to sleep 7 hours or more when taking this.  Patient will hold Rx and contact me if they experience any impaired mental alertness the next day.    The following portions of the patient's history were reviewed and updated as appropriate: allergies, current  medications, past family history, past medical history, past social history, past surgical history and problem list.    Review of Systems   Constitutional: Negative for activity change, appetite change and unexpected weight change.   HENT: Positive for hearing loss. Negative for nosebleeds and trouble swallowing.    Eyes: Negative for pain and visual disturbance.   Respiratory: Negative for chest tightness, shortness of breath and wheezing.    Cardiovascular: Negative for chest pain and palpitations.   Gastrointestinal: Negative for abdominal pain and blood in stool.   Endocrine: Negative.    Genitourinary: Negative for difficulty urinating and hematuria.   Musculoskeletal: Negative for joint swelling.   Skin: Negative for color change and rash.   Allergic/Immunologic: Negative.    Neurological: Negative for syncope and speech difficulty.   Hematological: Negative for adenopathy.   Psychiatric/Behavioral: Positive for sleep disturbance. Negative for agitation and confusion.   All other systems reviewed and are negative.      Objective   Physical Exam   Constitutional: She is oriented to person, place, and time. She appears well-developed and well-nourished. No distress.   HENT:   Head: Normocephalic and atraumatic.   Eyes: Pupils are equal, round, and reactive to light. Conjunctivae and EOM are normal. Right eye exhibits no discharge. Left eye exhibits no discharge. No scleral icterus.   Neck: Normal range of motion. Neck supple. No tracheal deviation present.   Cardiovascular: Normal rate, regular rhythm, normal heart sounds, intact distal pulses and normal pulses.  Exam reveals no gallop.    No murmur heard.  Pulmonary/Chest: Effort normal and breath sounds normal. No respiratory distress. She has no wheezes. She has no rales.   Musculoskeletal: Normal range of motion.   Neurological: She is alert and oriented to person, place, and time. She exhibits normal muscle tone. Coordination normal.   Skin: Skin is warm.  No rash noted. No erythema. No pallor.   Psychiatric: She has a normal mood and affect. Her behavior is normal. Judgment and thought content normal.   Nursing note and vitals reviewed.      Assessment/Plan   Kristyn was seen today for insomnia.    Diagnoses and all orders for this visit:    Primary insomnia    Renal insufficiency

## 2018-09-04 NOTE — PATIENT INSTRUCTIONS
Low glycemic index diet  Exercise 30 minutes most days of the week  Make sure you get results on any labs or tests we ordered today  We discussed medications and how to take them as prescribed  Sleep 6-8 hours each night if possible  If you have not signed up for [email or web address], please activate your code ASAP from your After Visit Summary today    LDL goal <100  LDL goal if heart disease <70  HDL goal >60  Triglyceride goal <150  BP goal =<130/80  Fasting glucose <100  Stop Ambien if you develop excessive daytime drowsiness and/or sleep walking.  Avoid driving if drowsy.  Do not drink alcohol with this medication.  Ambien is a controlled substance and requires you to be seen for an appointment every 3 months for a medication check refill.  Use the lowest effective dose.    Will update CBC again

## 2018-09-05 LAB
BASOPHILS # BLD AUTO: 0.03 10*3/MM3 (ref 0–0.2)
BASOPHILS NFR BLD AUTO: 0.5 % (ref 0–1.5)
EOSINOPHIL # BLD AUTO: 0.13 10*3/MM3 (ref 0–0.7)
EOSINOPHIL NFR BLD AUTO: 2.1 % (ref 0.3–6.2)
ERYTHROCYTE [DISTWIDTH] IN BLOOD BY AUTOMATED COUNT: 14.7 % (ref 11.7–13)
FERRITIN SERPL-MCNC: 15.33 NG/ML (ref 13–150)
FOLATE SERPL-MCNC: >20 NG/ML (ref 4.78–24.2)
HCT VFR BLD AUTO: 28.8 % (ref 35.6–45.5)
HGB BLD-MCNC: 8.7 G/DL (ref 11.9–15.5)
IMM GRANULOCYTES # BLD: 0 10*3/MM3 (ref 0–0.03)
IMM GRANULOCYTES NFR BLD: 0 % (ref 0–0.5)
IRON SATN MFR SERPL: 83 % (ref 20–50)
IRON SERPL-MCNC: 327 MCG/DL (ref 37–145)
LYMPHOCYTES # BLD AUTO: 2.52 10*3/MM3 (ref 0.9–4.8)
LYMPHOCYTES NFR BLD AUTO: 40.1 % (ref 19.6–45.3)
MCH RBC QN AUTO: 29.1 PG (ref 26.9–32)
MCHC RBC AUTO-ENTMCNC: 30.2 G/DL (ref 32.4–36.3)
MCV RBC AUTO: 96.3 FL (ref 80.5–98.2)
MONOCYTES # BLD AUTO: 0.59 10*3/MM3 (ref 0.2–1.2)
MONOCYTES NFR BLD AUTO: 9.4 % (ref 5–12)
NEUTROPHILS # BLD AUTO: 3.01 10*3/MM3 (ref 1.9–8.1)
NEUTROPHILS NFR BLD AUTO: 47.9 % (ref 42.7–76)
PLATELET # BLD AUTO: 377 10*3/MM3 (ref 140–500)
RBC # BLD AUTO: 2.99 10*6/MM3 (ref 3.9–5.2)
TIBC SERPL-MCNC: 392 MCG/DL
UIBC SERPL-MCNC: 65 MCG/DL
VIT B12 SERPL-MCNC: 450 PG/ML (ref 211–946)
WBC # BLD AUTO: 6.28 10*3/MM3 (ref 4.5–10.7)

## 2018-09-07 ENCOUNTER — OFFICE VISIT (OUTPATIENT)
Dept: ENDOCRINOLOGY | Age: 83
End: 2018-09-07

## 2018-09-07 VITALS — WEIGHT: 129.4 LBS | HEIGHT: 65 IN | BODY MASS INDEX: 21.56 KG/M2

## 2018-09-07 DIAGNOSIS — E78.2 MIXED HYPERLIPIDEMIA: ICD-10-CM

## 2018-09-07 DIAGNOSIS — E55.9 VITAMIN D DEFICIENCY: ICD-10-CM

## 2018-09-07 DIAGNOSIS — E06.3 HYPOTHYROIDISM DUE TO HASHIMOTO'S THYROIDITIS: Primary | ICD-10-CM

## 2018-09-07 DIAGNOSIS — E83.52 HYPERCALCEMIA: ICD-10-CM

## 2018-09-07 DIAGNOSIS — E03.8 HYPOTHYROIDISM DUE TO HASHIMOTO'S THYROIDITIS: Primary | ICD-10-CM

## 2018-09-07 PROCEDURE — 99214 OFFICE O/P EST MOD 30 MIN: CPT | Performed by: NURSE PRACTITIONER

## 2018-09-07 RX ORDER — LEVOTHYROXINE SODIUM 0.07 MG/1
75 TABLET ORAL DAILY
Qty: 90 TABLET | Refills: 1 | Status: SHIPPED | OUTPATIENT
Start: 2018-09-07 | End: 2019-01-07

## 2018-09-07 NOTE — PROGRESS NOTES
Subjective    Kristyn Severino is a 89 y.o. female. she is here to be seen for hypothyroidism.     hypothyroid      Hypothyroidism   This is a chronic problem. The current episode started more than 1 year ago. The problem occurs constantly. The problem has been waxing and waning. Associated symptoms include fatigue. Pertinent negatives include no myalgias or rash. Nothing aggravates the symptoms. Treatments tried: meds and labs. The treatment provided no relief.        Evaluation history:  TSH   Date Value Ref Range Status   08/24/2018 0.020 (L) 0.270 - 4.200 mIU/mL Final     Free T4   Date Value Ref Range Status   08/24/2018 1.36 0.93 - 1.70 ng/dL Final     T3, Free   Date Value Ref Range Status   08/24/2018 6.3 (H) 2.0 - 4.4 pg/mL Final       Current medications:  Current Outpatient Prescriptions   Medication Sig Dispense Refill   • aspirin 81 MG EC tablet Take 81 mg by mouth daily.     • Biotin 1 MG capsule Take by mouth.     • ezetimibe (ZETIA) 10 MG tablet Take 1 tablet by mouth Daily. For cholesterol 90 tablet 3   • ferrous sulfate 325 (65 FE) MG tablet Take 2 tablets by mouth Daily With Breakfast. 60 tablet 5   • folic acid (FOLVITE) 400 MCG tablet Take 400 mcg by mouth Daily.     • glucosamine-chondroitin 500-400 MG per tablet Take 1 tablet by mouth 3 (three) times a day.     • Lactobacillus (ACIDOPHILUS PO) Take  by mouth.     • Multiple Vitamins-Minerals (MULTIVITAMIN WITH MINERALS) tablet Take 1 tablet by mouth daily.     • Nutritional Supplements (ENSURE NUTRA SHAKE HI-CAMMY) liquid Take 1 can by mouth 4 (Four) Times a Day. PRN meal supplementation 120 can 5   • Pyridoxine HCl (VITAMIN B-6 PO) Take 100 mg by mouth daily.     • simethicone (MYLICON) 125 MG chewable tablet Chew 125 mg Every 6 (Six) Hours As Needed for Flatulence.     • TRAVATAN Z 0.004 % solution ophthalmic solution      • ultra-purified peppermint oil (IBGARD) 90 mg capsule capsule Take  by mouth.     • zolpidem (AMBIEN) 5 MG tablet 1/2  -1 tabs PO Q HS PRN insomnia 30 tablet 2   • levothyroxine (UNITHROID) 75 MCG tablet Take 1 tablet by mouth Daily. 90 tablet 1     No current facility-administered medications for this visit.        The following portions of the patient's history were reviewed and updated as appropriate:   Past Medical History:   Diagnosis Date   • Anemia of chronic illness    • PALACIOS (dyspnea on exertion)    • Fatigue    • Hirsutism    • History of bone density study     few years; normal   • History of colonoscopy     never   • HLD (hyperlipidemia)    • Hypothyroidism    • Insomnia    • Lightheadedness    • Osteoarthritis    • Postmenopausal    • Renal insufficiency    • Rhinorrhea    • Seborrheic keratosis    • Vitamin D deficiency      Past Surgical History:   Procedure Laterality Date   • APPENDECTOMY  2010    Dr. De Oliveira   • BREAST BIOPSY     • JOINT REPLACEMENT Left 2011    Dr. Gold   • MAMMO BILATERAL      many years ago   • PAP SMEAR      many years ago     Family History   Problem Relation Age of Onset   • Family history unknown: Yes     OB History      Para Term  AB Living    4 4 4          SAB TAB Ectopic Molar Multiple Live Births                       No Known Allergies  Social History     Social History   • Marital status:      Social History Main Topics   • Smoking status: Never Smoker   • Smokeless tobacco: Never Used   • Alcohol use Yes   • Drug use: No   • Sexual activity: Defer     Other Topics Concern   • Not on file       Review of Systems  Review of Systems   Constitutional: Positive for fatigue.   HENT: Negative for trouble swallowing.    Eyes: Negative for visual disturbance.   Respiratory: Negative for choking.    Cardiovascular: Negative for palpitations.   Gastrointestinal: Positive for constipation and diarrhea.   Endocrine: Negative for cold intolerance.   Genitourinary: Negative for difficulty urinating.   Musculoskeletal: Negative for myalgias.   Skin: Negative for rash.    Allergic/Immunologic: Negative.    Neurological: Positive for light-headedness.   Hematological: Bruises/bleeds easily.   Psychiatric/Behavioral: Negative for sleep disturbance.   All other systems reviewed and are negative.       Objective      Wt Readings from Last 3 Encounters:   09/07/18 58.7 kg (129 lb 6.4 oz)   09/04/18 60.8 kg (134 lb)   06/01/18 60.3 kg (133 lb)     Temp Readings from Last 3 Encounters:   09/04/18 98 °F (36.7 °C) (Oral)   06/01/18 98.1 °F (36.7 °C) (Oral)   03/06/18 97.5 °F (36.4 °C) (Oral)     BP Readings from Last 3 Encounters:   09/04/18 110/60   06/01/18 110/74   05/07/18 118/70     Pulse Readings from Last 3 Encounters:   09/04/18 65   06/01/18 74   03/06/18 63     Physical Exam   Constitutional: She is oriented to person, place, and time. She appears well-nourished.   HENT:   Head: Atraumatic.   Eyes: Pupils are equal, round, and reactive to light. EOM are normal.   Neck: Normal range of motion. Neck supple.   Cardiovascular: Normal rate, regular rhythm, normal heart sounds and intact distal pulses.    No murmur heard.  Pulmonary/Chest: Effort normal and breath sounds normal.   Abdominal: Soft. Bowel sounds are normal.   Musculoskeletal: Normal range of motion.   Neurological: She is oriented to person, place, and time.   Skin: Skin is warm and dry. Capillary refill takes 2 to 3 seconds.   Psychiatric: She has a normal mood and affect. Her behavior is normal. Judgment and thought content normal.   Nursing note and vitals reviewed.      Lab Review    Office Visit on 09/04/2018   Component Date Value Ref Range Status   • WBC 09/04/2018 6.28  4.50 - 10.70 10*3/mm3 Final   • RBC 09/04/2018 2.99* 3.90 - 5.20 10*6/mm3 Final   • Hemoglobin 09/04/2018 8.7* 11.9 - 15.5 g/dL Final   • Hematocrit 09/04/2018 28.8* 35.6 - 45.5 % Final   • MCV 09/04/2018 96.3  80.5 - 98.2 fL Final   • MCH 09/04/2018 29.1  26.9 - 32.0 pg Final   • MCHC 09/04/2018 30.2* 32.4 - 36.3 g/dL Final   • RDW 09/04/2018  14.7* 11.7 - 13.0 % Final   • Platelets 09/04/2018 377  140 - 500 10*3/mm3 Final   • Neutrophil Rel % 09/04/2018 47.9  42.7 - 76.0 % Final   • Lymphocyte Rel % 09/04/2018 40.1  19.6 - 45.3 % Final   • Monocyte Rel % 09/04/2018 9.4  5.0 - 12.0 % Final   • Eosinophil Rel % 09/04/2018 2.1  0.3 - 6.2 % Final   • Basophil Rel % 09/04/2018 0.5  0.0 - 1.5 % Final   • Neutrophils Absolute 09/04/2018 3.01  1.90 - 8.10 10*3/mm3 Final   • Lymphocytes Absolute 09/04/2018 2.52  0.90 - 4.80 10*3/mm3 Final   • Monocytes Absolute 09/04/2018 0.59  0.20 - 1.20 10*3/mm3 Final   • Eosinophils Absolute 09/04/2018 0.13  0.00 - 0.70 10*3/mm3 Final   • Basophils Absolute 09/04/2018 0.03  0.00 - 0.20 10*3/mm3 Final   • Immature Granulocyte Rel % 09/04/2018 0.0  0.0 - 0.5 % Final   • Immature Grans Absolute 09/04/2018 0.00  0.00 - 0.03 10*3/mm3 Final   • Ferritin 09/04/2018 15.33  13.00 - 150.00 ng/mL Final   • TIBC 09/04/2018 392  mcg/dL Final   • UIBC 09/04/2018 65  mcg/dL Final   • Iron 09/04/2018 327* 37 - 145 mcg/dL Final   • Iron Saturation 09/04/2018 83* 20 - 50 % Final   • Vitamin B-12 09/04/2018 450  211 - 946 pg/mL Final   • Folate 09/04/2018 >20.00  4.78 - 24.20 ng/mL Final        Assessment/Plan      1. Hypothyroidism due to Hashimoto's thyroiditis    2. Vitamin D deficiency    3. Mixed hyperlipidemia    4. Hypercalcemia    . This diagnosis was discussed and reviewed with the patient including the advantages of drug therapy.     1. Orders placed during this encounter include:  Orders Placed This Encounter   Procedures   • Comprehensive Metabolic Panel     Standing Status:   Future   • Lipid Panel     Standing Status:   Future     Standing Expiration Date:   9/11/2019     Order Specific Question:   [facility] Has the patient fasted?     Answer:   No   • Uric Acid     Standing Status:   Future   • Vitamin D 25 Hydroxy     Standing Status:   Future   • TSH     Standing Status:   Future   • Thyroid Antibodies     Standing Status:    Future   • T4, Free     Standing Status:   Future   • T3, Free     Standing Status:   Future   • T3     Standing Status:   Future   • T4     Standing Status:   Future       Medications prescribed:  Outpatient Encounter Prescriptions as of 9/7/2018   Medication Sig Dispense Refill   • aspirin 81 MG EC tablet Take 81 mg by mouth daily.     • Biotin 1 MG capsule Take by mouth.     • ezetimibe (ZETIA) 10 MG tablet Take 1 tablet by mouth Daily. For cholesterol 90 tablet 3   • ferrous sulfate 325 (65 FE) MG tablet Take 2 tablets by mouth Daily With Breakfast. 60 tablet 5   • folic acid (FOLVITE) 400 MCG tablet Take 400 mcg by mouth Daily.     • glucosamine-chondroitin 500-400 MG per tablet Take 1 tablet by mouth 3 (three) times a day.     • Lactobacillus (ACIDOPHILUS PO) Take  by mouth.     • Multiple Vitamins-Minerals (MULTIVITAMIN WITH MINERALS) tablet Take 1 tablet by mouth daily.     • Nutritional Supplements (ENSURE NUTRA SHAKE HI-CAMMY) liquid Take 1 can by mouth 4 (Four) Times a Day. PRN meal supplementation 120 can 5   • Pyridoxine HCl (VITAMIN B-6 PO) Take 100 mg by mouth daily.     • simethicone (MYLICON) 125 MG chewable tablet Chew 125 mg Every 6 (Six) Hours As Needed for Flatulence.     • TRAVATAN Z 0.004 % solution ophthalmic solution      • ultra-purified peppermint oil (IBGARD) 90 mg capsule capsule Take  by mouth.     • zolpidem (AMBIEN) 5 MG tablet 1/2 -1 tabs PO Q HS PRN insomnia 30 tablet 2   • [DISCONTINUED] SYNTHROID 88 MCG tablet Take 1 tablet by mouth Daily.  on an empty stomach 30 tablet 3   • levothyroxine (UNITHROID) 75 MCG tablet Take 1 tablet by mouth Daily. 90 tablet 1     No facility-administered encounter medications on file as of 9/7/2018.        2. Repeat s-TSH in before patient's next visit.    3. The risks and benefits of my recommendations, as well as other treatment options were discussed with the patient today. Questions were answered.  In summary: Patient is metabolic stable and doing  well. Reviewed lab work and at this time there will be no medication changes with doses. We will send in a script for unithroid for pricing to assist patient financially.     4. Return in about 4 months (around 1/7/2019), or if symptoms worsen or fail to improve, for Recheck. 4 months with Paz - 2 weeks prior for labs, 8 months with Dr. Olivo - 2 weeks prior for labs.

## 2018-09-18 ENCOUNTER — CONSULT (OUTPATIENT)
Dept: ONCOLOGY | Facility: CLINIC | Age: 83
End: 2018-09-18

## 2018-09-18 ENCOUNTER — INFUSION (OUTPATIENT)
Dept: ONCOLOGY | Facility: HOSPITAL | Age: 83
End: 2018-09-18

## 2018-09-18 ENCOUNTER — LAB (OUTPATIENT)
Dept: OTHER | Facility: HOSPITAL | Age: 83
End: 2018-09-18

## 2018-09-18 VITALS — HEART RATE: 65 BPM | SYSTOLIC BLOOD PRESSURE: 111 MMHG | DIASTOLIC BLOOD PRESSURE: 66 MMHG

## 2018-09-18 VITALS
OXYGEN SATURATION: 99 % | RESPIRATION RATE: 14 BRPM | DIASTOLIC BLOOD PRESSURE: 73 MMHG | HEIGHT: 63 IN | HEART RATE: 78 BPM | WEIGHT: 128.1 LBS | TEMPERATURE: 98.1 F | SYSTOLIC BLOOD PRESSURE: 119 MMHG | BODY MASS INDEX: 22.7 KG/M2

## 2018-09-18 DIAGNOSIS — D50.0 IRON DEFICIENCY ANEMIA DUE TO CHRONIC BLOOD LOSS: Primary | ICD-10-CM

## 2018-09-18 DIAGNOSIS — K58.0 IRRITABLE BOWEL SYNDROME WITH DIARRHEA: ICD-10-CM

## 2018-09-18 DIAGNOSIS — D63.8 ANEMIA OF CHRONIC ILLNESS: Primary | ICD-10-CM

## 2018-09-18 PROBLEM — K58.9 IRRITABLE BOWEL SYNDROME: Status: ACTIVE | Noted: 2018-09-18

## 2018-09-18 LAB
BASOPHILS # BLD AUTO: 0.06 10*3/MM3 (ref 0–0.2)
BASOPHILS NFR BLD AUTO: 0.9 % (ref 0–1.5)
DEPRECATED RDW RBC AUTO: 48.2 FL (ref 37–54)
EOSINOPHIL # BLD AUTO: 0.22 10*3/MM3 (ref 0–0.7)
EOSINOPHIL NFR BLD AUTO: 3.2 % (ref 0.3–6.2)
ERYTHROCYTE [DISTWIDTH] IN BLOOD BY AUTOMATED COUNT: 14.5 % (ref 11.7–13)
HCT VFR BLD AUTO: 29.7 % (ref 35.6–45.5)
HGB BLD-MCNC: 9.7 G/DL (ref 11.9–15.5)
IMM GRANULOCYTES # BLD: 0.01 10*3/MM3 (ref 0–0.03)
IMM GRANULOCYTES NFR BLD: 0.1 % (ref 0–0.5)
LYMPHOCYTES # BLD AUTO: 2.42 10*3/MM3 (ref 0.9–4.8)
LYMPHOCYTES NFR BLD AUTO: 35.7 % (ref 19.6–45.3)
MCH RBC QN AUTO: 29.6 PG (ref 26.9–32)
MCHC RBC AUTO-ENTMCNC: 32.7 G/DL (ref 32.4–36.3)
MCV RBC AUTO: 90.5 FL (ref 80.5–98.2)
MONOCYTES # BLD AUTO: 0.79 10*3/MM3 (ref 0.2–1.2)
MONOCYTES NFR BLD AUTO: 11.7 % (ref 5–12)
NEUTROPHILS # BLD AUTO: 3.28 10*3/MM3 (ref 1.9–8.1)
NEUTROPHILS NFR BLD AUTO: 48.4 % (ref 42.7–76)
NRBC BLD MANUAL-RTO: 0 /100 WBC (ref 0–0)
PLATELET # BLD AUTO: 383 10*3/MM3 (ref 140–500)
PMV BLD AUTO: 9.5 FL (ref 6–12)
RBC # BLD AUTO: 3.28 10*6/MM3 (ref 3.9–5.2)
WBC NRBC COR # BLD: 6.78 10*3/MM3 (ref 4.5–10.7)

## 2018-09-18 PROCEDURE — 96374 THER/PROPH/DIAG INJ IV PUSH: CPT | Performed by: INTERNAL MEDICINE

## 2018-09-18 PROCEDURE — 36415 COLL VENOUS BLD VENIPUNCTURE: CPT

## 2018-09-18 PROCEDURE — 99205 OFFICE O/P NEW HI 60 MIN: CPT | Performed by: INTERNAL MEDICINE

## 2018-09-18 PROCEDURE — 25010000002 FERRIC CARBOXYMALTOSE 750 MG/15ML SOLUTION 15 ML VIAL: Performed by: INTERNAL MEDICINE

## 2018-09-18 PROCEDURE — 85025 COMPLETE CBC W/AUTO DIFF WBC: CPT | Performed by: INTERNAL MEDICINE

## 2018-09-18 RX ORDER — METHYLPREDNISOLONE SODIUM SUCCINATE 125 MG/2ML
125 INJECTION, POWDER, LYOPHILIZED, FOR SOLUTION INTRAMUSCULAR; INTRAVENOUS AS NEEDED
Status: CANCELLED | OUTPATIENT
Start: 2018-09-18

## 2018-09-18 RX ORDER — METHYLPREDNISOLONE SODIUM SUCCINATE 125 MG/2ML
125 INJECTION, POWDER, LYOPHILIZED, FOR SOLUTION INTRAMUSCULAR; INTRAVENOUS AS NEEDED
OUTPATIENT
Start: 2018-09-18

## 2018-09-18 RX ORDER — DIPHENHYDRAMINE HYDROCHLORIDE 50 MG/ML
50 INJECTION INTRAMUSCULAR; INTRAVENOUS AS NEEDED
OUTPATIENT
Start: 2018-09-18

## 2018-09-18 RX ORDER — SODIUM CHLORIDE 9 MG/ML
250 INJECTION, SOLUTION INTRAVENOUS ONCE
Status: CANCELLED | OUTPATIENT
Start: 2018-09-18 | End: 2018-09-18

## 2018-09-18 RX ORDER — SODIUM CHLORIDE 9 MG/ML
250 INJECTION, SOLUTION INTRAVENOUS ONCE
Status: COMPLETED | OUTPATIENT
Start: 2018-09-18 | End: 2018-09-18

## 2018-09-18 RX ORDER — DIPHENHYDRAMINE HYDROCHLORIDE 50 MG/ML
50 INJECTION INTRAMUSCULAR; INTRAVENOUS AS NEEDED
Status: CANCELLED | OUTPATIENT
Start: 2018-09-18

## 2018-09-18 RX ORDER — SODIUM CHLORIDE 9 MG/ML
250 INJECTION, SOLUTION INTRAVENOUS ONCE
OUTPATIENT
Start: 2018-09-18 | End: 2018-09-18

## 2018-09-18 RX ADMIN — FERRIC CARBOXYMALTOSE INJECTION 750 MG: 50 INJECTION, SOLUTION INTRAVENOUS at 10:03

## 2018-09-18 RX ADMIN — SODIUM CHLORIDE 250 ML: 900 INJECTION, SOLUTION INTRAVENOUS at 10:03

## 2018-09-18 NOTE — PROGRESS NOTES
Subjective     REASON FOR CONSULTATION:  Iron deficiency anemia and weight loss  Provide an opinion on any further workup or treatment                             REQUESTING PHYSICIAN:  Cary ALVARES    RECORDS OBTAINED:  Records of the patients history including those obtained from the referring provider were reviewed and summarized in detail.    HISTORY OF PRESENT ILLNESS:  The patient is a 89 y.o. year old female who is here for an opinion about the above issue.  She is referred to us from her primary care office for evaluation of anemia and weight loss.  This is been an issue for her for over a year.  She had iron studies in the past documenting iron deficiency and has been taking oral iron supplementation.  She is having some significant GI issues related to the iron including worsening diarrhea.    She has not seen any visible bleeding and has been evaluated by gastroenterology.  She has had imaging studies including a CT scan of the abdomen and pelvis performed 1/23/2017 with no obvious anatomic abnormalities identified on the scan.  She has not undergone recent endoscopic evaluation due to her advanced age.    Her most recent ferritin level was still low at 15 although her serum iron was elevated showing that she has absorbed some of the oral iron however she is having more GI problems on the oral iron therapy.  She has a history of irritable bowel syndrome and reports that she's had more discomfort and diarrhea and bloating since being on the iron.    I discussed with the patient that I think we need to replace her iron intravenously and then reassess her anemia once her iron is fully replaced.  We will defer to Dr. Espinal and Dr. Aguilar about whether or not she needs to undergo any further GI evaluation.  We told her to discontinue the iron and discontinue her daily aspirin for now.  We plan to see her back next month to assess her response to IV iron and further recommendations may follow from  there.    History of Present Illness     Past Medical History:   Diagnosis Date   • Anemia of chronic illness    • PALACIOS (dyspnea on exertion)    • Fatigue    • Hirsutism    • History of bone density study     few years; normal   • History of colonoscopy     never   • History of prior pregnancies     x4   • HLD (hyperlipidemia)    • Hypothyroidism     Hashimoto's diagnosed age 19   • Insomnia    • Lightheadedness    • Osteoarthritis    • Postmenopausal    • Renal insufficiency    • Rhinorrhea    • Seborrheic keratosis    • Vitamin D deficiency         Past Surgical History:   Procedure Laterality Date   • APPENDECTOMY  03/2010    Dr. De Oliveira   • BREAST BIOPSY     • JOINT REPLACEMENT Left 02/07/2011    Dr. Gold   • MAMMO BILATERAL      many years ago   • PAP SMEAR      many years ago        Current Outpatient Prescriptions on File Prior to Visit   Medication Sig Dispense Refill   • aspirin 81 MG EC tablet Take 81 mg by mouth daily.     • ferrous sulfate 325 (65 FE) MG tablet Take 2 tablets by mouth Daily With Breakfast. 60 tablet 5   • folic acid (FOLVITE) 400 MCG tablet Take 400 mcg by mouth Daily.     • glucosamine-chondroitin 500-400 MG per tablet Take 1 tablet by mouth 3 (three) times a day.     • levothyroxine (UNITHROID) 75 MCG tablet Take 1 tablet by mouth Daily. (Patient taking differently: Take 88 mcg by mouth Daily.) 90 tablet 1   • Multiple Vitamins-Minerals (MULTIVITAMIN WITH MINERALS) tablet Take 1 tablet by mouth daily.     • ultra-purified peppermint oil (IBGARD) 90 mg capsule capsule Take  by mouth.     • zolpidem (AMBIEN) 5 MG tablet 1/2 -1 tabs PO Q HS PRN insomnia 30 tablet 2   • Biotin 1 MG capsule Take by mouth.     • ezetimibe (ZETIA) 10 MG tablet Take 1 tablet by mouth Daily. For cholesterol 90 tablet 3   • Lactobacillus (ACIDOPHILUS PO) Take  by mouth.     • Nutritional Supplements (ENSURE NUTRA SHAKE HI-CAMMY) liquid Take 1 can by mouth 4 (Four) Times a Day. PRN meal supplementation 120 can 5  "  • Pyridoxine HCl (VITAMIN B-6 PO) Take 100 mg by mouth daily.     • simethicone (MYLICON) 125 MG chewable tablet Chew 125 mg Every 6 (Six) Hours As Needed for Flatulence.     • TRAVATAN Z 0.004 % solution ophthalmic solution        No current facility-administered medications on file prior to visit.         ALLERGIES:  No Known Allergies     Social History     Social History   • Marital status:    • Number of children: 4     Occupational History   •  Retired     Social History Main Topics   • Smoking status: Never Smoker   • Smokeless tobacco: Never Used   • Alcohol use Yes   • Drug use: No   • Sexual activity: Defer     Other Topics Concern   • Not on file        Family History   Problem Relation Age of Onset   • Family history unknown: Yes        Review of Systems   Constitutional: Positive for appetite change, fatigue and unexpected weight change.   Gastrointestinal: Positive for abdominal distention, abdominal pain and diarrhea.   Musculoskeletal: Positive for arthralgias and gait problem.   Skin: Positive for pallor.   Neurological: Positive for weakness and light-headedness.   Hematological: Bruises/bleeds easily.        Objective     Vitals:    09/18/18 0908   BP: 119/73   Pulse: 78   Resp: 14   Temp: 98.1 °F (36.7 °C)   TempSrc: Oral   SpO2: 99%   Weight: 58.1 kg (128 lb 1.6 oz)   Height: 159 cm (62.6\")   PainSc: 0-No pain     Current Status 9/18/2018   ECOG score 1       Physical Exam   Constitutional: She is oriented to person, place, and time. She appears well-developed and well-nourished. No distress.   HENT:   Head: Normocephalic.   Eyes: Pupils are equal, round, and reactive to light. Conjunctivae and EOM are normal. No scleral icterus.   Neck: Normal range of motion. Neck supple. No JVD present. No thyromegaly present.   Cardiovascular: Normal rate and regular rhythm.  Exam reveals no gallop and no friction rub.    No murmur heard.  Pulmonary/Chest: Effort normal and breath sounds normal. She " has no wheezes. She has no rales.   Abdominal: Soft. She exhibits no distension and no mass. There is no tenderness.   Musculoskeletal: Normal range of motion. She exhibits no edema or deformity.   Lymphadenopathy:     She has no cervical adenopathy.   Neurological: She is alert and oriented to person, place, and time. She has normal reflexes. No cranial nerve deficit.   Skin: Skin is warm and dry. No rash noted. No erythema.   Psychiatric: She has a normal mood and affect. Her behavior is normal. Judgment normal.         RECENT LABS:  Hematology WBC   Date Value Ref Range Status   09/18/2018 6.78 4.50 - 10.70 10*3/mm3 Final     RBC   Date Value Ref Range Status   09/18/2018 3.28 (L) 3.90 - 5.20 10*6/mm3 Final   09/04/2018 2.99 (L) 3.90 - 5.20 10*6/mm3 Final     Hemoglobin   Date Value Ref Range Status   09/18/2018 9.7 (L) 11.9 - 15.5 g/dL Final     Hematocrit   Date Value Ref Range Status   09/18/2018 29.7 (L) 35.6 - 45.5 % Final     Platelets   Date Value Ref Range Status   09/18/2018 383 140 - 500 10*3/mm3 Final        Lab Results   Component Value Date    TIBC 392 09/04/2018    FERRITIN 15.33 09/04/2018     Lab Results   Component Value Date    SMOIRRLA10 450 09/04/2018     Lab Results   Component Value Date    FOLATE >20.00 09/04/2018     Lab Results   Component Value Date    BUN 18 08/24/2018    CREATININE 0.86 08/24/2018    EGFRIFNONA 62 08/24/2018    EGFRIFAFRI 75 08/24/2018    BCR 20.9 08/24/2018    K 4.6 08/24/2018    CO2 25.9 08/24/2018    CALCIUM 9.1 08/24/2018    PROTENTOTREF 6.4 08/24/2018    ALBUMIN 4.00 08/24/2018    LABIL2 1.7 08/24/2018    AST 14 08/24/2018    ALT 7 08/24/2018         Assessment/Plan     1.  Partially treated iron deficiency anemia with significant GI side effects from oral iron supplementation.  2.  Chronic GI complaints and weight loss.  She does have a history of irritable bowel and has been evaluated by gastroenterology.  She feels her symptoms have been worse since starting on  oral iron therapy.    Recommendations  1.  Although her iron studies indicate that she may be absorbing the oral iron and this is causing her more GI distress and has not been successful in replacing her storage iron and therefore I think she is a good candidate for IV iron therapy with 2 IV doses of Injectafer.   2.  We will plan to deliver the first dose of IV iron and her office today as part of this initial visit and she'll be scheduled to return next week for the second dose of IV iron indices 750 mg each dose).  3.  Because of her GI issues we have asked her to discontinue the oral iron supplement and also to discontinue her daily aspirin therapy.  4.  She will return for M.D. follow-up in 4-6 weeks and at that time will have repeat iron studies in another CBC performed.  5.  We discussed that if her anemia persists after complete iron replacement therapy then we may need to consider further workup including the possibility of a bone marrow aspirate and biopsy to evaluate for myelodysplasia.  6.  We will defer to the referring provider as to whether or not she may need additional GI evaluation at this point.  She has seen Dr. Henry Maurer in the past and it probably would be reasonable for her to at least check in with him again in the near future.    Thanks for allowing us to see this nice patient in consultation.

## 2018-09-18 NOTE — PROGRESS NOTES
Education provided for injectafer with handout given to pt. Side effects reviewed questions answered. Pt vu.  Tolerated injectafer without complications, dcd ambulatory

## 2018-09-19 ENCOUNTER — TELEPHONE (OUTPATIENT)
Dept: GASTROENTEROLOGY | Facility: CLINIC | Age: 83
End: 2018-09-19

## 2018-09-19 NOTE — TELEPHONE ENCOUNTER
----- Message from Dayami Shahid sent at 9/19/2018  9:03 AM EDT -----  Regarding: PT CALLED - GAS, WEIGHT LOSS & IBS  Contact: [phone]  PT STATED SHE WAS UP ALL NIGHT. HAS GAS THAT CAUSES A LUMP IN HER INTESTINE. PT STATED SHE CAN FEEL THE LUMP. VERY NASEOUS AND LOSING WEIGHT. WHAT CAN SHE DO?  PT ASK IF SHE COULD SEE THE DR SOONER THAN OCT

## 2018-09-19 NOTE — TELEPHONE ENCOUNTER
Is she taking simethicone?  I think she has used this in the past with good success.    Please send her xifaxin 550mg TID for 2 weeks  We can overbook her to see me on 9/28 if she can't get in to see her PCP any sooner.

## 2018-09-19 NOTE — TELEPHONE ENCOUNTER
Called pt back. Pt states she has been having issues with her GI tract for about a year and she can normally take IBgard twice daily and keep it under control. She states for the last week or so, her gas pain has gotten really bad and she has had continuous pain since about 9 PM last night. She states she can also feel a lump in her RUQ that feels like gas build-up. She states she can gently push on the lump and push the gas back in to her colon. Pt denies a fever, but she states her head feels hot to touch. She is having nausea, no vomiting. She is having pain in the area especially when the gas builds up and it is painful until she presses gently and moves the air back into the colon. Pt asked what she should do? She cannot be seen until 10/25. Advised she should call her PMD to see if they can work her in to be seen any sooner and will send an update to Dr Maurer for further instructions. Pt verb understanding,

## 2018-09-19 NOTE — TELEPHONE ENCOUNTER
Called pt back. Asked if she is taking simethicone or Gas-X for the pain/gas right now? She states no. Advise she can try taking simethicone along with the IBgard to help with her symptoms. Advise will also call in a medication called Xifaxan that is an antibiotic to help with these symptoms as well. Advised this med will likely need a PA or prior authorization before her insurance will pay for it. Advised MD can see her on 9/28 if her PMD cannot see her any sooner than that. Pt states she has a call into her PMD and is just awaiting a call back. Advised will send her script to her pharmacy and if her PMD cannot see her before 9/28, call back and we will get her an appt with Dr Maurer for that day. Pt verb understanding.

## 2018-09-20 ENCOUNTER — APPOINTMENT (OUTPATIENT)
Dept: GENERAL RADIOLOGY | Facility: HOSPITAL | Age: 83
End: 2018-09-20
Attending: HOSPITALIST

## 2018-09-20 ENCOUNTER — HOSPITAL ENCOUNTER (INPATIENT)
Facility: HOSPITAL | Age: 83
LOS: 6 days | Discharge: NURSING, REHAB OR LONG-TERM CARE | End: 2018-09-26
Attending: INTERNAL MEDICINE | Admitting: HOSPITALIST

## 2018-09-20 ENCOUNTER — APPOINTMENT (OUTPATIENT)
Dept: GENERAL RADIOLOGY | Facility: HOSPITAL | Age: 83
End: 2018-09-20

## 2018-09-20 DIAGNOSIS — R26.81 UNSTEADINESS ON FEET: Primary | ICD-10-CM

## 2018-09-20 PROBLEM — K52.9 COLITIS: Status: ACTIVE | Noted: 2018-09-20

## 2018-09-20 PROBLEM — R11.10 VOMITING: Status: ACTIVE | Noted: 2018-09-20

## 2018-09-20 LAB
ANION GAP SERPL CALCULATED.3IONS-SCNC: 11.9 MMOL/L
BUN BLD-MCNC: 10 MG/DL (ref 8–23)
BUN/CREAT SERPL: 11.9 (ref 7–25)
CALCIUM SPEC-SCNC: 9.6 MG/DL (ref 8.2–9.6)
CHLORIDE SERPL-SCNC: 101 MMOL/L (ref 98–107)
CO2 SERPL-SCNC: 25.1 MMOL/L (ref 22–29)
CREAT BLD-MCNC: 0.84 MG/DL (ref 0.57–1)
D-LACTATE SERPL-SCNC: 1.7 MMOL/L (ref 0.5–2)
GFR SERPL CREATININE-BSD FRML MDRD: 63 ML/MIN/1.73
GLUCOSE BLD-MCNC: 103 MG/DL (ref 65–99)
POTASSIUM BLD-SCNC: 3.8 MMOL/L (ref 3.5–5.2)
PROCALCITONIN SERPL-MCNC: 0.09 NG/ML (ref 0.1–0.25)
SODIUM BLD-SCNC: 138 MMOL/L (ref 136–145)

## 2018-09-20 PROCEDURE — 74018 RADEX ABDOMEN 1 VIEW: CPT

## 2018-09-20 PROCEDURE — 97162 PT EVAL MOD COMPLEX 30 MIN: CPT

## 2018-09-20 PROCEDURE — 25010000002 ONDANSETRON PER 1 MG: Performed by: HOSPITALIST

## 2018-09-20 PROCEDURE — 84145 PROCALCITONIN (PCT): CPT | Performed by: HOSPITALIST

## 2018-09-20 PROCEDURE — 25810000003 DEXTROSE-NACL PER 500 ML: Performed by: HOSPITALIST

## 2018-09-20 PROCEDURE — 99222 1ST HOSP IP/OBS MODERATE 55: CPT | Performed by: INTERNAL MEDICINE

## 2018-09-20 PROCEDURE — 99221 1ST HOSP IP/OBS SF/LOW 40: CPT | Performed by: SURGERY

## 2018-09-20 PROCEDURE — 80048 BASIC METABOLIC PNL TOTAL CA: CPT | Performed by: HOSPITALIST

## 2018-09-20 PROCEDURE — 74019 RADEX ABDOMEN 2 VIEWS: CPT

## 2018-09-20 PROCEDURE — 83605 ASSAY OF LACTIC ACID: CPT | Performed by: HOSPITALIST

## 2018-09-20 RX ORDER — ZOLPIDEM TARTRATE 5 MG/1
2.5 TABLET ORAL NIGHTLY PRN
Status: DISCONTINUED | OUTPATIENT
Start: 2018-09-20 | End: 2018-09-26 | Stop reason: HOSPADM

## 2018-09-20 RX ORDER — LATANOPROST 50 UG/ML
1 SOLUTION/ DROPS OPHTHALMIC NIGHTLY
Status: DISCONTINUED | OUTPATIENT
Start: 2018-09-20 | End: 2018-09-26 | Stop reason: HOSPADM

## 2018-09-20 RX ORDER — LEVOTHYROXINE SODIUM 0.05 MG/1
50 TABLET ORAL
Status: DISCONTINUED | OUTPATIENT
Start: 2018-09-20 | End: 2018-09-26 | Stop reason: HOSPADM

## 2018-09-20 RX ORDER — SODIUM CHLORIDE 9 MG/ML
75 INJECTION, SOLUTION INTRAVENOUS CONTINUOUS
Status: DISCONTINUED | OUTPATIENT
Start: 2018-09-20 | End: 2018-09-20

## 2018-09-20 RX ORDER — HYDROCODONE BITARTRATE AND ACETAMINOPHEN 5; 325 MG/1; MG/1
1 TABLET ORAL EVERY 4 HOURS PRN
Status: DISCONTINUED | OUTPATIENT
Start: 2018-09-20 | End: 2018-09-26 | Stop reason: HOSPADM

## 2018-09-20 RX ORDER — SODIUM CHLORIDE 0.9 % (FLUSH) 0.9 %
1-10 SYRINGE (ML) INJECTION AS NEEDED
Status: DISCONTINUED | OUTPATIENT
Start: 2018-09-20 | End: 2018-09-26 | Stop reason: HOSPADM

## 2018-09-20 RX ORDER — MORPHINE SULFATE 2 MG/ML
2 INJECTION, SOLUTION INTRAMUSCULAR; INTRAVENOUS EVERY 4 HOURS PRN
Status: DISCONTINUED | OUTPATIENT
Start: 2018-09-20 | End: 2018-09-26 | Stop reason: HOSPADM

## 2018-09-20 RX ORDER — ACETAMINOPHEN 325 MG/1
650 TABLET ORAL EVERY 4 HOURS PRN
Status: DISCONTINUED | OUTPATIENT
Start: 2018-09-20 | End: 2018-09-26 | Stop reason: HOSPADM

## 2018-09-20 RX ORDER — SIMETHICONE 80 MG
80 TABLET,CHEWABLE ORAL 4 TIMES DAILY PRN
Status: DISCONTINUED | OUTPATIENT
Start: 2018-09-20 | End: 2018-09-26 | Stop reason: HOSPADM

## 2018-09-20 RX ORDER — ONDANSETRON 4 MG/1
4 TABLET, ORALLY DISINTEGRATING ORAL EVERY 6 HOURS PRN
Status: DISCONTINUED | OUTPATIENT
Start: 2018-09-20 | End: 2018-09-26 | Stop reason: HOSPADM

## 2018-09-20 RX ORDER — DEXTROSE AND SODIUM CHLORIDE 5; .9 G/100ML; G/100ML
75 INJECTION, SOLUTION INTRAVENOUS CONTINUOUS
Status: DISCONTINUED | OUTPATIENT
Start: 2018-09-20 | End: 2018-09-25

## 2018-09-20 RX ORDER — ONDANSETRON 4 MG/1
4 TABLET, FILM COATED ORAL EVERY 6 HOURS PRN
Status: DISCONTINUED | OUTPATIENT
Start: 2018-09-20 | End: 2018-09-26 | Stop reason: HOSPADM

## 2018-09-20 RX ORDER — ONDANSETRON 2 MG/ML
4 INJECTION INTRAMUSCULAR; INTRAVENOUS EVERY 6 HOURS PRN
Status: DISCONTINUED | OUTPATIENT
Start: 2018-09-20 | End: 2018-09-26 | Stop reason: HOSPADM

## 2018-09-20 RX ORDER — LANOLIN ALCOHOL/MO/W.PET/CERES
100 CREAM (GRAM) TOPICAL DAILY
Status: DISCONTINUED | OUTPATIENT
Start: 2018-09-20 | End: 2018-09-26 | Stop reason: HOSPADM

## 2018-09-20 RX ADMIN — DEXTROSE AND SODIUM CHLORIDE 75 ML/HR: 5; 900 INJECTION, SOLUTION INTRAVENOUS at 14:34

## 2018-09-20 RX ADMIN — RIFAXIMIN 550 MG: 550 TABLET ORAL at 21:57

## 2018-09-20 RX ADMIN — SODIUM CHLORIDE 75 ML/HR: 9 INJECTION, SOLUTION INTRAVENOUS at 11:01

## 2018-09-20 RX ADMIN — ZOLPIDEM TARTRATE 2.5 MG: 5 TABLET, FILM COATED ORAL at 22:49

## 2018-09-20 RX ADMIN — LATANOPROST 1 DROP: 50 SOLUTION OPHTHALMIC at 21:57

## 2018-09-20 RX ADMIN — METRONIDAZOLE 500 MG: 500 INJECTION, SOLUTION INTRAVENOUS at 11:01

## 2018-09-20 RX ADMIN — Medication 100 MG: at 11:45

## 2018-09-20 RX ADMIN — METRONIDAZOLE 500 MG: 500 INJECTION, SOLUTION INTRAVENOUS at 19:37

## 2018-09-20 RX ADMIN — RIFAXIMIN 550 MG: 550 TABLET ORAL at 17:18

## 2018-09-20 RX ADMIN — LEVOTHYROXINE SODIUM 50 MCG: 50 TABLET ORAL at 11:45

## 2018-09-20 RX ADMIN — HYDROCODONE BITARTRATE AND ACETAMINOPHEN 1 TABLET: 5; 325 TABLET ORAL at 17:24

## 2018-09-20 RX ADMIN — ONDANSETRON 4 MG: 2 INJECTION INTRAMUSCULAR; INTRAVENOUS at 11:45

## 2018-09-21 LAB
ADV 40+41 DNA STL QL NAA+NON-PROBE: NOT DETECTED
ANION GAP SERPL CALCULATED.3IONS-SCNC: 10.8 MMOL/L
ASTRO TYP 1-8 RNA STL QL NAA+NON-PROBE: NOT DETECTED
BASOPHILS # BLD AUTO: 0.01 10*3/MM3 (ref 0–0.2)
BASOPHILS NFR BLD AUTO: 0.1 % (ref 0–1.5)
BUN BLD-MCNC: 12 MG/DL (ref 8–23)
BUN/CREAT SERPL: 14.3 (ref 7–25)
C CAYETANENSIS DNA STL QL NAA+NON-PROBE: NOT DETECTED
CALCIUM SPEC-SCNC: 9 MG/DL (ref 8.2–9.6)
CAMPY SP DNA.DIARRHEA STL QL NAA+PROBE: NOT DETECTED
CHLORIDE SERPL-SCNC: 104 MMOL/L (ref 98–107)
CO2 SERPL-SCNC: 24.2 MMOL/L (ref 22–29)
CREAT BLD-MCNC: 0.84 MG/DL (ref 0.57–1)
CRYPTOSP STL CULT: NOT DETECTED
DEPRECATED RDW RBC AUTO: 49.9 FL (ref 37–54)
E COLI DNA SPEC QL NAA+PROBE: NOT DETECTED
E HISTOLYT AG STL-ACNC: NOT DETECTED
EAEC PAA PLAS AGGR+AATA ST NAA+NON-PRB: NOT DETECTED
EC STX1 + STX2 GENES STL NAA+PROBE: NOT DETECTED
EOSINOPHIL # BLD AUTO: 0.01 10*3/MM3 (ref 0–0.7)
EOSINOPHIL NFR BLD AUTO: 0.1 % (ref 0.3–6.2)
EPEC EAE GENE STL QL NAA+NON-PROBE: NOT DETECTED
ERYTHROCYTE [DISTWIDTH] IN BLOOD BY AUTOMATED COUNT: 14.5 % (ref 11.7–13)
ETEC LTA+ST1A+ST1B TOX ST NAA+NON-PROBE: NOT DETECTED
G LAMBLIA DNA SPEC QL NAA+PROBE: NOT DETECTED
GFR SERPL CREATININE-BSD FRML MDRD: 63 ML/MIN/1.73
GLUCOSE BLD-MCNC: 132 MG/DL (ref 65–99)
HCT VFR BLD AUTO: 31.8 % (ref 35.6–45.5)
HGB BLD-MCNC: 9.7 G/DL (ref 11.9–15.5)
IMM GRANULOCYTES # BLD: 0.02 10*3/MM3 (ref 0–0.03)
IMM GRANULOCYTES NFR BLD: 0.2 % (ref 0–0.5)
LYMPHOCYTES # BLD AUTO: 0.89 10*3/MM3 (ref 0.9–4.8)
LYMPHOCYTES NFR BLD AUTO: 7.8 % (ref 19.6–45.3)
MCH RBC QN AUTO: 28.9 PG (ref 26.9–32)
MCHC RBC AUTO-ENTMCNC: 30.5 G/DL (ref 32.4–36.3)
MCV RBC AUTO: 94.6 FL (ref 80.5–98.2)
MONOCYTES # BLD AUTO: 1.21 10*3/MM3 (ref 0.2–1.2)
MONOCYTES NFR BLD AUTO: 10.7 % (ref 5–12)
NEUTROPHILS # BLD AUTO: 9.22 10*3/MM3 (ref 1.9–8.1)
NEUTROPHILS NFR BLD AUTO: 81.1 % (ref 42.7–76)
NOROVIRUS GI+II RNA STL QL NAA+NON-PROBE: NOT DETECTED
P SHIGELLOIDES DNA STL QL NAA+NON-PROBE: NOT DETECTED
PLATELET # BLD AUTO: 377 10*3/MM3 (ref 140–500)
PMV BLD AUTO: 9.4 FL (ref 6–12)
POTASSIUM BLD-SCNC: 4.6 MMOL/L (ref 3.5–5.2)
RBC # BLD AUTO: 3.36 10*6/MM3 (ref 3.9–5.2)
RV RNA STL NAA+PROBE: NOT DETECTED
SALMONELLA DNA SPEC QL NAA+PROBE: NOT DETECTED
SAPO I+II+IV+V RNA STL QL NAA+NON-PROBE: NOT DETECTED
SHIGELLA SP+EIEC IPAH STL QL NAA+PROBE: NOT DETECTED
SODIUM BLD-SCNC: 139 MMOL/L (ref 136–145)
V CHOLERAE DNA SPEC QL NAA+PROBE: NOT DETECTED
VIBRIO DNA SPEC NAA+PROBE: NOT DETECTED
WBC NRBC COR # BLD: 11.36 10*3/MM3 (ref 4.5–10.7)
YERSINIA STL CULT: NOT DETECTED

## 2018-09-21 PROCEDURE — 87999 UNLISTED MICROBIOLOGY PX: CPT | Performed by: HOSPITALIST

## 2018-09-21 PROCEDURE — 99232 SBSQ HOSP IP/OBS MODERATE 35: CPT | Performed by: INTERNAL MEDICINE

## 2018-09-21 PROCEDURE — 99232 SBSQ HOSP IP/OBS MODERATE 35: CPT | Performed by: SURGERY

## 2018-09-21 PROCEDURE — 85025 COMPLETE CBC W/AUTO DIFF WBC: CPT | Performed by: HOSPITALIST

## 2018-09-21 PROCEDURE — 25010000002 ONDANSETRON PER 1 MG: Performed by: HOSPITALIST

## 2018-09-21 PROCEDURE — 25810000003 DEXTROSE-NACL PER 500 ML: Performed by: HOSPITALIST

## 2018-09-21 PROCEDURE — 80048 BASIC METABOLIC PNL TOTAL CA: CPT | Performed by: HOSPITALIST

## 2018-09-21 RX ADMIN — Medication 100 MG: at 10:00

## 2018-09-21 RX ADMIN — ONDANSETRON 4 MG: 2 INJECTION INTRAMUSCULAR; INTRAVENOUS at 01:42

## 2018-09-21 RX ADMIN — DEXTROSE AND SODIUM CHLORIDE 75 ML/HR: 5; 900 INJECTION, SOLUTION INTRAVENOUS at 07:00

## 2018-09-21 RX ADMIN — LEVOTHYROXINE SODIUM 50 MCG: 50 TABLET ORAL at 06:21

## 2018-09-21 RX ADMIN — METRONIDAZOLE 500 MG: 500 INJECTION, SOLUTION INTRAVENOUS at 10:01

## 2018-09-21 RX ADMIN — ZOLPIDEM TARTRATE 2.5 MG: 5 TABLET, FILM COATED ORAL at 23:42

## 2018-09-21 RX ADMIN — RIFAXIMIN 550 MG: 550 TABLET ORAL at 21:24

## 2018-09-21 RX ADMIN — LATANOPROST 1 DROP: 50 SOLUTION OPHTHALMIC at 21:24

## 2018-09-21 RX ADMIN — METRONIDAZOLE 500 MG: 500 INJECTION, SOLUTION INTRAVENOUS at 02:24

## 2018-09-21 RX ADMIN — DEXTROSE AND SODIUM CHLORIDE 75 ML/HR: 5; 900 INJECTION, SOLUTION INTRAVENOUS at 21:27

## 2018-09-21 RX ADMIN — RIFAXIMIN 550 MG: 550 TABLET ORAL at 13:30

## 2018-09-21 RX ADMIN — ONDANSETRON 4 MG: 2 INJECTION INTRAMUSCULAR; INTRAVENOUS at 13:30

## 2018-09-21 RX ADMIN — RIFAXIMIN 550 MG: 550 TABLET ORAL at 06:21

## 2018-09-21 RX ADMIN — METRONIDAZOLE 500 MG: 500 INJECTION, SOLUTION INTRAVENOUS at 18:30

## 2018-09-22 ENCOUNTER — INPATIENT HOSPITAL (OUTPATIENT)
Dept: URBAN - METROPOLITAN AREA HOSPITAL 113 | Facility: HOSPITAL | Age: 83
End: 2018-09-22

## 2018-09-22 DIAGNOSIS — K56.609 UNSPECIFIED INTESTINAL OBSTRUCTION, UNSPECIFIED AS TO PARTIA: ICD-10-CM

## 2018-09-22 DIAGNOSIS — K52.9 NONINFECTIVE GASTROENTERITIS AND COLITIS, UNSPECIFIED: ICD-10-CM

## 2018-09-22 LAB
ANION GAP SERPL CALCULATED.3IONS-SCNC: 7.3 MMOL/L
BASOPHILS # BLD AUTO: 0.01 10*3/MM3 (ref 0–0.2)
BASOPHILS NFR BLD AUTO: 0.2 % (ref 0–1.5)
BUN BLD-MCNC: 10 MG/DL (ref 8–23)
BUN/CREAT SERPL: 14.1 (ref 7–25)
CALCIUM SPEC-SCNC: 8.2 MG/DL (ref 8.2–9.6)
CHLORIDE SERPL-SCNC: 106 MMOL/L (ref 98–107)
CO2 SERPL-SCNC: 24.7 MMOL/L (ref 22–29)
CREAT BLD-MCNC: 0.71 MG/DL (ref 0.57–1)
DEPRECATED RDW RBC AUTO: 51 FL (ref 37–54)
EOSINOPHIL # BLD AUTO: 0.06 10*3/MM3 (ref 0–0.7)
EOSINOPHIL NFR BLD AUTO: 1.2 % (ref 0.3–6.2)
ERYTHROCYTE [DISTWIDTH] IN BLOOD BY AUTOMATED COUNT: 15 % (ref 11.7–13)
GFR SERPL CREATININE-BSD FRML MDRD: 77 ML/MIN/1.73
GLUCOSE BLD-MCNC: 106 MG/DL (ref 65–99)
HCT VFR BLD AUTO: 28.8 % (ref 35.6–45.5)
HGB BLD-MCNC: 9.1 G/DL (ref 11.9–15.5)
IMM GRANULOCYTES # BLD: 0.01 10*3/MM3 (ref 0–0.03)
IMM GRANULOCYTES NFR BLD: 0.2 % (ref 0–0.5)
LYMPHOCYTES # BLD AUTO: 1.36 10*3/MM3 (ref 0.9–4.8)
LYMPHOCYTES NFR BLD AUTO: 27.6 % (ref 19.6–45.3)
MCH RBC QN AUTO: 29.6 PG (ref 26.9–32)
MCHC RBC AUTO-ENTMCNC: 31.6 G/DL (ref 32.4–36.3)
MCV RBC AUTO: 93.8 FL (ref 80.5–98.2)
MONOCYTES # BLD AUTO: 0.92 10*3/MM3 (ref 0.2–1.2)
MONOCYTES NFR BLD AUTO: 18.7 % (ref 5–12)
NEUTROPHILS # BLD AUTO: 2.58 10*3/MM3 (ref 1.9–8.1)
NEUTROPHILS NFR BLD AUTO: 52.3 % (ref 42.7–76)
NRBC BLD MANUAL-RTO: 0 /100 WBC (ref 0–0)
PLATELET # BLD AUTO: 375 10*3/MM3 (ref 140–500)
PMV BLD AUTO: 9.4 FL (ref 6–12)
POTASSIUM BLD-SCNC: 3.6 MMOL/L (ref 3.5–5.2)
RBC # BLD AUTO: 3.07 10*6/MM3 (ref 3.9–5.2)
SODIUM BLD-SCNC: 138 MMOL/L (ref 136–145)
WBC NRBC COR # BLD: 4.93 10*3/MM3 (ref 4.5–10.7)

## 2018-09-22 PROCEDURE — 99232 SBSQ HOSP IP/OBS MODERATE 35: CPT | Performed by: SURGERY

## 2018-09-22 PROCEDURE — 80048 BASIC METABOLIC PNL TOTAL CA: CPT | Performed by: HOSPITALIST

## 2018-09-22 PROCEDURE — 85025 COMPLETE CBC W/AUTO DIFF WBC: CPT | Performed by: HOSPITALIST

## 2018-09-22 PROCEDURE — 99231 SBSQ HOSP IP/OBS SF/LOW 25: CPT | Performed by: INTERNAL MEDICINE

## 2018-09-22 PROCEDURE — 25810000003 DEXTROSE-NACL PER 500 ML: Performed by: HOSPITALIST

## 2018-09-22 RX ADMIN — ZOLPIDEM TARTRATE 2.5 MG: 5 TABLET, FILM COATED ORAL at 22:22

## 2018-09-22 RX ADMIN — Medication 100 MG: at 10:02

## 2018-09-22 RX ADMIN — LEVOTHYROXINE SODIUM 50 MCG: 50 TABLET ORAL at 05:30

## 2018-09-22 RX ADMIN — METRONIDAZOLE 500 MG: 500 INJECTION, SOLUTION INTRAVENOUS at 18:56

## 2018-09-22 RX ADMIN — RIFAXIMIN 550 MG: 550 TABLET ORAL at 15:23

## 2018-09-22 RX ADMIN — DEXTROSE AND SODIUM CHLORIDE 75 ML/HR: 5; 900 INJECTION, SOLUTION INTRAVENOUS at 15:23

## 2018-09-22 RX ADMIN — METRONIDAZOLE 500 MG: 500 INJECTION, SOLUTION INTRAVENOUS at 10:04

## 2018-09-22 RX ADMIN — RIFAXIMIN 550 MG: 550 TABLET ORAL at 05:30

## 2018-09-22 RX ADMIN — RIFAXIMIN 550 MG: 550 TABLET ORAL at 21:28

## 2018-09-22 RX ADMIN — METRONIDAZOLE 500 MG: 500 INJECTION, SOLUTION INTRAVENOUS at 03:57

## 2018-09-23 ENCOUNTER — INPATIENT HOSPITAL (OUTPATIENT)
Dept: URBAN - METROPOLITAN AREA HOSPITAL 113 | Facility: HOSPITAL | Age: 83
End: 2018-09-23

## 2018-09-23 ENCOUNTER — APPOINTMENT (OUTPATIENT)
Dept: GENERAL RADIOLOGY | Facility: HOSPITAL | Age: 83
End: 2018-09-23

## 2018-09-23 DIAGNOSIS — K56.609 UNSPECIFIED INTESTINAL OBSTRUCTION, UNSPECIFIED AS TO PARTIA: ICD-10-CM

## 2018-09-23 DIAGNOSIS — K52.9 NONINFECTIVE GASTROENTERITIS AND COLITIS, UNSPECIFIED: ICD-10-CM

## 2018-09-23 DIAGNOSIS — D50.9 IRON DEFICIENCY ANEMIA, UNSPECIFIED: ICD-10-CM

## 2018-09-23 LAB
ANION GAP SERPL CALCULATED.3IONS-SCNC: 9.6 MMOL/L
BASOPHILS # BLD AUTO: 0.02 10*3/MM3 (ref 0–0.2)
BASOPHILS NFR BLD AUTO: 0.3 % (ref 0–1.5)
BUN BLD-MCNC: 8 MG/DL (ref 8–23)
BUN/CREAT SERPL: 11.3 (ref 7–25)
CALCIUM SPEC-SCNC: 8.3 MG/DL (ref 8.2–9.6)
CHLORIDE SERPL-SCNC: 108 MMOL/L (ref 98–107)
CO2 SERPL-SCNC: 22.4 MMOL/L (ref 22–29)
CREAT BLD-MCNC: 0.71 MG/DL (ref 0.57–1)
DEPRECATED RDW RBC AUTO: 51.7 FL (ref 37–54)
EOSINOPHIL # BLD AUTO: 0.16 10*3/MM3 (ref 0–0.7)
EOSINOPHIL NFR BLD AUTO: 2.3 % (ref 0.3–6.2)
ERYTHROCYTE [DISTWIDTH] IN BLOOD BY AUTOMATED COUNT: 15.1 % (ref 11.7–13)
GFR SERPL CREATININE-BSD FRML MDRD: 77 ML/MIN/1.73
GLUCOSE BLD-MCNC: 114 MG/DL (ref 65–99)
HCT VFR BLD AUTO: 29.9 % (ref 35.6–45.5)
HGB BLD-MCNC: 9.5 G/DL (ref 11.9–15.5)
IMM GRANULOCYTES # BLD: 0.01 10*3/MM3 (ref 0–0.03)
IMM GRANULOCYTES NFR BLD: 0.1 % (ref 0–0.5)
LYMPHOCYTES # BLD AUTO: 1.55 10*3/MM3 (ref 0.9–4.8)
LYMPHOCYTES NFR BLD AUTO: 22.6 % (ref 19.6–45.3)
MCH RBC QN AUTO: 30.2 PG (ref 26.9–32)
MCHC RBC AUTO-ENTMCNC: 31.8 G/DL (ref 32.4–36.3)
MCV RBC AUTO: 94.9 FL (ref 80.5–98.2)
MONOCYTES # BLD AUTO: 0.93 10*3/MM3 (ref 0.2–1.2)
MONOCYTES NFR BLD AUTO: 13.6 % (ref 5–12)
NEUTROPHILS # BLD AUTO: 4.2 10*3/MM3 (ref 1.9–8.1)
NEUTROPHILS NFR BLD AUTO: 61.2 % (ref 42.7–76)
PLATELET # BLD AUTO: 359 10*3/MM3 (ref 140–500)
PMV BLD AUTO: 9.4 FL (ref 6–12)
POTASSIUM BLD-SCNC: 3.1 MMOL/L (ref 3.5–5.2)
RBC # BLD AUTO: 3.15 10*6/MM3 (ref 3.9–5.2)
SODIUM BLD-SCNC: 140 MMOL/L (ref 136–145)
WBC NRBC COR # BLD: 6.86 10*3/MM3 (ref 4.5–10.7)

## 2018-09-23 PROCEDURE — 25810000003 DEXTROSE-NACL PER 500 ML: Performed by: HOSPITALIST

## 2018-09-23 PROCEDURE — 99232 SBSQ HOSP IP/OBS MODERATE 35: CPT | Performed by: SURGERY

## 2018-09-23 PROCEDURE — 85025 COMPLETE CBC W/AUTO DIFF WBC: CPT | Performed by: HOSPITALIST

## 2018-09-23 PROCEDURE — 80048 BASIC METABOLIC PNL TOTAL CA: CPT | Performed by: HOSPITALIST

## 2018-09-23 PROCEDURE — 99231 SBSQ HOSP IP/OBS SF/LOW 25: CPT | Performed by: INTERNAL MEDICINE

## 2018-09-23 PROCEDURE — 74018 RADEX ABDOMEN 1 VIEW: CPT

## 2018-09-23 RX ORDER — POTASSIUM CHLORIDE 750 MG/1
40 CAPSULE, EXTENDED RELEASE ORAL ONCE
Status: COMPLETED | OUTPATIENT
Start: 2018-09-23 | End: 2018-09-23

## 2018-09-23 RX ADMIN — RIFAXIMIN 550 MG: 550 TABLET ORAL at 21:43

## 2018-09-23 RX ADMIN — POTASSIUM CHLORIDE 40 MEQ: 750 CAPSULE, EXTENDED RELEASE ORAL at 22:40

## 2018-09-23 RX ADMIN — METRONIDAZOLE 500 MG: 500 INJECTION, SOLUTION INTRAVENOUS at 10:20

## 2018-09-23 RX ADMIN — DEXTROSE AND SODIUM CHLORIDE 75 ML/HR: 5; 900 INJECTION, SOLUTION INTRAVENOUS at 08:59

## 2018-09-23 RX ADMIN — LEVOTHYROXINE SODIUM 50 MCG: 50 TABLET ORAL at 06:22

## 2018-09-23 RX ADMIN — RIFAXIMIN 550 MG: 550 TABLET ORAL at 13:37

## 2018-09-23 RX ADMIN — METRONIDAZOLE 500 MG: 500 INJECTION, SOLUTION INTRAVENOUS at 18:00

## 2018-09-23 RX ADMIN — DEXTROSE AND SODIUM CHLORIDE 75 ML/HR: 5; 900 INJECTION, SOLUTION INTRAVENOUS at 23:53

## 2018-09-23 RX ADMIN — Medication 100 MG: at 08:59

## 2018-09-23 RX ADMIN — METRONIDAZOLE 500 MG: 500 INJECTION, SOLUTION INTRAVENOUS at 04:34

## 2018-09-23 RX ADMIN — LATANOPROST 1 DROP: 50 SOLUTION OPHTHALMIC at 21:43

## 2018-09-23 RX ADMIN — RIFAXIMIN 550 MG: 550 TABLET ORAL at 06:22

## 2018-09-23 RX ADMIN — ZOLPIDEM TARTRATE 2.5 MG: 5 TABLET, FILM COATED ORAL at 21:48

## 2018-09-24 LAB
ANION GAP SERPL CALCULATED.3IONS-SCNC: 9.7 MMOL/L
BASOPHILS # BLD AUTO: 0.01 10*3/MM3 (ref 0–0.2)
BASOPHILS NFR BLD AUTO: 0.1 % (ref 0–1.5)
BUN BLD-MCNC: 3 MG/DL (ref 8–23)
BUN/CREAT SERPL: 4.9 (ref 7–25)
C DIFF TOX GENS STL QL NAA+PROBE: NEGATIVE
CALCIUM SPEC-SCNC: 7.9 MG/DL (ref 8.2–9.6)
CHLORIDE SERPL-SCNC: 112 MMOL/L (ref 98–107)
CO2 SERPL-SCNC: 22.3 MMOL/L (ref 22–29)
CREAT BLD-MCNC: 0.61 MG/DL (ref 0.57–1)
DEPRECATED RDW RBC AUTO: 51.6 FL (ref 37–54)
EOSINOPHIL # BLD AUTO: 0.2 10*3/MM3 (ref 0–0.7)
EOSINOPHIL NFR BLD AUTO: 2.6 % (ref 0.3–6.2)
ERYTHROCYTE [DISTWIDTH] IN BLOOD BY AUTOMATED COUNT: 15 % (ref 11.7–13)
GFR SERPL CREATININE-BSD FRML MDRD: 91 ML/MIN/1.73
GLUCOSE BLD-MCNC: 103 MG/DL (ref 65–99)
HCT VFR BLD AUTO: 29.2 % (ref 35.6–45.5)
HEMOCCULT STL QL: POSITIVE
HGB BLD-MCNC: 9 G/DL (ref 11.9–15.5)
IMM GRANULOCYTES # BLD: 0.02 10*3/MM3 (ref 0–0.03)
IMM GRANULOCYTES NFR BLD: 0.3 % (ref 0–0.5)
LYMPHOCYTES # BLD AUTO: 1.58 10*3/MM3 (ref 0.9–4.8)
LYMPHOCYTES NFR BLD AUTO: 20.9 % (ref 19.6–45.3)
MCH RBC QN AUTO: 29.3 PG (ref 26.9–32)
MCHC RBC AUTO-ENTMCNC: 30.8 G/DL (ref 32.4–36.3)
MCV RBC AUTO: 95.1 FL (ref 80.5–98.2)
MONOCYTES # BLD AUTO: 1.04 10*3/MM3 (ref 0.2–1.2)
MONOCYTES NFR BLD AUTO: 13.7 % (ref 5–12)
NEUTROPHILS # BLD AUTO: 4.72 10*3/MM3 (ref 1.9–8.1)
NEUTROPHILS NFR BLD AUTO: 62.4 % (ref 42.7–76)
PLATELET # BLD AUTO: 332 10*3/MM3 (ref 140–500)
PMV BLD AUTO: 9.6 FL (ref 6–12)
POTASSIUM BLD-SCNC: 3.5 MMOL/L (ref 3.5–5.2)
RBC # BLD AUTO: 3.07 10*6/MM3 (ref 3.9–5.2)
SODIUM BLD-SCNC: 144 MMOL/L (ref 136–145)
WBC NRBC COR # BLD: 7.57 10*3/MM3 (ref 4.5–10.7)

## 2018-09-24 PROCEDURE — 97110 THERAPEUTIC EXERCISES: CPT

## 2018-09-24 PROCEDURE — 87493 C DIFF AMPLIFIED PROBE: CPT | Performed by: NURSE PRACTITIONER

## 2018-09-24 PROCEDURE — 82272 OCCULT BLD FECES 1-3 TESTS: CPT | Performed by: NURSE PRACTITIONER

## 2018-09-24 PROCEDURE — 99231 SBSQ HOSP IP/OBS SF/LOW 25: CPT | Performed by: INTERNAL MEDICINE

## 2018-09-24 PROCEDURE — 25810000003 DEXTROSE-NACL PER 500 ML: Performed by: HOSPITALIST

## 2018-09-24 PROCEDURE — 99232 SBSQ HOSP IP/OBS MODERATE 35: CPT | Performed by: SURGERY

## 2018-09-24 PROCEDURE — 80048 BASIC METABOLIC PNL TOTAL CA: CPT | Performed by: HOSPITALIST

## 2018-09-24 PROCEDURE — 85025 COMPLETE CBC W/AUTO DIFF WBC: CPT | Performed by: HOSPITALIST

## 2018-09-24 RX ORDER — METRONIDAZOLE 500 MG/1
500 TABLET ORAL EVERY 8 HOURS SCHEDULED
Status: DISCONTINUED | OUTPATIENT
Start: 2018-09-24 | End: 2018-09-26 | Stop reason: HOSPADM

## 2018-09-24 RX ORDER — METRONIDAZOLE 500 MG/1
500 TABLET ORAL EVERY 8 HOURS SCHEDULED
Status: DISCONTINUED | OUTPATIENT
Start: 2018-09-24 | End: 2018-09-24

## 2018-09-24 RX ORDER — POTASSIUM CHLORIDE 750 MG/1
40 CAPSULE, EXTENDED RELEASE ORAL ONCE
Status: COMPLETED | OUTPATIENT
Start: 2018-09-24 | End: 2018-09-24

## 2018-09-24 RX ADMIN — RIFAXIMIN 550 MG: 550 TABLET ORAL at 20:25

## 2018-09-24 RX ADMIN — DEXTROSE AND SODIUM CHLORIDE 75 ML/HR: 5; 900 INJECTION, SOLUTION INTRAVENOUS at 14:44

## 2018-09-24 RX ADMIN — RIFAXIMIN 550 MG: 550 TABLET ORAL at 05:42

## 2018-09-24 RX ADMIN — POTASSIUM CHLORIDE 40 MEQ: 750 CAPSULE, EXTENDED RELEASE ORAL at 10:38

## 2018-09-24 RX ADMIN — METRONIDAZOLE 500 MG: 500 TABLET, FILM COATED ORAL at 20:25

## 2018-09-24 RX ADMIN — METRONIDAZOLE 500 MG: 500 INJECTION, SOLUTION INTRAVENOUS at 10:38

## 2018-09-24 RX ADMIN — RIFAXIMIN 550 MG: 550 TABLET ORAL at 14:41

## 2018-09-24 RX ADMIN — ZOLPIDEM TARTRATE 2.5 MG: 5 TABLET, FILM COATED ORAL at 22:37

## 2018-09-24 RX ADMIN — LEVOTHYROXINE SODIUM 50 MCG: 50 TABLET ORAL at 05:42

## 2018-09-24 RX ADMIN — METRONIDAZOLE 500 MG: 500 INJECTION, SOLUTION INTRAVENOUS at 03:18

## 2018-09-24 RX ADMIN — Medication 100 MG: at 08:55

## 2018-09-25 ENCOUNTER — APPOINTMENT (OUTPATIENT)
Dept: ONCOLOGY | Facility: HOSPITAL | Age: 83
End: 2018-09-25

## 2018-09-25 LAB
ANION GAP SERPL CALCULATED.3IONS-SCNC: 7.9 MMOL/L
BASOPHILS # BLD AUTO: 0.02 10*3/MM3 (ref 0–0.2)
BASOPHILS NFR BLD AUTO: 0.3 % (ref 0–1.5)
BUN BLD-MCNC: 3 MG/DL (ref 8–23)
BUN/CREAT SERPL: 4.7 (ref 7–25)
CALCIUM SPEC-SCNC: 7.7 MG/DL (ref 8.2–9.6)
CHLORIDE SERPL-SCNC: 112 MMOL/L (ref 98–107)
CO2 SERPL-SCNC: 22.1 MMOL/L (ref 22–29)
CREAT BLD-MCNC: 0.64 MG/DL (ref 0.57–1)
DEPRECATED RDW RBC AUTO: 52.7 FL (ref 37–54)
EOSINOPHIL # BLD AUTO: 0.34 10*3/MM3 (ref 0–0.7)
EOSINOPHIL NFR BLD AUTO: 5.3 % (ref 0.3–6.2)
ERYTHROCYTE [DISTWIDTH] IN BLOOD BY AUTOMATED COUNT: 15.4 % (ref 11.7–13)
GFR SERPL CREATININE-BSD FRML MDRD: 86 ML/MIN/1.73
GLUCOSE BLD-MCNC: 100 MG/DL (ref 65–99)
HCT VFR BLD AUTO: 27.2 % (ref 35.6–45.5)
HGB BLD-MCNC: 8.3 G/DL (ref 11.9–15.5)
IMM GRANULOCYTES # BLD: 0.02 10*3/MM3 (ref 0–0.03)
IMM GRANULOCYTES NFR BLD: 0.3 % (ref 0–0.5)
LYMPHOCYTES # BLD AUTO: 1.71 10*3/MM3 (ref 0.9–4.8)
LYMPHOCYTES NFR BLD AUTO: 26.4 % (ref 19.6–45.3)
MCH RBC QN AUTO: 29.2 PG (ref 26.9–32)
MCHC RBC AUTO-ENTMCNC: 30.5 G/DL (ref 32.4–36.3)
MCV RBC AUTO: 95.8 FL (ref 80.5–98.2)
MONOCYTES # BLD AUTO: 0.83 10*3/MM3 (ref 0.2–1.2)
MONOCYTES NFR BLD AUTO: 12.8 % (ref 5–12)
NEUTROPHILS # BLD AUTO: 3.55 10*3/MM3 (ref 1.9–8.1)
NEUTROPHILS NFR BLD AUTO: 54.9 % (ref 42.7–76)
PLATELET # BLD AUTO: 307 10*3/MM3 (ref 140–500)
PMV BLD AUTO: 9.3 FL (ref 6–12)
POTASSIUM BLD-SCNC: 3.3 MMOL/L (ref 3.5–5.2)
RBC # BLD AUTO: 2.84 10*6/MM3 (ref 3.9–5.2)
SODIUM BLD-SCNC: 142 MMOL/L (ref 136–145)
WBC NRBC COR # BLD: 6.47 10*3/MM3 (ref 4.5–10.7)

## 2018-09-25 PROCEDURE — 97110 THERAPEUTIC EXERCISES: CPT

## 2018-09-25 PROCEDURE — 80048 BASIC METABOLIC PNL TOTAL CA: CPT | Performed by: HOSPITALIST

## 2018-09-25 PROCEDURE — 99231 SBSQ HOSP IP/OBS SF/LOW 25: CPT | Performed by: INTERNAL MEDICINE

## 2018-09-25 PROCEDURE — 85025 COMPLETE CBC W/AUTO DIFF WBC: CPT | Performed by: HOSPITALIST

## 2018-09-25 RX ORDER — POTASSIUM CHLORIDE 750 MG/1
40 CAPSULE, EXTENDED RELEASE ORAL ONCE
Status: COMPLETED | OUTPATIENT
Start: 2018-09-25 | End: 2018-09-25

## 2018-09-25 RX ADMIN — METRONIDAZOLE 500 MG: 500 TABLET, FILM COATED ORAL at 05:06

## 2018-09-25 RX ADMIN — METRONIDAZOLE 500 MG: 500 TABLET, FILM COATED ORAL at 14:12

## 2018-09-25 RX ADMIN — LEVOTHYROXINE SODIUM 50 MCG: 50 TABLET ORAL at 05:06

## 2018-09-25 RX ADMIN — Medication 100 MG: at 08:39

## 2018-09-25 RX ADMIN — POTASSIUM CHLORIDE 40 MEQ: 750 CAPSULE, EXTENDED RELEASE ORAL at 10:33

## 2018-09-25 RX ADMIN — ZOLPIDEM TARTRATE 2.5 MG: 5 TABLET, FILM COATED ORAL at 23:29

## 2018-09-25 RX ADMIN — RIFAXIMIN 550 MG: 550 TABLET ORAL at 05:06

## 2018-09-25 RX ADMIN — RIFAXIMIN 550 MG: 550 TABLET ORAL at 20:47

## 2018-09-25 RX ADMIN — METRONIDAZOLE 500 MG: 500 TABLET, FILM COATED ORAL at 20:47

## 2018-09-25 RX ADMIN — RIFAXIMIN 550 MG: 550 TABLET ORAL at 14:12

## 2018-09-26 VITALS
RESPIRATION RATE: 16 BRPM | HEART RATE: 68 BPM | OXYGEN SATURATION: 99 % | DIASTOLIC BLOOD PRESSURE: 65 MMHG | TEMPERATURE: 96.8 F | WEIGHT: 130.8 LBS | BODY MASS INDEX: 23.18 KG/M2 | SYSTOLIC BLOOD PRESSURE: 133 MMHG | HEIGHT: 63 IN

## 2018-09-26 LAB
ANION GAP SERPL CALCULATED.3IONS-SCNC: 7.1 MMOL/L
BASOPHILS # BLD AUTO: 0.03 10*3/MM3 (ref 0–0.2)
BASOPHILS NFR BLD AUTO: 0.4 % (ref 0–1.5)
BUN BLD-MCNC: 3 MG/DL (ref 8–23)
BUN/CREAT SERPL: 5.5 (ref 7–25)
CALCIUM SPEC-SCNC: 7.9 MG/DL (ref 8.2–9.6)
CHLORIDE SERPL-SCNC: 109 MMOL/L (ref 98–107)
CO2 SERPL-SCNC: 23.9 MMOL/L (ref 22–29)
CREAT BLD-MCNC: 0.55 MG/DL (ref 0.57–1)
DEPRECATED RDW RBC AUTO: 54.3 FL (ref 37–54)
EOSINOPHIL # BLD AUTO: 0.26 10*3/MM3 (ref 0–0.7)
EOSINOPHIL NFR BLD AUTO: 3.8 % (ref 0.3–6.2)
ERYTHROCYTE [DISTWIDTH] IN BLOOD BY AUTOMATED COUNT: 15.9 % (ref 11.7–13)
GFR SERPL CREATININE-BSD FRML MDRD: 103 ML/MIN/1.73
GLUCOSE BLD-MCNC: 85 MG/DL (ref 65–99)
HCT VFR BLD AUTO: 28.9 % (ref 35.6–45.5)
HGB BLD-MCNC: 9.3 G/DL (ref 11.9–15.5)
IMM GRANULOCYTES # BLD: 0.03 10*3/MM3 (ref 0–0.03)
IMM GRANULOCYTES NFR BLD: 0.4 % (ref 0–0.5)
LYMPHOCYTES # BLD AUTO: 1.99 10*3/MM3 (ref 0.9–4.8)
LYMPHOCYTES NFR BLD AUTO: 29.1 % (ref 19.6–45.3)
MCH RBC QN AUTO: 30.3 PG (ref 26.9–32)
MCHC RBC AUTO-ENTMCNC: 32.2 G/DL (ref 32.4–36.3)
MCV RBC AUTO: 94.1 FL (ref 80.5–98.2)
MONOCYTES # BLD AUTO: 0.78 10*3/MM3 (ref 0.2–1.2)
MONOCYTES NFR BLD AUTO: 11.4 % (ref 5–12)
NEUTROPHILS # BLD AUTO: 3.79 10*3/MM3 (ref 1.9–8.1)
NEUTROPHILS NFR BLD AUTO: 55.3 % (ref 42.7–76)
PLATELET # BLD AUTO: 335 10*3/MM3 (ref 140–500)
PMV BLD AUTO: 9.1 FL (ref 6–12)
POTASSIUM BLD-SCNC: 3.3 MMOL/L (ref 3.5–5.2)
RBC # BLD AUTO: 3.07 10*6/MM3 (ref 3.9–5.2)
SODIUM BLD-SCNC: 140 MMOL/L (ref 136–145)
WBC NRBC COR # BLD: 6.85 10*3/MM3 (ref 4.5–10.7)

## 2018-09-26 PROCEDURE — 93005 ELECTROCARDIOGRAM TRACING: CPT | Performed by: HOSPITALIST

## 2018-09-26 PROCEDURE — 99232 SBSQ HOSP IP/OBS MODERATE 35: CPT | Performed by: INTERNAL MEDICINE

## 2018-09-26 PROCEDURE — 93010 ELECTROCARDIOGRAM REPORT: CPT | Performed by: INTERNAL MEDICINE

## 2018-09-26 PROCEDURE — 85025 COMPLETE CBC W/AUTO DIFF WBC: CPT | Performed by: HOSPITALIST

## 2018-09-26 PROCEDURE — 80048 BASIC METABOLIC PNL TOTAL CA: CPT | Performed by: HOSPITALIST

## 2018-09-26 RX ORDER — ZOLPIDEM TARTRATE 5 MG/1
TABLET ORAL
Qty: 5 TABLET | Refills: 0 | Status: ON HOLD | OUTPATIENT
Start: 2018-09-26 | End: 2018-10-17

## 2018-09-26 RX ORDER — METRONIDAZOLE 500 MG/1
500 TABLET ORAL EVERY 8 HOURS SCHEDULED
Qty: 12 TABLET | Refills: 0
Start: 2018-09-26 | End: 2018-09-30

## 2018-09-26 RX ORDER — TRAVOPROST 0.004 %
1 DROPS OPHTHALMIC (EYE) NIGHTLY
Start: 2018-09-26 | End: 2019-07-09

## 2018-09-26 RX ADMIN — Medication 100 MG: at 09:32

## 2018-09-26 RX ADMIN — METRONIDAZOLE 500 MG: 500 TABLET, FILM COATED ORAL at 06:17

## 2018-09-26 RX ADMIN — RIFAXIMIN 550 MG: 550 TABLET ORAL at 06:17

## 2018-09-26 RX ADMIN — LEVOTHYROXINE SODIUM 50 MCG: 50 TABLET ORAL at 06:17

## 2018-09-27 ENCOUNTER — EPISODE CHANGES (OUTPATIENT)
Dept: CASE MANAGEMENT | Facility: OTHER | Age: 83
End: 2018-09-27

## 2018-09-27 ENCOUNTER — PATIENT OUTREACH (OUTPATIENT)
Dept: CASE MANAGEMENT | Facility: OTHER | Age: 83
End: 2018-09-27

## 2018-09-27 NOTE — OUTREACH NOTE
Skilled Nursing Facility Discharge Flowsheet:     Skilled Nursing Facility Discharge Assessment 9/27/2018   Acute Facility Discharged From [facility]   Acute Discharge Date 9/26/2018   Name of the Skilled Nursing Facility? [facility]   Tier Level of the Skilled Nursing Facility 3   Purpose of SNF Admission PT;SN   Estimated length of stay for the patient? TBD   Who is the insurance provider or payor of patient stay? Medicare

## 2018-10-03 ENCOUNTER — PATIENT OUTREACH (OUTPATIENT)
Dept: CASE MANAGEMENT | Facility: OTHER | Age: 83
End: 2018-10-03

## 2018-10-03 NOTE — OUTREACH NOTE
Skilled Nursing Facility Discharge Flowsheet:     Skilled Nursing Facility Discharge Assessment 10/3/2018   Acute Facility Discharged From [facility]   Acute Discharge Date 9/26/2018   Name of the Skilled Nursing Facility? [facility]   Tier Level of the Skilled Nursing Facility 3   Purpose of SNF Admission PT;SN   Estimated length of stay for the patient? TBD   Who is the insurance provider or payor of patient stay? Medicare   Progression of Patient? Daily therapies continue

## 2018-10-06 ENCOUNTER — APPOINTMENT (OUTPATIENT)
Dept: CT IMAGING | Facility: HOSPITAL | Age: 83
End: 2018-10-06

## 2018-10-06 ENCOUNTER — HOSPITAL ENCOUNTER (INPATIENT)
Facility: HOSPITAL | Age: 83
LOS: 11 days | Discharge: NURSING, REHAB OR LONG-TERM CARE | End: 2018-10-17
Attending: INTERNAL MEDICINE | Admitting: SURGERY

## 2018-10-06 DIAGNOSIS — R53.1 GENERALIZED WEAKNESS: ICD-10-CM

## 2018-10-06 DIAGNOSIS — K56.699 OTHER SPECIFIED INTESTINAL OBSTRUCTION, UNSPECIFIED WHETHER PARTIAL OR COMPLETE (HCC): ICD-10-CM

## 2018-10-06 DIAGNOSIS — K56.609 SMALL BOWEL OBSTRUCTION (HCC): Primary | ICD-10-CM

## 2018-10-06 LAB
ALBUMIN SERPL-MCNC: 3.7 G/DL (ref 3.5–5.2)
ALBUMIN/GLOB SERPL: 1.2 G/DL
ALP SERPL-CCNC: 36 U/L (ref 39–117)
ALT SERPL W P-5'-P-CCNC: 15 U/L (ref 1–33)
ANION GAP SERPL CALCULATED.3IONS-SCNC: 13.2 MMOL/L
AST SERPL-CCNC: 23 U/L (ref 1–32)
BASOPHILS # BLD AUTO: 0.01 10*3/MM3 (ref 0–0.2)
BASOPHILS NFR BLD AUTO: 0.1 % (ref 0–1.5)
BILIRUB SERPL-MCNC: 0.6 MG/DL (ref 0.1–1.2)
BUN BLD-MCNC: 10 MG/DL (ref 8–23)
BUN/CREAT SERPL: 15.6 (ref 7–25)
CALCIUM SPEC-SCNC: 9.5 MG/DL (ref 8.2–9.6)
CHLORIDE SERPL-SCNC: 100 MMOL/L (ref 98–107)
CO2 SERPL-SCNC: 26.8 MMOL/L (ref 22–29)
CREAT BLD-MCNC: 0.64 MG/DL (ref 0.57–1)
DEPRECATED RDW RBC AUTO: 63 FL (ref 37–54)
EOSINOPHIL # BLD AUTO: 0 10*3/MM3 (ref 0–0.7)
EOSINOPHIL NFR BLD AUTO: 0 % (ref 0.3–6.2)
ERYTHROCYTE [DISTWIDTH] IN BLOOD BY AUTOMATED COUNT: 18 % (ref 11.7–13)
GFR SERPL CREATININE-BSD FRML MDRD: 86 ML/MIN/1.73
GLOBULIN UR ELPH-MCNC: 3 GM/DL
GLUCOSE BLD-MCNC: 126 MG/DL (ref 65–99)
HCT VFR BLD AUTO: 36.7 % (ref 35.6–45.5)
HGB BLD-MCNC: 11.4 G/DL (ref 11.9–15.5)
IMM GRANULOCYTES # BLD: 0 10*3/MM3 (ref 0–0.03)
IMM GRANULOCYTES NFR BLD: 0 % (ref 0–0.5)
LIPASE SERPL-CCNC: 19 U/L (ref 13–60)
LYMPHOCYTES # BLD AUTO: 0.93 10*3/MM3 (ref 0.9–4.8)
LYMPHOCYTES NFR BLD AUTO: 9.8 % (ref 19.6–45.3)
MCH RBC QN AUTO: 29.8 PG (ref 26.9–32)
MCHC RBC AUTO-ENTMCNC: 31.1 G/DL (ref 32.4–36.3)
MCV RBC AUTO: 96.1 FL (ref 80.5–98.2)
MONOCYTES # BLD AUTO: 0.56 10*3/MM3 (ref 0.2–1.2)
MONOCYTES NFR BLD AUTO: 5.9 % (ref 5–12)
NEUTROPHILS # BLD AUTO: 8.02 10*3/MM3 (ref 1.9–8.1)
NEUTROPHILS NFR BLD AUTO: 84.2 % (ref 42.7–76)
PLATELET # BLD AUTO: 433 10*3/MM3 (ref 140–500)
PMV BLD AUTO: 9.8 FL (ref 6–12)
POTASSIUM BLD-SCNC: 3.9 MMOL/L (ref 3.5–5.2)
PROT SERPL-MCNC: 6.7 G/DL (ref 6–8.5)
RBC # BLD AUTO: 3.82 10*6/MM3 (ref 3.9–5.2)
SODIUM BLD-SCNC: 140 MMOL/L (ref 136–145)
WBC NRBC COR # BLD: 9.52 10*3/MM3 (ref 4.5–10.7)

## 2018-10-06 PROCEDURE — 25010000002 IOPAMIDOL 61 % SOLUTION: Performed by: EMERGENCY MEDICINE

## 2018-10-06 PROCEDURE — 83690 ASSAY OF LIPASE: CPT | Performed by: EMERGENCY MEDICINE

## 2018-10-06 PROCEDURE — 25010000002 ONDANSETRON PER 1 MG: Performed by: EMERGENCY MEDICINE

## 2018-10-06 PROCEDURE — 99284 EMERGENCY DEPT VISIT MOD MDM: CPT

## 2018-10-06 PROCEDURE — 85025 COMPLETE CBC W/AUTO DIFF WBC: CPT | Performed by: EMERGENCY MEDICINE

## 2018-10-06 PROCEDURE — 80053 COMPREHEN METABOLIC PANEL: CPT | Performed by: EMERGENCY MEDICINE

## 2018-10-06 PROCEDURE — 74177 CT ABD & PELVIS W/CONTRAST: CPT

## 2018-10-06 RX ORDER — ONDANSETRON 4 MG/1
4 TABLET, ORALLY DISINTEGRATING ORAL EVERY 6 HOURS PRN
Status: DISCONTINUED | OUTPATIENT
Start: 2018-10-06 | End: 2018-10-17 | Stop reason: HOSPADM

## 2018-10-06 RX ORDER — ONDANSETRON 2 MG/ML
4 INJECTION INTRAMUSCULAR; INTRAVENOUS ONCE
Status: COMPLETED | OUTPATIENT
Start: 2018-10-06 | End: 2018-10-06

## 2018-10-06 RX ORDER — SODIUM CHLORIDE 9 MG/ML
75 INJECTION, SOLUTION INTRAVENOUS CONTINUOUS
Status: DISCONTINUED | OUTPATIENT
Start: 2018-10-06 | End: 2018-10-11

## 2018-10-06 RX ORDER — SODIUM CHLORIDE 0.9 % (FLUSH) 0.9 %
3 SYRINGE (ML) INJECTION EVERY 12 HOURS SCHEDULED
Status: DISCONTINUED | OUTPATIENT
Start: 2018-10-06 | End: 2018-10-09

## 2018-10-06 RX ORDER — MORPHINE SULFATE 2 MG/ML
1 INJECTION, SOLUTION INTRAMUSCULAR; INTRAVENOUS EVERY 4 HOURS PRN
Status: DISCONTINUED | OUTPATIENT
Start: 2018-10-06 | End: 2018-10-12

## 2018-10-06 RX ORDER — NITROGLYCERIN 0.4 MG/1
0.4 TABLET SUBLINGUAL
Status: DISCONTINUED | OUTPATIENT
Start: 2018-10-06 | End: 2018-10-17 | Stop reason: HOSPADM

## 2018-10-06 RX ORDER — ONDANSETRON 4 MG/1
4 TABLET, FILM COATED ORAL EVERY 6 HOURS PRN
Status: DISCONTINUED | OUTPATIENT
Start: 2018-10-06 | End: 2018-10-17 | Stop reason: HOSPADM

## 2018-10-06 RX ORDER — FAMOTIDINE 10 MG/ML
20 INJECTION, SOLUTION INTRAVENOUS EVERY 12 HOURS SCHEDULED
Status: DISCONTINUED | OUTPATIENT
Start: 2018-10-06 | End: 2018-10-08

## 2018-10-06 RX ORDER — NALOXONE HCL 0.4 MG/ML
0.4 VIAL (ML) INJECTION
Status: DISCONTINUED | OUTPATIENT
Start: 2018-10-06 | End: 2018-10-12

## 2018-10-06 RX ORDER — ONDANSETRON 2 MG/ML
4 INJECTION INTRAMUSCULAR; INTRAVENOUS EVERY 6 HOURS PRN
Status: DISCONTINUED | OUTPATIENT
Start: 2018-10-06 | End: 2018-10-17 | Stop reason: HOSPADM

## 2018-10-06 RX ORDER — SODIUM CHLORIDE 0.9 % (FLUSH) 0.9 %
10 SYRINGE (ML) INJECTION AS NEEDED
Status: DISCONTINUED | OUTPATIENT
Start: 2018-10-06 | End: 2018-10-12

## 2018-10-06 RX ORDER — SODIUM CHLORIDE 0.9 % (FLUSH) 0.9 %
3-10 SYRINGE (ML) INJECTION AS NEEDED
Status: DISCONTINUED | OUTPATIENT
Start: 2018-10-06 | End: 2018-10-09

## 2018-10-06 RX ORDER — ACETAMINOPHEN 325 MG/1
650 TABLET ORAL EVERY 4 HOURS PRN
Status: DISCONTINUED | OUTPATIENT
Start: 2018-10-06 | End: 2018-10-17 | Stop reason: HOSPADM

## 2018-10-06 RX ADMIN — SODIUM CHLORIDE 125 ML/HR: 9 INJECTION, SOLUTION INTRAVENOUS at 19:22

## 2018-10-06 RX ADMIN — ONDANSETRON 4 MG: 2 INJECTION INTRAMUSCULAR; INTRAVENOUS at 19:22

## 2018-10-06 RX ADMIN — IOPAMIDOL 85 ML: 612 INJECTION, SOLUTION INTRAVENOUS at 20:28

## 2018-10-06 NOTE — ED PROVIDER NOTES
" EMERGENCY DEPARTMENT ENCOUNTER    CHIEF COMPLAINT  Chief Complaint: Vomiting and constipation  History given by: Pt  History limited by: Nothing  Room Number: 22/22  PMD: Cary Espinal PA-C      HPI:  Pt is a 89 y.o. female who presents complaining of vomiting that began yesterday, and continued this morning, which the pt states looked like bile. The pt states that she will have bursts of \"a lot of gas\". The pt has not had a normal BM in 3 days, but passed a very small amount of stool yesterday. The pt confirms abd distension and denies fever.     Duration:  3 days  Onset: Gradual  Timing: Constant  Location: GI  Radiation: None  Quality: Like bile  Intensity/Severity: Moderate  Progression: No change  Associated Symptoms: Abd distension, gas  Aggravating Factors: Unknown  Alleviating Factors: Unknown  Previous Episodes: Unknown  Treatment before arrival: None    PAST MEDICAL HISTORY  Active Ambulatory Problems     Diagnosis Date Noted   • Anemia of chronic illness    • Hirsutism    • HLD (hyperlipidemia)    • Hypothyroidism    • Insomnia    • Osteoarthritis    • Renal insufficiency    • Seborrheic keratosis    • Vitamin D deficiency    • Iron deficiency anemia due to chronic blood loss 09/18/2018   • Irritable bowel syndrome 09/18/2018   • Colitis 09/20/2018   • Vomiting 09/20/2018     Resolved Ambulatory Problems     Diagnosis Date Noted   • Hypercalcemia 08/24/2017     Past Medical History:   Diagnosis Date   • Anemia of chronic illness    • PALACIOS (dyspnea on exertion)    • Fatigue    • Hirsutism    • History of bone density study    • History of colonoscopy    • History of prior pregnancies    • HLD (hyperlipidemia)    • Hypothyroidism    • Insomnia    • Lightheadedness    • Osteoarthritis    • Postmenopausal    • Renal insufficiency    • Rhinorrhea    • Seborrheic keratosis    • Vitamin D deficiency        PAST SURGICAL HISTORY  Past Surgical History:   Procedure Laterality Date   • APPENDECTOMY  03/2010    Dr." Yennifer   • BREAST BIOPSY     • JOINT REPLACEMENT Left 02/07/2011    Dr. Gold   • MAMMO BILATERAL      many years ago   • PAP SMEAR      many years ago       FAMILY HISTORY  Family History   Problem Relation Age of Onset   • Basal cell carcinoma Mother         Ages 35-96   • Nephrolithiasis Mother    • Diabetes Son        SOCIAL HISTORY  Social History     Social History   • Marital status:      Spouse name: N/A   • Number of children: 3   • Years of education: Some College     Occupational History   • RN Retired     Social History Main Topics   • Smoking status: Never Smoker   • Smokeless tobacco: Never Used   • Alcohol use Yes   • Drug use: No   • Sexual activity: Defer     Other Topics Concern   • Not on file     Social History Narrative   • No narrative on file       ALLERGIES  Patient has no known allergies.    REVIEW OF SYSTEMS  Review of Systems   Constitutional: Negative for fever.   HENT: Negative for sore throat.    Eyes: Negative.    Respiratory: Negative for cough and shortness of breath.    Cardiovascular: Negative for chest pain.   Gastrointestinal: Positive for abdominal distention, constipation and vomiting. Negative for abdominal pain and diarrhea.        Gas   Genitourinary: Negative for dysuria.   Musculoskeletal: Negative for neck pain.   Skin: Negative for rash.   Allergic/Immunologic: Negative.    Neurological: Negative for weakness, numbness and headaches.   Hematological: Negative.    Psychiatric/Behavioral: Negative.    All other systems reviewed and are negative.      PHYSICAL EXAM  ED Triage Vitals [10/06/18 1739]   Temp Heart Rate Resp BP SpO2   98.6 °F (37 °C) 88 14 120/62 97 %      Temp src Heart Rate Source Patient Position BP Location FiO2 (%)   Oral -- -- -- --       Physical Exam   Constitutional: She is oriented to person, place, and time. No distress.   HENT:   Head: Normocephalic and atraumatic.   Eyes: Pupils are equal, round, and reactive to light. EOM are normal.    Neck: Normal range of motion. Neck supple.   Cardiovascular: Normal rate, regular rhythm and normal heart sounds.    Pulmonary/Chest: Effort normal and breath sounds normal. No respiratory distress.   Abdominal: Soft. She exhibits distension. Bowel sounds are hypoactive. There is tenderness (MIld) in the right lower quadrant and left lower quadrant. There is no rebound and no guarding.   Musculoskeletal: Normal range of motion. She exhibits no edema.   Neurological: She is alert and oriented to person, place, and time. She has normal sensation and normal strength.   Skin: Skin is warm and dry. No rash noted.   Psychiatric: Mood and affect normal.   Nursing note and vitals reviewed.      LAB RESULTS  Lab Results (last 24 hours)     Procedure Component Value Units Date/Time    CBC & Differential [[id number]] Collected:  10/06/18 1756    Specimen:  Blood Updated:  10/06/18 1814    Narrative:       The following orders were created for panel order CBC & Differential.  Procedure                               Abnormality         Status                     ---------                               -----------         ------                     CBC Auto Differential[[id number]]        Abnormal            Final result                 Please view results for these tests on the individual orders.    Comprehensive Metabolic Panel [[id number]]  (Abnormal) Collected:  10/06/18 1756    Specimen:  Blood Updated:  10/06/18 1836     Glucose 126 (H) mg/dL      BUN 10 mg/dL      Creatinine 0.64 mg/dL      Sodium 140 mmol/L      Potassium 3.9 mmol/L      Chloride 100 mmol/L      CO2 26.8 mmol/L      Calcium 9.5 mg/dL      Total Protein 6.7 g/dL      Albumin 3.70 g/dL      ALT (SGPT) 15 U/L      AST (SGOT) 23 U/L      Alkaline Phosphatase 36 (L) U/L      Total Bilirubin 0.6 mg/dL      eGFR Non African Amer 86 mL/min/1.73      Globulin 3.0 gm/dL      A/G Ratio 1.2 g/dL      BUN/Creatinine Ratio 15.6     Anion Gap 13.2 mmol/L     Narrative:        The MDRD GFR formula is only valid for adults with stable renal function between ages 18 and 70.    Lipase [[id number]]  (Normal) Collected:  10/06/18 1756    Specimen:  Blood Updated:  10/06/18 1827     Lipase 19 U/L     CBC Auto Differential [[id number]]  (Abnormal) Collected:  10/06/18 1756    Specimen:  Blood Updated:  10/06/18 1814     WBC 9.52 10*3/mm3      RBC 3.82 (L) 10*6/mm3      Hemoglobin 11.4 (L) g/dL      Hematocrit 36.7 %      MCV 96.1 fL      MCH 29.8 pg      MCHC 31.1 (L) g/dL      RDW 18.0 (H) %      RDW-SD 63.0 (H) fl      MPV 9.8 fL      Platelets 433 10*3/mm3      Neutrophil % 84.2 (H) %      Lymphocyte % 9.8 (L) %      Monocyte % 5.9 %      Eosinophil % 0.0 (L) %      Basophil % 0.1 %      Immature Grans % 0.0 %      Neutrophils, Absolute 8.02 10*3/mm3      Lymphocytes, Absolute 0.93 10*3/mm3      Monocytes, Absolute 0.56 10*3/mm3      Eosinophils, Absolute 0.00 10*3/mm3      Basophils, Absolute 0.01 10*3/mm3      Immature Grans, Absolute 0.00 10*3/mm3           I ordered the above labs and reviewed the results    RADIOLOGY  CT Abdomen Pelvis With Contrast   Final Result       1. Multiple dilated and fluid-filled loops of small bowel are seen. The   patient's cecum is also distended, as is the ascending colon. Apparent   transition point appears to be at the level of the hepatic flexure,   findings are concerning for colonic obstruction at this level.  While   benign stricture would be a possibility, I'm suspicious there may be an   underlying neoplastic lesion, although no adjacent adenopathy is seen..   While no pneumatosis or free air is seen, patient is noted to have free   fluid throughout the abdomen.        Radiation dose reduction techniques were utilized, including automated   exposure control and exposure modulation based on body size.       This report was finalized on 10/6/2018 9:09 PM by Dr. Leanna Martinez M.D.               I ordered the above noted radiological studies.  Interpreted by radiologist. Discussed with radiologist (Dr. Martinez). Reviewed by me in PACS.       PROCEDURES  Procedures      PROGRESS AND CONSULTS     1903 Ordered CT abd pelvis for further evaluation. Ordered Zofran for nausea.     2109 Placed call to [facility] for admission. Placed call to surgery. Ordered med surg bed.     2110 Pt is resting comfortably. Vital signs are stable. No emesis.     2128 Discussed the pt with Dr. Ch (Surgery) who agrees to see the pt.     2134 Discussed the pt with Dr. Roberts ([facility]) who agrees to admit the pt.     2136 Ordered inpatient admission and med surg bed request.     MEDICAL DECISION MAKING  Results were reviewed/discussed with the patient and they were also made aware of online access. Pt also made aware that some labs, such as cultures, will not be resulted during ER visit and follow up with PMD is necessary.     MDM  Number of Diagnoses or Management Options     Amount and/or Complexity of Data Reviewed  Clinical lab tests: reviewed and ordered (Hemoglobin: 11.4)  Tests in the radiology section of CPT®: ordered and reviewed (CT abd pelvis: Bowel obstruction at level of hepatic flexure )  Discussion of test results with the performing providers: yes (Dr. Martinez (radiology))  Decide to obtain previous medical records or to obtain history from someone other than the patient: yes  Review and summarize past medical records: yes (The pt was discharged from here on 9/26 for infectious enteritis. The pt did not have C. Diff.)  Discuss the patient with other providers: yes (Dr. Ch (surgery), Dr. Roberts ([facility]) )    Patient Progress  Patient progress: stable         DIAGNOSIS  Final diagnoses:   Small bowel obstruction (CMS/HCC)       DISPOSITION  ADMISSION    Discussed treatment plan and reason for admission with pt/family and admitting physician.  Pt/family voiced understanding of the plan for admission for further testing/treatment as needed.       Latest Documented  Vital Signs:  As of 9:36 PM  BP- 138/81 HR- 98 Temp- 98.6 °F (37 °C) (Oral) O2 sat- 98%    --  Documentation assistance provided by janeth Lopez for Dr. Miner.  Information recorded by the scribe was done at my direction and has been verified and validated by me.     Sho Lopez  10/06/18 1945       Sho Lopez  10/06/18 2136       Addy Miner MD  10/06/18 2201

## 2018-10-06 NOTE — ED NOTES
Pt comes to ER for constipation x3 days. Pt states she started feeling ill yesterday and started vomiting this morning. She states that every time she goes to drink anything she cannot keep it down.       Beronica Blevins RN  10/06/18 5293

## 2018-10-07 PROBLEM — Z66 DNR (DO NOT RESUSCITATE): Status: ACTIVE | Noted: 2018-10-07

## 2018-10-07 PROBLEM — R10.9 ABDOMINAL PAIN: Status: ACTIVE | Noted: 2018-10-07

## 2018-10-07 LAB
ANION GAP SERPL CALCULATED.3IONS-SCNC: 7.5 MMOL/L
BASOPHILS # BLD AUTO: 0.02 10*3/MM3 (ref 0–0.2)
BASOPHILS NFR BLD AUTO: 0.3 % (ref 0–1.5)
BUN BLD-MCNC: 8 MG/DL (ref 8–23)
BUN/CREAT SERPL: 12.7 (ref 7–25)
CALCIUM SPEC-SCNC: 8.8 MG/DL (ref 8.2–9.6)
CEA SERPL-MCNC: 3.26 NG/ML
CHLORIDE SERPL-SCNC: 104 MMOL/L (ref 98–107)
CO2 SERPL-SCNC: 28.5 MMOL/L (ref 22–29)
CREAT BLD-MCNC: 0.63 MG/DL (ref 0.57–1)
DEPRECATED RDW RBC AUTO: 64.8 FL (ref 37–54)
EOSINOPHIL # BLD AUTO: 0.05 10*3/MM3 (ref 0–0.7)
EOSINOPHIL NFR BLD AUTO: 0.7 % (ref 0.3–6.2)
ERYTHROCYTE [DISTWIDTH] IN BLOOD BY AUTOMATED COUNT: 18.4 % (ref 11.7–13)
GFR SERPL CREATININE-BSD FRML MDRD: 88 ML/MIN/1.73
GLUCOSE BLD-MCNC: 103 MG/DL (ref 65–99)
HCT VFR BLD AUTO: 32.5 % (ref 35.6–45.5)
HGB BLD-MCNC: 10 G/DL (ref 11.9–15.5)
IMM GRANULOCYTES # BLD: 0 10*3/MM3 (ref 0–0.03)
IMM GRANULOCYTES NFR BLD: 0 % (ref 0–0.5)
LYMPHOCYTES # BLD AUTO: 1.45 10*3/MM3 (ref 0.9–4.8)
LYMPHOCYTES NFR BLD AUTO: 19.8 % (ref 19.6–45.3)
MCH RBC QN AUTO: 29.9 PG (ref 26.9–32)
MCHC RBC AUTO-ENTMCNC: 30.8 G/DL (ref 32.4–36.3)
MCV RBC AUTO: 97.3 FL (ref 80.5–98.2)
MONOCYTES # BLD AUTO: 1.05 10*3/MM3 (ref 0.2–1.2)
MONOCYTES NFR BLD AUTO: 14.4 % (ref 5–12)
NEUTROPHILS # BLD AUTO: 4.74 10*3/MM3 (ref 1.9–8.1)
NEUTROPHILS NFR BLD AUTO: 64.8 % (ref 42.7–76)
PLATELET # BLD AUTO: 371 10*3/MM3 (ref 140–500)
PMV BLD AUTO: 9.7 FL (ref 6–12)
POTASSIUM BLD-SCNC: 4.2 MMOL/L (ref 3.5–5.2)
RBC # BLD AUTO: 3.34 10*6/MM3 (ref 3.9–5.2)
SODIUM BLD-SCNC: 140 MMOL/L (ref 136–145)
WBC NRBC COR # BLD: 7.31 10*3/MM3 (ref 4.5–10.7)

## 2018-10-07 PROCEDURE — 85025 COMPLETE CBC W/AUTO DIFF WBC: CPT | Performed by: INTERNAL MEDICINE

## 2018-10-07 PROCEDURE — 99222 1ST HOSP IP/OBS MODERATE 55: CPT | Performed by: SURGERY

## 2018-10-07 PROCEDURE — 25010000002 ONDANSETRON PER 1 MG: Performed by: INTERNAL MEDICINE

## 2018-10-07 PROCEDURE — 82378 CARCINOEMBRYONIC ANTIGEN: CPT | Performed by: SURGERY

## 2018-10-07 PROCEDURE — 25010000002 MORPHINE PER 10 MG: Performed by: INTERNAL MEDICINE

## 2018-10-07 PROCEDURE — 80048 BASIC METABOLIC PNL TOTAL CA: CPT | Performed by: INTERNAL MEDICINE

## 2018-10-07 RX ORDER — LEVOTHYROXINE SODIUM 88 UG/1
88 TABLET ORAL
Status: DISCONTINUED | OUTPATIENT
Start: 2018-10-07 | End: 2018-10-17 | Stop reason: HOSPADM

## 2018-10-07 RX ORDER — LATANOPROST 50 UG/ML
1 SOLUTION/ DROPS OPHTHALMIC NIGHTLY
Status: DISCONTINUED | OUTPATIENT
Start: 2018-10-07 | End: 2018-10-13

## 2018-10-07 RX ORDER — ZOLPIDEM TARTRATE 5 MG/1
2.5 TABLET ORAL NIGHTLY PRN
Status: DISPENSED | OUTPATIENT
Start: 2018-10-07 | End: 2018-10-17

## 2018-10-07 RX ADMIN — LEVOTHYROXINE SODIUM 88 MCG: 88 TABLET ORAL at 05:46

## 2018-10-07 RX ADMIN — ONDANSETRON HYDROCHLORIDE 4 MG: 2 SOLUTION INTRAMUSCULAR; INTRAVENOUS at 00:31

## 2018-10-07 RX ADMIN — FAMOTIDINE 20 MG: 10 INJECTION, SOLUTION INTRAVENOUS at 12:53

## 2018-10-07 RX ADMIN — Medication 3 ML: at 22:14

## 2018-10-07 RX ADMIN — FAMOTIDINE 20 MG: 10 INJECTION, SOLUTION INTRAVENOUS at 21:15

## 2018-10-07 RX ADMIN — Medication 3 ML: at 04:29

## 2018-10-07 RX ADMIN — Medication 3 ML: at 10:10

## 2018-10-07 RX ADMIN — MORPHINE SULFATE 1 MG: 2 INJECTION, SOLUTION INTRAMUSCULAR; INTRAVENOUS at 17:48

## 2018-10-07 RX ADMIN — SODIUM CHLORIDE 75 ML/HR: 9 INJECTION, SOLUTION INTRAVENOUS at 21:16

## 2018-10-07 RX ADMIN — ZOLPIDEM TARTRATE 2.5 MG: 5 TABLET ORAL at 23:15

## 2018-10-07 RX ADMIN — FAMOTIDINE 20 MG: 10 INJECTION, SOLUTION INTRAVENOUS at 05:45

## 2018-10-07 RX ADMIN — ONDANSETRON HYDROCHLORIDE 4 MG: 2 SOLUTION INTRAMUSCULAR; INTRAVENOUS at 21:15

## 2018-10-07 RX ADMIN — MORPHINE SULFATE 1 MG: 2 INJECTION, SOLUTION INTRAMUSCULAR; INTRAVENOUS at 23:15

## 2018-10-07 NOTE — H&P
Patient Name:  Kristyn Severino  YOB: 1924  MRN:  [id number]  Admit Date:  10/6/2018  Patient Care Team:  Cary Espinal PA-C as PCP - General (Family Medicine)  Cary Espinal PA-C as PCP - Claims Attributed  Henry Maurer MD as Consulting Physician (Gastroenterology)  Marty Gold MD as Consulting Physician (Orthopedic Surgery)  Yumiko Olivo MD as Consulting Physician (Endocrinology)  Viktor Melgar MD as Consulting Physician (Cardiology)  Cary Espinal PA-C as Referring Physician (Family Medicine)  Melvin Kenny MD as Consulting Physician (Hematology and Oncology)  Hanna Beltre RN as Care Coordinator (Population Health)      Chief Complaint   Patient presents with   • Vomiting     with constipation for 3 days. Sent from [facility] rehab to rule out obstruction.      Subjective   Ms. Severino is a 89 y.o. female with a history of colitis recently treated with antibiotics and discharged from this facility that presents to [facility] from rehab complaining of abdominal discomfort and bilious vomiting starting this morning.  She states that she has been constipated for the past 3-4d.  She has had associated abdominal distension.  She was found to have evidence of obstruction on abdominal CT scan. She had had SBO on previous admission which had resolved with conservative management.    History of Present Illness    Past Medical History:   Diagnosis Date   • Anemia of chronic illness    • PALACIOS (dyspnea on exertion)    • Fatigue    • Hirsutism    • History of bone density study     few years; normal   • History of colonoscopy     never   • History of prior pregnancies     x4   • HLD (hyperlipidemia)    • Hypothyroidism     Hashimoto's diagnosed age 19   • Insomnia    • Lightheadedness    • Osteoarthritis    • Postmenopausal    • Renal insufficiency    • Rhinorrhea    • Seborrheic keratosis    • Vitamin D deficiency      Past Surgical History:   Procedure  Laterality Date   • APPENDECTOMY  03/2010    Dr. De Oliveira   • BREAST BIOPSY     • JOINT REPLACEMENT Left 02/07/2011    Dr. Gold   • MAMMO BILATERAL      many years ago   • PAP SMEAR      many years ago     Family History   Problem Relation Age of Onset   • Basal cell carcinoma Mother         Ages 35-96   • Nephrolithiasis Mother    • Diabetes Son      Social History   Substance Use Topics   • Smoking status: Never Smoker   • Smokeless tobacco: Never Used   • Alcohol use Yes     Prescriptions Prior to Admission   Medication Sig Dispense Refill Last Dose   • B Complex-C (SUPER B COMPLEX PO) Take  by mouth.   Unknown at Unknown time   • folic acid (FOLVITE) 400 MCG tablet Take 400 mcg by mouth Daily.   Unknown at Unknown time   • hyoscyamine (LEVSIN) 0.125 MG SL tablet Take 1 tablet by mouth Every 4 (Four) Hours As Needed for Cramping.      • Lactobacillus (ACIDOPHILUS PO) Take  by mouth.   Unknown at Unknown time   • levothyroxine (UNITHROID) 75 MCG tablet Take 1 tablet by mouth Daily. (Patient taking differently: Take 88 mcg by mouth Daily.) 90 tablet 1 Unknown at Unknown time   • Multiple Vitamins-Minerals (MULTIVITAMIN WITH MINERALS) tablet Take 1 tablet by mouth daily.   Unknown at Unknown time   • Nutritional Supplements (ENSURE NUTRA SHAKE HI-CAMMY) liquid Take 1 can by mouth 4 (Four) Times a Day. PRN meal supplementation 120 can 5 Unknown at Unknown time   • Pyridoxine HCl (VITAMIN B-6 PO) Take 100 mg by mouth daily.   Unknown at Unknown time   • simethicone (MYLICON) 125 MG chewable tablet Chew 125 mg Every 6 (Six) Hours As Needed for Flatulence.   Unknown at Unknown time   • TRAVATAN Z 0.004 % solution ophthalmic solution Administer 1 drop to both eyes Every Night.      • zolpidem (AMBIEN) 5 MG tablet 1/2 -1 tabs PO Q HS PRN insomnia 5 tablet 0      Allergies:  No Known Allergies    Review of Systems   Constitutional: Positive for appetite change (decreased). Negative for chills and fever.   HENT: Negative for  congestion, nosebleeds, sore throat and trouble swallowing.    Eyes: Negative for photophobia and visual disturbance.   Respiratory: Negative for cough, choking, chest tightness and shortness of breath.    Cardiovascular: Negative for chest pain, palpitations and leg swelling.   Gastrointestinal: Positive for abdominal distention, abdominal pain, constipation, nausea and vomiting.   Endocrine: Negative for cold intolerance and heat intolerance.   Genitourinary: Negative for difficulty urinating, dysuria and hematuria.   Musculoskeletal: Negative for arthralgias and myalgias.   Skin: Negative for pallor and rash.   Neurological: Negative for dizziness, light-headedness and headaches.   Hematological: Negative for adenopathy. Does not bruise/bleed easily.   Psychiatric/Behavioral: Negative for confusion and decreased concentration.        Objective    Vital Signs  Temp:  [98.6 °F (37 °C)] 98.6 °F (37 °C)  Heart Rate:  [77-98] 89  Resp:  [14] 14  BP: (117-148)/() 117/108  SpO2:  [94 %-100 %] 94 %  on   ;      Body mass index is 23.86 kg/m².    Physical Exam   Constitutional: She is oriented to person, place, and time. No distress.   HENT:   Head: Normocephalic and atraumatic.   Mouth/Throat: Oropharynx is clear and moist.   Eyes: Pupils are equal, round, and reactive to light. Conjunctivae are normal.   Neck: Normal range of motion. Neck supple.   Cardiovascular: Normal rate, regular rhythm and intact distal pulses.    Pulmonary/Chest: Effort normal and breath sounds normal.   Abdominal: Soft. Bowel sounds are normal.   Musculoskeletal: She exhibits no edema or tenderness.   Neurological: She is alert and oriented to person, place, and time.   Skin: Skin is warm and dry. She is not diaphoretic.   Psychiatric: She has a normal mood and affect. Her behavior is normal.   Nursing note and vitals reviewed.      Results Review:  I reviewed the patient's new clinical results.  I reviewed the patient's new imaging  results and agree with the interpretation.  I reviewed the patient's other test results and agree with the interpretation  I personally viewed and interpreted the patient's EKG/Telemetry data  Discussed with ED provider.    Lab Results (last 24 hours)     Procedure Component Value Units Date/Time    CBC & Differential [[id number]] Collected:  10/06/18 1756    Specimen:  Blood Updated:  10/06/18 1814    Narrative:       The following orders were created for panel order CBC & Differential.  Procedure                               Abnormality         Status                     ---------                               -----------         ------                     CBC Auto Differential[[id number]]        Abnormal            Final result                 Please view results for these tests on the individual orders.    Comprehensive Metabolic Panel [[id number]]  (Abnormal) Collected:  10/06/18 1756    Specimen:  Blood Updated:  10/06/18 1836     Glucose 126 (H) mg/dL      BUN 10 mg/dL      Creatinine 0.64 mg/dL      Sodium 140 mmol/L      Potassium 3.9 mmol/L      Chloride 100 mmol/L      CO2 26.8 mmol/L      Calcium 9.5 mg/dL      Total Protein 6.7 g/dL      Albumin 3.70 g/dL      ALT (SGPT) 15 U/L      AST (SGOT) 23 U/L      Alkaline Phosphatase 36 (L) U/L      Total Bilirubin 0.6 mg/dL      eGFR Non African Amer 86 mL/min/1.73      Globulin 3.0 gm/dL      A/G Ratio 1.2 g/dL      BUN/Creatinine Ratio 15.6     Anion Gap 13.2 mmol/L     Narrative:       The MDRD GFR formula is only valid for adults with stable renal function between ages 18 and 70.    Lipase [[id number]]  (Normal) Collected:  10/06/18 1756    Specimen:  Blood Updated:  10/06/18 1827     Lipase 19 U/L     CBC Auto Differential [[id number]]  (Abnormal) Collected:  10/06/18 1756    Specimen:  Blood Updated:  10/06/18 1814     WBC 9.52 10*3/mm3      RBC 3.82 (L) 10*6/mm3      Hemoglobin 11.4 (L) g/dL      Hematocrit 36.7 %      MCV 96.1 fL      MCH 29.8 pg      MCHC  31.1 (L) g/dL      RDW 18.0 (H) %      RDW-SD 63.0 (H) fl      MPV 9.8 fL      Platelets 433 10*3/mm3      Neutrophil % 84.2 (H) %      Lymphocyte % 9.8 (L) %      Monocyte % 5.9 %      Eosinophil % 0.0 (L) %      Basophil % 0.1 %      Immature Grans % 0.0 %      Neutrophils, Absolute 8.02 10*3/mm3      Lymphocytes, Absolute 0.93 10*3/mm3      Monocytes, Absolute 0.56 10*3/mm3      Eosinophils, Absolute 0.00 10*3/mm3      Basophils, Absolute 0.01 10*3/mm3      Immature Grans, Absolute 0.00 10*3/mm3           CT Abdomen Pelvis With Contrast   Final Result       1. Multiple dilated and fluid-filled loops of small bowel are seen. The   patient's cecum is also distended, as is the ascending colon. Apparent   transition point appears to be at the level of the hepatic flexure,   findings are concerning for colonic obstruction at this level.  While   benign stricture would be a possibility, I'm suspicious there may be an   underlying neoplastic lesion, although no adjacent adenopathy is seen..   While no pneumatosis or free air is seen, patient is noted to have free   fluid throughout the abdomen.        Radiation dose reduction techniques were utilized, including automated   exposure control and exposure modulation based on body size.       This report was finalized on 10/6/2018 9:09 PM by Dr. Leanna Martinez M.D.            Assessment/Plan   Active Hospital Problems    Diagnosis Date Noted   • Abdominal pain [R10.9] 10/07/2018   • DNR (do not resuscitate) [Z66] 10/07/2018   • Bowel obstruction (CMS/HCC) [K56.609] 10/06/2018   • Iron deficiency anemia due to chronic blood loss [D50.0] 09/18/2018   • Hypothyroidism [E03.9]    • Anemia of chronic illness [D63.8]       Resolved Hospital Problems    Diagnosis Date Noted Date Resolved   No resolved problems to display.   Bowel Obstruction  - NPO  - patient is no longer nauseated or vomiting so will hold off on NG  - pain control, IVF  - will ask general surgery to  evaluate    Hypothyroidism  - resume synthroid    Anemia  - due to inflammation and Fe deficiency  - Hgb improved since last admission but is probably a little concentrated  - will monitor with IVF    DVT Prophylaxis  - SCDs    Code Status  I discussed with the patient and she is DNR.    I discussed the patients findings and my recommendations with patient and nursing staff.      Jonnathan Roberts MD  [facility]  10/07/18  5:05 AM

## 2018-10-07 NOTE — PROGRESS NOTES
"DAILY PROGRESS NOTE  [facility]    Patient Identification:  Name: Kristyn Severino  Age: 89 y.o.  Sex: female  :  1924  MRN: [id number]         Primary Care Physician: Cary Espinal PA-C    Subjective:  Interval History:She is bloated.     Objective:    Scheduled Meds:  famotidine 20 mg Intravenous Q12H   latanoprost 1 drop Both Eyes Nightly   levothyroxine 88 mcg Oral Q AM   sodium chloride 3 mL Intravenous Q12H     Continuous Infusions:  sodium chloride 75 mL/hr Last Rate: 75 mL/hr (10/07/18 0429)       Vital signs in last 24 hours:  Temp:  [98.5 °F (36.9 °C)-98.6 °F (37 °C)] 98.5 °F (36.9 °C)  Heart Rate:  [77-98] 89  Resp:  [14-18] 18  BP: (116-148)/() 116/60    Intake/Output:    Intake/Output Summary (Last 24 hours) at 10/07/18 1345  Last data filed at 10/07/18 0429   Gross per 24 hour   Intake          1139.58 ml   Output                0 ml   Net          1139.58 ml       Exam:  /60 (BP Location: Left arm, Patient Position: Lying)   Pulse 89   Temp 98.5 °F (36.9 °C) (Oral)   Resp 18   Ht 162.6 cm (64\")   Wt 63 kg (139 lb)   LMP  (LMP Unknown)   SpO2 94%   BMI 23.86 kg/m²     General Appearance:    Alert, cooperative, no distress   Head:    Normocephalic, without obvious abnormality, atraumatic   Eyes:       Throat:   Lips, tongue, gums normal   Neck:   Supple, symmetrical, trachea midline, no JVD   Lungs:     Clear to auscultation bilaterally, respirations unlabored   Chest Wall:    No tenderness or deformity    Heart:    Regular rate and rhythm, S1 and S2 normal, no murmur,no  Rub or gallop   Abdomen:     Soft, distended, bowel sounds active, no masses, no organomegaly    Extremities:   Extremities normal, atraumatic, no cyanosis or edema   Pulses:      Skin:   Skin is warm and dry,  no rashes or palpable lesions   Neurologic:   no focal deficits noted      Lab Results (last 72 hours)     Procedure Component Value Units Date/Time    Basic Metabolic Panel " [[id number]]  (Abnormal) Collected:  10/07/18 0547    Specimen:  Blood Updated:  10/07/18 0625     Glucose 103 (H) mg/dL      BUN 8 mg/dL      Creatinine 0.63 mg/dL      Sodium 140 mmol/L      Potassium 4.2 mmol/L      Chloride 104 mmol/L      CO2 28.5 mmol/L      Calcium 8.8 mg/dL      eGFR Non African Amer 88 mL/min/1.73      BUN/Creatinine Ratio 12.7     Anion Gap 7.5 mmol/L     Narrative:       The MDRD GFR formula is only valid for adults with stable renal function between ages 18 and 70.    CBC & Differential [[id number]] Collected:  10/07/18 0547    Specimen:  Blood Updated:  10/07/18 0606    Narrative:       The following orders were created for panel order CBC & Differential.  Procedure                               Abnormality         Status                     ---------                               -----------         ------                     CBC Auto Differential[[id number]]        Abnormal            Final result                 Please view results for these tests on the individual orders.    CBC Auto Differential [[id number]]  (Abnormal) Collected:  10/07/18 0547    Specimen:  Blood Updated:  10/07/18 0606     WBC 7.31 10*3/mm3      RBC 3.34 (L) 10*6/mm3      Hemoglobin 10.0 (L) g/dL      Hematocrit 32.5 (L) %      MCV 97.3 fL      MCH 29.9 pg      MCHC 30.8 (L) g/dL      RDW 18.4 (H) %      RDW-SD 64.8 (H) fl      MPV 9.7 fL      Platelets 371 10*3/mm3      Neutrophil % 64.8 %      Lymphocyte % 19.8 %      Monocyte % 14.4 (H) %      Eosinophil % 0.7 %      Basophil % 0.3 %      Immature Grans % 0.0 %      Neutrophils, Absolute 4.74 10*3/mm3      Lymphocytes, Absolute 1.45 10*3/mm3      Monocytes, Absolute 1.05 10*3/mm3      Eosinophils, Absolute 0.05 10*3/mm3      Basophils, Absolute 0.02 10*3/mm3      Immature Grans, Absolute 0.00 10*3/mm3     Comprehensive Metabolic Panel [[id number]]  (Abnormal) Collected:  10/06/18 1756    Specimen:  Blood Updated:  10/06/18 1836     Glucose 126 (H) mg/dL      BUN  10 mg/dL      Creatinine 0.64 mg/dL      Sodium 140 mmol/L      Potassium 3.9 mmol/L      Chloride 100 mmol/L      CO2 26.8 mmol/L      Calcium 9.5 mg/dL      Total Protein 6.7 g/dL      Albumin 3.70 g/dL      ALT (SGPT) 15 U/L      AST (SGOT) 23 U/L      Alkaline Phosphatase 36 (L) U/L      Total Bilirubin 0.6 mg/dL      eGFR Non African Amer 86 mL/min/1.73      Globulin 3.0 gm/dL      A/G Ratio 1.2 g/dL      BUN/Creatinine Ratio 15.6     Anion Gap 13.2 mmol/L     Narrative:       The MDRD GFR formula is only valid for adults with stable renal function between ages 18 and 70.    Lipase [[id number]]  (Normal) Collected:  10/06/18 1756    Specimen:  Blood Updated:  10/06/18 1827     Lipase 19 U/L     CBC & Differential [[id number]] Collected:  10/06/18 1756    Specimen:  Blood Updated:  10/06/18 1814    Narrative:       The following orders were created for panel order CBC & Differential.  Procedure                               Abnormality         Status                     ---------                               -----------         ------                     CBC Auto Differential[[id number]]        Abnormal            Final result                 Please view results for these tests on the individual orders.    CBC Auto Differential [[id number]]  (Abnormal) Collected:  10/06/18 1756    Specimen:  Blood Updated:  10/06/18 1814     WBC 9.52 10*3/mm3      RBC 3.82 (L) 10*6/mm3      Hemoglobin 11.4 (L) g/dL      Hematocrit 36.7 %      MCV 96.1 fL      MCH 29.8 pg      MCHC 31.1 (L) g/dL      RDW 18.0 (H) %      RDW-SD 63.0 (H) fl      MPV 9.8 fL      Platelets 433 10*3/mm3      Neutrophil % 84.2 (H) %      Lymphocyte % 9.8 (L) %      Monocyte % 5.9 %      Eosinophil % 0.0 (L) %      Basophil % 0.1 %      Immature Grans % 0.0 %      Neutrophils, Absolute 8.02 10*3/mm3      Lymphocytes, Absolute 0.93 10*3/mm3      Monocytes, Absolute 0.56 10*3/mm3      Eosinophils, Absolute 0.00 10*3/mm3      Basophils, Absolute 0.01 10*3/mm3       Immature Grans, Absolute 0.00 10*3/mm3         Data Review:    Results from last 7 days  Lab Units 10/07/18  0547 10/06/18  1756   SODIUM mmol/L 140 140   POTASSIUM mmol/L 4.2 3.9   CHLORIDE mmol/L 104 100   CO2 mmol/L 28.5 26.8   BUN mg/dL 8 10   CREATININE mg/dL 0.63 0.64   GLUCOSE mg/dL 103* 126*   CALCIUM mg/dL 8.8 9.5       Results from last 7 days  Lab Units 10/07/18  0547 10/06/18  1756   WBC 10*3/mm3 7.31 9.52   HEMOGLOBIN g/dL 10.0* 11.4*   HEMATOCRIT % 32.5* 36.7   PLATELETS 10*3/mm3 371 433             No results found for: TROPONINT        Results from last 7 days  Lab Units 10/06/18  1756   ALK PHOS U/L 36*   BILIRUBIN mg/dL 0.6   ALT (SGPT) U/L 15   AST (SGOT) U/L 23             No results found for: POCGLU        Patient Active Problem List   Diagnosis Code   • Anemia of chronic illness D63.8   • Hirsutism L68.0   • HLD (hyperlipidemia) E78.5   • Hypothyroidism E03.9   • Insomnia G47.00   • Osteoarthritis M19.90   • Renal insufficiency N28.9   • Seborrheic keratosis L82.1   • Vitamin D deficiency E55.9   • Iron deficiency anemia due to chronic blood loss D50.0   • Irritable bowel syndrome K58.9   • Colitis K52.9   • Vomiting R11.10   • Bowel obstruction (CMS/HCC) K56.609   • Abdominal pain R10.9   • DNR (do not resuscitate) Z66       Assessment:  Active Hospital Problems    Diagnosis Date Noted   • Abdominal pain [R10.9] 10/07/2018   • DNR (do not resuscitate) [Z66] 10/07/2018   • Bowel obstruction (CMS/HCC) [K56.609] 10/06/2018   • Iron deficiency anemia due to chronic blood loss [D50.0] 09/18/2018   • Hypothyroidism [E03.9]    • Anemia of chronic illness [D63.8]       Resolved Hospital Problems    Diagnosis Date Noted Date Resolved   No resolved problems to display.       Plan:  Continue IV fluids, Surgery consult noted.     Edgar Andrews MD  10/7/2018  1:45 PM

## 2018-10-07 NOTE — CONSULTS
"SURGERY  Date of Visit: 10/07/18  Date of Admission:10/6/2018  5:42 PM     Reason for Consult/Admission:  Colonic obstruction    Patient's Chief Complaint: abdominal discomfort    HPI    Patient is a delightful 89 y.o. female readmitted after being discharged 9/26/18 after being treated for colitis, with her symptoms being right sided \"lumps\" of contained gas.  She describes a 10# weight loss over the last 4 months and loose stools.  Her fecal occult was +.   Plain films were suggestive of SBO.  She had been seen at an outside ER and perhaps additional studies were done there that suggested colitis.    She went home and now says she's been unable to have a bowel movement for the last 5 days and only a small amount of gas.  She is very distended.  She threw up yesterday but not today.  She came to the ER with CT suggestive of ascending colon obstruction.  Clearly the ascending colon is extremely dilated, the transverse completely decompressed.  There is stranding around the ascending, i believe likely from chronic distension.      Past Medical History:   Diagnosis Date   • Anemia of chronic illness    • PALACIOS (dyspnea on exertion)    • Fatigue    • Hirsutism    • History of bone density study     few years; normal   • History of colonoscopy     never   • History of prior pregnancies     x4   • HLD (hyperlipidemia)    • Hypothyroidism     Hashimoto's diagnosed age 19   • Insomnia    • Lightheadedness    • Osteoarthritis    • Postmenopausal    • Renal insufficiency    • Rhinorrhea    • Seborrheic keratosis    • Vitamin D deficiency      Past Surgical History:   Procedure Laterality Date   • APPENDECTOMY  03/2010    Dr. De Oliveira   • BREAST BIOPSY     • JOINT REPLACEMENT Left 02/07/2011    Dr. Gold   • MAMMO BILATERAL      many years ago   • PAP SMEAR      many years ago     Family History   Problem Relation Age of Onset   • Basal cell carcinoma Mother         Ages 35-96   • Nephrolithiasis Mother    • Diabetes Son  "     Social History     Social History   • Marital status:      Spouse name: N/A   • Number of children: 3   • Years of education: Some College     Occupational History   • RN Retired     Social History Main Topics   • Smoking status: Never Smoker   • Smokeless tobacco: Never Used   • Alcohol use Yes   • Drug use: No   • Sexual activity: Defer     Other Topics Concern   • Not on file     Social History Narrative   • No narrative on file     Occupation/Additional Social Hx: daughter is at bedside      Current Facility-Administered Medications:   •  acetaminophen (TYLENOL) tablet 650 mg, 650 mg, Oral, Q4H PRN, Jonnathan Roberts MD  •  famotidine (PEPCID) injection 20 mg, 20 mg, Intravenous, Q12H, Jonnathan Roberts MD, 20 mg at 10/07/18 1253  •  latanoprost (XALATAN) 0.005 % ophthalmic solution 1 drop, 1 drop, Both Eyes, Nightly, Jonnathan Roberts MD  •  levothyroxine (SYNTHROID, LEVOTHROID) tablet 88 mcg, 88 mcg, Oral, Q AM, Jonnathan Roberts MD, 88 mcg at 10/07/18 0546  •  morphine injection 1 mg, 1 mg, Intravenous, Q4H PRN **AND** naloxone (NARCAN) injection 0.4 mg, 0.4 mg, Intravenous, Q5 Min PRN, Jonnathan Roberts MD  •  nitroglycerin (NITROSTAT) SL tablet 0.4 mg, 0.4 mg, Sublingual, Q5 Min PRN, Jonnathan Roberts MD  •  ondansetron (ZOFRAN) tablet 4 mg, 4 mg, Oral, Q6H PRN **OR** ondansetron ODT (ZOFRAN-ODT) disintegrating tablet 4 mg, 4 mg, Oral, Q6H PRN **OR** ondansetron (ZOFRAN) injection 4 mg, 4 mg, Intravenous, Q6H PRN, Jonnathan Roberts MD, 4 mg at 10/07/18 0031  •  Insert peripheral IV, , , Once **AND** sodium chloride 0.9 % flush 10 mL, 10 mL, Intravenous, PRN, Iván Peterson MD  •  sodium chloride 0.9 % flush 3 mL, 3 mL, Intravenous, Q12H, Jonnathan Roberts MD, 3 mL at 10/07/18 1010  •  sodium chloride 0.9 % flush 3-10 mL, 3-10 mL, Intravenous, PRN, Jonnathan Roberts MD  •  sodium chloride 0.9 % infusion, 75 mL/hr, Intravenous, Continuous, Jonnathan Roberts MD, Last Rate: 75 mL/hr at  "10/07/18 0429, 75 mL/hr at 10/07/18 0429  •  zolpidem (AMBIEN) tablet 2.5 mg, 2.5 mg, Oral, Nightly PRN, Jonnathan Roberts MD    No Known Allergies    Preventative Medicine  Colonoscopy: none    Review of Systems   Constitutional: Positive for activity change, appetite change, fatigue and unexpected weight change.   Gastrointestinal: Positive for abdominal distention, abdominal pain, constipation and diarrhea.   All other systems reviewed and are negative.      Vitals:    10/06/18 2103 10/06/18 2133 10/06/18 2318 10/07/18 0755   BP: 136/71 138/81 (!) 117/108 116/60   BP Location:   Right arm Left arm   Patient Position:   Lying Lying   Pulse:   89    Resp:    18   Temp:    98.5 °F (36.9 °C)   TempSrc:    Oral   SpO2: 95% 98% 94%    Weight:       Height:           PHYSICAL EXAM:    /60 (BP Location: Left arm, Patient Position: Lying)   Pulse 89   Temp 98.5 °F (36.9 °C) (Oral)   Resp 18   Ht 162.6 cm (64\")   Wt 63 kg (139 lb)   LMP  (LMP Unknown)   SpO2 94%   BMI 23.86 kg/m²   Body mass index is 23.86 kg/m².    Constitutional: well developed, well nourished, located on 6 east, appears younger than stated age  Eyes: sclera nonicteric, conjunctiva not injected   ENMT: Hearing barely diminished, trachea midline, thyroid without masses  CVS: RRR, no murmur, peripheral edema not present  Respiratory: CTA, normal respiratory effort   Gastrointestinal: no hepatosplenomegaly, abdomen very distended with the appearance of a basket ball, abdominal hernia not detected, tender in the right lower quadrant  Genitourinary: inguinal hernia not detected  Musculoskeletal: gait not witnessed, muscle mass normal  Skin: warm and dry, no rashes visible  Neurological: awake and alert, seems to have reasonable capacity for understanding for medical decision making  Psychiatric: appears to have reasonable judgement, pleasant  Lymphatics: no cervical adenopathy    Radiographic Findings: as above    Lab Findings: hgb 10, likely " in dehydrated state    IMPRESSION:  · Obstructing colon cancer  · Anemia from obstructed cancer.  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  · Prior obstructive overflow diarrhea, now with obstructing constipation  · Advanced age but functional.    PLAN:  · Since she's passing gas and no further emesis, would not place NGT.  Would allow ice chips and see if she will decompress to any degree.  · Doubt c scope will be possible.  · Will check CEA level  · Will need a right hemicolectomy without prep.  Dr Barillas saw patient at last admission.  He'll be back tomorrow and will take over.      Carmela Ch MD  10/07/18  1:17 PM

## 2018-10-07 NOTE — PLAN OF CARE
Problem: Fall Risk (Adult)  Goal: Identify Related Risk Factors and Signs and Symptoms  Outcome: Ongoing (interventions implemented as appropriate)    Goal: Absence of Fall  Outcome: Ongoing (interventions implemented as appropriate)      Problem: Patient Care Overview  Goal: Plan of Care Review  Outcome: Ongoing (interventions implemented as appropriate)   10/07/18 1700   Coping/Psychosocial   Plan of Care Reviewed With patient;daughter   OTHER   Outcome Summary VSS; no tele pt; slight c/o pain but refused any pain meds; no s/s distress noted; NS 75mL/h continuous; will continue to monitor       Problem: Skin Injury Risk (Adult)  Goal: Identify Related Risk Factors and Signs and Symptoms  Outcome: Ongoing (interventions implemented as appropriate)    Goal: Skin Health and Integrity  Outcome: Ongoing (interventions implemented as appropriate)

## 2018-10-08 PROBLEM — K56.609 INTESTINAL OBSTRUCTION (HCC): Status: ACTIVE | Noted: 2018-10-06

## 2018-10-08 LAB
ABO GROUP BLD: NORMAL
ANION GAP SERPL CALCULATED.3IONS-SCNC: 11.8 MMOL/L
BASOPHILS # BLD AUTO: 0.03 10*3/MM3 (ref 0–0.2)
BASOPHILS NFR BLD AUTO: 0.7 % (ref 0–1.5)
BLD GP AB SCN SERPL QL: NEGATIVE
BUN BLD-MCNC: 10 MG/DL (ref 8–23)
BUN/CREAT SERPL: 17.2 (ref 7–25)
CALCIUM SPEC-SCNC: 8.5 MG/DL (ref 8.2–9.6)
CHLORIDE SERPL-SCNC: 105 MMOL/L (ref 98–107)
CO2 SERPL-SCNC: 21.2 MMOL/L (ref 22–29)
CREAT BLD-MCNC: 0.58 MG/DL (ref 0.57–1)
DEPRECATED RDW RBC AUTO: 65 FL (ref 37–54)
EOSINOPHIL # BLD AUTO: 0.03 10*3/MM3 (ref 0–0.7)
EOSINOPHIL NFR BLD AUTO: 0.7 % (ref 0.3–6.2)
ERYTHROCYTE [DISTWIDTH] IN BLOOD BY AUTOMATED COUNT: 18.3 % (ref 11.7–13)
GFR SERPL CREATININE-BSD FRML MDRD: 97 ML/MIN/1.73
GLUCOSE BLD-MCNC: 68 MG/DL (ref 65–99)
HCT VFR BLD AUTO: 34.9 % (ref 35.6–45.5)
HGB BLD-MCNC: 10.6 G/DL (ref 11.9–15.5)
IMM GRANULOCYTES # BLD: 0 10*3/MM3 (ref 0–0.03)
IMM GRANULOCYTES NFR BLD: 0 % (ref 0–0.5)
LYMPHOCYTES # BLD AUTO: 0.74 10*3/MM3 (ref 0.9–4.8)
LYMPHOCYTES NFR BLD AUTO: 16.2 % (ref 19.6–45.3)
MCH RBC QN AUTO: 30.2 PG (ref 26.9–32)
MCHC RBC AUTO-ENTMCNC: 30.4 G/DL (ref 32.4–36.3)
MCV RBC AUTO: 99.4 FL (ref 80.5–98.2)
MONOCYTES # BLD AUTO: 0.86 10*3/MM3 (ref 0.2–1.2)
MONOCYTES NFR BLD AUTO: 18.8 % (ref 5–12)
NEUTROPHILS # BLD AUTO: 2.92 10*3/MM3 (ref 1.9–8.1)
NEUTROPHILS NFR BLD AUTO: 63.6 % (ref 42.7–76)
PLATELET # BLD AUTO: 325 10*3/MM3 (ref 140–500)
PMV BLD AUTO: 10.1 FL (ref 6–12)
POTASSIUM BLD-SCNC: 3.9 MMOL/L (ref 3.5–5.2)
RBC # BLD AUTO: 3.51 10*6/MM3 (ref 3.9–5.2)
RH BLD: POSITIVE
SODIUM BLD-SCNC: 138 MMOL/L (ref 136–145)
T&S EXPIRATION DATE: NORMAL
WBC NRBC COR # BLD: 4.58 10*3/MM3 (ref 4.5–10.7)

## 2018-10-08 PROCEDURE — 25010000002 MORPHINE PER 10 MG: Performed by: INTERNAL MEDICINE

## 2018-10-08 PROCEDURE — 86850 RBC ANTIBODY SCREEN: CPT | Performed by: SURGERY

## 2018-10-08 PROCEDURE — 86900 BLOOD TYPING SEROLOGIC ABO: CPT | Performed by: SURGERY

## 2018-10-08 PROCEDURE — 80048 BASIC METABOLIC PNL TOTAL CA: CPT | Performed by: INTERNAL MEDICINE

## 2018-10-08 PROCEDURE — 25010000002 ONDANSETRON PER 1 MG: Performed by: INTERNAL MEDICINE

## 2018-10-08 PROCEDURE — 36415 COLL VENOUS BLD VENIPUNCTURE: CPT | Performed by: INTERNAL MEDICINE

## 2018-10-08 PROCEDURE — 99232 SBSQ HOSP IP/OBS MODERATE 35: CPT | Performed by: SURGERY

## 2018-10-08 PROCEDURE — 85025 COMPLETE CBC W/AUTO DIFF WBC: CPT | Performed by: INTERNAL MEDICINE

## 2018-10-08 PROCEDURE — 86901 BLOOD TYPING SEROLOGIC RH(D): CPT | Performed by: SURGERY

## 2018-10-08 RX ORDER — FAMOTIDINE 10 MG/ML
20 INJECTION, SOLUTION INTRAVENOUS DAILY
Status: DISCONTINUED | OUTPATIENT
Start: 2018-10-08 | End: 2018-10-17

## 2018-10-08 RX ADMIN — MORPHINE SULFATE 1 MG: 2 INJECTION, SOLUTION INTRAMUSCULAR; INTRAVENOUS at 22:58

## 2018-10-08 RX ADMIN — MORPHINE SULFATE 1 MG: 2 INJECTION, SOLUTION INTRAMUSCULAR; INTRAVENOUS at 13:38

## 2018-10-08 RX ADMIN — MORPHINE SULFATE 1 MG: 2 INJECTION, SOLUTION INTRAMUSCULAR; INTRAVENOUS at 19:14

## 2018-10-08 RX ADMIN — Medication 3 ML: at 07:17

## 2018-10-08 RX ADMIN — LEVOTHYROXINE SODIUM 88 MCG: 88 TABLET ORAL at 06:04

## 2018-10-08 RX ADMIN — Medication 3 ML: at 21:11

## 2018-10-08 RX ADMIN — ZOLPIDEM TARTRATE 2.5 MG: 5 TABLET ORAL at 22:58

## 2018-10-08 RX ADMIN — ONDANSETRON HYDROCHLORIDE 4 MG: 2 SOLUTION INTRAMUSCULAR; INTRAVENOUS at 22:58

## 2018-10-08 RX ADMIN — SODIUM CHLORIDE 75 ML/HR: 9 INJECTION, SOLUTION INTRAVENOUS at 22:58

## 2018-10-08 RX ADMIN — SODIUM CHLORIDE 75 ML/HR: 9 INJECTION, SOLUTION INTRAVENOUS at 12:58

## 2018-10-08 RX ADMIN — FAMOTIDINE 20 MG: 10 INJECTION, SOLUTION INTRAVENOUS at 09:03

## 2018-10-08 NOTE — PLAN OF CARE
Problem: Fall Risk (Adult)  Goal: Absence of Fall  Outcome: Ongoing (interventions implemented as appropriate)      Problem: Patient Care Overview  Goal: Plan of Care Review  Outcome: Ongoing (interventions implemented as appropriate)   10/08/18 0320 10/08/18 1340 10/08/18 1742   Coping/Psychosocial   Plan of Care Reviewed With --  patient --    OTHER   Outcome Summary --  --  vss; no s/s distress noted; c/o pain one time today and administered IV pain med which controlled pt's pain; NS at 75ml/hour; pt to have Right Hemicolectomy procedure rene 10/9/18; pt has been NPO except for ice chips since admission date of 10/6/18; removed left AC IV today due to infiltration and new one was placed in right forearm; will continue to monito pt   Plan of Care Review   Progress no change --  --        Problem: Skin Injury Risk (Adult)  Goal: Identify Related Risk Factors and Signs and Symptoms  Outcome: Ongoing (interventions implemented as appropriate)    Goal: Skin Health and Integrity  Outcome: Ongoing (interventions implemented as appropriate)

## 2018-10-08 NOTE — PROGRESS NOTES
Name: Kristyn Severino ADMIT: 10/6/2018   : 1924  PCP: Cary Espinal PA-C    MRN: [id number] LOS: 2 days   AGE/SEX: 89 y.o. female  ROOM: [facility]   Subjective   About the same. No new complaints.    Objective   Vital Signs  Temp:  [97.8 °F (36.6 °C)-98.4 °F (36.9 °C)] 98.4 °F (36.9 °C)  Heart Rate:  [73-94] 94  Resp:  [16-18] 16  BP: (117-134)/(52-73) 134/73  SpO2:  [94 %-98 %] 98 %  on   ;   Device (Oxygen Therapy): room air  Body mass index is 23.86 kg/m².    Physical Exam   Constitutional: She is oriented to person, place, and time. No distress.   Cardiovascular: Normal rate and regular rhythm.    No murmur heard.  Pulmonary/Chest: Effort normal and breath sounds normal.   Abdominal: Soft. She exhibits distension. Bowel sounds are decreased. There is no tenderness.   Musculoskeletal: Normal range of motion. She exhibits no edema.   Neurological: She is alert and oriented to person, place, and time.   Skin: Skin is warm and dry. She is not diaphoretic.       Results Review:       I reviewed the patient's new clinical results.    Results from last 7 days  Lab Units 10/08/18  0641 10/07/18  0547 10/06/18  1756   WBC 10*3/mm3 4.58 7.31 9.52   HEMOGLOBIN g/dL 10.6* 10.0* 11.4*   PLATELETS 10*3/mm3 325 371 433       Results from last 7 days  Lab Units 10/08/18  0641 10/07/18  0547 10/06/18  1756   SODIUM mmol/L 138 140 140   POTASSIUM mmol/L 3.9 4.2 3.9   CHLORIDE mmol/L 105 104 100   CO2 mmol/L 21.2* 28.5 26.8   BUN mg/dL 10 8 10   CREATININE mg/dL 0.58 0.63 0.64   GLUCOSE mg/dL 68 103* 126*   Estimated Creatinine Clearance: 42.8 mL/min (by C-G formula based on SCr of 0.58 mg/dL).    Results from last 7 days  Lab Units 10/08/18  0641 10/07/18  0547 10/06/18  1756   CALCIUM mg/dL 8.5 8.8 9.5   ALBUMIN g/dL  --   --  3.70         ertapenem 1 g Intravenous 30 Min Pre-Op   famotidine 20 mg Intravenous Daily   latanoprost 1 drop Both Eyes Nightly   levothyroxine 88 mcg Oral Q AM   sodium chloride 3 mL  Intravenous Q12H       sodium chloride 75 mL/hr Last Rate: 75 mL/hr (10/08/18 1258)   NPO Diet NPO Except: Ice Chips, Sips With Meds      Assessment/Plan      Active Hospital Problems    Diagnosis Date Noted   • **Intestinal obstruction (CMS/HCC) [K56.609] 10/06/2018   • Abdominal pain [R10.9] 10/07/2018   • DNR (do not resuscitate) [Z66] 10/07/2018   • Iron deficiency anemia due to chronic blood loss [D50.0] 09/18/2018   • Hypothyroidism [E03.9]    • Anemia of chronic illness [D63.8]    • HLD (hyperlipidemia) [E78.5]       Resolved Hospital Problems    Diagnosis Date Noted Date Resolved   No resolved problems to display.       · Currently NPO. Continue IV fluids.  · Pain is controlled with IV morphine. Zofran ordered for nausea.  · CEA is normal.  · Surgery tomorrow for ex lap and probable right hemicolectomy.      Garth Dalton MD  [facility]  10/08/18  1:09 PM

## 2018-10-08 NOTE — PROGRESS NOTES
Follow-up large bowel obstruction    Subjective:  Patient planes of mild abdominal discomfort but mostly fullness.  She denies any nausea currently.  She did pass a small amount of flatus last night.    Review of systems:  Respiratory: Patient denies hemoptysis, cough, shortness of air    Physical exam:  Temperature 98.4 heart rate 94 respiratory rate 16 breast for minute blood pressure 134/73 oxygenation 98% on room air  Body mass index 23.8  Gen.: Awake alert and oriented, no acute distress, answers questions appropriately  Eyes: Extraocular was intact, no scleral icterus  Neck: Supple, trachea midline, no lymphadenopathy  Gastrointestinal: Abdomen is distended but soft, minimal tenderness, no hernia or hepatosplenomegaly.  Positive tympany, particularly on the right side.  No mass noted.    Laboratory data shows a white blood cell count of 4.5 with a hemoglobin of 10.6.  Platelets 325.  Chemistries look okay.    CT reviewed by me.  Cecum and right colon are grossly distended along with distended small bowel loops and stomach.  Transverse colon and all colon distal to that appears decompressed.  I cannot clearly appreciate a mass at the hepatic flexure.    Assessment and plan:  Large bowel obstruction, apparently near the level of the hepatic flexure.  She does not appear to be completely obstructed, but given the current clinical circumstances I agree with Dr. Ch that exploration and likely right colectomy are indicated.  I do not think there is anything particularly urgent and I cannot get to her today but I have placed her on the schedule for tomorrow.  I detailed with the patient the risks including bleeding, infection, hernia, pain, leak and abscess as well as the possibility of changing plans if we find something different intraoperatively.  She also understands there is a possible need for diverting ostomy formation.    Jung Barillas MD  General and Endoscopic Surgery  [facility]    [address], [address]  [place], [place], [address]  P: [phone]  F: [phone]

## 2018-10-08 NOTE — PROGRESS NOTES
Discharge Planning Assessment  [facility]     Patient Name: Kristyn Severino  MRN: [id number]  Today's Date: 10/8/2018    Admit Date: 10/6/2018          Discharge Needs Assessment     Row Name 10/08/18 1026       Living Environment    Lives With alone    Current Living Arrangements home/apartment/condo    Primary Care Provided by self    Provides Primary Care For no one    Family Caregiver if Needed child(geronimo), adult    Family Caregiver Names Daughter  (Kya Dunn)    Able to Return to Prior Arrangements yes    Living Arrangement Comments Pt lives alone in a single story house.        Resource/Environmental Concerns    Resource/Environmental Concerns none    Transportation Concerns car, none       Transition Planning    Patient/Family Anticipates Transition to home with family    Patient/Family Anticipated Services at Transition none    Transportation Anticipated family or friend will provide       Discharge Needs Assessment    Concerns to be Addressed denies needs/concerns at this time    Equipment Currently Used at Home cane, straight;walker, rolling    Anticipated Changes Related to Illness none    Equipment Needed After Discharge cane, straight;walker, rolling    Offered/Gave Vendor List no            Discharge Plan     Row Name 10/08/18 1027       Plan    Plan CCP to follow for discharge needs.   JAMIL Leonard    Patient/Family in Agreement with Plan yes    Plan Comments FACE SHEET VERIFIED/ IM LETTER SIGNED.  Spoke to pt at bedside.  Pt's PCP is DIA Kwan.  Pt lives alone in a single story house.  Pt independent with ADL's.   Pt has a straight cane and rolling walker for home use.  Pt gets prescriptions at [facility] in [facility].  Pt denies any issues affording medications.  Pt is not current with .  Pt has recently been in [facility].  CCP to follow for discharge needs.  JAMIL Leonard        Destination     No service coordination in this encounter.      Durable Medical Equipment      No service coordination in this encounter.      Dialysis/Infusion     No service coordination in this encounter.      Home Medical Care     No service coordination in this encounter.      Social Care     No service coordination in this encounter.                Demographic Summary     Row Name 10/08/18 1026       General Information    Admission Type inpatient    Arrived From emergency department    Required Notices Provided Important Message from Medicare    Referral Source admission list    Reason for Consult discharge planning    Preferred Language English     Used During This Interaction no            Functional Status     Row Name 10/08/18 1026       Functional Status    Usual Activity Tolerance good    Current Activity Tolerance moderate       Functional Status, IADL    Medications independent    Meal Preparation independent    Housekeeping independent    Laundry independent    Shopping independent       Mental Status    General Appearance WDL WDL       Mental Status Summary    Recent Changes in Mental Status/Cognitive Functioning no changes            Psychosocial    No documentation.           Abuse/Neglect    No documentation.           Legal    No documentation.           Substance Abuse    No documentation.           Patient Forms    No documentation.         Hanna Hobbs RN

## 2018-10-08 NOTE — PLAN OF CARE
Problem: Fall Risk (Adult)  Goal: Identify Related Risk Factors and Signs and Symptoms  Outcome: Ongoing (interventions implemented as appropriate)    Goal: Absence of Fall  Outcome: Ongoing (interventions implemented as appropriate)      Problem: Patient Care Overview  Goal: Plan of Care Review  Outcome: Ongoing (interventions implemented as appropriate)   10/08/18 0320   Coping/Psychosocial   Plan of Care Reviewed With patient   OTHER   Outcome Summary no complications pt slept most of the night will continue to monitor    Plan of Care Review   Progress no change     Goal: Individualization and Mutuality  Outcome: Ongoing (interventions implemented as appropriate)    Goal: Discharge Needs Assessment  Outcome: Ongoing (interventions implemented as appropriate)    Goal: Interprofessional Rounds/Family Conf  Outcome: Ongoing (interventions implemented as appropriate)      Problem: Skin Injury Risk (Adult)  Goal: Identify Related Risk Factors and Signs and Symptoms  Outcome: Ongoing (interventions implemented as appropriate)    Goal: Skin Health and Integrity  Outcome: Ongoing (interventions implemented as appropriate)

## 2018-10-09 ENCOUNTER — ANESTHESIA (OUTPATIENT)
Dept: PERIOP | Facility: HOSPITAL | Age: 83
End: 2018-10-09

## 2018-10-09 ENCOUNTER — ANESTHESIA EVENT (OUTPATIENT)
Dept: PERIOP | Facility: HOSPITAL | Age: 83
End: 2018-10-09

## 2018-10-09 LAB
ANION GAP SERPL CALCULATED.3IONS-SCNC: 13.8 MMOL/L
ANISOCYTOSIS BLD QL: NORMAL
BASOPHILS # BLD AUTO: 0.01 10*3/MM3 (ref 0–0.2)
BASOPHILS NFR BLD AUTO: 0.4 % (ref 0–1.5)
BUN BLD-MCNC: 11 MG/DL (ref 8–23)
BUN/CREAT SERPL: 19.6 (ref 7–25)
CALCIUM SPEC-SCNC: 8 MG/DL (ref 8.2–9.6)
CHLORIDE SERPL-SCNC: 105 MMOL/L (ref 98–107)
CO2 SERPL-SCNC: 21.2 MMOL/L (ref 22–29)
CREAT BLD-MCNC: 0.56 MG/DL (ref 0.57–1)
DEPRECATED RDW RBC AUTO: 63.5 FL (ref 37–54)
EOSINOPHIL # BLD AUTO: 0.02 10*3/MM3 (ref 0–0.7)
EOSINOPHIL NFR BLD AUTO: 0.7 % (ref 0.3–6.2)
ERYTHROCYTE [DISTWIDTH] IN BLOOD BY AUTOMATED COUNT: 17.9 % (ref 11.7–13)
GFR SERPL CREATININE-BSD FRML MDRD: 101 ML/MIN/1.73
GLUCOSE BLD-MCNC: 70 MG/DL (ref 65–99)
GLUCOSE BLDC GLUCOMTR-MCNC: 97 MG/DL (ref 70–130)
HCT VFR BLD AUTO: 30.1 % (ref 35.6–45.5)
HGB BLD-MCNC: 9.3 G/DL (ref 11.9–15.5)
IMM GRANULOCYTES # BLD: 0 10*3/MM3 (ref 0–0.03)
IMM GRANULOCYTES NFR BLD: 0 % (ref 0–0.5)
LYMPHOCYTES # BLD AUTO: 0.58 10*3/MM3 (ref 0.9–4.8)
LYMPHOCYTES NFR BLD AUTO: 20.4 % (ref 19.6–45.3)
MCH RBC QN AUTO: 30.4 PG (ref 26.9–32)
MCHC RBC AUTO-ENTMCNC: 30.9 G/DL (ref 32.4–36.3)
MCV RBC AUTO: 98.4 FL (ref 80.5–98.2)
MONOCYTES # BLD AUTO: 0.76 10*3/MM3 (ref 0.2–1.2)
MONOCYTES NFR BLD AUTO: 26.8 % (ref 5–12)
NEUTROPHILS # BLD AUTO: 1.47 10*3/MM3 (ref 1.9–8.1)
NEUTROPHILS NFR BLD AUTO: 51.7 % (ref 42.7–76)
OVALOCYTES BLD QL SMEAR: NORMAL
PLAT MORPH BLD: NORMAL
PLATELET # BLD AUTO: 299 10*3/MM3 (ref 140–500)
PMV BLD AUTO: 9.7 FL (ref 6–12)
POTASSIUM BLD-SCNC: 3.8 MMOL/L (ref 3.5–5.2)
RBC # BLD AUTO: 3.06 10*6/MM3 (ref 3.9–5.2)
SODIUM BLD-SCNC: 140 MMOL/L (ref 136–145)
SPHEROCYTES BLD QL SMEAR: NORMAL
WBC MORPH BLD: NORMAL
WBC NRBC COR # BLD: 2.84 10*3/MM3 (ref 4.5–10.7)

## 2018-10-09 PROCEDURE — 25010000002 PROPOFOL 10 MG/ML EMULSION: Performed by: NURSE ANESTHETIST, CERTIFIED REGISTERED

## 2018-10-09 PROCEDURE — 44205 LAP COLECTOMY PART W/ILEUM: CPT | Performed by: SURGERY

## 2018-10-09 PROCEDURE — 25010000002 FENTANYL CITRATE (PF) 100 MCG/2ML SOLUTION: Performed by: NURSE ANESTHETIST, CERTIFIED REGISTERED

## 2018-10-09 PROCEDURE — 25010000002 FENTANYL CITRATE (PF) 100 MCG/2ML SOLUTION

## 2018-10-09 PROCEDURE — 25010000002 HYDROMORPHONE PER 4 MG: Performed by: SURGERY

## 2018-10-09 PROCEDURE — 85025 COMPLETE CBC W/AUTO DIFF WBC: CPT | Performed by: INTERNAL MEDICINE

## 2018-10-09 PROCEDURE — 80048 BASIC METABOLIC PNL TOTAL CA: CPT | Performed by: INTERNAL MEDICINE

## 2018-10-09 PROCEDURE — 25010000002 DEXAMETHASONE PER 1 MG: Performed by: NURSE ANESTHETIST, CERTIFIED REGISTERED

## 2018-10-09 PROCEDURE — 25010000002 MORPHINE PER 10 MG: Performed by: INTERNAL MEDICINE

## 2018-10-09 PROCEDURE — 82962 GLUCOSE BLOOD TEST: CPT

## 2018-10-09 PROCEDURE — 85007 BL SMEAR W/DIFF WBC COUNT: CPT | Performed by: INTERNAL MEDICINE

## 2018-10-09 PROCEDURE — 88309 TISSUE EXAM BY PATHOLOGIST: CPT | Performed by: SURGERY

## 2018-10-09 PROCEDURE — 25010000002 ONDANSETRON PER 1 MG: Performed by: INTERNAL MEDICINE

## 2018-10-09 PROCEDURE — 44205 LAP COLECTOMY PART W/ILEUM: CPT | Performed by: PHYSICIAN ASSISTANT

## 2018-10-09 PROCEDURE — 25010000002 PHENYLEPHRINE PER 1 ML: Performed by: NURSE ANESTHETIST, CERTIFIED REGISTERED

## 2018-10-09 PROCEDURE — C1765 ADHESION BARRIER: HCPCS | Performed by: SURGERY

## 2018-10-09 PROCEDURE — 25010000002 HYDROMORPHONE PER 4 MG: Performed by: NURSE ANESTHETIST, CERTIFIED REGISTERED

## 2018-10-09 PROCEDURE — 0DTF0ZZ RESECTION OF RIGHT LARGE INTESTINE, OPEN APPROACH: ICD-10-PCS | Performed by: SURGERY

## 2018-10-09 RX ORDER — MAGNESIUM HYDROXIDE 1200 MG/15ML
LIQUID ORAL AS NEEDED
Status: DISCONTINUED | OUTPATIENT
Start: 2018-10-09 | End: 2018-10-09 | Stop reason: HOSPADM

## 2018-10-09 RX ORDER — HYDROMORPHONE HCL 110MG/55ML
PATIENT CONTROLLED ANALGESIA SYRINGE INTRAVENOUS AS NEEDED
Status: DISCONTINUED | OUTPATIENT
Start: 2018-10-09 | End: 2018-10-09 | Stop reason: SURG

## 2018-10-09 RX ORDER — DEXAMETHASONE SODIUM PHOSPHATE 10 MG/ML
INJECTION INTRAMUSCULAR; INTRAVENOUS AS NEEDED
Status: DISCONTINUED | OUTPATIENT
Start: 2018-10-09 | End: 2018-10-09 | Stop reason: SURG

## 2018-10-09 RX ORDER — EPHEDRINE SULFATE 50 MG/ML
5 INJECTION, SOLUTION INTRAVENOUS ONCE AS NEEDED
Status: DISCONTINUED | OUTPATIENT
Start: 2018-10-09 | End: 2018-10-09 | Stop reason: HOSPADM

## 2018-10-09 RX ORDER — DIPHENHYDRAMINE HYDROCHLORIDE 50 MG/ML
12.5 INJECTION INTRAMUSCULAR; INTRAVENOUS
Status: DISCONTINUED | OUTPATIENT
Start: 2018-10-09 | End: 2018-10-09 | Stop reason: HOSPADM

## 2018-10-09 RX ORDER — OXYCODONE AND ACETAMINOPHEN 7.5; 325 MG/1; MG/1
1 TABLET ORAL ONCE AS NEEDED
Status: DISCONTINUED | OUTPATIENT
Start: 2018-10-09 | End: 2018-10-09 | Stop reason: HOSPADM

## 2018-10-09 RX ORDER — PROMETHAZINE HYDROCHLORIDE 25 MG/1
25 TABLET ORAL ONCE AS NEEDED
Status: DISCONTINUED | OUTPATIENT
Start: 2018-10-09 | End: 2018-10-09 | Stop reason: HOSPADM

## 2018-10-09 RX ORDER — PROMETHAZINE HYDROCHLORIDE 25 MG/ML
12.5 INJECTION, SOLUTION INTRAMUSCULAR; INTRAVENOUS ONCE AS NEEDED
Status: DISCONTINUED | OUTPATIENT
Start: 2018-10-09 | End: 2018-10-09 | Stop reason: HOSPADM

## 2018-10-09 RX ORDER — SODIUM CHLORIDE, SODIUM LACTATE, POTASSIUM CHLORIDE, CALCIUM CHLORIDE 600; 310; 30; 20 MG/100ML; MG/100ML; MG/100ML; MG/100ML
9 INJECTION, SOLUTION INTRAVENOUS CONTINUOUS
Status: DISCONTINUED | OUTPATIENT
Start: 2018-10-09 | End: 2018-10-11

## 2018-10-09 RX ORDER — HYDROMORPHONE HYDROCHLORIDE 1 MG/ML
0.5 INJECTION, SOLUTION INTRAMUSCULAR; INTRAVENOUS; SUBCUTANEOUS
Status: DISCONTINUED | OUTPATIENT
Start: 2018-10-09 | End: 2018-10-09 | Stop reason: HOSPADM

## 2018-10-09 RX ORDER — FLUMAZENIL 0.1 MG/ML
0.2 INJECTION INTRAVENOUS AS NEEDED
Status: DISCONTINUED | OUTPATIENT
Start: 2018-10-09 | End: 2018-10-09 | Stop reason: HOSPADM

## 2018-10-09 RX ORDER — LIDOCAINE HYDROCHLORIDE 20 MG/ML
INJECTION, SOLUTION INFILTRATION; PERINEURAL AS NEEDED
Status: DISCONTINUED | OUTPATIENT
Start: 2018-10-09 | End: 2018-10-09 | Stop reason: SURG

## 2018-10-09 RX ORDER — HYDROMORPHONE HYDROCHLORIDE 1 MG/ML
0.5 INJECTION, SOLUTION INTRAMUSCULAR; INTRAVENOUS; SUBCUTANEOUS
Status: DISCONTINUED | OUTPATIENT
Start: 2018-10-09 | End: 2018-10-12 | Stop reason: SDUPTHER

## 2018-10-09 RX ORDER — SODIUM CHLORIDE 0.9 % (FLUSH) 0.9 %
3-10 SYRINGE (ML) INJECTION AS NEEDED
Status: DISCONTINUED | OUTPATIENT
Start: 2018-10-09 | End: 2018-10-09 | Stop reason: HOSPADM

## 2018-10-09 RX ORDER — FENTANYL CITRATE 50 UG/ML
50 INJECTION, SOLUTION INTRAMUSCULAR; INTRAVENOUS
Status: DISCONTINUED | OUTPATIENT
Start: 2018-10-09 | End: 2018-10-09 | Stop reason: HOSPADM

## 2018-10-09 RX ORDER — PROPOFOL 10 MG/ML
VIAL (ML) INTRAVENOUS AS NEEDED
Status: DISCONTINUED | OUTPATIENT
Start: 2018-10-09 | End: 2018-10-09 | Stop reason: SURG

## 2018-10-09 RX ORDER — FENTANYL CITRATE 50 UG/ML
INJECTION, SOLUTION INTRAMUSCULAR; INTRAVENOUS AS NEEDED
Status: DISCONTINUED | OUTPATIENT
Start: 2018-10-09 | End: 2018-10-09 | Stop reason: SURG

## 2018-10-09 RX ORDER — ROCURONIUM BROMIDE 10 MG/ML
INJECTION, SOLUTION INTRAVENOUS AS NEEDED
Status: DISCONTINUED | OUTPATIENT
Start: 2018-10-09 | End: 2018-10-09 | Stop reason: SURG

## 2018-10-09 RX ORDER — PROMETHAZINE HYDROCHLORIDE 25 MG/1
12.5 TABLET ORAL ONCE AS NEEDED
Status: DISCONTINUED | OUTPATIENT
Start: 2018-10-09 | End: 2018-10-09 | Stop reason: HOSPADM

## 2018-10-09 RX ORDER — HYDROCODONE BITARTRATE AND ACETAMINOPHEN 7.5; 325 MG/1; MG/1
1 TABLET ORAL ONCE AS NEEDED
Status: DISCONTINUED | OUTPATIENT
Start: 2018-10-09 | End: 2018-10-09 | Stop reason: HOSPADM

## 2018-10-09 RX ORDER — NALOXONE HCL 0.4 MG/ML
0.2 VIAL (ML) INJECTION AS NEEDED
Status: DISCONTINUED | OUTPATIENT
Start: 2018-10-09 | End: 2018-10-09 | Stop reason: HOSPADM

## 2018-10-09 RX ORDER — ONDANSETRON 2 MG/ML
4 INJECTION INTRAMUSCULAR; INTRAVENOUS ONCE AS NEEDED
Status: DISCONTINUED | OUTPATIENT
Start: 2018-10-09 | End: 2018-10-09 | Stop reason: HOSPADM

## 2018-10-09 RX ORDER — LABETALOL HYDROCHLORIDE 5 MG/ML
5 INJECTION, SOLUTION INTRAVENOUS
Status: DISCONTINUED | OUTPATIENT
Start: 2018-10-09 | End: 2018-10-09 | Stop reason: HOSPADM

## 2018-10-09 RX ORDER — PROMETHAZINE HYDROCHLORIDE 25 MG/1
25 SUPPOSITORY RECTAL ONCE AS NEEDED
Status: DISCONTINUED | OUTPATIENT
Start: 2018-10-09 | End: 2018-10-09 | Stop reason: HOSPADM

## 2018-10-09 RX ORDER — FENTANYL CITRATE 50 UG/ML
INJECTION, SOLUTION INTRAMUSCULAR; INTRAVENOUS
Status: COMPLETED
Start: 2018-10-09 | End: 2018-10-09

## 2018-10-09 RX ORDER — SODIUM CHLORIDE 0.9 % (FLUSH) 0.9 %
3 SYRINGE (ML) INJECTION EVERY 12 HOURS SCHEDULED
Status: DISCONTINUED | OUTPATIENT
Start: 2018-10-09 | End: 2018-10-09 | Stop reason: HOSPADM

## 2018-10-09 RX ADMIN — HYDROMORPHONE HYDROCHLORIDE 0.5 MG: 1 INJECTION, SOLUTION INTRAMUSCULAR; INTRAVENOUS; SUBCUTANEOUS at 19:53

## 2018-10-09 RX ADMIN — FENTANYL CITRATE 50 MCG: 50 INJECTION, SOLUTION INTRAMUSCULAR; INTRAVENOUS at 12:29

## 2018-10-09 RX ADMIN — PHENYLEPHRINE HYDROCHLORIDE 100 MCG: 10 INJECTION INTRAVENOUS at 10:10

## 2018-10-09 RX ADMIN — MORPHINE SULFATE 1 MG: 2 INJECTION, SOLUTION INTRAMUSCULAR; INTRAVENOUS at 04:00

## 2018-10-09 RX ADMIN — ONDANSETRON HYDROCHLORIDE 4 MG: 2 SOLUTION INTRAMUSCULAR; INTRAVENOUS at 11:20

## 2018-10-09 RX ADMIN — LEVOTHYROXINE SODIUM 88 MCG: 88 TABLET ORAL at 05:53

## 2018-10-09 RX ADMIN — HYDROMORPHONE HYDROCHLORIDE 0.5 MG: 1 INJECTION, SOLUTION INTRAMUSCULAR; INTRAVENOUS; SUBCUTANEOUS at 14:34

## 2018-10-09 RX ADMIN — SUGAMMADEX 200 MG: 100 INJECTION, SOLUTION INTRAVENOUS at 11:35

## 2018-10-09 RX ADMIN — SODIUM CHLORIDE 75 ML/HR: 9 INJECTION, SOLUTION INTRAVENOUS at 20:14

## 2018-10-09 RX ADMIN — LIDOCAINE HYDROCHLORIDE 100 MG: 20 INJECTION, SOLUTION INFILTRATION; PERINEURAL at 09:31

## 2018-10-09 RX ADMIN — FENTANYL CITRATE 50 MCG: 50 INJECTION, SOLUTION INTRAMUSCULAR; INTRAVENOUS at 13:05

## 2018-10-09 RX ADMIN — PHENYLEPHRINE HYDROCHLORIDE 100 MCG: 10 INJECTION INTRAVENOUS at 10:32

## 2018-10-09 RX ADMIN — FENTANYL CITRATE 50 MCG: 50 INJECTION INTRAMUSCULAR; INTRAVENOUS at 09:31

## 2018-10-09 RX ADMIN — DEXAMETHASONE SODIUM PHOSPHATE 8 MG: 10 INJECTION INTRAMUSCULAR; INTRAVENOUS at 10:08

## 2018-10-09 RX ADMIN — ROCURONIUM BROMIDE 50 MG: 10 INJECTION INTRAVENOUS at 09:31

## 2018-10-09 RX ADMIN — HYDROMORPHONE HYDROCHLORIDE 0.5 MG: 1 INJECTION, SOLUTION INTRAMUSCULAR; INTRAVENOUS; SUBCUTANEOUS at 23:53

## 2018-10-09 RX ADMIN — ROCURONIUM BROMIDE 10 MG: 10 INJECTION INTRAVENOUS at 10:10

## 2018-10-09 RX ADMIN — HYDROMORPHONE HYDROCHLORIDE 0.5 MG: 2 INJECTION INTRAMUSCULAR; INTRAVENOUS; SUBCUTANEOUS at 10:16

## 2018-10-09 RX ADMIN — PHENYLEPHRINE HYDROCHLORIDE 100 MCG: 10 INJECTION INTRAVENOUS at 10:38

## 2018-10-09 RX ADMIN — SODIUM CHLORIDE, POTASSIUM CHLORIDE, SODIUM LACTATE AND CALCIUM CHLORIDE 9 ML/HR: 600; 310; 30; 20 INJECTION, SOLUTION INTRAVENOUS at 09:06

## 2018-10-09 RX ADMIN — SODIUM CHLORIDE, POTASSIUM CHLORIDE, SODIUM LACTATE AND CALCIUM CHLORIDE: 600; 310; 30; 20 INJECTION, SOLUTION INTRAVENOUS at 11:09

## 2018-10-09 RX ADMIN — FAMOTIDINE 20 MG: 10 INJECTION, SOLUTION INTRAVENOUS at 07:26

## 2018-10-09 RX ADMIN — PHENYLEPHRINE HYDROCHLORIDE 100 MCG: 10 INJECTION INTRAVENOUS at 10:26

## 2018-10-09 RX ADMIN — HYDROMORPHONE HYDROCHLORIDE 0.5 MG: 2 INJECTION INTRAMUSCULAR; INTRAVENOUS; SUBCUTANEOUS at 11:35

## 2018-10-09 RX ADMIN — FENTANYL CITRATE 50 MCG: 50 INJECTION INTRAMUSCULAR; INTRAVENOUS at 09:53

## 2018-10-09 RX ADMIN — PROPOFOL 140 MG: 10 INJECTION, EMULSION INTRAVENOUS at 09:31

## 2018-10-09 NOTE — PERIOPERATIVE NURSING NOTE
Assessment and documentation entered by JOEY Marlow RN but when she badged in computer it came up under JOEY Pena's name in error.

## 2018-10-09 NOTE — OP NOTE
Operative Note :   Jung Barillas MD    Kristyn Severino  9/7/1924    Procedure Date: 10/09/18    Pre-op Diagnosis:  Obstructing right colon cancer    Post-Op Diagnosis:  Same    Procedure:   · Right hemicolectomy    Surgeon: Jung Barillas MD    Assistant: Jenny OSORIO    Anesthesia:  General (general endotracheal tube)    EBL:   minimal    Specimens:   Terminal ileum and right colon    Indications:  · During nice, relatively functional 89-year-old lady who presented with what looked like a large bowel obstruction with transition point around the level of the hepatic flexure.  She had a history of anemia and weight loss over the last year.    Findings:   · Obstructing mass near the level of the hepatic flexure    Recommendations:   · Routine post op care    Description of procedure:    After obtaining informed consent, patient was brought to the operating room and placed under a general anesthetic.  Wynn catheter was placed in the patient's abdomen was then sterilely prepped and draped.  Midline incision was carried out through the skin and subcutaneous continues tissues starting above the umbilicus and extending this to several centimeters below the umbilicus.  The fascia was incised in the midline and the preperitoneal tissues were  and the peritoneum was then widely opened.  The bowel was obviously grossly dilated throughout.  A large Lam wound retractor was inserted.  Exploration was undertaken.  Small bowel was examined and I ran back towards the ligament of Treitz and then began milking some of this back towards the patient's nasogastric tube.  Once we have made a little bit of space I then ran the bowel all the way to the terminal ileum.  There were no kinks or adhesions within the loops of the small bowel.  The cecum was identified and it was obviously grossly distended.  I followed this up and around the level of where the gallbladder was the colon and a hard mass in this area.   Distal to this it was obviously decompressed and appeared healthy.  The cecum was stuck down as well as the terminal ileum little bit where the appendectomy abdomen performed previously.  I began to free this up and freed up the right colon along the white line of Toldt.  I worked my way up towards the hepatic flexure.  I then took the greater omentum off of the transverse colon starting at about the level of the middle colic vessels and then worked my way proximally.  There were some adhesions to the gallbladder which were also taken down and I then began to get the hepatic flexure mobilized out of the right upper quadrant.  I then divided the terminal ileum approximately 4 cm before the ileocecal valve.  The mesentery of the colon was then gradually mobilized medially and the duodenum was visualized and  from this mesentery.  I then used the LigaSure to take the mesentery staying relatively close to the base of the mesentery first dividing the ileocolic vessels and then working my way proximally.  At about the level of the hepatic flexure stopped and then identified in the mid transverse colon where the middle colic vessels were.  I then made a space underneath the colon just proximal to this and then again divided the colon with the 75 mm JAGRUTI stapler just proximal to the ileocolic vessels.  The mesentery was divided down and then again the mesentery was divided connecting the dots with the artery completed mesenteric division using the LigaSure device.  The specimen was passed off.  The colon was opened on the back table and there appeared to be a near obstructing mass with evidence of good margins proximally and distally.  The abdomen was then thoroughly irrigated.  The bowel appeared healthy and I saw no reason not to perform an ileocolic anastomosis.  I then fashioned a side to side functional end to and ileocolic anastomosis by approximating the bowel with 2-0 silk sutures.  I then opened the bowel  with the Bovie electrocautery and then fashioned our staple anastomosis using the 75 mm JAGRUTI stapler.  The common enterotomy was closed with 3-0 chromic sutures and then the entirety of the staple line and common enterotomy was reinforced with 2-0 silk sutures in the fashion of Larry.  The common mesenteric defect was also closed with 2-0 silk sutures.  I was able to easily pass succus through the anastomosis and there is no evidence of leak or obstruction.  The abdomen was then irrigated again.  We then changed gloves and then I fashioned the omentum over our anastomosis.  I placed 6 pieces of Seprafilm throughout the abdomen and then the fascia was closed with two running 0 PDS sutures.  Subcutaneous continues tissues were closed with 3-0 Vicryl.  Skin was closed with staples.  Sterile dressing was applied and the patient was awakened and brought back to recovery in stable condition.    Jung Barillas MD  General and Endoscopic Surgery  [facility]    [address], [address]  [place], [place], [address]  P: [phone]  F: [phone]

## 2018-10-09 NOTE — DISCHARGE PLACEMENT REQUEST
"Kristyn Coreas (89 y.o. Female)     Date of Birth Social Security Number Address Home Phone MRN    09/07/1924  [id number] [address] [address] [facility] [place] [address] [phone] [id number]    Holiness Marital Status          None        Admission Date Admission Type Admitting Provider Attending Provider Department, Room/Bed    10/6/18 Emergency Jonnathan Roberts MD Ray, Jonathan, MD [facility] MAIN OR, [facility] Main OR/MAIN OR    Discharge Date Discharge Disposition Discharge Destination                       Attending Provider:  Garth Dalton MD    Allergies:  No Known Allergies    Isolation:  None   Infection:  None   Code Status:  No CPR    Ht:  162.6 cm (64\")   Wt:  63 kg (139 lb)    Admission Cmt:  None   Principal Problem:  Intestinal obstruction (CMS/HCC) [K56.609] More...                 Active Insurance as of 10/6/2018     Primary Coverage     Payor Plan Insurance Group Employer/Plan Group    MEDICARE MEDICARE A & B      Payor Plan Address Payor Plan Phone Number Effective From Effective To    [address] [phone] 9/1/1989     [facility] [id number]       Subscriber Name Subscriber Birth Date Member ID       KRISTYN COREAS 9/7/1924 [id number]           Secondary Coverage     Payor Plan Insurance Group Employer/Plan Group    ANTH BLUE CROSS ANTHEM BLUE CROSS BLUE [facility] PPO [id number]     Payor Plan Address Payor Plan Phone Number Effective From Effective To    [address] [phone] 1/1/2012     [facility] [id number]       Subscriber Name Subscriber Birth Date Member ID       KRISTYN COREAS 9/7/1924 [id number]                 Emergency Contacts      (Rel.) Home Phone Work Phone Mobile Phone    Kya Dunn (Daughter) [phone] -- [phone]    Rosa Coreas (Daughter) [phone] -- --              "

## 2018-10-09 NOTE — ANESTHESIA POSTPROCEDURE EVALUATION
Patient: Kristyn Severino    Procedure Summary     Date:  10/09/18 Room / Location:  [facility] OR  / [facility] MAIN OR    Anesthesia Start:  0921 Anesthesia Stop:  1154    Procedure:  right hemicolectomy (N/A Abdomen) Diagnosis:       Other specified intestinal obstruction, unspecified whether partial or complete (CMS/HCC)      (Other specified intestinal obstruction, unspecified whether partial or complete (CMS/HCC) [K56.699])    Surgeon:  Jung Barillas MD Provider:  Jose Antonio Ramires MD    Anesthesia Type:  general ASA Status:  3          Anesthesia Type: general  Last vitals  BP   105/48 (10/09/18 1300)   Temp   36.4 °C (97.5 °F) (10/09/18 1151)   Pulse   65 (10/09/18 1300)   Resp   14 (10/09/18 1300)     SpO2   93 % (10/09/18 1300)     Post Anesthesia Care and Evaluation    Patient location during evaluation: PACU  Patient participation: complete - patient participated  Level of consciousness: awake and alert  Pain management: adequate  Airway patency: patent  Anesthetic complications: No anesthetic complications    Cardiovascular status: acceptable  Respiratory status: acceptable  Hydration status: acceptable    Comments: --------------------            10/09/18               1300     --------------------   BP:       105/48     Pulse:      65       Resp:       14       Temp:                SpO2:      93%      --------------------

## 2018-10-09 NOTE — PROGRESS NOTES
Name: Kristyn Severino ADMIT: 10/6/2018   : 1924  PCP: Cary Espinal PA-C    MRN: [id number] LOS: 3 days   AGE/SEX: 89 y.o. female  ROOM: [facility]   Subjective   About the same. No new complaints.    Objective   Vital Signs  Temp:  [97.5 °F (36.4 °C)-98.9 °F (37.2 °C)] 97.7 °F (36.5 °C)  Heart Rate:  [61-84] 84  Resp:  [14-20] 20  BP: (105-128)/(42-81) 116/50  SpO2:  [92 %-98 %] 92 %  on  Flow (L/min):  [2-4] 2;   Device (Oxygen Therapy): nasal cannula  Body mass index is 23.86 kg/m².    Physical Exam   Constitutional: She is oriented to person, place, and time. No distress.   HENT:   +NGT   Cardiovascular: Normal rate and regular rhythm.    No murmur heard.  Pulmonary/Chest: Effort normal and breath sounds normal.   Abdominal: Soft. She exhibits distension. Bowel sounds are decreased. There is tenderness.   Musculoskeletal: Normal range of motion. She exhibits no edema.   Neurological: She is alert and oriented to person, place, and time.   Skin: Skin is warm and dry. She is not diaphoretic.       Results Review:       I reviewed the patient's new clinical results.    Results from last 7 days  Lab Units 10/09/18  0558 10/08/18  0641 10/07/18  0547 10/06/18  1756   WBC 10*3/mm3 2.84* 4.58 7.31 9.52   HEMOGLOBIN g/dL 9.3* 10.6* 10.0* 11.4*   PLATELETS 10*3/mm3 299 325 371 433       Results from last 7 days  Lab Units 10/09/18  0558 10/08/18  0641 10/07/18  0547 10/06/18  1756   SODIUM mmol/L 140 138 140 140   POTASSIUM mmol/L 3.8 3.9 4.2 3.9   CHLORIDE mmol/L 105 105 104 100   CO2 mmol/L 21.2* 21.2* 28.5 26.8   BUN mg/dL 11 10 8 10   CREATININE mg/dL 0.56* 0.58 0.63 0.64   GLUCOSE mg/dL 70 68 103* 126*   Estimated Creatinine Clearance: 42.8 mL/min (A) (by C-G formula based on SCr of 0.56 mg/dL (L)).    Results from last 7 days  Lab Units 10/09/18  0558 10/08/18  0641 10/07/18  0547 10/06/18  1756   CALCIUM mg/dL 8.0* 8.5 8.8 9.5   ALBUMIN g/dL  --   --   --  3.70         famotidine 20 mg Intravenous Daily    latanoprost 1 drop Both Eyes Nightly   levothyroxine 88 mcg Oral Q AM       lactated ringers 9 mL/hr Last Rate: 9 mL/hr (10/09/18 0906)   sodium chloride 75 mL/hr Last Rate: 75 mL/hr (10/08/18 9220)   NPO Diet NPO Except: Ice Chips, Sips With Meds      Assessment/Plan      Active Hospital Problems    Diagnosis Date Noted   • **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 [K56.609] 10/06/2018   • Abdominal pain [R10.9] 10/07/2018   • DNR (do not resuscitate) [Z66] 10/07/2018   • Iron deficiency anemia due to chronic blood loss [D50.0] 09/18/2018   • Hypothyroidism [E03.9]    • Anemia of chronic illness [D63.8]    • HLD (hyperlipidemia) [E78.5]       Resolved Hospital Problems    Diagnosis Date Noted Date Resolved   No resolved problems to display.       · Currently NPO following surgery. Continue IV fluids.  · Pain is controlled with IV morphine. Zofran ordered for nausea.  · CEA is normal.  · Postoperative care per Dr. Barillas.  · Recheck WBC in am.      Garth Dalton MD  [facility]  10/09/18  4:45 PM

## 2018-10-09 NOTE — PLAN OF CARE
Problem: Fall Risk (Adult)  Goal: Identify Related Risk Factors and Signs and Symptoms  Outcome: Ongoing (interventions implemented as appropriate)    Goal: Absence of Fall  Outcome: Ongoing (interventions implemented as appropriate)      Problem: Patient Care Overview  Goal: Plan of Care Review  Outcome: Ongoing (interventions implemented as appropriate)   10/09/18 0308   Coping/Psychosocial   Plan of Care Reviewed With patient   OTHER   Outcome Summary pt chief complaint is that of pain. morphine used to mange that. consent is signed and in the chart. no complications pt rested for most the night    Plan of Care Review   Progress no change     Goal: Individualization and Mutuality  Outcome: Ongoing (interventions implemented as appropriate)    Goal: Discharge Needs Assessment  Outcome: Ongoing (interventions implemented as appropriate)    Goal: Interprofessional Rounds/Family Conf  Outcome: Ongoing (interventions implemented as appropriate)      Problem: Skin Injury Risk (Adult)  Goal: Identify Related Risk Factors and Signs and Symptoms  Outcome: Ongoing (interventions implemented as appropriate)    Goal: Skin Health and Integrity  Outcome: Ongoing (interventions implemented as appropriate)

## 2018-10-09 NOTE — ANESTHESIA PREPROCEDURE EVALUATION
Anesthesia Evaluation     no history of anesthetic complications:               Airway   Mallampati: II  no difficulty expected  Dental - normal exam     Pulmonary - normal exam   (+) shortness of breath,   (-) COPD, asthma, sleep apnea, not a smoker    PE comment: nonlabored  Cardiovascular - normal exam    Rhythm: regular  Rate: normal    (+) PALACIOS, hyperlipidemia,   (-) hypertension, valvular problems/murmurs, past MI, CAD, dysrhythmias, angina      Neuro/Psych- negative ROS  (-) seizures, TIA, CVA  GI/Hepatic/Renal/Endo    (+)   renal disease (CKD), hypothyroidism (Hashimoto's Thyroiditis),   (-) GERD, liver disease, diabetes, hyperthyroidism    ROS Comment: Colon obstruction;  Colitis    Musculoskeletal     (+) arthralgias,   Abdominal    Substance History      OB/GYN          Other   (+) blood dyscrasia (Anemia ), arthritis                     Anesthesia Plan    ASA 3     general     intravenous induction   Anesthetic plan, all risks, benefits, and alternatives have been provided, discussed and informed consent has been obtained with: patient.

## 2018-10-09 NOTE — ANESTHESIA PROCEDURE NOTES
Airway  Urgency: elective    Airway not difficult    General Information and Staff    Patient location during procedure: OR  CRNA: CAILIN SOL    Indications and Patient Condition  Indications for airway management: airway protection    Preoxygenated: yes  MILS maintained throughout  Mask difficulty assessment: 1 - vent by mask    Final Airway Details  Final airway type: endotracheal airway      Successful airway: ETT  Cuffed: yes   Successful intubation technique: direct laryngoscopy  Blade: Dianne  Blade size: 3  ETT size: 7.0 mm  Cormack-Lehane Classification: grade I - full view of glottis  Placement verified by: chest auscultation   Cuff volume (mL): 7  Measured from: teeth  ETT to teeth (cm): 20  Number of attempts at approach: 1

## 2018-10-10 ENCOUNTER — PATIENT OUTREACH (OUTPATIENT)
Dept: CASE MANAGEMENT | Facility: OTHER | Age: 83
End: 2018-10-10

## 2018-10-10 ENCOUNTER — EPISODE CHANGES (OUTPATIENT)
Dept: CASE MANAGEMENT | Facility: OTHER | Age: 83
End: 2018-10-10

## 2018-10-10 PROBLEM — E87.20 ACIDOSIS: Status: ACTIVE | Noted: 2018-10-10

## 2018-10-10 LAB
ANION GAP SERPL CALCULATED.3IONS-SCNC: 18.5 MMOL/L
BASOPHILS # BLD AUTO: 0.01 10*3/MM3 (ref 0–0.2)
BASOPHILS NFR BLD AUTO: 0.1 % (ref 0–1.5)
BUN BLD-MCNC: 17 MG/DL (ref 8–23)
BUN/CREAT SERPL: 20.7 (ref 7–25)
CALCIUM SPEC-SCNC: 8.1 MG/DL (ref 8.2–9.6)
CHLORIDE SERPL-SCNC: 104 MMOL/L (ref 98–107)
CO2 SERPL-SCNC: 16.5 MMOL/L (ref 22–29)
CREAT BLD-MCNC: 0.82 MG/DL (ref 0.57–1)
CYTO UR: NORMAL
DEPRECATED RDW RBC AUTO: 69 FL (ref 37–54)
EOSINOPHIL # BLD AUTO: 0 10*3/MM3 (ref 0–0.7)
EOSINOPHIL NFR BLD AUTO: 0 % (ref 0.3–6.2)
ERYTHROCYTE [DISTWIDTH] IN BLOOD BY AUTOMATED COUNT: 18.8 % (ref 11.7–13)
GFR SERPL CREATININE-BSD FRML MDRD: 65 ML/MIN/1.73
GLUCOSE BLD-MCNC: 101 MG/DL (ref 65–99)
HCT VFR BLD AUTO: 36.4 % (ref 35.6–45.5)
HGB BLD-MCNC: 11.2 G/DL (ref 11.9–15.5)
IMM GRANULOCYTES # BLD: 0.03 10*3/MM3 (ref 0–0.03)
IMM GRANULOCYTES NFR BLD: 0.4 % (ref 0–0.5)
LAB AP CASE REPORT: NORMAL
LYMPHOCYTES # BLD AUTO: 0.78 10*3/MM3 (ref 0.9–4.8)
LYMPHOCYTES NFR BLD AUTO: 9.9 % (ref 19.6–45.3)
MCH RBC QN AUTO: 30.9 PG (ref 26.9–32)
MCHC RBC AUTO-ENTMCNC: 30.8 G/DL (ref 32.4–36.3)
MCV RBC AUTO: 100.6 FL (ref 80.5–98.2)
MONOCYTES # BLD AUTO: 1.25 10*3/MM3 (ref 0.2–1.2)
MONOCYTES NFR BLD AUTO: 15.9 % (ref 5–12)
NEUTROPHILS # BLD AUTO: 5.82 10*3/MM3 (ref 1.9–8.1)
NEUTROPHILS NFR BLD AUTO: 74.1 % (ref 42.7–76)
PATH REPORT.FINAL DX SPEC: NORMAL
PATH REPORT.GROSS SPEC: NORMAL
PLATELET # BLD AUTO: 280 10*3/MM3 (ref 140–500)
PMV BLD AUTO: 10.4 FL (ref 6–12)
POTASSIUM BLD-SCNC: 4.2 MMOL/L (ref 3.5–5.2)
RBC # BLD AUTO: 3.62 10*6/MM3 (ref 3.9–5.2)
SODIUM BLD-SCNC: 139 MMOL/L (ref 136–145)
WBC NRBC COR # BLD: 7.86 10*3/MM3 (ref 4.5–10.7)

## 2018-10-10 PROCEDURE — 25010000002 HYDROMORPHONE PER 4 MG: Performed by: SURGERY

## 2018-10-10 PROCEDURE — 99024 POSTOP FOLLOW-UP VISIT: CPT | Performed by: SURGERY

## 2018-10-10 PROCEDURE — 85025 COMPLETE CBC W/AUTO DIFF WBC: CPT | Performed by: INTERNAL MEDICINE

## 2018-10-10 PROCEDURE — 80048 BASIC METABOLIC PNL TOTAL CA: CPT | Performed by: INTERNAL MEDICINE

## 2018-10-10 RX ADMIN — FAMOTIDINE 20 MG: 10 INJECTION, SOLUTION INTRAVENOUS at 07:49

## 2018-10-10 RX ADMIN — ZOLPIDEM TARTRATE 2.5 MG: 5 TABLET ORAL at 23:28

## 2018-10-10 RX ADMIN — HYDROMORPHONE HYDROCHLORIDE 0.5 MG: 1 INJECTION, SOLUTION INTRAMUSCULAR; INTRAVENOUS; SUBCUTANEOUS at 23:36

## 2018-10-10 RX ADMIN — HYDROMORPHONE HYDROCHLORIDE 0.5 MG: 1 INJECTION, SOLUTION INTRAMUSCULAR; INTRAVENOUS; SUBCUTANEOUS at 21:23

## 2018-10-10 RX ADMIN — SODIUM CHLORIDE 75 ML/HR: 9 INJECTION, SOLUTION INTRAVENOUS at 21:23

## 2018-10-10 RX ADMIN — HYDROMORPHONE HYDROCHLORIDE 0.5 MG: 1 INJECTION, SOLUTION INTRAMUSCULAR; INTRAVENOUS; SUBCUTANEOUS at 16:18

## 2018-10-10 RX ADMIN — HYDROMORPHONE HYDROCHLORIDE 0.5 MG: 1 INJECTION, SOLUTION INTRAMUSCULAR; INTRAVENOUS; SUBCUTANEOUS at 05:14

## 2018-10-10 RX ADMIN — SODIUM CHLORIDE 75 ML/HR: 9 INJECTION, SOLUTION INTRAVENOUS at 07:53

## 2018-10-10 RX ADMIN — LEVOTHYROXINE SODIUM 88 MCG: 88 TABLET ORAL at 05:14

## 2018-10-10 NOTE — PROGRESS NOTES
Chief Complaint:    S/P Right hemicolectomy, POD 1    Subjective:    The patient is feeling well with adequate postoperative pain control.  She is not having any nausea or vomiting.  Has not passed flatus nor had a bowel movement.  The NG output is somewhat bilious.    Objective:    Temp:  [97.4 °F (36.3 °C)-98.9 °F (37.2 °C)] 97.4 °F (36.3 °C)  Heart Rate:  [64-90] 73  Resp:  [14-20] 18  BP: (104-141)/(48-84) 114/60    Physical Exam   Constitutional: She is cooperative. She does not appear ill. No distress.   Pulmonary/Chest: Effort normal. No respiratory distress.   Abdominal: Soft. She exhibits no distension. Bowel sounds are absent. There is generalized tenderness (Appropriate postop tenderness).   Incision: Dressing is in place and clean.   Neurological: She is alert.       Results:    WBC is 7.86, H/H is 11.2/36.4.  Electrolytes are normal.    Impression/Plan:    The patient is POD 1 from a right hemicolectomy.  She is clinically stable but has an expected postop ileus.  The NG tube will be continued for now.    The Wynn catheter will be removed.    Slowly increase activity.    Jose Contreras Jr., M.D.

## 2018-10-10 NOTE — PROGRESS NOTES
Name: Kristyn Severino ADMIT: 10/6/2018   : 1924  PCP: Cary Espinal PA-C    MRN: [id number] LOS: 4 days   AGE/SEX: 89 y.o. female  ROOM: [facility]   Subjective   About the same. No new complaints.    Objective   Vital Signs  Temp:  [97.4 °F (36.3 °C)-98.9 °F (37.2 °C)] 97.4 °F (36.3 °C)  Heart Rate:  [61-90] 73  Resp:  [14-20] 18  BP: (104-141)/(42-84) 114/60  SpO2:  [92 %-99 %] 99 %  on  Flow (L/min):  [2-4] 2;   Device (Oxygen Therapy): nasal cannula  Body mass index is 23.86 kg/m².    Physical Exam   Constitutional: She is oriented to person, place, and time. No distress.   HENT:   +NGT   Cardiovascular: Normal rate and regular rhythm.    No murmur heard.  Pulmonary/Chest: Effort normal and breath sounds normal.   Abdominal: Soft. She exhibits distension. Bowel sounds are decreased. There is tenderness.   Musculoskeletal: Normal range of motion. She exhibits no edema.   Neurological: She is alert and oriented to person, place, and time.   Skin: Skin is warm and dry. She is not diaphoretic.       Results Review:       I reviewed the patient's new clinical results.    Results from last 7 days  Lab Units 10/10/18  0631 10/09/18  0558 10/08/18  0641 10/07/18  0547   WBC 10*3/mm3 7.86 2.84* 4.58 7.31   HEMOGLOBIN g/dL 11.2* 9.3* 10.6* 10.0*   PLATELETS 10*3/mm3 280 299 325 371       Results from last 7 days  Lab Units 10/10/18  0631 10/09/18  0558 10/08/18  0641 10/07/18  0547   SODIUM mmol/L 139 140 138 140   POTASSIUM mmol/L 4.2 3.8 3.9 4.2   CHLORIDE mmol/L 104 105 105 104   CO2 mmol/L 16.5* 21.2* 21.2* 28.5   BUN mg/dL 17 11 10 8   CREATININE mg/dL 0.82 0.56* 0.58 0.63   GLUCOSE mg/dL 101* 70 68 103*   Estimated Creatinine Clearance: 41.8 mL/min (by C-G formula based on SCr of 0.82 mg/dL).    Results from last 7 days  Lab Units 10/10/18  0631 10/09/18  0558 10/08/18  0641 10/07/18  0547 10/06/18  1756   CALCIUM mg/dL 8.1* 8.0* 8.5 8.8 9.5   ALBUMIN g/dL  --   --   --   --  3.70         famotidine 20 mg  Intravenous Daily   latanoprost 1 drop Both Eyes Nightly   levothyroxine 88 mcg Oral Q AM       lactated ringers 9 mL/hr Last Rate: 9 mL/hr (10/09/18 0906)   sodium chloride 75 mL/hr Last Rate: 75 mL/hr (10/10/18 0753)   NPO Diet NPO Except: Ice Chips, Sips With Meds      Assessment/Plan      Active Hospital Problems    Diagnosis Date Noted   • **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 [K56.609] 10/06/2018   • Acidosis [E87.2] 10/10/2018   • DNR (do not resuscitate) [Z66] 10/07/2018   • Iron deficiency anemia due to chronic blood loss [D50.0] 09/18/2018   • Hypothyroidism [E03.9]    • Anemia of chronic illness [D63.8]    • HLD (hyperlipidemia) [E78.5]       Resolved Hospital Problems    Diagnosis Date Noted Date Resolved   No resolved problems to display.       · Currently NPO following surgery. Continue IV fluids.  · Pain is controlled with IV morphine. Zofran ordered for nausea.  · CEA is normal.  · Postoperative care per Dr. Barillas.  · Recheck WBC is normal this am. Lab error yesterday?  · Monitor acidosis.      Garth Dalton MD  [facility]  10/10/18  8:46 AM

## 2018-10-10 NOTE — PROGRESS NOTES
Continued Stay Note  [facility]     Patient Name: Kristyn Severino  MRN: [id number]  Today's Date: 10/10/2018    Admit Date: 10/6/2018          Discharge Plan     Row Name 10/10/18 1039       Plan    Plan Plan return to [facility].  JAMIL Leonard    Plan Comments Note from 10/8 that was not added to progress notes   :FACE SHEET VERIFIED/ IM LETTER SIGNED.  Spoke to pt at bedside.  Pt's PCP is DIA Kwan.  Pt lives alone in a single story house.  Pt independent with ADL's.   Pt has a straight cane and rolling walker for home use.  Pt gets prescriptions at [facility] in [facility].  Pt denies any issues affording medications.  Pt is not current with .  Pt has recently been in [facility].  CCP to follow for discharge needs.  JAMIL Leonard    Row Name 10/10/18 1168       Plan    Plan Plan return to [facility].   JAMIL Leonard    Plan Comments Per Joe  ( [phone]) pt was admitted from skilled care at [facility].  Plan return to [facility].   JAMIL Leonard              Discharge Codes    No documentation.           Hanna Hobbs RN

## 2018-10-10 NOTE — PLAN OF CARE
Problem: Fall Risk (Adult)  Goal: Identify Related Risk Factors and Signs and Symptoms  Outcome: Ongoing (interventions implemented as appropriate)    Goal: Absence of Fall  Outcome: Ongoing (interventions implemented as appropriate)      Problem: Patient Care Overview  Goal: Plan of Care Review  Outcome: Ongoing (interventions implemented as appropriate)   10/10/18 3727   Coping/Psychosocial   Plan of Care Reviewed With patient   OTHER   Outcome Summary F/C removed about 1400 today. Pt with a brief on. Pt has had pain medication once on day shift. No c/o nausea. VSS. Will cont to monitor.   Plan of Care Review   Progress no change       Problem: Skin Injury Risk (Adult)  Goal: Identify Related Risk Factors and Signs and Symptoms  Outcome: Ongoing (interventions implemented as appropriate)    Goal: Skin Health and Integrity  Outcome: Ongoing (interventions implemented as appropriate)      Problem: Bowel Obstruction (Adult)  Goal: Signs and Symptoms of Listed Potential Problems Will be Absent, Minimized or Managed (Bowel Obstruction)  Outcome: Ongoing (interventions implemented as appropriate)

## 2018-10-10 NOTE — PROGRESS NOTES
Adult Nutrition  Assessment/PES    Patient Name:  Kristyn Severino  YOB: 1924  MRN: [id number]  Admit Date:  10/6/2018    Assessment Date:  10/10/2018    Comments:  Assessment triggered by MST score of 2 per nurse admission screen and NPO status x 4 days.  Patient with intestinal obstruction, s/p R hemicolectomy yesterday.  NGT to LWS.      Will continue to follow.          Reason for Assessment     Row Name 10/10/18 1359          Reason for Assessment    Reason For Assessment identified at risk by screening criteria     Diagnosis endocrine conditions;renal disease;gastrointestinal disease   hypothyroid, anemia, OA, renal insufficiency; admitted with intestinal obstruction, s/p R hemicolectomy yest     Identified At Risk by Screening Criteria MST SCORE 2+;other (see comments)   NPO x 4 days             Nutrition/Diet History     Row Name 10/10/18 1401          Nutrition/Diet History    Typical Food/Fluid Intake NPO x 4 days, s/p R hemicolectomy yesterday             Anthropometrics     Row Name 10/10/18 1401          Admit Weight    Admit Weight --   139# 10/6        Usual Body Weight (UBW)    Weight Loss Time Frame does not appear to have lost weight per chart weight report        Body Mass Index (BMI)    BMI Assessment BMI 18.5-24.9: normal             Labs/Tests/Procedures/Meds     Row Name 10/10/18 1401          Labs/Procedures/Meds    Lab Results Reviewed reviewed, pertinent     Lab Results Comments Gluc, Ca, Hgb        Diagnostic Tests/Procedures    Diagnostic Test/Procedure Reviewed reviewed, pertinent     Diagnostic Test/Procedures Comments s/p R hemicolectomy yesterday        Medications    Pertinent Medications Reviewed reviewed, pertinent     Pertinent Medications Comments pepcid, synthroid             Physical Findings     Row Name 10/10/18 1402          Physical Findings    Tubes nasogastric tube   to LWS     Skin other (see comments)   B=18, abd inc             Estimated/Assessed Needs      Row Name 10/10/18 1402          Calculation Measurements    Weight Used For Calculations 63 kg (138 lb 14.2 oz)        Estimated/Assessed Needs    Additional Documentation KCAL/KG (Group);Protein Requirements (Group);Fluid Requirements (Group)        KCAL/KG    14 Kcal/Kg (kcal) 882     15 Kcal/Kg (kcal) 945     18 Kcal/Kg (kcal) 1134     20 Kcal/Kg (kcal) 1260     25 Kcal/Kg (kcal) 1575     30 Kcal/Kg (kcal) 1890     35 Kcal/Kg (kcal) 2205     40 Kcal/Kg (kcal) 2520     45 Kcal/Kg (kcal) 2835     50 Kcal/Kg (kcal) 3150     kcal/kg (Specify) --   1575        Gassaway-St. Jeor Equation    RMR (Gassaway-St. Jeor Equation) 1015        Protein Requirements    Est Protein Requirement Amount (gms/kg) 1.0 gm protein     Estimated Protein Requirements (gms/day) 63        Fluid Requirements    Estimated Fluid Requirements (mL/day) 1890     Estimated Fluid Requirement Method RDA Method     RDA Method (mL) 1890     Ward-Dimas Method (over 20 kg) 2760             Nutrition Prescription Ordered     Row Name 10/10/18 1403          Nutrition Prescription PO    Current PO Diet NPO             Evaluation of Received Nutrient/Fluid Intake     Row Name 10/10/18 1402          Calculation Measurements    Weight Used For Calculations 63 kg (138 lb 14.2 oz)             Evaluation of Prescribed Nutrient/Fluid Intake     Row Name 10/10/18 1402          Calculation Measurements    Weight Used For Calculations 63 kg (138 lb 14.2 oz)           Problem/Interventions:        Problem 1     Row Name 10/10/18 1403          Nutrition Diagnoses Problem 1    Problem 1 Inadequate Intake/Infusion     Macronutrient Kcal;Protein     Etiology (related to) Medical Diagnosis     Gastrointestinal Colectomy;SBO     Signs/Symptoms (evidenced by) Report/Observation;NPO                     Intervention Goal     Row Name 10/10/18 1403          Intervention Goal    General Maintain nutrition;Disease management/therapy     PO Initiate feeding     Weight No  significant weight loss             Nutrition Intervention     Row Name 10/10/18 1404          Nutrition Intervention    RD/Tech Action Follow Tx progress;Care plan reviewd;Await begin PO               Education/Evaluation     Row Name 10/10/18 1404          Education    Education Will Instruct as appropriate        Monitor/Evaluation    Monitor Per protocol;I&O;Pertinent labs;Weight;Skin status;Symptoms         Electronically signed by:  Sheila Ballard RD  10/10/18 2:04 PM

## 2018-10-10 NOTE — PROGRESS NOTES
Continued Stay Note  [facility]     Patient Name: Kristyn Severino  MRN: [id number]  Today's Date: 10/10/2018    Admit Date: 10/6/2018          Discharge Plan     Row Name 10/10/18 1035       Plan    Plan [facility]   JAMIL Leonard    Plan Comments Per Joe  ( [phone]) pt was admitted from skilled care at [facility].  Plan return to [facility].   JAMIL Leonard              Discharge Codes    No documentation.           Hanna Hobbs, RN

## 2018-10-10 NOTE — PLAN OF CARE
Problem: Fall Risk (Adult)  Goal: Identify Related Risk Factors and Signs and Symptoms  Outcome: Ongoing (interventions implemented as appropriate)    Goal: Absence of Fall  Outcome: Ongoing (interventions implemented as appropriate)      Problem: Patient Care Overview  Goal: Plan of Care Review  Outcome: Ongoing (interventions implemented as appropriate)   10/10/18 0339   Coping/Psychosocial   Plan of Care Reviewed With patient   OTHER   Outcome Summary pt. chief complaint his that of pain. medicated as needed. no nausea. dressing dry and intact. will continue to monitor pt    Plan of Care Review   Progress no change     Goal: Individualization and Mutuality  Outcome: Ongoing (interventions implemented as appropriate)    Goal: Discharge Needs Assessment  Outcome: Ongoing (interventions implemented as appropriate)    Goal: Interprofessional Rounds/Family Conf  Outcome: Ongoing (interventions implemented as appropriate)      Problem: Skin Injury Risk (Adult)  Goal: Identify Related Risk Factors and Signs and Symptoms  Outcome: Ongoing (interventions implemented as appropriate)    Goal: Skin Health and Integrity  Outcome: Ongoing (interventions implemented as appropriate)

## 2018-10-10 NOTE — OUTREACH NOTE
Skilled Nursing Facility Discharge Flowsheet:     Skilled Nursing Facility Discharge Assessment 10/10/2018   Acute Facility Discharged From [facility]   Acute Discharge Date 9/26/2018   Name of the Skilled Nursing Facility? [facility]   Tier Level of the Skilled Nursing Facility 3   Purpose of SNF Admission PT;SN   Estimated length of stay for the patient? TBD   Who is the insurance provider or payor of patient stay? -   Progression of Patient? Daily therapies continue   Skilled Nursing Discharge Date? 10/6/2018

## 2018-10-11 PROBLEM — E87.20 ACIDOSIS: Status: RESOLVED | Noted: 2018-10-10 | Resolved: 2018-10-11

## 2018-10-11 LAB
ANION GAP SERPL CALCULATED.3IONS-SCNC: 7.7 MMOL/L
BASOPHILS # BLD AUTO: 0 10*3/MM3 (ref 0–0.2)
BASOPHILS NFR BLD AUTO: 0 % (ref 0–1.5)
BUN BLD-MCNC: 19 MG/DL (ref 8–23)
BUN/CREAT SERPL: 27.1 (ref 7–25)
CALCIUM SPEC-SCNC: 8.2 MG/DL (ref 8.2–9.6)
CHLORIDE SERPL-SCNC: 110 MMOL/L (ref 98–107)
CO2 SERPL-SCNC: 24.3 MMOL/L (ref 22–29)
CREAT BLD-MCNC: 0.7 MG/DL (ref 0.57–1)
DEPRECATED RDW RBC AUTO: 64.2 FL (ref 37–54)
EOSINOPHIL # BLD AUTO: 0 10*3/MM3 (ref 0–0.7)
EOSINOPHIL NFR BLD AUTO: 0 % (ref 0.3–6.2)
ERYTHROCYTE [DISTWIDTH] IN BLOOD BY AUTOMATED COUNT: 18.3 % (ref 11.7–13)
GFR SERPL CREATININE-BSD FRML MDRD: 78 ML/MIN/1.73
GLUCOSE BLD-MCNC: 94 MG/DL (ref 65–99)
HCT VFR BLD AUTO: 28.4 % (ref 35.6–45.5)
HGB BLD-MCNC: 8.7 G/DL (ref 11.9–15.5)
IMM GRANULOCYTES # BLD: 0.02 10*3/MM3 (ref 0–0.03)
IMM GRANULOCYTES NFR BLD: 0.3 % (ref 0–0.5)
LYMPHOCYTES # BLD AUTO: 0.72 10*3/MM3 (ref 0.9–4.8)
LYMPHOCYTES NFR BLD AUTO: 11.4 % (ref 19.6–45.3)
MCH RBC QN AUTO: 29.7 PG (ref 26.9–32)
MCHC RBC AUTO-ENTMCNC: 30.6 G/DL (ref 32.4–36.3)
MCV RBC AUTO: 96.9 FL (ref 80.5–98.2)
MONOCYTES # BLD AUTO: 1.01 10*3/MM3 (ref 0.2–1.2)
MONOCYTES NFR BLD AUTO: 16 % (ref 5–12)
NEUTROPHILS # BLD AUTO: 4.58 10*3/MM3 (ref 1.9–8.1)
NEUTROPHILS NFR BLD AUTO: 72.3 % (ref 42.7–76)
PLATELET # BLD AUTO: 238 10*3/MM3 (ref 140–500)
PMV BLD AUTO: 10 FL (ref 6–12)
POTASSIUM BLD-SCNC: 3.5 MMOL/L (ref 3.5–5.2)
RBC # BLD AUTO: 2.93 10*6/MM3 (ref 3.9–5.2)
SODIUM BLD-SCNC: 142 MMOL/L (ref 136–145)
WBC NRBC COR # BLD: 6.33 10*3/MM3 (ref 4.5–10.7)

## 2018-10-11 PROCEDURE — 25010000002 HYDROMORPHONE PER 4 MG: Performed by: SURGERY

## 2018-10-11 PROCEDURE — 99024 POSTOP FOLLOW-UP VISIT: CPT | Performed by: SURGERY

## 2018-10-11 PROCEDURE — 85025 COMPLETE CBC W/AUTO DIFF WBC: CPT | Performed by: INTERNAL MEDICINE

## 2018-10-11 PROCEDURE — 80048 BASIC METABOLIC PNL TOTAL CA: CPT | Performed by: INTERNAL MEDICINE

## 2018-10-11 RX ORDER — DEXTROSE, SODIUM CHLORIDE, AND POTASSIUM CHLORIDE 5; .45; .15 G/100ML; G/100ML; G/100ML
75 INJECTION INTRAVENOUS CONTINUOUS
Status: DISCONTINUED | OUTPATIENT
Start: 2018-10-11 | End: 2018-10-17 | Stop reason: HOSPADM

## 2018-10-11 RX ADMIN — FAMOTIDINE 20 MG: 10 INJECTION, SOLUTION INTRAVENOUS at 08:03

## 2018-10-11 RX ADMIN — ZOLPIDEM TARTRATE 2.5 MG: 5 TABLET ORAL at 23:40

## 2018-10-11 RX ADMIN — LEVOTHYROXINE SODIUM 88 MCG: 88 TABLET ORAL at 06:11

## 2018-10-11 RX ADMIN — HYDROMORPHONE HYDROCHLORIDE 0.5 MG: 1 INJECTION, SOLUTION INTRAMUSCULAR; INTRAVENOUS; SUBCUTANEOUS at 23:30

## 2018-10-11 RX ADMIN — POTASSIUM CHLORIDE, DEXTROSE MONOHYDRATE AND SODIUM CHLORIDE 75 ML/HR: 150; 5; 450 INJECTION, SOLUTION INTRAVENOUS at 17:11

## 2018-10-11 NOTE — PROGRESS NOTES
Name: Kristyn Severino ADMIT: 10/6/2018   : 1924  PCP: Cary Espinal PA-C    MRN: [id number] LOS: 5 days   AGE/SEX: 89 y.o. female  ROOM: [facility]   Subjective   No new complaints. Sat in a chair some this afternoon.    Objective   Vital Signs  Temp:  [97.4 °F (36.3 °C)-97.9 °F (36.6 °C)] 97.4 °F (36.3 °C)  Heart Rate:  [65-92] 65  Resp:  [18] 18  BP: (106-128)/(55-76) 120/57  SpO2:  [90 %-96 %] 96 %  on  Flow (L/min):  [2] 2;   Device (Oxygen Therapy): nasal cannula  Body mass index is 23.86 kg/m².    Physical Exam   Constitutional: She is oriented to person, place, and time. No distress.   HENT:   +NGT   Cardiovascular: Normal rate and regular rhythm.    No murmur heard.  Pulmonary/Chest: Effort normal and breath sounds normal.   Abdominal: Soft. She exhibits distension. Bowel sounds are decreased. There is tenderness.   Musculoskeletal: Normal range of motion. She exhibits no edema.   Neurological: She is alert and oriented to person, place, and time.   Skin: Skin is warm and dry. She is not diaphoretic.       Results Review:       I reviewed the patient's new clinical results.    Results from last 7 days  Lab Units 10/11/18  0542 10/10/18  0631 10/09/18  0558 10/08/18  0641   WBC 10*3/mm3 6.33 7.86 2.84* 4.58   HEMOGLOBIN g/dL 8.7* 11.2* 9.3* 10.6*   PLATELETS 10*3/mm3 238 280 299 325       Results from last 7 days  Lab Units 10/11/18  0542 10/10/18  0631 10/09/18  0558 10/08/18  0641   SODIUM mmol/L 142 139 140 138   POTASSIUM mmol/L 3.5 4.2 3.8 3.9   CHLORIDE mmol/L 110* 104 105 105   CO2 mmol/L 24.3 16.5* 21.2* 21.2*   BUN mg/dL 19 17 11 10   CREATININE mg/dL 0.70 0.82 0.56* 0.58   GLUCOSE mg/dL 94 101* 70 68   Estimated Creatinine Clearance: 42.8 mL/min (by C-G formula based on SCr of 0.7 mg/dL).    Results from last 7 days  Lab Units 10/11/18  0542 10/10/18  0631 10/09/18  0558 10/08/18  0641  10/06/18  1756   CALCIUM mg/dL 8.2 8.1* 8.0* 8.5  < > 9.5   ALBUMIN g/dL  --   --   --   --   --  3.70    < > = values in this interval not displayed.      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       · Currently NPO following surgery. Continue IV fluids. Will add potassium.  · Pain is controlled with IV morphine. Zofran ordered for nausea.  · Postoperative care per Dr. Barillas.  · Monitor acidosis. Improved today.  · Pathology noted. Invasive colonic adenocarcinoma.      Garth Dalton MD  [facility]  10/11/18  4:41 PM

## 2018-10-11 NOTE — PLAN OF CARE
Problem: Fall Risk (Adult)  Goal: Identify Related Risk Factors and Signs and Symptoms  Outcome: Ongoing (interventions implemented as appropriate)    Goal: Absence of Fall  Outcome: Ongoing (interventions implemented as appropriate)      Problem: Patient Care Overview  Goal: Plan of Care Review  Outcome: Ongoing (interventions implemented as appropriate)   10/11/18 0421   Coping/Psychosocial   Plan of Care Reviewed With patient   OTHER   Outcome Summary incontient of urine x 2, bladderscanned with 55cc pvr, no c/o nausea, ng patent, med for pain x 2 with relief stated, eyes closed at long interval, resting quielty   Plan of Care Review   Progress no change     Goal: Individualization and Mutuality  Outcome: Ongoing (interventions implemented as appropriate)    Goal: Discharge Needs Assessment  Outcome: Ongoing (interventions implemented as appropriate)    Goal: Interprofessional Rounds/Family Conf  Outcome: Ongoing (interventions implemented as appropriate)      Problem: Skin Injury Risk (Adult)  Goal: Identify Related Risk Factors and Signs and Symptoms  Outcome: Ongoing (interventions implemented as appropriate)    Goal: Skin Health and Integrity  Outcome: Ongoing (interventions implemented as appropriate)      Problem: Bowel Obstruction (Adult)  Goal: Signs and Symptoms of Listed Potential Problems Will be Absent, Minimized or Managed (Bowel Obstruction)  Outcome: Ongoing (interventions implemented as appropriate)

## 2018-10-11 NOTE — PROGRESS NOTES
Chief Complaint:    S/P Right hemicolectomy, POD 2    Subjective:    The patient is generally feeling well.  She has mild abdominal pain.  The NG tube continues to have bilious output.  She has not passed flatus nor had a bowel movement.    Objective:    Temp:  [97.7 °F (36.5 °C)-97.9 °F (36.6 °C)] 97.8 °F (36.6 °C)  Heart Rate:  [87-92] 87  Resp:  [18] 18  BP: (106-139)/(55-76) 106/55    Physical Exam   Constitutional: She is cooperative. She does not appear ill. No distress.   Abdominal: Soft. She exhibits no distension. Bowel sounds are decreased. There is generalized tenderness (Appropriate postop tenderness).   Incision: Intact with no evidence of infection.   Neurological: She is alert.       Results:    WBC is 6.33.  H/H is 8.7/28.4 which is decreased from yesterday but stable from previous draws.    Impression/Plan:    The patient is POD 2 from a right hemicolectomy.  She continues to have an expected postop ileus with bilious output from the NG tube.  The NG tube will be continued for another day.    Recheck labs in the morning to make sure H/H is stable    Jose Contreras Jr., M.D.

## 2018-10-11 NOTE — PLAN OF CARE
Problem: Fall Risk (Adult)  Goal: Identify Related Risk Factors and Signs and Symptoms  Outcome: Ongoing (interventions implemented as appropriate)    Goal: Absence of Fall  Outcome: Ongoing (interventions implemented as appropriate)      Problem: Patient Care Overview  Goal: Plan of Care Review  Outcome: Ongoing (interventions implemented as appropriate)   10/11/18 1640   Coping/Psychosocial   Plan of Care Reviewed With patient   OTHER   Outcome Summary Pt was in the chair at bedside for a few hours, got a bath and walked in room. Then pt wanted to go back to bed soon after.. No c/o pain. Dressing removed by Dr. Contreras. Now CATHRYN with Staples. C/D/I. VSS. Will cont to monitor.   Plan of Care Review   Progress no change       Problem: Skin Injury Risk (Adult)  Goal: Identify Related Risk Factors and Signs and Symptoms  Outcome: Ongoing (interventions implemented as appropriate)    Goal: Skin Health and Integrity  Outcome: Ongoing (interventions implemented as appropriate)      Problem: Bowel Obstruction (Adult)  Goal: Signs and Symptoms of Listed Potential Problems Will be Absent, Minimized or Managed (Bowel Obstruction)  Outcome: Ongoing (interventions implemented as appropriate)

## 2018-10-12 ENCOUNTER — APPOINTMENT (OUTPATIENT)
Dept: GENERAL RADIOLOGY | Facility: HOSPITAL | Age: 83
End: 2018-10-12

## 2018-10-12 LAB
ANION GAP SERPL CALCULATED.3IONS-SCNC: 8 MMOL/L
BASOPHILS # BLD AUTO: 0.01 10*3/MM3 (ref 0–0.2)
BASOPHILS NFR BLD AUTO: 0.2 % (ref 0–1.5)
BUN BLD-MCNC: 16 MG/DL (ref 8–23)
BUN/CREAT SERPL: 30.2 (ref 7–25)
CALCIUM SPEC-SCNC: 8 MG/DL (ref 8.2–9.6)
CHLORIDE SERPL-SCNC: 111 MMOL/L (ref 98–107)
CO2 SERPL-SCNC: 23 MMOL/L (ref 22–29)
CREAT BLD-MCNC: 0.53 MG/DL (ref 0.57–1)
DEPRECATED RDW RBC AUTO: 63.6 FL (ref 37–54)
EOSINOPHIL # BLD AUTO: 0.09 10*3/MM3 (ref 0–0.7)
EOSINOPHIL NFR BLD AUTO: 1.4 % (ref 0.3–6.2)
ERYTHROCYTE [DISTWIDTH] IN BLOOD BY AUTOMATED COUNT: 18.1 % (ref 11.7–13)
GFR SERPL CREATININE-BSD FRML MDRD: 107 ML/MIN/1.73
GLUCOSE BLD-MCNC: 101 MG/DL (ref 65–99)
HCT VFR BLD AUTO: 29.7 % (ref 35.6–45.5)
HGB BLD-MCNC: 9 G/DL (ref 11.9–15.5)
IMM GRANULOCYTES # BLD: 0 10*3/MM3 (ref 0–0.03)
IMM GRANULOCYTES NFR BLD: 0 % (ref 0–0.5)
LYMPHOCYTES # BLD AUTO: 1.18 10*3/MM3 (ref 0.9–4.8)
LYMPHOCYTES NFR BLD AUTO: 18.4 % (ref 19.6–45.3)
MCH RBC QN AUTO: 29.6 PG (ref 26.9–32)
MCHC RBC AUTO-ENTMCNC: 30.3 G/DL (ref 32.4–36.3)
MCV RBC AUTO: 97.7 FL (ref 80.5–98.2)
MONOCYTES # BLD AUTO: 0.9 10*3/MM3 (ref 0.2–1.2)
MONOCYTES NFR BLD AUTO: 14.1 % (ref 5–12)
NEUTROPHILS # BLD AUTO: 4.22 10*3/MM3 (ref 1.9–8.1)
NEUTROPHILS NFR BLD AUTO: 65.9 % (ref 42.7–76)
PLATELET # BLD AUTO: 237 10*3/MM3 (ref 140–500)
PMV BLD AUTO: 10 FL (ref 6–12)
POTASSIUM BLD-SCNC: 3.5 MMOL/L (ref 3.5–5.2)
RBC # BLD AUTO: 3.04 10*6/MM3 (ref 3.9–5.2)
SODIUM BLD-SCNC: 142 MMOL/L (ref 136–145)
WBC NRBC COR # BLD: 6.4 10*3/MM3 (ref 4.5–10.7)

## 2018-10-12 PROCEDURE — 25010000002 MORPHINE PER 10 MG: Performed by: HOSPITALIST

## 2018-10-12 PROCEDURE — 80048 BASIC METABOLIC PNL TOTAL CA: CPT | Performed by: INTERNAL MEDICINE

## 2018-10-12 PROCEDURE — 85025 COMPLETE CBC W/AUTO DIFF WBC: CPT | Performed by: INTERNAL MEDICINE

## 2018-10-12 PROCEDURE — 99024 POSTOP FOLLOW-UP VISIT: CPT | Performed by: SURGERY

## 2018-10-12 PROCEDURE — 97161 PT EVAL LOW COMPLEX 20 MIN: CPT | Performed by: PHYSICAL THERAPIST

## 2018-10-12 PROCEDURE — 25010000002 MORPHINE PER 10 MG: Performed by: INTERNAL MEDICINE

## 2018-10-12 PROCEDURE — 74018 RADEX ABDOMEN 1 VIEW: CPT

## 2018-10-12 RX ORDER — MORPHINE SULFATE 2 MG/ML
2 INJECTION, SOLUTION INTRAMUSCULAR; INTRAVENOUS EVERY 4 HOURS PRN
Status: DISPENSED | OUTPATIENT
Start: 2018-10-12 | End: 2018-10-16

## 2018-10-12 RX ORDER — NALOXONE HCL 0.4 MG/ML
0.4 VIAL (ML) INJECTION
Status: DISCONTINUED | OUTPATIENT
Start: 2018-10-12 | End: 2018-10-17 | Stop reason: HOSPADM

## 2018-10-12 RX ADMIN — MORPHINE SULFATE 1 MG: 2 INJECTION, SOLUTION INTRAMUSCULAR; INTRAVENOUS at 12:38

## 2018-10-12 RX ADMIN — MORPHINE SULFATE 2 MG: 2 INJECTION, SOLUTION INTRAMUSCULAR; INTRAVENOUS at 23:28

## 2018-10-12 RX ADMIN — POTASSIUM CHLORIDE, DEXTROSE MONOHYDRATE AND SODIUM CHLORIDE 75 ML/HR: 150; 5; 450 INJECTION, SOLUTION INTRAVENOUS at 20:11

## 2018-10-12 RX ADMIN — FAMOTIDINE 20 MG: 10 INJECTION, SOLUTION INTRAVENOUS at 08:44

## 2018-10-12 RX ADMIN — POTASSIUM CHLORIDE, DEXTROSE MONOHYDRATE AND SODIUM CHLORIDE 75 ML/HR: 150; 5; 450 INJECTION, SOLUTION INTRAVENOUS at 05:48

## 2018-10-12 RX ADMIN — MORPHINE SULFATE 2 MG: 2 INJECTION, SOLUTION INTRAMUSCULAR; INTRAVENOUS at 18:48

## 2018-10-12 RX ADMIN — LEVOTHYROXINE SODIUM 88 MCG: 88 TABLET ORAL at 05:41

## 2018-10-12 RX ADMIN — ZOLPIDEM TARTRATE 2.5 MG: 5 TABLET ORAL at 23:28

## 2018-10-12 RX ADMIN — MORPHINE SULFATE 2 MG: 2 INJECTION, SOLUTION INTRAMUSCULAR; INTRAVENOUS at 14:52

## 2018-10-12 NOTE — THERAPY EVALUATION
Acute Care - Physical Therapy Initial Evaluation  [facility]     Patient Name: Kristyn Severino  : 1924  MRN: [id number]  Today's Date: 10/12/2018   Onset of Illness/Injury or Date of Surgery: 10/12/18  Date of Referral to PT: 10/12/18  Referring Physician: Dr. Garth Dalton      Admit Date: 10/6/2018    Visit Dx:     ICD-10-CM ICD-9-CM   1. Small bowel obstruction (CMS/HCC) K56.609 560.9   2. Other specified intestinal obstruction, unspecified whether partial or complete (CMS/HCC) K56.699 560.89   3. 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     Past Medical History:   Diagnosis Date   • Anemia of chronic illness    • PALACIOS (dyspnea on exertion)    • Fatigue    • Hirsutism    • History of bone density study     few years; normal   • History of colonoscopy     never   • History of prior pregnancies     x4   • HLD (hyperlipidemia)    • Hypothyroidism     Hashimoto's diagnosed age 19   • Insomnia    • Lightheadedness    • Osteoarthritis    • Postmenopausal    • Renal insufficiency    • Rhinorrhea    • Seborrheic keratosis    • Vitamin D deficiency      Past Surgical History:   Procedure Laterality Date   • APPENDECTOMY  2010    Dr. De Oliveira   • BREAST BIOPSY     • COLON RESECTION N/A 10/9/2018    Procedure: right hemicolectomy;  Surgeon: Jung Barillas MD;  Location: [facility];  Service: General   • JOINT REPLACEMENT Left 2011    Dr. Gold   • MAMMO BILATERAL      many years ago   • PAP SMEAR      many years ago        PT ASSESSMENT (last 12 hours)      Physical Therapy Evaluation     Row  Name 10/12/18 1514          PT Evaluation Time/Intention    Subjective Information complains of;pain;fatigue  -     Document Type evaluation  -     Mode of Treatment individual therapy;physical therapy  -KH     Patient Effort excellent  -     Symptoms Noted During/After Treatment none  -     Row Name 10/12/18 1514          General Information    Patient Profile Reviewed? yes  -     Onset of Illness/Injury or Date of Surgery 10/12/18  -     Referring Physician Dr. Garth Dalton  -     Patient Observations alert;cooperative;agree to therapy  -     Patient/Family Observations daughter present  -     General Observations of Patient Patient supine in bed  -KH     Prior Level of Function independent:  -KH     Equipment Currently Used at Home cane, straight  -     Pertinent History of Current Functional Problem intestinal obstruction due to colon cancer  -     Existing Precautions/Restrictions fall;oxygen therapy device and L/min  -     Limitations/Impairments hearing  -     Risks Reviewed patient:  -     Benefits Reviewed patient:  -     Barriers to Rehab none identified  -     Row Name 10/12/18 1514          Relationship/Environment    Primary Source of Support/Comfort child(geronimo)  -     Lives With alone  -     Family Caregiver if Needed child(geronimo), adult  -     Primary Roles/Responsibilities retired  -     Row Name 10/12/18 1514          Resource/Environmental Concerns    Current Living Arrangements home/apartment/condo  -     Resource/Environmental Concerns none  -     Transportation Concerns car, none  -     Row Name 10/12/18 1514          Cognitive Assessment/Interventions    Additional Documentation Cognitive Assessment/Intervention (Group)  -     Row Name 10/12/18 1514          Cognitive Assessment/Intervention- PT/OT    Orientation Status (Cognition) oriented x 3  -     Follows Commands (Cognition) WFL  -     Personal Safety Interventions fall prevention program  maintained;gait belt;nonskid shoes/slippers when out of bed;supervised activity  -     Row Name 10/12/18 1514          Bed Mobility Assessment/Treatment    Bed Mobility Assessment/Treatment scooting/bridging;supine-sit;sit-supine  -     Scooting/Bridging Bland (Bed Mobility) minimum assist (75% patient effort)  -     Supine-Sit Bland (Bed Mobility) moderate assist (50% patient effort)  -     Sit-Supine Bland (Bed Mobility) moderate assist (50% patient effort)  -     Bed Mobility, Safety Issues decreased use of arms for pushing/pulling;decreased use of legs for bridging/pushing;impaired trunk control for bed mobility  -     Assistive Device (Bed Mobility) bed rails;draw sheet  -     Row Name 10/12/18 1514          Transfer Assessment/Treatment    Transfer Assessment/Treatment sit-stand transfer;stand-sit transfer  -     Sit-Stand Bland (Transfers) minimum assist (75% patient effort)  -     Stand-Sit Bland (Transfers) minimum assist (75% patient effort)  -     Row Name 10/12/18 1514          Sit-Stand Transfer    Assistive Device (Sit-Stand Transfers) walker, front-wheeled  -     Row Name 10/12/18 1514          Stand-Sit Transfer    Assistive Device (Stand-Sit Transfers) walker, front-wheeled  -     Row Name 10/12/18 1514          Gait/Stairs Assessment/Training    Gait/Stairs Assessment/Training gait/ambulation independence  -     Bland Level (Gait) minimum assist (75% patient effort)  -     Assistive Device (Gait) walker, front-wheeled  -     Distance in Feet (Gait) 20  -     Deviations/Abnormal Patterns (Gait) bilateral deviations;base of support, wide;lori decreased;festinating/shuffling;gait speed decreased;stride length decreased  -     Bilateral Gait Deviations forward flexed posture  -     Row Name 10/12/18 1514          General ROM    GENERAL ROM COMMENTS WFL  -     Row Name 10/12/18 1514          MMT (Manual Muscle Testing)     General MMT Comments B LE grossly 4/5 with MMT  -     Row Name 10/12/18 1514          Pain Assessment    Additional Documentation Pain Scale: Numbers Pre/Post-Treatment (Group)  -     Row Name 10/12/18 1514          Pain Scale: Numbers Pre/Post-Treatment    Pain Scale: Numbers, Pretreatment 3/10  -KH     Pain Scale: Numbers, Post-Treatment 3/10  -KH     Pain Location - Orientation lower  -KH     Pain Location abdomen  -     Pain Intervention(s) Repositioned  -     Row Name             Wound 10/09/18 1132 Other (See comments) abdomen incision    Wound - Properties Group Date first assessed: 10/09/18  - Time first assessed: 1132  -SM Side: Other (See comments)  -SM Location: abdomen  -SM Type: incision  -SM    Row Name 10/12/18 1514          Plan of Care Review    Plan of Care Reviewed With patient  -     Row Name 10/12/18 1514          Physical Therapy Clinical Impression    Date of Referral to PT 10/12/18  -     PT Diagnosis (PT Clinical Impression) generalized weakness  -     Prognosis (PT Clinical Impression) good  -     Functional Level at Time of Evaluation (PT Clinical Impression) min A  -KH     Patient/Family Goals Statement (PT Clinical Impression) to get stronger and go home  -     Criteria for Skilled Interventions Met (PT Clinical Impression) yes;treatment indicated  -     Pathology/Pathophysiology Noted (Describe Specifically for Each System) musculoskeletal;pulmonary  -     Impairments Found (describe specific impairments) aerobic capacity/endurance;gait, locomotion, and balance;muscle performance  -     Functional Limitations in Following Categories (Describe Specific Limitations) home management;community/leisure;self-care  -     Disability: Inability to Perform Actions/Activities of Required Roles (describe specific disability) community/leisure  -     Rehab Potential (PT Clinical Summary) good, to achieve stated therapy goals  -     Care Plan Review, Other Participant  (PT Clinical Impression) daughter  -     Row Name 10/12/18 1514          Physical Therapy Goals    Bed Mobility Goal Selection (PT) bed mobility, PT goal 1  -KH     Transfer Goal Selection (PT) transfer, PT goal 1  -KH     Gait Training Goal Selection (PT) gait training, PT goal 1  -     Row Name 10/12/18 1514          Bed Mobility Goal 1 (PT)    Activity/Assistive Device (Bed Mobility Goal 1, PT) sit to supine/supine to sit  -KH     [place] Level/Cues Needed (Bed Mobility Goal 1, PT) independent  -KH     Time Frame (Bed Mobility Goal 1, PT) long term goal (LTG)  -KH     Progress/Outcomes (Bed Mobility Goal 1, PT) goal ongoing  -     Row Name 10/12/18 1514          Transfer Goal 1 (PT)    Activity/Assistive Device (Transfer Goal 1, PT) sit-to-stand/stand-to-sit;bed-to-chair/chair-to-bed  -KH     [place] Level/Cues Needed (Transfer Goal 1, PT) conditional independence  -KH     Time Frame (Transfer Goal 1, PT) long term goal (LTG)  -KH     Progress/Outcome (Transfer Goal 1, PT) goal ongoing  -     Row Name 10/12/18 1514          Gait Training Goal 1 (PT)    Activity/Assistive Device (Gait Training Goal 1, PT) gait (walking locomotion)  -KH     [place] Level (Gait Training Goal 1, PT) conditional independence  -KH     Distance (Gait Goal 1, PT) 150  -KH     Time Frame (Gait Training Goal 1, PT) long term goal (LTG)  -KH     Progress/Outcome (Gait Training Goal 1, PT) goal ongoing  -     Row Name 10/12/18 1514          Positioning and Restraints    Pre-Treatment Position in bed  -KH     Post Treatment Position bed  -KH     In Bed supine;call light within reach;encouraged to call for assist  -     Row Name 10/12/18 1514          Living Environment    Home Accessibility stairs to enter home;stairs within home  -KH       User Key  (r) = Recorded By, (t) = Taken By, (c) = Cosigned By    Initials Name Provider Type    Brady Aaron, RN Registered Nurse    Jessie Ross, PT Physical Therapist           Physical Therapy Education     Title: PT OT SLP Therapies (Active)     Topic: Physical Therapy (Active)     Point: Mobility training (Done)    Learning Progress Summary     Learner Status Readiness Method Response Comment Documented by    Patient Done Acceptance E,[facility]AS [facility] 10/12/18 1519          Point: Body mechanics (Done)    Learning Progress Summary     Learner Status Readiness Method Response Comment Documented by    Patient Done Acceptance E,NICKOLAS [facility] 10/12/18 1519          Point: Precautions (Done)    Learning Progress Summary     Learner Status Readiness Method Response Comment Documented by    Patient Done Acceptance E,NICKOLAS [facility] 10/12/18 1519                      User Key     Initials Effective Dates Name Provider Type Discipline     06/22/16 -  Jessie Moreira, PT Physical Therapist PT                PT Recommendation and Plan  Anticipated Discharge Disposition (PT): home with home health, skilled nursing facility  Planned Therapy Interventions (PT Eval): balance training, bed mobility training, gait training, home exercise program, ROM (range of motion), stair training, strengthening, stretching, transfer training, patient/family education, postural re-education  Therapy Frequency (PT Clinical Impression): daily  Outcome Summary/Treatment Plan (PT)  Anticipated Equipment Needs at Discharge (PT): walker  Anticipated Discharge Disposition (PT): home with home health, skilled nursing facility  Plan of Care Reviewed With: patient  Progress: improving  Outcome Summary: Patient admitted due to intestinal obstruction. She was independent prior to admission and currently requires min A for safety with mobility due to weakness/pain. Patient is appropriate for skilled PT while in acute care. Anticipate D/C home with home health or to SNF pending patient's progress          Outcome Measures     Row Name 10/12/18 1500             How much help from another person do you currently need...    Turning from your  back to your side while in flat bed without using bedrails? 3  -KH      Moving from lying on back to sitting on the side of a flat bed without bedrails? 3  -KH      Moving to and from a bed to a chair (including a wheelchair)? 3  -KH      Standing up from a chair using your arms (e.g., wheelchair, bedside chair)? 3  -KH      Climbing 3-5 steps with a railing? 3  -KH      To walk in hospital room? 3  -KH      AM-PAC 6 Clicks Score 18  -KH         Functional Assessment    Outcome Measure Options AM-PAC 6 Clicks Basic Mobility (PT)  -        User Key  (r) = Recorded By, (t) = Taken By, (c) = Cosigned By    Initials Name Provider Type    Jessie Ross, PT Physical Therapist           Time Calculation:         PT Charges     Row Name 10/12/18 1513             Time Calculation    Start Time 1455  -      Stop Time 1510  -      Time Calculation (min) 15 min  -KH      PT Received On 10/12/18  -      PT - Next Appointment 10/13/18  -NORMA      PT Goal Re-Cert Due Date 10/19/18  -        User Key  (r) = Recorded By, (t) = Taken By, (c) = Cosigned By    Initials Name Provider Type    Jessie Ross, PT Physical Therapist        Therapy Suggested Charges     Code   Minutes Charges    None           Therapy Charges for Today     Code Description Service Date Service Provider Modifiers Qty    66712333038 HC PT EVAL LOW COMPLEXITY 2 10/12/2018 Jessie Moreira, PT GP 1          PT G-Codes  Outcome Measure Options: AM-PAC 6 Clicks Basic Mobility (PT)  AM-PAC 6 Clicks Score: 18      Jessie Moreira PT  10/12/2018

## 2018-10-12 NOTE — PROGRESS NOTES
Chief Complaint:    S/P Right hemicolectomy, POD 3    Subjective:    The patient is generally feeling well with no nausea or vomiting.  She has mild abdominal pain.  She has not passed flatus nor had any bowel movement.  The NG tube has decreased output but remains bilious.    Objective:    Temp:  [97.2 °F (36.2 °C)-97.9 °F (36.6 °C)] 97.2 °F (36.2 °C)  Heart Rate:  [61-65] 64  Resp:  [16-18] 16  BP: (117-136)/(57-65) 117/57    Physical Exam   Constitutional: She is cooperative. She does not appear ill. No distress.   Abdominal: Soft. She exhibits no distension. Bowel sounds are absent. There is no tenderness.   Incision: Intact with no evidence of infection.   Neurological: She is alert.       Results:    WBC is 6.40.  H/H is stable at 9.0/29.7.  Pathology shows an invasive carcinoma that extends through the muscularis propria into surrounding fat.  There is one positive lymph node out of 17.  Her stage is T3 N1 M0.    Impression/Plan:    The patient is POD 3 from a right hemicolectomy.  She has an expected postop ileus and may not be ready for NG tube removal yet.  Abdominal x-rays have been ordered.    Jose Contreras Jr., M.D.

## 2018-10-12 NOTE — PROGRESS NOTES
Continued Stay Note  [facility]     Patient Name: Kristyn Severino  MRN: [id number]  Today's Date: 10/12/2018    Admit Date: 10/6/2018          Discharge Plan     Row Name 10/12/18 1009       Plan    Plan Plan return to [facility] for skilled care.  Faby BLAKE RN    Plan Comments Spoke to pt at bedside.  Pt confirms plan remains to return to [facility] .  Joe  ( [phone]) continues to follow for [facility].  Plan [facility].  JAMIL Leonard              Discharge Codes    No documentation.           Hanna Hobbs, RN

## 2018-10-12 NOTE — PLAN OF CARE
Problem: Fall Risk (Adult)  Goal: Identify Related Risk Factors and Signs and Symptoms  Outcome: Ongoing (interventions implemented as appropriate)    Goal: Absence of Fall  Outcome: Ongoing (interventions implemented as appropriate)      Problem: Patient Care Overview  Goal: Plan of Care Review  Outcome: Ongoing (interventions implemented as appropriate)   10/12/18 0417   Coping/Psychosocial   Plan of Care Reviewed With patient   OTHER   Outcome Summary hypoactive bowel sounds, ng patent with brown drainage, denies nausea, med for pain x 1 with relief stated, Eye closed at long interval, resting quielty   Plan of Care Review   Progress no change     Goal: Individualization and Mutuality  Outcome: Ongoing (interventions implemented as appropriate)    Goal: Discharge Needs Assessment  Outcome: Ongoing (interventions implemented as appropriate)    Goal: Interprofessional Rounds/Family Conf  Outcome: Ongoing (interventions implemented as appropriate)      Problem: Skin Injury Risk (Adult)  Goal: Identify Related Risk Factors and Signs and Symptoms  Outcome: Ongoing (interventions implemented as appropriate)    Goal: Skin Health and Integrity  Outcome: Ongoing (interventions implemented as appropriate)      Problem: Bowel Obstruction (Adult)  Goal: Signs and Symptoms of Listed Potential Problems Will be Absent, Minimized or Managed (Bowel Obstruction)  Outcome: Ongoing (interventions implemented as appropriate)

## 2018-10-12 NOTE — PROGRESS NOTES
Name: Kristyn Severino ADMIT: 10/6/2018   : 1924  PCP: Cary Espinal PA-C    MRN: [id number] LOS: 6 days   AGE/SEX: 89 y.o. female  ROOM: [facility]   Subjective   No new complaints. Sore throat.    Objective   Vital Signs  Temp:  [97.2 °F (36.2 °C)-97.9 °F (36.6 °C)] 97.2 °F (36.2 °C)  Heart Rate:  [61-65] 64  Resp:  [16-18] 16  BP: (117-136)/(57-65) 117/57  SpO2:  [96 %-100 %] 99 %  on  Flow (L/min):  [2-3] 3;   Device (Oxygen Therapy): nasal cannula  Body mass index is 23.86 kg/m².    Physical Exam   Constitutional: She is oriented to person, place, and time. No distress.   HENT:   +NGT   Cardiovascular: Normal rate and regular rhythm.    No murmur heard.  Pulmonary/Chest: Effort normal and breath sounds normal.   Abdominal: Soft. She exhibits distension. Bowel sounds are decreased. There is tenderness.   Musculoskeletal: Normal range of motion. She exhibits no edema.   Neurological: She is alert and oriented to person, place, and time.   Skin: Skin is warm and dry. She is not diaphoretic.       Results Review:       I reviewed the patient's new clinical results.    Results from last 7 days  Lab Units 10/12/18  0535 10/11/18  0542 10/10/18  0631 10/09/18  0558   WBC 10*3/mm3 6.40 6.33 7.86 2.84*   HEMOGLOBIN g/dL 9.0* 8.7* 11.2* 9.3*   PLATELETS 10*3/mm3 237 238 280 299       Results from last 7 days  Lab Units 10/12/18  0535 10/11/18  0542 10/10/18  0631 10/09/18  0558   SODIUM mmol/L 142 142 139 140   POTASSIUM mmol/L 3.5 3.5 4.2 3.8   CHLORIDE mmol/L 111* 110* 104 105   CO2 mmol/L 23.0 24.3 16.5* 21.2*   BUN mg/dL 16 19 17 11   CREATININE mg/dL 0.53* 0.70 0.82 0.56*   GLUCOSE mg/dL 101* 94 101* 70   Estimated Creatinine Clearance: 42.8 mL/min (A) (by C-G formula based on SCr of 0.53 mg/dL (L)).    Results from last 7 days  Lab Units 10/12/18  0535 10/11/18  0542 10/10/18  0631 10/09/18  0558  10/06/18  1756   CALCIUM mg/dL 8.0* 8.2 8.1* 8.0*  < > 9.5   ALBUMIN g/dL  --   --   --   --   --  3.70   < > =  values in this interval not displayed.      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   · Continue IV fluids.   · Pain is only partially controlled with IV morphine. Will increase dose to 2 mg.  · Postoperative care per surgery. Discussed w/ Dr. Contreras.  · Pathology noted. Invasive colonic adenocarcinoma. Likely consult oncology next week.  · Consult PT.      Garth Dalton MD  [facility]  10/12/18  7:52 AM

## 2018-10-12 NOTE — PLAN OF CARE
Problem: Patient Care Overview  Goal: Plan of Care Review  Outcome: Ongoing (interventions implemented as appropriate)   10/12/18 4182   Coping/Psychosocial   Plan of Care Reviewed With patient   OTHER   Outcome Summary Patient admitted due to intestinal obstruction. She was independent prior to admission and currently requires min A for safety with mobility due to weakness/pain. Patient is appropriate for skilled PT while in acute care. Anticipate D/C home with home health or to SNF pending patient's progress   Plan of Care Review   Progress improving

## 2018-10-13 LAB
ANION GAP SERPL CALCULATED.3IONS-SCNC: 7.5 MMOL/L
BASOPHILS # BLD AUTO: 0.01 10*3/MM3 (ref 0–0.2)
BASOPHILS NFR BLD AUTO: 0.2 % (ref 0–1.5)
BUN BLD-MCNC: 10 MG/DL (ref 8–23)
BUN/CREAT SERPL: 21.7 (ref 7–25)
CALCIUM SPEC-SCNC: 7.9 MG/DL (ref 8.2–9.6)
CHLORIDE SERPL-SCNC: 109 MMOL/L (ref 98–107)
CO2 SERPL-SCNC: 24.5 MMOL/L (ref 22–29)
CREAT BLD-MCNC: 0.46 MG/DL (ref 0.57–1)
DEPRECATED RDW RBC AUTO: 62.8 FL (ref 37–54)
EOSINOPHIL # BLD AUTO: 0.14 10*3/MM3 (ref 0–0.7)
EOSINOPHIL NFR BLD AUTO: 2.9 % (ref 0.3–6.2)
ERYTHROCYTE [DISTWIDTH] IN BLOOD BY AUTOMATED COUNT: 17.6 % (ref 11.7–13)
GFR SERPL CREATININE-BSD FRML MDRD: 126 ML/MIN/1.73
GLUCOSE BLD-MCNC: 97 MG/DL (ref 65–99)
HCT VFR BLD AUTO: 31.7 % (ref 35.6–45.5)
HGB BLD-MCNC: 9.7 G/DL (ref 11.9–15.5)
IMM GRANULOCYTES # BLD: 0 10*3/MM3 (ref 0–0.03)
IMM GRANULOCYTES NFR BLD: 0 % (ref 0–0.5)
LYMPHOCYTES # BLD AUTO: 1.3 10*3/MM3 (ref 0.9–4.8)
LYMPHOCYTES NFR BLD AUTO: 26.6 % (ref 19.6–45.3)
MCH RBC QN AUTO: 30.1 PG (ref 26.9–32)
MCHC RBC AUTO-ENTMCNC: 30.6 G/DL (ref 32.4–36.3)
MCV RBC AUTO: 98.4 FL (ref 80.5–98.2)
MONOCYTES # BLD AUTO: 0.94 10*3/MM3 (ref 0.2–1.2)
MONOCYTES NFR BLD AUTO: 19.2 % (ref 5–12)
NEUTROPHILS # BLD AUTO: 2.5 10*3/MM3 (ref 1.9–8.1)
NEUTROPHILS NFR BLD AUTO: 51.1 % (ref 42.7–76)
PLATELET # BLD AUTO: 248 10*3/MM3 (ref 140–500)
PMV BLD AUTO: 10 FL (ref 6–12)
POTASSIUM BLD-SCNC: 3.5 MMOL/L (ref 3.5–5.2)
RBC # BLD AUTO: 3.22 10*6/MM3 (ref 3.9–5.2)
SODIUM BLD-SCNC: 141 MMOL/L (ref 136–145)
WBC NRBC COR # BLD: 4.89 10*3/MM3 (ref 4.5–10.7)

## 2018-10-13 PROCEDURE — 80048 BASIC METABOLIC PNL TOTAL CA: CPT | Performed by: INTERNAL MEDICINE

## 2018-10-13 PROCEDURE — 85025 COMPLETE CBC W/AUTO DIFF WBC: CPT | Performed by: INTERNAL MEDICINE

## 2018-10-13 PROCEDURE — 97110 THERAPEUTIC EXERCISES: CPT

## 2018-10-13 PROCEDURE — 25010000002 MORPHINE PER 10 MG: Performed by: HOSPITALIST

## 2018-10-13 PROCEDURE — 99024 POSTOP FOLLOW-UP VISIT: CPT | Performed by: SURGERY

## 2018-10-13 PROCEDURE — 36415 COLL VENOUS BLD VENIPUNCTURE: CPT | Performed by: INTERNAL MEDICINE

## 2018-10-13 RX ADMIN — MORPHINE SULFATE 2 MG: 2 INJECTION, SOLUTION INTRAMUSCULAR; INTRAVENOUS at 03:35

## 2018-10-13 RX ADMIN — POTASSIUM CHLORIDE, DEXTROSE MONOHYDRATE AND SODIUM CHLORIDE 75 ML/HR: 150; 5; 450 INJECTION, SOLUTION INTRAVENOUS at 11:38

## 2018-10-13 RX ADMIN — LEVOTHYROXINE SODIUM 88 MCG: 88 TABLET ORAL at 06:44

## 2018-10-13 RX ADMIN — MORPHINE SULFATE 2 MG: 2 INJECTION, SOLUTION INTRAMUSCULAR; INTRAVENOUS at 23:57

## 2018-10-13 RX ADMIN — MORPHINE SULFATE 2 MG: 2 INJECTION, SOLUTION INTRAMUSCULAR; INTRAVENOUS at 16:19

## 2018-10-13 RX ADMIN — ZOLPIDEM TARTRATE 2.5 MG: 5 TABLET ORAL at 23:57

## 2018-10-13 RX ADMIN — FAMOTIDINE 20 MG: 10 INJECTION, SOLUTION INTRAVENOUS at 08:07

## 2018-10-13 RX ADMIN — MORPHINE SULFATE 2 MG: 2 INJECTION, SOLUTION INTRAMUSCULAR; INTRAVENOUS at 10:50

## 2018-10-13 NOTE — THERAPY TREATMENT NOTE
Acute Care - Physical Therapy Treatment Note  [facility]     Patient Name: Kristyn Severino  : 1924  MRN: [id number]  Today's Date: 10/13/2018  Onset of Illness/Injury or Date of Surgery: 10/12/18  Date of Referral to PT: 10/12/18  Referring Physician: Dr. Garth Dalton    Admit Date: 10/6/2018    Visit Dx:    ICD-10-CM ICD-9-CM   1. Small bowel obstruction (CMS/HCC) K56.609 560.9   2. Other specified intestinal obstruction, unspecified whether partial or complete (CMS/HCC) K56.699 560.89   3. 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       Therapy Treatment          Rehabilitation Treatment Summary     Row Name 10/13/18 1136             Treatment Time/Intention    Discipline physical therapist  -AR      Document Type therapy note (daily note)  -AR      Subjective Information complains of;pain;weakness  -AR      Mode of Treatment physical therapy  -AR      Therapy Frequency (PT Clinical Impression) daily  -AR      Patient Effort good  -AR      Existing Precautions/Restrictions fall  -AR      Recorded by [AR] Shanthi Hernandez, PT 10/13/18 1138      Row Name 10/13/18 1136             Vital Signs    O2 Delivery Pre Treatment supplemental O2  -AR      Recorded by [AR] Shanthi Hernandez, PT 10/13/18 1138      Row Name 10/13/18 1136             Cognitive Assessment/Intervention- PT/OT    Orientation Status (Cognition) oriented x 4  -AR      Follows Commands (Cognition) WNL  -AR      Personal Safety Interventions fall prevention program maintained;gait belt;muscle strengthening facilitated  -AR      Recorded  by [AR] Shanthi Hernandez, PT 10/13/18 1138      Row Name 10/13/18 1136             Bed Mobility Assessment/Treatment    Supine-Sit [place] (Bed Mobility) minimum assist (75% patient effort)  -AR      Sit-Supine [place] (Bed Mobility) not tested  -AR      Assistive Device (Bed Mobility) bed rails;head of bed elevated  -AR      Recorded by [AR] Shanthi Hernandez, PT 10/13/18 1138      Row Name 10/13/18 1136             Sit-Stand Transfer    Sit-Stand [place] (Transfers) minimum assist (75% patient effort)  -AR      Assistive Device (Sit-Stand Transfers) walker, front-wheeled  -AR      Recorded by [AR] Shanthi Hernandez, PT 10/13/18 1138      Row Name 10/13/18 1136             Stand-Sit Transfer    Stand-Sit [place] (Transfers) minimum assist (75% patient effort)  -AR      Assistive Device (Stand-Sit Transfers) walker, front-wheeled  -AR      Recorded by [AR] Shanthi Hernandez, PT 10/13/18 1138      Row Name 10/13/18 1136             Gait/Stairs Assessment/Training    [place] Level (Gait) minimum assist (75% patient effort)  -AR      Assistive Device (Gait) walker, front-wheeled  -AR      Distance in Feet (Gait) 3  -AR      Pattern (Gait) swing-to  -AR      Deviations/Abnormal Patterns (Gait) festinating/shuffling;gait speed decreased  -AR      Comment (Gait/Stairs) limited by dizziness and fatigue  -AR      Recorded by [AR] Shanthi Hernandez, PT 10/13/18 1138      Row Name 10/13/18 1136             Positioning and Restraints    Pre-Treatment Position in bed  -AR      Post Treatment Position chair  -AR      In Chair sitting;encouraged to call for assist;call light within reach;exit alarm on  -AR      Recorded by [AR] Shanthi Hernandez, PT 10/13/18 1138      Row Name 10/13/18 1136             Pain Assessment    Additional Documentation Pain Scale: Numbers Pre/Post-Treatment (Group)  -AR      Recorded by [AR] Shanthi Hernandez, PT 10/13/18 1138      Row Name 10/13/18 1136             Pain Scale:  Numbers Pre/Post-Treatment    Pain Scale: Numbers, Pretreatment 6/10  -AR      Pain Scale: Numbers, Post-Treatment 7/10  -AR      Pain Location abdomen  -AR      Recorded by [AR] Shanthi Hernandez, PT 10/13/18 1138      Row Name                Wound 10/09/18 1132 Other (See comments) abdomen incision    Wound - Properties Group Date first assessed: 10/09/18 [SM] Time first assessed: 1132 [SM] Side: Other (See comments) [SM] Location: abdomen [SM] Type: incision [SM] Recorded by:  [SM] Brady Carcamo RN 10/09/18 1132    Row Name 10/13/18 1136             Outcome Summary/Treatment Plan (PT)    Anticipated Discharge Disposition (PT) skilled nursing facility  -AR      Recorded by [AR] Shanthi Hernandez, PT 10/13/18 1138        User Key  (r) = Recorded By, (t) = Taken By, (c) = Cosigned By    Initials Name Effective Dates Discipline     Brady Carcamo, RN 06/16/16 -  Nurse    AR Shanthi Hernandez, PT 04/03/18 -  PT          Wound 10/09/18 1132 Other (See comments) abdomen incision (Active)   Dressing Appearance dry;intact 10/13/2018  8:07 AM   Closure Staples 10/13/2018  8:07 AM   Drainage Amount none 10/13/2018  8:07 AM   Dressing Care, Wound open to air 10/13/2018  8:07 AM             Physical Therapy Education     Title: PT OT SLP Therapies (Done)     Topic: Physical Therapy (Done)     Point: Mobility training (Done)    Learning Progress Summary     Learner Status Readiness Method Response Comment Documented by    Patient Done Acceptance E VU  AR 10/13/18 1138     Done Acceptance E VU  MS 10/13/18 0442     Done Acceptance E,TB VU  KH 10/12/18 0359          Point: Home exercise program (Done)    Learning Progress Summary     Learner Status Readiness Method Response Comment Documented by    Patient Done Acceptance E VU  AR 10/13/18 1138     Done Acceptance E VU  MS 10/13/18 0442          Point: Body mechanics (Done)    Learning Progress Summary     Learner Status Readiness Method Response Comment Documented by     Patient Done Acceptance E VU  AR 10/13/18 1138     Done Acceptance E VU  MS 10/13/18 0442     Done Acceptance E,TB VU   10/12/18 1519          Point: Precautions (Done)    Learning Progress Summary     Learner Status Readiness Method Response Comment Documented by    Patient Done Acceptance E VU  AR 10/13/18 1138     Done Acceptance E VU  MS 10/13/18 0442     Done Acceptance E,TB VU   10/12/18 1519                      User Key     Initials Effective Dates Name Provider Type Discipline    AR 04/03/18 -  Shanthi Hernandez, PT Physical Therapist PT     06/22/16 -  Jessie Moreira, PT Physical Therapist PT    MS 10/30/17 -  Nica Castro, RN Registered Nurse Nurse                    PT Recommendation and Plan  Anticipated Discharge Disposition (PT): skilled nursing facility  Therapy Frequency (PT Clinical Impression): daily  Outcome Summary/Treatment Plan (PT)  Anticipated Discharge Disposition (PT): skilled nursing facility  Outcome Summary: Limited progress towards goals during PT today.  Able to ambulate few feet bed>chair w/ min A and RW, limited by dizziness, weakness, and pain.  Currently recommend DC to SNU.            Outcome Measures     Row Name 10/13/18 1100 10/12/18 1500          How much help from another person do you currently need...    Turning from your back to your side while in flat bed without using bedrails? 3  -AR 3  -KH     Moving from lying on back to sitting on the side of a flat bed without bedrails? 3  -AR 3  -KH     Moving to and from a bed to a chair (including a wheelchair)? 3  -AR 3  -KH     Standing up from a chair using your arms (e.g., wheelchair, bedside chair)? 3  -AR 3  -KH     Climbing 3-5 steps with a railing? 2  -AR 3  -KH     To walk in hospital room? 3  -AR 3  -KH     AM-PAC 6 Clicks Score 17  -AR 18  -KH        Functional Assessment    Outcome Measure Options  -- AM-PAC 6 Clicks Basic Mobility (PT)  -KH       User Key  (r) = Recorded By, (t) = Taken By, (c) = Cosigned By     Initials Name Provider Type    AR Shanthi Hernandez, PT Physical Therapist    Jessie Ross, PT Physical Therapist           Time Calculation:         PT Charges     Row Name 10/13/18 1139             Time Calculation    Start Time 1050  -AR      Stop Time 1104  -AR      Time Calculation (min) 14 min  -AR      PT Received On 10/13/18  -AR      PT - Next Appointment 10/14/18  -AR        User Key  (r) = Recorded By, (t) = Taken By, (c) = Cosigned By    Initials Name Provider Type    AR Shanthi Hernandez PT Physical Therapist        Therapy Suggested Charges     Code   Minutes Charges    None           Therapy Charges for Today     Code Description Service Date Service Provider Modifiers Qty    69200088145 HC PT THER PROC EA 15 MIN 10/13/2018 Shanthi Hernandez, PT GP 1          PT G-Codes  Outcome Measure Options: AM-PAC 6 Clicks Basic Mobility (PT)  AM-PAC 6 Clicks Score: 17    Sahnthi Hernandez PT  10/13/2018

## 2018-10-13 NOTE — PROGRESS NOTES
Chief Complaint:    S/P Right hemicolectomy, POD 4    Subjective:    The patient is feeling better with no nausea or vomiting.  She has very mild abdominal pain.  She has not passed flatus nor had a bowel movement.  The NG tube continues to have bilious output.    Objective:    Temp:  [97.5 °F (36.4 °C)-97.9 °F (36.6 °C)] 97.6 °F (36.4 °C)  Heart Rate:  [65-90] 77  Resp:  [16-20] 16  BP: (109-165)/(62-93) 109/82    Physical Exam   Constitutional: She is cooperative. She appears ill. No distress.   Abdominal: Soft. She exhibits distension. Bowel sounds are absent. There is no tenderness.   Neurological: She is alert.       Results:    Labs are basically normal.    Impression/Plan:    The patient is POD 4 from a right hemicolectomy.  She continues to have an ileus with no bowel sounds and bilious output from the NG tube.  She has been encouraged to increase her activity.  Await return of bowel function.    Jose Contreras Jr., M.D.

## 2018-10-13 NOTE — PLAN OF CARE
Problem: Fall Risk (Adult)  Goal: Identify Related Risk Factors and Signs and Symptoms  Outcome: Ongoing (interventions implemented as appropriate)    Goal: Absence of Fall  Outcome: Ongoing (interventions implemented as appropriate)      Problem: Patient Care Overview  Goal: Plan of Care Review   10/13/18 1501   OTHER   Outcome Summary Pt quiet this shift. Very little bowel sounds. NG remains to intermittent suction. Medicated for pain as needed. Up in chair this morning. Walked with PT. VSS. Will continue to monitor.     Goal: Individualization and Mutuality  Outcome: Ongoing (interventions implemented as appropriate)    Goal: Discharge Needs Assessment  Outcome: Ongoing (interventions implemented as appropriate)    Goal: Interprofessional Rounds/Family Conf  Outcome: Ongoing (interventions implemented as appropriate)      Problem: Skin Injury Risk (Adult)  Goal: Identify Related Risk Factors and Signs and Symptoms  Outcome: Ongoing (interventions implemented as appropriate)    Goal: Skin Health and Integrity  Outcome: Ongoing (interventions implemented as appropriate)      Problem: Bowel Obstruction (Adult)  Goal: Signs and Symptoms of Listed Potential Problems Will be Absent, Minimized or Managed (Bowel Obstruction)  Outcome: Ongoing (interventions implemented as appropriate)

## 2018-10-13 NOTE — PLAN OF CARE
Problem: Patient Care Overview  Goal: Plan of Care Review   10/13/18 1139   OTHER   Outcome Summary Limited progress towards goals during PT today. Able to ambulate few feet bed>chair w/ min A and RW, limited by dizziness, weakness, and pain. Currently recommend DC to SNU.

## 2018-10-13 NOTE — PROGRESS NOTES
"DAILY PROGRESS NOTE  [facility]    Patient Identification:  Name: Kristyn Severino  Age: 89 y.o.  Sex: female  :  1924  MRN: [id number]         Primary Care Physician: Cary Espinal PA-C    Subjective:  Interval History:She feels better    Objective:    Scheduled Meds:  famotidine 20 mg Intravenous Daily   latanoprost 1 drop Both Eyes Nightly   levothyroxine 88 mcg Oral Q AM     Continuous Infusions:  dextrose 5 % and sodium chloride 0.45 % with KCl 20 mEq/L 75 mL/hr Last Rate: 75 mL/hr (10/13/18 1138)       Vital signs in last 24 hours:  Temp:  [97.5 °F (36.4 °C)-97.9 °F (36.6 °C)] 97.6 °F (36.4 °C)  Heart Rate:  [65-90] 77  Resp:  [16-20] 16  BP: (109-165)/(62-93) 109/82    Intake/Output:    Intake/Output Summary (Last 24 hours) at 10/13/18 1314  Last data filed at 10/13/18 0930   Gross per 24 hour   Intake             1120 ml   Output              250 ml   Net              870 ml       Exam:  /82 (BP Location: Left arm, Patient Position: Lying)   Pulse 77   Temp 97.6 °F (36.4 °C) (Oral)   Resp 16   Ht 162.6 cm (64\")   Wt 63 kg (139 lb)   LMP  (LMP Unknown)   SpO2 100%   BMI 23.86 kg/m²     General Appearance:    Alert, cooperative, no distress   Head:    Normocephalic, without obvious abnormality, atraumatic   Eyes:       Throat:   Lips, tongue, gums normal   Neck:   Supple, symmetrical, trachea midline, no JVD   Lungs:     Clear to auscultation bilaterally, respirations unlabored   Chest Wall:    No tenderness or deformity    Heart:    Regular rate and rhythm, S1 and S2 normal, no murmur,no  Rub or gallop   Abdomen:     Soft, surgical changes, bowel sounds active, no masses, no organomegaly    Extremities:   Extremities normal, atraumatic, no cyanosis or edema   Pulses:      Skin:   Skin is warm and dry,  no rashes or palpable lesions   Neurologic:   no focal deficits noted      Lab Results (last 72 hours)     Procedure Component Value Units Date/Time    Basic Metabolic " Panel [[id number]]  (Abnormal) Collected:  10/13/18 0618    Specimen:  Blood Updated:  10/13/18 0653     Glucose 97 mg/dL      BUN 10 mg/dL      Creatinine 0.46 (L) mg/dL      Sodium 141 mmol/L      Potassium 3.5 mmol/L      Chloride 109 (H) mmol/L      CO2 24.5 mmol/L      Calcium 7.9 (L) mg/dL      eGFR Non African Amer 126 mL/min/1.73      BUN/Creatinine Ratio 21.7     Anion Gap 7.5 mmol/L     Narrative:       The MDRD GFR formula is only valid for adults with stable renal function between ages 18 and 70.    CBC & Differential [[id number]] Collected:  10/13/18 0618    Specimen:  Blood Updated:  10/13/18 0633    Narrative:       The following orders were created for panel order CBC & Differential.  Procedure                               Abnormality         Status                     ---------                               -----------         ------                     CBC Auto Differential[[id number]]        Abnormal            Final result                 Please view results for these tests on the individual orders.    CBC Auto Differential [[id number]]  (Abnormal) Collected:  10/13/18 0618    Specimen:  Blood Updated:  10/13/18 0633     WBC 4.89 10*3/mm3      RBC 3.22 (L) 10*6/mm3      Hemoglobin 9.7 (L) g/dL      Hematocrit 31.7 (L) %      MCV 98.4 (H) fL      MCH 30.1 pg      MCHC 30.6 (L) g/dL      RDW 17.6 (H) %      RDW-SD 62.8 (H) fl      MPV 10.0 fL      Platelets 248 10*3/mm3      Neutrophil % 51.1 %      Lymphocyte % 26.6 %      Monocyte % 19.2 (H) %      Eosinophil % 2.9 %      Basophil % 0.2 %      Immature Grans % 0.0 %      Neutrophils, Absolute 2.50 10*3/mm3      Lymphocytes, Absolute 1.30 10*3/mm3      Monocytes, Absolute 0.94 10*3/mm3      Eosinophils, Absolute 0.14 10*3/mm3      Basophils, Absolute 0.01 10*3/mm3      Immature Grans, Absolute 0.00 10*3/mm3     Basic Metabolic Panel [[id number]]  (Abnormal) Collected:  10/12/18 0535    Specimen:  Blood Updated:  10/12/18 0638     Glucose 101 (H) mg/dL       BUN 16 mg/dL      Creatinine 0.53 (L) mg/dL      Sodium 142 mmol/L      Potassium 3.5 mmol/L      Chloride 111 (H) mmol/L      CO2 23.0 mmol/L      Calcium 8.0 (L) mg/dL      eGFR Non African Amer 107 mL/min/1.73      BUN/Creatinine Ratio 30.2 (H)     Anion Gap 8.0 mmol/L     Narrative:       The MDRD GFR formula is only valid for adults with stable renal function between ages 18 and 70.    CBC & Differential [[id number]] Collected:  10/12/18 0535    Specimen:  Blood Updated:  10/12/18 0611    Narrative:       The following orders were created for panel order CBC & Differential.  Procedure                               Abnormality         Status                     ---------                               -----------         ------                     CBC Auto Differential[[id number]]        Abnormal            Final result                 Please view results for these tests on the individual orders.    CBC Auto Differential [[id number]]  (Abnormal) Collected:  10/12/18 0535    Specimen:  Blood Updated:  10/12/18 0611     WBC 6.40 10*3/mm3      RBC 3.04 (L) 10*6/mm3      Hemoglobin 9.0 (L) g/dL      Hematocrit 29.7 (L) %      MCV 97.7 fL      MCH 29.6 pg      MCHC 30.3 (L) g/dL      RDW 18.1 (H) %      RDW-SD 63.6 (H) fl      MPV 10.0 fL      Platelets 237 10*3/mm3      Neutrophil % 65.9 %      Lymphocyte % 18.4 (L) %      Monocyte % 14.1 (H) %      Eosinophil % 1.4 %      Basophil % 0.2 %      Immature Grans % 0.0 %      Neutrophils, Absolute 4.22 10*3/mm3      Lymphocytes, Absolute 1.18 10*3/mm3      Monocytes, Absolute 0.90 10*3/mm3      Eosinophils, Absolute 0.09 10*3/mm3      Basophils, Absolute 0.01 10*3/mm3      Immature Grans, Absolute 0.00 10*3/mm3     Basic Metabolic Panel [[id number]]  (Abnormal) Collected:  10/11/18 0542    Specimen:  Blood Updated:  10/11/18 0647     Glucose 94 mg/dL      BUN 19 mg/dL      Creatinine 0.70 mg/dL      Sodium 142 mmol/L      Potassium 3.5 mmol/L      Chloride 110 (H)  mmol/L      CO2 24.3 mmol/L      Calcium 8.2 mg/dL      eGFR Non African Amer 78 mL/min/1.73      BUN/Creatinine Ratio 27.1 (H)     Anion Gap 7.7 mmol/L     Narrative:       The MDRD GFR formula is only valid for adults with stable renal function between ages 18 and 70.    CBC & Differential [[id number]] Collected:  10/11/18 0542    Specimen:  Blood Updated:  10/11/18 0620    Narrative:       The following orders were created for panel order CBC & Differential.  Procedure                               Abnormality         Status                     ---------                               -----------         ------                     CBC Auto Differential[[id number]]        Abnormal            Final result                 Please view results for these tests on the individual orders.    CBC Auto Differential [[id number]]  (Abnormal) Collected:  10/11/18 0542    Specimen:  Blood Updated:  10/11/18 0620     WBC 6.33 10*3/mm3      RBC 2.93 (L) 10*6/mm3      Hemoglobin 8.7 (L) g/dL      Hematocrit 28.4 (L) %      MCV 96.9 fL      MCH 29.7 pg      MCHC 30.6 (L) g/dL      RDW 18.3 (H) %      RDW-SD 64.2 (H) fl      MPV 10.0 fL      Platelets 238 10*3/mm3      Neutrophil % 72.3 %      Lymphocyte % 11.4 (L) %      Monocyte % 16.0 (H) %      Eosinophil % 0.0 (L) %      Basophil % 0.0 %      Immature Grans % 0.3 %      Neutrophils, Absolute 4.58 10*3/mm3      Lymphocytes, Absolute 0.72 (L) 10*3/mm3      Monocytes, Absolute 1.01 10*3/mm3      Eosinophils, Absolute 0.00 10*3/mm3      Basophils, Absolute 0.00 10*3/mm3      Immature Grans, Absolute 0.02 10*3/mm3     Tissue Pathology Exam [[id number]] Collected:  10/09/18 1020    Specimen:  Tissue from Large Intestine, Right / Ascending Colon Updated:  10/10/18 6018     Case Report --     Surgical Pathology Report                         Case: [id number]                                  Authorizing Provider:  Jung Barillas MD      Collected:           10/09/2018 10:20 AM         "  Ordering Location:     [facility]  Received:            10/09/2018 12:08 PM                                 MAIN OR                                                                      Pathologist:           Phil Hernandez MD                                                                           Specimen:    Large Intestine, Right / Ascending Colon, TERMINAL ILEUM AND RIGHT COLON                    Final Diagnosis --     RIGHT/ASCENDING COLON, TERMINAL ILEUM:         INVASIVE COLONIC ADENOCARCINOMA, GRADE 2 (5.3 CM) OF RIGHT COLON.         TUMOR EXTENDS THROUGH FULL THICKNESS OF MUSCULARIS PROPRIA AND INTO ADIPOSE TISSUE.         METASTATIC CARCINOMA PRESENT IN 1 OF 17 MESENTERIC LYMPH NODES.         MARGINS FREE OF TUMOR.    SYNOPTIC REPORT: (The following synoptic report utilizes the June, 2017 CAP protocol for carcinoma of colon and rectum):    Procedure: Right hemicolectomy.  Tumor site: Right colon.  Tumor size: 5.3 cm.  Macroscopic tumor perforation: Not identified.  Histologic type: Adenocarcinoma.  Histologic grade: Grade 2.  Tumor extension: Tumor invades through muscularis propria into pericolonic tissue.  Margins:         Margins uninvolved by invasive carcinoma, high grade dysplasia, intramucosal adenocarcinoma, and adenoma.        Margins examined: Proximal, distal, and mesenteric margins.        Distance of invasive carcinoma from closest margin: 7 cm from mesenteric margin.  Lymphovascular invasion: Not identified.  Perineural invasion: Not identified.  Tumor deposit: Not identified.  Regional lymph nodes:        Number of lymph nodes involved: 1.        Number of lymph nodes examined: 17.  Pathologic staging:        Primary tumor: pT3.        Regional lymph nodes: pN1a.          CMK/th  IHC/CHAKA     CPT CODES:  1. 22879        Gross Description --     Received in formalin labeled \"terminal ileum " "and right colon\" is an unopened right colectomy specimen which includes the terminal ileum, cecum without appendix, proximal colon, and a modern amount of attached mesentery fat. The margins are closed with metal sutures. The specimen is 7 cm from the ileal margin to the ileocecal valve and 30 cm from the ileocecal valve to the distal margin. Located 12 cm above the distal margin of resection there is a circumferential tan neoplastic appearing lesion which is 5.3 cm in circumference and up to 5.0 cm in maximum length. The lesion appears to involve the full thickness of the bowel wall and the overlying serosa is focally puckered. The bowel proximal to the lesion is moderately dilated but otherwise unremarkable. The bowel distal to the lesion is also grossly normal. The lesion is located 7 cm from the mesenteric apical soft tissue margin, which appears unremarkable. The attached fat is found to contain multiple grossly benign appearing lymph node candidates. Representative sections are submitted as follows:    1A: Shave adjacent to sutured ileal margin.  1B: Ileocecal valve.  1C and 1D: Lesion with inked on serosa.   1E and 1F: Additional sections of lesion.  1G: Shave adjacent to sutured distal margin.  1H: Shaved mesenteric apex soft tissue margin.  1I-1L: Intact mesenteric lymph node.  1M: Two bisected mesenteric lymph nodes, one inked black.    LW/USO/CMK/th             Microscopic Description --     Performed, incorporated in diagnosis.             Data Review:    Results from last 7 days  Lab Units 10/13/18  0618 10/12/18  0535 10/11/18  0542   SODIUM mmol/L 141 142 142   POTASSIUM mmol/L 3.5 3.5 3.5   CHLORIDE mmol/L 109* 111* 110*   CO2 mmol/L 24.5 23.0 24.3   BUN mg/dL 10 16 19   CREATININE mg/dL 0.46* 0.53* 0.70   GLUCOSE mg/dL 97 101* 94   CALCIUM mg/dL 7.9* 8.0* 8.2       Results from last 7 days  Lab Units 10/13/18  0618 10/12/18  0535 10/11/18  0542   WBC 10*3/mm3 4.89 6.40 6.33   HEMOGLOBIN g/dL 9.7* " 9.0* 8.7*   HEMATOCRIT % 31.7* 29.7* 28.4*   PLATELETS 10*3/mm3 248 237 238             No results found for: TROPONINT        Results from last 7 days  Lab Units 10/06/18  1756   ALK PHOS U/L 36*   BILIRUBIN mg/dL 0.6   ALT (SGPT) U/L 15   AST (SGOT) U/L 23             No results found for: POCGLU        Patient Active Problem List   Diagnosis Code   • Anemia of chronic illness D63.8   • Hirsutism L68.0   • HLD (hyperlipidemia) E78.5   • Hypothyroidism E03.9   • Insomnia G47.00   • Osteoarthritis M19.90   • Renal insufficiency N28.9   • Seborrheic keratosis L82.1   • Vitamin D deficiency E55.9   • Iron deficiency anemia due to chronic blood loss D50.0   • Irritable bowel syndrome K58.9   • Colitis K52.9   • Vomiting R11.10   • DNR (do not resuscitate) Z66   • 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 Await return of bowel function.  Path report noted.    Edgar Andrews MD  10/13/2018  1:14 PM

## 2018-10-13 NOTE — PLAN OF CARE
Problem: Fall Risk (Adult)  Goal: Identify Related Risk Factors and Signs and Symptoms  Outcome: Ongoing (interventions implemented as appropriate)   10/09/18 6489   Fall Risk (Adult)   Related Risk Factors (Fall Risk) age-related changes;fear of falling;gait/mobility problems;fatigue/slow reaction;environment unfamiliar   Signs and Symptoms (Fall Risk) presence of risk factors     Goal: Absence of Fall  Outcome: Ongoing (interventions implemented as appropriate)      Problem: Patient Care Overview  Goal: Individualization and Mutuality  Outcome: Ongoing (interventions implemented as appropriate)    Goal: Discharge Needs Assessment  Outcome: Ongoing (interventions implemented as appropriate)    Goal: Interprofessional Rounds/Family Conf  Outcome: Ongoing (interventions implemented as appropriate)      Problem: Skin Injury Risk (Adult)  Goal: Identify Related Risk Factors and Signs and Symptoms  Outcome: Ongoing (interventions implemented as appropriate)    Goal: Skin Health and Integrity  Outcome: Ongoing (interventions implemented as appropriate)      Problem: Bowel Obstruction (Adult)  Goal: Signs and Symptoms of Listed Potential Problems Will be Absent, Minimized or Managed (Bowel Obstruction)  Outcome: Ongoing (interventions implemented as appropriate)

## 2018-10-14 LAB
ANION GAP SERPL CALCULATED.3IONS-SCNC: 5.8 MMOL/L
BASOPHILS # BLD AUTO: 0.02 10*3/MM3 (ref 0–0.2)
BASOPHILS NFR BLD AUTO: 0.5 % (ref 0–1.5)
BUN BLD-MCNC: 6 MG/DL (ref 8–23)
BUN/CREAT SERPL: 10.5 (ref 7–25)
CALCIUM SPEC-SCNC: 8.2 MG/DL (ref 8.2–9.6)
CHLORIDE SERPL-SCNC: 108 MMOL/L (ref 98–107)
CO2 SERPL-SCNC: 29.2 MMOL/L (ref 22–29)
CREAT BLD-MCNC: 0.57 MG/DL (ref 0.57–1)
DEPRECATED RDW RBC AUTO: 61.1 FL (ref 37–54)
EOSINOPHIL # BLD AUTO: 0.2 10*3/MM3 (ref 0–0.7)
EOSINOPHIL NFR BLD AUTO: 4.6 % (ref 0.3–6.2)
ERYTHROCYTE [DISTWIDTH] IN BLOOD BY AUTOMATED COUNT: 17.4 % (ref 11.7–13)
GFR SERPL CREATININE-BSD FRML MDRD: 99 ML/MIN/1.73
GLUCOSE BLD-MCNC: 91 MG/DL (ref 65–99)
GLUCOSE BLDC GLUCOMTR-MCNC: 105 MG/DL (ref 70–130)
GLUCOSE BLDC GLUCOMTR-MCNC: 88 MG/DL (ref 70–130)
GLUCOSE BLDC GLUCOMTR-MCNC: 99 MG/DL (ref 70–130)
HCT VFR BLD AUTO: 32.9 % (ref 35.6–45.5)
HGB BLD-MCNC: 10.7 G/DL (ref 11.9–15.5)
IMM GRANULOCYTES # BLD: 0.01 10*3/MM3 (ref 0–0.03)
IMM GRANULOCYTES NFR BLD: 0.2 % (ref 0–0.5)
LYMPHOCYTES # BLD AUTO: 1.37 10*3/MM3 (ref 0.9–4.8)
LYMPHOCYTES NFR BLD AUTO: 31.6 % (ref 19.6–45.3)
MCH RBC QN AUTO: 31.2 PG (ref 26.9–32)
MCHC RBC AUTO-ENTMCNC: 32.5 G/DL (ref 32.4–36.3)
MCV RBC AUTO: 95.9 FL (ref 80.5–98.2)
MONOCYTES # BLD AUTO: 0.98 10*3/MM3 (ref 0.2–1.2)
MONOCYTES NFR BLD AUTO: 22.6 % (ref 5–12)
NEUTROPHILS # BLD AUTO: 1.77 10*3/MM3 (ref 1.9–8.1)
NEUTROPHILS NFR BLD AUTO: 40.7 % (ref 42.7–76)
PLATELET # BLD AUTO: 298 10*3/MM3 (ref 140–500)
PMV BLD AUTO: 9.7 FL (ref 6–12)
POTASSIUM BLD-SCNC: 3.3 MMOL/L (ref 3.5–5.2)
POTASSIUM BLD-SCNC: 4.6 MMOL/L (ref 3.5–5.2)
RBC # BLD AUTO: 3.43 10*6/MM3 (ref 3.9–5.2)
SODIUM BLD-SCNC: 143 MMOL/L (ref 136–145)
WBC NRBC COR # BLD: 4.34 10*3/MM3 (ref 4.5–10.7)

## 2018-10-14 PROCEDURE — 99024 POSTOP FOLLOW-UP VISIT: CPT | Performed by: SURGERY

## 2018-10-14 PROCEDURE — 25010000003 POTASSIUM CHLORIDE 10 MEQ/100ML SOLUTION: Performed by: HOSPITALIST

## 2018-10-14 PROCEDURE — 25010000002 MORPHINE PER 10 MG: Performed by: HOSPITALIST

## 2018-10-14 PROCEDURE — 84132 ASSAY OF SERUM POTASSIUM: CPT | Performed by: HOSPITALIST

## 2018-10-14 PROCEDURE — 80048 BASIC METABOLIC PNL TOTAL CA: CPT | Performed by: INTERNAL MEDICINE

## 2018-10-14 PROCEDURE — 97110 THERAPEUTIC EXERCISES: CPT

## 2018-10-14 PROCEDURE — 85025 COMPLETE CBC W/AUTO DIFF WBC: CPT | Performed by: INTERNAL MEDICINE

## 2018-10-14 PROCEDURE — 82962 GLUCOSE BLOOD TEST: CPT

## 2018-10-14 RX ORDER — POTASSIUM CHLORIDE 7.45 MG/ML
10 INJECTION INTRAVENOUS
Status: DISCONTINUED | OUTPATIENT
Start: 2018-10-14 | End: 2018-10-17 | Stop reason: HOSPADM

## 2018-10-14 RX ORDER — POTASSIUM CHLORIDE 1.5 G/1.77G
40 POWDER, FOR SOLUTION ORAL AS NEEDED
Status: DISCONTINUED | OUTPATIENT
Start: 2018-10-14 | End: 2018-10-17 | Stop reason: HOSPADM

## 2018-10-14 RX ORDER — POTASSIUM CHLORIDE 750 MG/1
40 CAPSULE, EXTENDED RELEASE ORAL AS NEEDED
Status: DISCONTINUED | OUTPATIENT
Start: 2018-10-14 | End: 2018-10-17 | Stop reason: HOSPADM

## 2018-10-14 RX ADMIN — MORPHINE SULFATE 2 MG: 2 INJECTION, SOLUTION INTRAMUSCULAR; INTRAVENOUS at 20:08

## 2018-10-14 RX ADMIN — POTASSIUM CHLORIDE, DEXTROSE MONOHYDRATE AND SODIUM CHLORIDE 75 ML/HR: 150; 5; 450 INJECTION, SOLUTION INTRAVENOUS at 14:05

## 2018-10-14 RX ADMIN — FAMOTIDINE 20 MG: 10 INJECTION, SOLUTION INTRAVENOUS at 07:43

## 2018-10-14 RX ADMIN — ZOLPIDEM TARTRATE 2.5 MG: 5 TABLET ORAL at 23:08

## 2018-10-14 RX ADMIN — MORPHINE SULFATE 2 MG: 2 INJECTION, SOLUTION INTRAMUSCULAR; INTRAVENOUS at 08:00

## 2018-10-14 RX ADMIN — LEVOTHYROXINE SODIUM 88 MCG: 88 TABLET ORAL at 06:22

## 2018-10-14 RX ADMIN — POTASSIUM CHLORIDE, DEXTROSE MONOHYDRATE AND SODIUM CHLORIDE 75 ML/HR: 150; 5; 450 INJECTION, SOLUTION INTRAVENOUS at 01:34

## 2018-10-14 RX ADMIN — MORPHINE SULFATE 2 MG: 2 INJECTION, SOLUTION INTRAMUSCULAR; INTRAVENOUS at 12:30

## 2018-10-14 RX ADMIN — POTASSIUM CHLORIDE 10 MEQ: 7.46 INJECTION, SOLUTION INTRAVENOUS at 17:22

## 2018-10-14 RX ADMIN — POTASSIUM CHLORIDE 10 MEQ: 7.46 INJECTION, SOLUTION INTRAVENOUS at 15:10

## 2018-10-14 RX ADMIN — POTASSIUM CHLORIDE 10 MEQ: 7.46 INJECTION, SOLUTION INTRAVENOUS at 11:36

## 2018-10-14 RX ADMIN — POTASSIUM CHLORIDE 10 MEQ: 7.46 INJECTION, SOLUTION INTRAVENOUS at 13:52

## 2018-10-14 NOTE — THERAPY TREATMENT NOTE
Acute Care - Physical Therapy Treatment Note  [facility]     Patient Name: Kristyn Severino  : 1924  MRN: [id number]  Today's Date: 10/14/2018  Onset of Illness/Injury or Date of Surgery: 10/12/18  Date of Referral to PT: 10/12/18  Referring Physician: Dr. Garth Dalton    Admit Date: 10/6/2018    Visit Dx:    ICD-10-CM ICD-9-CM   1. Small bowel obstruction (CMS/HCC) K56.609 560.9   2. Other specified intestinal obstruction, unspecified whether partial or complete (CMS/HCC) K56.699 560.89   3. 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       Therapy Treatment          Rehabilitation Treatment Summary     Row Name 10/14/18 1052             Treatment Time/Intention    Discipline physical therapist  -EE      Document Type therapy note (daily note)  -EE      Subjective Information complains of;fatigue  -EE      Mode of Treatment physical therapy  -EE      Patient/Family Observations Pt supine in bed in no acute distress  -EE      Patient Effort good  -EE      Existing Precautions/Restrictions fall;oxygen therapy device and L/min   NG tube to suction  -EE      Recorded by [EE] Sowmya Hirsch, PT 10/14/18 1118      Row Name 10/14/18 1052             Vital Signs    Pre SpO2 (%) 99  -EE      O2 Delivery Pre Treatment supplemental O2  -EE      O2 Delivery Intra Treatment supplemental O2  -EE      Post SpO2 (%) 99  -EE      O2 Delivery Post Treatment supplemental O2  -EE      Pre Patient Position Supine  -EE      Intra Patient Position Standing  -EE      Post Patient Position Sitting  -EE      Recorded by [EE]  Sowmya Hirsch, PT 10/14/18 1118      Row Name 10/14/18 1052             Cognitive Assessment/Intervention- PT/OT    Orientation Status (Cognition) oriented x 4  -EE      Follows Commands (Cognition) WNL  -EE      Personal Safety Interventions fall prevention program maintained;gait belt;muscle strengthening facilitated;nonskid shoes/slippers when out of bed;supervised activity  -EE      Recorded by [EE] Sowmya Hirsch, PT 10/14/18 1118      Row Name 10/14/18 1052             Safety Issues, Functional Mobility    Impairments Affecting Function (Mobility) balance;endurance/activity tolerance;strength  -EE      Recorded by [EE] Sowmya Hirsch, PT 10/14/18 1118      Row Name 10/14/18 1052             Bed Mobility Assessment/Treatment    Supine-Sit [place] (Bed Mobility) contact guard;verbal cues  -EE      Assistive Device (Bed Mobility) bed rails;head of bed elevated  -EE      Recorded by [EE] Sowmya Hirsch, PT 10/14/18 1118      Row Name 10/14/18 1052             Sit-Stand Transfer    Sit-Stand [place] (Transfers) contact guard;minimum assist (75% patient effort);verbal cues  -EE      Assistive Device (Sit-Stand Transfers) walker, front-wheeled  -EE      Recorded by [EE] Sowmya Hirsch, PT 10/14/18 1118      Row Name 10/14/18 1052             Stand-Sit Transfer    Stand-Sit [place] (Transfers) contact guard;verbal cues  -EE      Assistive Device (Stand-Sit Transfers) walker, front-wheeled  -EE      Recorded by [EE] Sowmya Hirsch, PT 10/14/18 1118      Row Name 10/14/18 1052             Gait/Stairs Assessment/Training    [place] Level (Gait) contact guard;verbal cues  -EE      Assistive Device (Gait) walker, front-wheeled  -EE      Distance in Feet (Gait) 70  -EE      Deviations/Abnormal Patterns (Gait) lori decreased;stride length decreased  -EE      Bilateral Gait Deviations forward flexed posture;heel strike decreased  -EE      Recorded by [EE] Sowmya Hirsch, PT 10/14/18 1118      Row Name 10/14/18 1052              Motor Skills Assessment/Interventions    Additional Documentation Therapeutic Exercise (Group)  -EE      Recorded by [EE] Sowmya Hirsch, PT 10/14/18 1118      Row Name 10/14/18 1052             Therapeutic Exercise    Lower Extremity (Therapeutic Exercise) LAQ (long arc quad), bilateral  -EE      Lower Extremity Range of Motion (Therapeutic Exercise) ankle dorsiflexion/plantar flexion, bilateral  -EE      Exercise Type (Therapeutic Exercise) AROM (active range of motion)  -EE      Position (Therapeutic Exercise) seated  -EE      Sets/Reps (Therapeutic Exercise) 1/10  -EE      Recorded by [EE] TorreySowmya, PT 10/14/18 1118      Row Name 10/14/18 1052             Positioning and Restraints    Pre-Treatment Position in bed  -EE      Post Treatment Position chair  -EE      In Chair sitting;call light within reach;encouraged to call for assist;exit alarm on  -EE      Recorded by [EE] Sowmya Hirsch, PT 10/14/18 1118      Row Name 10/14/18 1052             Pain Scale: Numbers Pre/Post-Treatment    Pain Scale: Numbers, Pretreatment 2/10  -EE      Pain Scale: Numbers, Post-Treatment 2/10  -EE      Pain Location throat  -EE      Pain Intervention(s) Repositioned  -EE      Recorded by [EE] Sowmya Hirsch, PT 10/14/18 1118      Row Name                Wound 10/09/18 1132 Other (See comments) abdomen incision    Wound - Properties Group Date first assessed: 10/09/18 [SM] Time first assessed: 1132 [SM] Side: Other (See comments) [SM] Location: abdomen [SM] Type: incision [SM] Recorded by:  [SM] Brady Carcamo RN 10/09/18 1132      User Key  (r) = Recorded By, (t) = Taken By, (c) = Cosigned By    Initials Name Effective Dates Discipline    EE Torrey Sowmya, PT 04/03/18 -  PT    SM Brady Carcamo RN 06/16/16 -  Nurse          Wound 10/09/18 1132 Other (See comments) abdomen incision (Active)   Dressing Appearance dry;intact 10/14/2018  8:00 AM   Closure [place] 10/14/2018  8:00 AM   Drainage Amount none 10/14/2018  8:00  AM   Care, Wound cleansed with;wound cleanser;irrigated with;sterile normal saline 10/13/2018  8:00 PM   Dressing Care, Wound open to air 10/14/2018  8:00 AM   Periwound Care, Wound cleansed with pH balanced cleanser;dry periwound area maintained 10/13/2018  8:00 PM   Wound Output (mL) 0 10/13/2018  8:00 PM             Physical Therapy Education     Title: PT OT SLP Therapies (Done)     Topic: Physical Therapy (Done)     Point: Mobility training (Done)    Learning Progress Summary     Learner Status Readiness Method Response Comment Documented by    Patient Done Acceptance E VU,NR  EE 10/14/18 1118     Done Acceptance E VU  MS 10/14/18 0333     Done Acceptance E VU  AR 10/13/18 1138     Done Acceptance E VU  MS 10/13/18 0442     Done Acceptance E,TB VU  KH 10/12/18 1519          Point: Home exercise program (Done)    Learning Progress Summary     Learner Status Readiness Method Response Comment Documented by    Patient Done Acceptance E VU,NR  EE 10/14/18 1118     Done Acceptance E VU  MS 10/14/18 0333     Done Acceptance E VU  AR 10/13/18 1138     Done Acceptance E VU  MS 10/13/18 0442          Point: Body mechanics (Done)    Learning Progress Summary     Learner Status Readiness Method Response Comment Documented by    Patient Done Acceptance E VU,NR  EE 10/14/18 1118     Done Acceptance E VU  MS 10/14/18 0333     Done Acceptance E VU  AR 10/13/18 1138     Done Acceptance E VU  MS 10/13/18 0442     Done Acceptance E,TB VU  KH 10/12/18 1519          Point: Precautions (Done)    Learning Progress Summary     Learner Status Readiness Method Response Comment Documented by    Patient Done Acceptance E VU  MS 10/14/18 0333     Done Acceptance E VU  AR 10/13/18 1138     Done Acceptance E VU  MS 10/13/18 0442     Done Acceptance E,TB VU  KH 10/12/18 1519                      User Key     Initials Effective Dates Name Provider Type Discipline    EE 04/03/18 -  Sowmya Hirsch, PT Physical Therapist PT    AR 04/03/18 -   Shanthi Hernandez, PT Physical Therapist PT     06/22/16 -  Jessie Moreira, PT Physical Therapist PT    MS 10/30/17 -  Nica Castro RN Registered Nurse Nurse                    PT Recommendation and Plan     Plan of Care Reviewed With: patient  Progress: improving  Outcome Summary: Pt demonstrates improved strength and endurance, as evidenced by tolerance of increased gait distance. No new PT concerns; denies dizziness during today's session.           Outcome Measures     Row Name 10/14/18 1100 10/13/18 1100 10/12/18 1500       How much help from another person do you currently need...    Turning from your back to your side while in flat bed without using bedrails? 3  -EE 3  -AR 3  -KH    Moving from lying on back to sitting on the side of a flat bed without bedrails? 3  -EE 3  -AR 3  -KH    Moving to and from a bed to a chair (including a wheelchair)? 3  -EE 3  -AR 3  -KH    Standing up from a chair using your arms (e.g., wheelchair, bedside chair)? 3  -EE 3  -AR 3  -KH    Climbing 3-5 steps with a railing? 2  -EE 2  -AR 3  -KH    To walk in hospital room? 3  -EE 3  -AR 3  -KH    AM-PAC 6 Clicks Score 17  -EE 17  -AR 18  -KH       Functional Assessment    Outcome Measure Options AM-PAC 6 Clicks Basic Mobility (PT)  -EE  -- AM-PAC 6 Clicks Basic Mobility (PT)  -KH      User Key  (r) = Recorded By, (t) = Taken By, (c) = Cosigned By    Initials Name Provider Type    EE Sowmya Hirsch, PT Physical Therapist    Shanthi Ann, PT Physical Therapist    Jessie Ross, PT Physical Therapist           Time Calculation:         PT Charges     Row Name 10/14/18 1123 10/14/18 1119          Time Calculation    Start Time --  -EE 1052  -EE     Stop Time --  -EE 1103  -EE     Time Calculation (min) --  -EE 11 min  -EE     PT Received On --  -EE 10/14/18  -EE     PT - Next Appointment --  -EE 10/15/18  -EE        Time Calculation- PT    Total Timed Code Minutes- PT --  -EE  --       User Key  (r) = Recorded By, (t) = Taken  By, (c) = Cosigned By    Initials Name Provider Type    EE Sowmya Hirsch, PT Physical Therapist        Therapy Suggested Charges     Code   Minutes Charges    None           Therapy Charges for Today     Code Description Service Date Service Provider Modifiers Qty    94039964029 HC PT THER PROC EA 15 MIN 10/14/2018 Sowmya Hirsch, PT GP 1          PT G-Codes  Outcome Measure Options: AM-PAC 6 Clicks Basic Mobility (PT)  AM-PAC 6 Clicks Score: 17    Sowmya Hirsch PT  10/14/2018

## 2018-10-14 NOTE — PROGRESS NOTES
Chief Complaint:    S/P Right hemicolectomy, POD 5    Subjective:    The patient has no significant abdominal pain.  She is not having any nausea or vomiting but the NG tube continues to put out bilious output.  She has not passed flatus or bowel movement.    Objective:    Temp:  [97.5 °F (36.4 °C)-97.7 °F (36.5 °C)] 97.6 °F (36.4 °C)  Heart Rate:  [79-81] 79  Resp:  [18] 18  BP: (107-127)/(62-71) 127/71    Physical Exam   Constitutional: She is cooperative. She does not appear ill. No distress.   Pulmonary/Chest: Effort normal. No respiratory distress.   Abdominal: Soft. Bowel sounds are normal. She exhibits distension. There is generalized tenderness (Appropriate postop tenderness).   Neurological: She is alert.       Results:    WBC is 4.34.  H/H is 10.7/32.9.    Impression/Plan:    The patient is POD 5 from a right hemicolectomy for colon cancer.  She is recovering well but continues to have an expected postop ileus.  Her NG tube will be clamped today in an attempt to remove it.    Jose Contreras Jr., M.D.

## 2018-10-14 NOTE — NURSING NOTE
Pt residual checked from NG tube after being clamped for 4 hours, no output.  Tube remains clamped.  Will continue to monitor.  DALE Hinojosa RN

## 2018-10-14 NOTE — PLAN OF CARE
Problem: Patient Care Overview  Goal: Individualization and Mutuality  Outcome: Ongoing (interventions implemented as appropriate)    Goal: Discharge Needs Assessment  Outcome: Ongoing (interventions implemented as appropriate)    Goal: Interprofessional Rounds/Family Conf  Outcome: Ongoing (interventions implemented as appropriate)   10/14/18 0326   Interdisciplinary Rounds/Family Conf   Summary Patient is resting well this shift, she was up with the CNA at beginning of shift and walked out to nursing station and back to bed. She is tolerating ice chips without difficulty, and has some hypoactive bowel sounds in all quadrants. Lungs are clear and respirations as well as vital signs have been normal. Incision to midline abdomen cleanses this shift with hibiclense and rinsed with sterile normal saline. No redness or drainage noted and staples are all intact. Nursing will continue to encourage patient to get up and walk and will monitor for any changes in her condition.       Problem: Skin Injury Risk (Adult)  Goal: Identify Related Risk Factors and Signs and Symptoms  Outcome: Ongoing (interventions implemented as appropriate)    Goal: Skin Health and Integrity  Outcome: Ongoing (interventions implemented as appropriate)      Problem: Bowel Obstruction (Adult)  Goal: Signs and Symptoms of Listed Potential Problems Will be Absent, Minimized or Managed (Bowel Obstruction)  Outcome: Ongoing (interventions implemented as appropriate)

## 2018-10-14 NOTE — PLAN OF CARE
Problem: Fall Risk (Adult)  Goal: Identify Related Risk Factors and Signs and Symptoms  Outcome: Ongoing (interventions implemented as appropriate)    Goal: Absence of Fall  Outcome: Ongoing (interventions implemented as appropriate)      Problem: Patient Care Overview  Goal: Plan of Care Review   10/14/18 1116   OTHER   Outcome Summary Pt still requiring IV pain medication. Walked with PT today and up to chair for a while at lunch time. NG tube is clamped and residuals with be checked. No c/o nausea so fair. IV postassium replacement infusing. VSS. Will continue to monitor patient.     Goal: Individualization and Mutuality  Outcome: Ongoing (interventions implemented as appropriate)    Goal: Discharge Needs Assessment  Outcome: Ongoing (interventions implemented as appropriate)      Problem: Skin Injury Risk (Adult)  Goal: Identify Related Risk Factors and Signs and Symptoms  Outcome: Ongoing (interventions implemented as appropriate)    Goal: Skin Health and Integrity  Outcome: Ongoing (interventions implemented as appropriate)      Problem: Bowel Obstruction (Adult)  Goal: Signs and Symptoms of Listed Potential Problems Will be Absent, Minimized or Managed (Bowel Obstruction)  Outcome: Ongoing (interventions implemented as appropriate)

## 2018-10-14 NOTE — PROGRESS NOTES
"DAILY PROGRESS NOTE  [facility]    Patient Identification:  Name: Kristyn Severino  Age: 89 y.o.  Sex: female  :  1924  MRN: [id number]         Primary Care Physician: Cary Espinal PA-C    Subjective:  Interval History:She feels better    Objective:    Scheduled Meds:    famotidine 20 mg Intravenous Daily   levothyroxine 88 mcg Oral Q AM     Continuous Infusions:    dextrose 5 % and sodium chloride 0.45 % with KCl 20 mEq/L 75 mL/hr Last Rate: 75 mL/hr (10/14/18 0134)       Vital signs in last 24 hours:  Temp:  [97.5 °F (36.4 °C)-97.7 °F (36.5 °C)] 97.6 °F (36.4 °C)  Heart Rate:  [79-81] 79  Resp:  [18] 18  BP: (107-127)/(62-71) 127/71    Intake/Output:    Intake/Output Summary (Last 24 hours) at 10/14/18 1116  Last data filed at 10/14/18 0134   Gross per 24 hour   Intake             1120 ml   Output              100 ml   Net             1020 ml       Exam:  /71 (BP Location: Left arm, Patient Position: Lying)   Pulse 79   Temp 97.6 °F (36.4 °C) (Oral)   Resp 18   Ht 162.6 cm (64\")   Wt 63 kg (139 lb)   LMP  (LMP Unknown)   SpO2 100%   BMI 23.86 kg/m²     General Appearance:    Alert, cooperative, no distress   Head:    Normocephalic, without obvious abnormality, atraumatic   Eyes:       Throat:   Lips, tongue, gums normal   Neck:   Supple, symmetrical, trachea midline, no JVD   Lungs:     Clear to auscultation bilaterally, respirations unlabored   Chest Wall:    No tenderness or deformity    Heart:    Regular rate and rhythm, S1 and S2 normal, no murmur,no  Rub or gallop   Abdomen:     Soft, surgical changes, bowel sounds active, no masses, no organomegaly    Extremities:   Extremities normal, atraumatic, no cyanosis or edema   Pulses:      Skin:   Skin is warm and dry,  no rashes or palpable lesions   Neurologic:   no focal deficits noted      Lab Results (last 72 hours)     Procedure Component Value Units Date/Time    Basic Metabolic Panel [[id number]]  (Abnormal) Collected:  " 10/13/18 0618    Specimen:  Blood Updated:  10/13/18 0653     Glucose 97 mg/dL      BUN 10 mg/dL      Creatinine 0.46 (L) mg/dL      Sodium 141 mmol/L      Potassium 3.5 mmol/L      Chloride 109 (H) mmol/L      CO2 24.5 mmol/L      Calcium 7.9 (L) mg/dL      eGFR Non African Amer 126 mL/min/1.73      BUN/Creatinine Ratio 21.7     Anion Gap 7.5 mmol/L     Narrative:       The MDRD GFR formula is only valid for adults with stable renal function between ages 18 and 70.    CBC & Differential [[id number]] Collected:  10/13/18 0618    Specimen:  Blood Updated:  10/13/18 0633    Narrative:       The following orders were created for panel order CBC & Differential.  Procedure                               Abnormality         Status                     ---------                               -----------         ------                     CBC Auto Differential[[id number]]        Abnormal            Final result                 Please view results for these tests on the individual orders.    CBC Auto Differential [[id number]]  (Abnormal) Collected:  10/13/18 0618    Specimen:  Blood Updated:  10/13/18 0633     WBC 4.89 10*3/mm3      RBC 3.22 (L) 10*6/mm3      Hemoglobin 9.7 (L) g/dL      Hematocrit 31.7 (L) %      MCV 98.4 (H) fL      MCH 30.1 pg      MCHC 30.6 (L) g/dL      RDW 17.6 (H) %      RDW-SD 62.8 (H) fl      MPV 10.0 fL      Platelets 248 10*3/mm3      Neutrophil % 51.1 %      Lymphocyte % 26.6 %      Monocyte % 19.2 (H) %      Eosinophil % 2.9 %      Basophil % 0.2 %      Immature Grans % 0.0 %      Neutrophils, Absolute 2.50 10*3/mm3      Lymphocytes, Absolute 1.30 10*3/mm3      Monocytes, Absolute 0.94 10*3/mm3      Eosinophils, Absolute 0.14 10*3/mm3      Basophils, Absolute 0.01 10*3/mm3      Immature Grans, Absolute 0.00 10*3/mm3     Basic Metabolic Panel [[id number]]  (Abnormal) Collected:  10/12/18 0535    Specimen:  Blood Updated:  10/12/18 0638     Glucose 101 (H) mg/dL      BUN 16 mg/dL      Creatinine 0.53  (L) mg/dL      Sodium 142 mmol/L      Potassium 3.5 mmol/L      Chloride 111 (H) mmol/L      CO2 23.0 mmol/L      Calcium 8.0 (L) mg/dL      eGFR Non African Amer 107 mL/min/1.73      BUN/Creatinine Ratio 30.2 (H)     Anion Gap 8.0 mmol/L     Narrative:       The MDRD GFR formula is only valid for adults with stable renal function between ages 18 and 70.    CBC & Differential [[id number]] Collected:  10/12/18 0535    Specimen:  Blood Updated:  10/12/18 0611    Narrative:       The following orders were created for panel order CBC & Differential.  Procedure                               Abnormality         Status                     ---------                               -----------         ------                     CBC Auto Differential[[id number]]        Abnormal            Final result                 Please view results for these tests on the individual orders.    CBC Auto Differential [[id number]]  (Abnormal) Collected:  10/12/18 0535    Specimen:  Blood Updated:  10/12/18 0611     WBC 6.40 10*3/mm3      RBC 3.04 (L) 10*6/mm3      Hemoglobin 9.0 (L) g/dL      Hematocrit 29.7 (L) %      MCV 97.7 fL      MCH 29.6 pg      MCHC 30.3 (L) g/dL      RDW 18.1 (H) %      RDW-SD 63.6 (H) fl      MPV 10.0 fL      Platelets 237 10*3/mm3      Neutrophil % 65.9 %      Lymphocyte % 18.4 (L) %      Monocyte % 14.1 (H) %      Eosinophil % 1.4 %      Basophil % 0.2 %      Immature Grans % 0.0 %      Neutrophils, Absolute 4.22 10*3/mm3      Lymphocytes, Absolute 1.18 10*3/mm3      Monocytes, Absolute 0.90 10*3/mm3      Eosinophils, Absolute 0.09 10*3/mm3      Basophils, Absolute 0.01 10*3/mm3      Immature Grans, Absolute 0.00 10*3/mm3     Basic Metabolic Panel [[id number]]  (Abnormal) Collected:  10/11/18 0542    Specimen:  Blood Updated:  10/11/18 0647     Glucose 94 mg/dL      BUN 19 mg/dL      Creatinine 0.70 mg/dL      Sodium 142 mmol/L      Potassium 3.5 mmol/L      Chloride 110 (H) mmol/L      CO2 24.3 mmol/L      Calcium 8.2  mg/dL      eGFR Non African Amer 78 mL/min/1.73      BUN/Creatinine Ratio 27.1 (H)     Anion Gap 7.7 mmol/L     Narrative:       The MDRD GFR formula is only valid for adults with stable renal function between ages 18 and 70.    CBC & Differential [[id number]] Collected:  10/11/18 0542    Specimen:  Blood Updated:  10/11/18 0620    Narrative:       The following orders were created for panel order CBC & Differential.  Procedure                               Abnormality         Status                     ---------                               -----------         ------                     CBC Auto Differential[[id number]]        Abnormal            Final result                 Please view results for these tests on the individual orders.    CBC Auto Differential [[id number]]  (Abnormal) Collected:  10/11/18 0542    Specimen:  Blood Updated:  10/11/18 0620     WBC 6.33 10*3/mm3      RBC 2.93 (L) 10*6/mm3      Hemoglobin 8.7 (L) g/dL      Hematocrit 28.4 (L) %      MCV 96.9 fL      MCH 29.7 pg      MCHC 30.6 (L) g/dL      RDW 18.3 (H) %      RDW-SD 64.2 (H) fl      MPV 10.0 fL      Platelets 238 10*3/mm3      Neutrophil % 72.3 %      Lymphocyte % 11.4 (L) %      Monocyte % 16.0 (H) %      Eosinophil % 0.0 (L) %      Basophil % 0.0 %      Immature Grans % 0.3 %      Neutrophils, Absolute 4.58 10*3/mm3      Lymphocytes, Absolute 0.72 (L) 10*3/mm3      Monocytes, Absolute 1.01 10*3/mm3      Eosinophils, Absolute 0.00 10*3/mm3      Basophils, Absolute 0.00 10*3/mm3      Immature Grans, Absolute 0.02 10*3/mm3     Tissue Pathology Exam [[id number]] Collected:  10/09/18 1020    Specimen:  Tissue from Large Intestine, Right / Ascending Colon Updated:  10/10/18 0172     Case Report --     Surgical Pathology Report                         Case: [id number]                                  Authorizing Provider:  Jung Barillas MD      Collected:           10/09/2018 10:20 AM          Ordering Location:     [facility]  "[place]  Received:            10/09/2018 12:08 PM                                 MAIN OR                                                                      Pathologist:           Phil Hernandez MD                                                                           Specimen:    Large Intestine, Right / Ascending Colon, TERMINAL ILEUM AND RIGHT COLON                    Final Diagnosis --     RIGHT/ASCENDING COLON, TERMINAL ILEUM:         INVASIVE COLONIC ADENOCARCINOMA, GRADE 2 (5.3 CM) OF RIGHT COLON.         TUMOR EXTENDS THROUGH FULL THICKNESS OF MUSCULARIS PROPRIA AND INTO ADIPOSE TISSUE.         METASTATIC CARCINOMA PRESENT IN 1 OF 17 MESENTERIC LYMPH NODES.         MARGINS FREE OF TUMOR.    SYNOPTIC REPORT: (The following synoptic report utilizes the June, 2017 CAP protocol for carcinoma of colon and rectum):    Procedure: Right hemicolectomy.  Tumor site: Right colon.  Tumor size: 5.3 cm.  Macroscopic tumor perforation: Not identified.  Histologic type: Adenocarcinoma.  Histologic grade: Grade 2.  Tumor extension: Tumor invades through muscularis propria into pericolonic tissue.  Margins:         Margins uninvolved by invasive carcinoma, high grade dysplasia, intramucosal adenocarcinoma, and adenoma.        Margins examined: Proximal, distal, and mesenteric margins.        Distance of invasive carcinoma from closest margin: 7 cm from mesenteric margin.  Lymphovascular invasion: Not identified.  Perineural invasion: Not identified.  Tumor deposit: Not identified.  Regional lymph nodes:        Number of lymph nodes involved: 1.        Number of lymph nodes examined: 17.  Pathologic staging:        Primary tumor: pT3.        Regional lymph nodes: pN1a.          CMK/th  IHC/CHAKA     CPT CODES:  1. 25969        Gross Description --     Received in formalin labeled \"terminal ileum and right colon\" is an unopened right " colectomy specimen which includes the terminal ileum, cecum without appendix, proximal colon, and a modern amount of attached mesentery fat. The margins are closed with metal sutures. The specimen is 7 cm from the ileal margin to the ileocecal valve and 30 cm from the ileocecal valve to the distal margin. Located 12 cm above the distal margin of resection there is a circumferential tan neoplastic appearing lesion which is 5.3 cm in circumference and up to 5.0 cm in maximum length. The lesion appears to involve the full thickness of the bowel wall and the overlying serosa is focally puckered. The bowel proximal to the lesion is moderately dilated but otherwise unremarkable. The bowel distal to the lesion is also grossly normal. The lesion is located 7 cm from the mesenteric apical soft tissue margin, which appears unremarkable. The attached fat is found to contain multiple grossly benign appearing lymph node candidates. Representative sections are submitted as follows:    1A: Shave adjacent to sutured ileal margin.  1B: Ileocecal valve.  1C and 1D: Lesion with inked on serosa.   1E and 1F: Additional sections of lesion.  1G: Shave adjacent to sutured distal margin.  1H: Shaved mesenteric apex soft tissue margin.  1I-1L: Intact mesenteric lymph node.  1M: Two bisected mesenteric lymph nodes, one inked black.    LW/USO/CMK/th             Microscopic Description --     Performed, incorporated in diagnosis.             Data Review:    Results from last 7 days  Lab Units 10/14/18  0757 10/13/18  0618 10/12/18  0535   SODIUM mmol/L 143 141 142   POTASSIUM mmol/L 3.3* 3.5 3.5   CHLORIDE mmol/L 108* 109* 111*   CO2 mmol/L 29.2* 24.5 23.0   BUN mg/dL 6* 10 16   CREATININE mg/dL 0.57 0.46* 0.53*   GLUCOSE mg/dL 91 97 101*   CALCIUM mg/dL 8.2 7.9* 8.0*       Results from last 7 days  Lab Units 10/14/18  0757 10/13/18  0618 10/12/18  0535   WBC 10*3/mm3 4.34* 4.89 6.40   HEMOGLOBIN g/dL 10.7* 9.7* 9.0*   HEMATOCRIT % 32.9* 31.7*  29.7*   PLATELETS 10*3/mm3 298 248 237             No results found for: TROPONINT            Invalid input(s): PROT, LABALBU          Glucose   Date/Time Value Ref Range Status   10/14/2018 0743 88 70 - 130 mg/dL Final   10/14/2018 0043 105 70 - 130 mg/dL Final           Patient Active Problem List   Diagnosis Code   • Anemia of chronic illness D63.8   • Hirsutism L68.0   • HLD (hyperlipidemia) E78.5   • Hypothyroidism E03.9   • Insomnia G47.00   • Osteoarthritis M19.90   • Renal insufficiency N28.9   • Seborrheic keratosis L82.1   • Vitamin D deficiency E55.9   • Iron deficiency anemia due to chronic blood loss D50.0   • Irritable bowel syndrome K58.9   • Colitis K52.9   • Vomiting R11.10   • DNR (do not resuscitate) Z66   • 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 Await return of bowel function.  Path report noted.  Up with PT and ambulate.    Edgar Andrews MD  10/14/2018  11:16 AM

## 2018-10-14 NOTE — PLAN OF CARE
Problem: Patient Care Overview  Goal: Plan of Care Review   10/14/18 1118   Coping/Psychosocial   Plan of Care Reviewed With patient   OTHER   Outcome Summary Pt demonstrates improved strength and endurance, as evidenced by tolerance of increased gait distance. No new PT concerns; denies dizziness during today's session.    Plan of Care Review   Progress improving

## 2018-10-15 ENCOUNTER — EPISODE CHANGES (OUTPATIENT)
Dept: CASE MANAGEMENT | Facility: OTHER | Age: 83
End: 2018-10-15

## 2018-10-15 LAB
ANION GAP SERPL CALCULATED.3IONS-SCNC: 6.5 MMOL/L
BASOPHILS # BLD AUTO: 0.01 10*3/MM3 (ref 0–0.2)
BASOPHILS NFR BLD AUTO: 0.2 % (ref 0–1.5)
BUN BLD-MCNC: 5 MG/DL (ref 8–23)
BUN/CREAT SERPL: 8.2 (ref 7–25)
CALCIUM SPEC-SCNC: 8.2 MG/DL (ref 8.2–9.6)
CHLORIDE SERPL-SCNC: 105 MMOL/L (ref 98–107)
CO2 SERPL-SCNC: 27.5 MMOL/L (ref 22–29)
CREAT BLD-MCNC: 0.61 MG/DL (ref 0.57–1)
DEPRECATED RDW RBC AUTO: 59.7 FL (ref 37–54)
EOSINOPHIL # BLD AUTO: 0.21 10*3/MM3 (ref 0–0.7)
EOSINOPHIL NFR BLD AUTO: 5.1 % (ref 0.3–6.2)
ERYTHROCYTE [DISTWIDTH] IN BLOOD BY AUTOMATED COUNT: 17 % (ref 11.7–13)
GFR SERPL CREATININE-BSD FRML MDRD: 91 ML/MIN/1.73
GLUCOSE BLD-MCNC: 97 MG/DL (ref 65–99)
GLUCOSE BLDC GLUCOMTR-MCNC: 102 MG/DL (ref 70–130)
GLUCOSE BLDC GLUCOMTR-MCNC: 118 MG/DL (ref 70–130)
GLUCOSE BLDC GLUCOMTR-MCNC: 91 MG/DL (ref 70–130)
GLUCOSE BLDC GLUCOMTR-MCNC: 96 MG/DL (ref 70–130)
HCT VFR BLD AUTO: 32.3 % (ref 35.6–45.5)
HGB BLD-MCNC: 10.1 G/DL (ref 11.9–15.5)
IMM GRANULOCYTES # BLD: 0.02 10*3/MM3 (ref 0–0.03)
IMM GRANULOCYTES NFR BLD: 0.5 % (ref 0–0.5)
LYMPHOCYTES # BLD AUTO: 1.24 10*3/MM3 (ref 0.9–4.8)
LYMPHOCYTES NFR BLD AUTO: 30.1 % (ref 19.6–45.3)
MCH RBC QN AUTO: 30 PG (ref 26.9–32)
MCHC RBC AUTO-ENTMCNC: 31.3 G/DL (ref 32.4–36.3)
MCV RBC AUTO: 95.8 FL (ref 80.5–98.2)
MONOCYTES # BLD AUTO: 1.06 10*3/MM3 (ref 0.2–1.2)
MONOCYTES NFR BLD AUTO: 25.7 % (ref 5–12)
NEUTROPHILS # BLD AUTO: 1.58 10*3/MM3 (ref 1.9–8.1)
NEUTROPHILS NFR BLD AUTO: 38.4 % (ref 42.7–76)
PLATELET # BLD AUTO: 261 10*3/MM3 (ref 140–500)
PMV BLD AUTO: 9.6 FL (ref 6–12)
POTASSIUM BLD-SCNC: 4 MMOL/L (ref 3.5–5.2)
RBC # BLD AUTO: 3.37 10*6/MM3 (ref 3.9–5.2)
SODIUM BLD-SCNC: 139 MMOL/L (ref 136–145)
WBC NRBC COR # BLD: 4.12 10*3/MM3 (ref 4.5–10.7)

## 2018-10-15 PROCEDURE — 97110 THERAPEUTIC EXERCISES: CPT

## 2018-10-15 PROCEDURE — 25010000002 MORPHINE PER 10 MG: Performed by: HOSPITALIST

## 2018-10-15 PROCEDURE — 82962 GLUCOSE BLOOD TEST: CPT

## 2018-10-15 PROCEDURE — 99024 POSTOP FOLLOW-UP VISIT: CPT | Performed by: SURGERY

## 2018-10-15 PROCEDURE — 80048 BASIC METABOLIC PNL TOTAL CA: CPT | Performed by: INTERNAL MEDICINE

## 2018-10-15 PROCEDURE — 85025 COMPLETE CBC W/AUTO DIFF WBC: CPT | Performed by: INTERNAL MEDICINE

## 2018-10-15 RX ADMIN — FAMOTIDINE 20 MG: 10 INJECTION, SOLUTION INTRAVENOUS at 08:15

## 2018-10-15 RX ADMIN — POTASSIUM CHLORIDE, DEXTROSE MONOHYDRATE AND SODIUM CHLORIDE 75 ML/HR: 150; 5; 450 INJECTION, SOLUTION INTRAVENOUS at 19:43

## 2018-10-15 RX ADMIN — POTASSIUM CHLORIDE, DEXTROSE MONOHYDRATE AND SODIUM CHLORIDE 75 ML/HR: 150; 5; 450 INJECTION, SOLUTION INTRAVENOUS at 06:05

## 2018-10-15 RX ADMIN — MORPHINE SULFATE 2 MG: 2 INJECTION, SOLUTION INTRAMUSCULAR; INTRAVENOUS at 08:15

## 2018-10-15 RX ADMIN — ZOLPIDEM TARTRATE 2.5 MG: 5 TABLET ORAL at 23:03

## 2018-10-15 RX ADMIN — MORPHINE SULFATE 2 MG: 2 INJECTION, SOLUTION INTRAMUSCULAR; INTRAVENOUS at 03:47

## 2018-10-15 RX ADMIN — MORPHINE SULFATE 2 MG: 2 INJECTION, SOLUTION INTRAMUSCULAR; INTRAVENOUS at 23:03

## 2018-10-15 RX ADMIN — LEVOTHYROXINE SODIUM 88 MCG: 88 TABLET ORAL at 06:05

## 2018-10-15 NOTE — PROGRESS NOTES
"DAILY PROGRESS NOTE  [facility]    Patient Identification:  Name: Kristyn Severino  Age: 89 y.o.  Sex: female  :  1924  MRN: [id number]         Primary Care Physician: Cary Espinal PA-C    Subjective:  Interval History:She feels better    Objective:    Scheduled Meds:    famotidine 20 mg Intravenous Daily   levothyroxine 88 mcg Oral Q AM     Continuous Infusions:    dextrose 5 % and sodium chloride 0.45 % with KCl 20 mEq/L 75 mL/hr Last Rate: 75 mL/hr (10/15/18 0605)       Vital signs in last 24 hours:  Temp:  [97.5 °F (36.4 °C)-98.5 °F (36.9 °C)] 97.7 °F (36.5 °C)  Heart Rate:  [70-91] 78  Resp:  [18-20] 20  BP: (124-136)/(70-90) 134/70    Intake/Output:    Intake/Output Summary (Last 24 hours) at 10/15/18 1349  Last data filed at 10/15/18 0815   Gross per 24 hour   Intake              900 ml   Output                0 ml   Net              900 ml       Exam:  /70 (BP Location: Left arm, Patient Position: Lying)   Pulse 78   Temp 97.7 °F (36.5 °C) (Oral)   Resp 20   Ht 162.6 cm (64\")   Wt 63 kg (139 lb)   LMP  (LMP Unknown)   SpO2 100%   BMI 23.86 kg/m²     General Appearance:    Alert, cooperative, no distress   Head:    Normocephalic, without obvious abnormality, atraumatic   Eyes:       Throat:   Lips, tongue, gums normal   Neck:   Supple, symmetrical, trachea midline, no JVD   Lungs:     Clear to auscultation bilaterally, respirations unlabored   Chest Wall:    No tenderness or deformity    Heart:    Regular rate and rhythm, S1 and S2 normal, no murmur,no  Rub or gallop   Abdomen:     Soft, surgical changes, bowel sounds active, no masses, no organomegaly    Extremities:   Extremities normal, atraumatic, no cyanosis or edema   Pulses:      Skin:   Skin is warm and dry,  no rashes or palpable lesions   Neurologic:   no focal deficits noted      Lab Results (last 72 hours)     Procedure Component Value Units Date/Time    Basic Metabolic Panel [[id number]]  (Abnormal) " Collected:  10/13/18 0618    Specimen:  Blood Updated:  10/13/18 0653     Glucose 97 mg/dL      BUN 10 mg/dL      Creatinine 0.46 (L) mg/dL      Sodium 141 mmol/L      Potassium 3.5 mmol/L      Chloride 109 (H) mmol/L      CO2 24.5 mmol/L      Calcium 7.9 (L) mg/dL      eGFR Non African Amer 126 mL/min/1.73      BUN/Creatinine Ratio 21.7     Anion Gap 7.5 mmol/L     Narrative:       The MDRD GFR formula is only valid for adults with stable renal function between ages 18 and 70.    CBC & Differential [[id number]] Collected:  10/13/18 0618    Specimen:  Blood Updated:  10/13/18 0633    Narrative:       The following orders were created for panel order CBC & Differential.  Procedure                               Abnormality         Status                     ---------                               -----------         ------                     CBC Auto Differential[[id number]]        Abnormal            Final result                 Please view results for these tests on the individual orders.    CBC Auto Differential [[id number]]  (Abnormal) Collected:  10/13/18 0618    Specimen:  Blood Updated:  10/13/18 0633     WBC 4.89 10*3/mm3      RBC 3.22 (L) 10*6/mm3      Hemoglobin 9.7 (L) g/dL      Hematocrit 31.7 (L) %      MCV 98.4 (H) fL      MCH 30.1 pg      MCHC 30.6 (L) g/dL      RDW 17.6 (H) %      RDW-SD 62.8 (H) fl      MPV 10.0 fL      Platelets 248 10*3/mm3      Neutrophil % 51.1 %      Lymphocyte % 26.6 %      Monocyte % 19.2 (H) %      Eosinophil % 2.9 %      Basophil % 0.2 %      Immature Grans % 0.0 %      Neutrophils, Absolute 2.50 10*3/mm3      Lymphocytes, Absolute 1.30 10*3/mm3      Monocytes, Absolute 0.94 10*3/mm3      Eosinophils, Absolute 0.14 10*3/mm3      Basophils, Absolute 0.01 10*3/mm3      Immature Grans, Absolute 0.00 10*3/mm3     Basic Metabolic Panel [[id number]]  (Abnormal) Collected:  10/12/18 0535    Specimen:  Blood Updated:  10/12/18 0638     Glucose 101 (H) mg/dL      BUN 16 mg/dL       Creatinine 0.53 (L) mg/dL      Sodium 142 mmol/L      Potassium 3.5 mmol/L      Chloride 111 (H) mmol/L      CO2 23.0 mmol/L      Calcium 8.0 (L) mg/dL      eGFR Non African Amer 107 mL/min/1.73      BUN/Creatinine Ratio 30.2 (H)     Anion Gap 8.0 mmol/L     Narrative:       The MDRD GFR formula is only valid for adults with stable renal function between ages 18 and 70.    CBC & Differential [[id number]] Collected:  10/12/18 0535    Specimen:  Blood Updated:  10/12/18 0611    Narrative:       The following orders were created for panel order CBC & Differential.  Procedure                               Abnormality         Status                     ---------                               -----------         ------                     CBC Auto Differential[[id number]]        Abnormal            Final result                 Please view results for these tests on the individual orders.    CBC Auto Differential [[id number]]  (Abnormal) Collected:  10/12/18 0535    Specimen:  Blood Updated:  10/12/18 0611     WBC 6.40 10*3/mm3      RBC 3.04 (L) 10*6/mm3      Hemoglobin 9.0 (L) g/dL      Hematocrit 29.7 (L) %      MCV 97.7 fL      MCH 29.6 pg      MCHC 30.3 (L) g/dL      RDW 18.1 (H) %      RDW-SD 63.6 (H) fl      MPV 10.0 fL      Platelets 237 10*3/mm3      Neutrophil % 65.9 %      Lymphocyte % 18.4 (L) %      Monocyte % 14.1 (H) %      Eosinophil % 1.4 %      Basophil % 0.2 %      Immature Grans % 0.0 %      Neutrophils, Absolute 4.22 10*3/mm3      Lymphocytes, Absolute 1.18 10*3/mm3      Monocytes, Absolute 0.90 10*3/mm3      Eosinophils, Absolute 0.09 10*3/mm3      Basophils, Absolute 0.01 10*3/mm3      Immature Grans, Absolute 0.00 10*3/mm3     Basic Metabolic Panel [[id number]]  (Abnormal) Collected:  10/11/18 0542    Specimen:  Blood Updated:  10/11/18 0647     Glucose 94 mg/dL      BUN 19 mg/dL      Creatinine 0.70 mg/dL      Sodium 142 mmol/L      Potassium 3.5 mmol/L      Chloride 110 (H) mmol/L      CO2 24.3 mmol/L       Calcium 8.2 mg/dL      eGFR Non African Amer 78 mL/min/1.73      BUN/Creatinine Ratio 27.1 (H)     Anion Gap 7.7 mmol/L     Narrative:       The MDRD GFR formula is only valid for adults with stable renal function between ages 18 and 70.    CBC & Differential [[id number]] Collected:  10/11/18 0542    Specimen:  Blood Updated:  10/11/18 0620    Narrative:       The following orders were created for panel order CBC & Differential.  Procedure                               Abnormality         Status                     ---------                               -----------         ------                     CBC Auto Differential[[id number]]        Abnormal            Final result                 Please view results for these tests on the individual orders.    CBC Auto Differential [[id number]]  (Abnormal) Collected:  10/11/18 0542    Specimen:  Blood Updated:  10/11/18 0620     WBC 6.33 10*3/mm3      RBC 2.93 (L) 10*6/mm3      Hemoglobin 8.7 (L) g/dL      Hematocrit 28.4 (L) %      MCV 96.9 fL      MCH 29.7 pg      MCHC 30.6 (L) g/dL      RDW 18.3 (H) %      RDW-SD 64.2 (H) fl      MPV 10.0 fL      Platelets 238 10*3/mm3      Neutrophil % 72.3 %      Lymphocyte % 11.4 (L) %      Monocyte % 16.0 (H) %      Eosinophil % 0.0 (L) %      Basophil % 0.0 %      Immature Grans % 0.3 %      Neutrophils, Absolute 4.58 10*3/mm3      Lymphocytes, Absolute 0.72 (L) 10*3/mm3      Monocytes, Absolute 1.01 10*3/mm3      Eosinophils, Absolute 0.00 10*3/mm3      Basophils, Absolute 0.00 10*3/mm3      Immature Grans, Absolute 0.02 10*3/mm3     Tissue Pathology Exam [[id number]] Collected:  10/09/18 1020    Specimen:  Tissue from Large Intestine, Right / Ascending Colon Updated:  10/10/18 1289     Case Report --     Surgical Pathology Report                         Case: [id number]                                  Authorizing Provider:  Jung Barillas MD      Collected:           10/09/2018 10:20 AM          Ordering Location:     [facility]  "[facility]  Received:            10/09/2018 12:08 PM                                 MAIN OR                                                                      Pathologist:           Phil Hernandez MD                                                                           Specimen:    Large Intestine, Right / Ascending Colon, TERMINAL ILEUM AND RIGHT COLON                    Final Diagnosis --     RIGHT/ASCENDING COLON, TERMINAL ILEUM:         INVASIVE COLONIC ADENOCARCINOMA, GRADE 2 (5.3 CM) OF RIGHT COLON.         TUMOR EXTENDS THROUGH FULL THICKNESS OF MUSCULARIS PROPRIA AND INTO ADIPOSE TISSUE.         METASTATIC CARCINOMA PRESENT IN 1 OF 17 MESENTERIC LYMPH NODES.         MARGINS FREE OF TUMOR.    SYNOPTIC REPORT: (The following synoptic report utilizes the June, 2017 CAP protocol for carcinoma of colon and rectum):    Procedure: Right hemicolectomy.  Tumor site: Right colon.  Tumor size: 5.3 cm.  Macroscopic tumor perforation: Not identified.  Histologic type: Adenocarcinoma.  Histologic grade: Grade 2.  Tumor extension: Tumor invades through muscularis propria into pericolonic tissue.  Margins:         Margins uninvolved by invasive carcinoma, high grade dysplasia, intramucosal adenocarcinoma, and adenoma.        Margins examined: Proximal, distal, and mesenteric margins.        Distance of invasive carcinoma from closest margin: 7 cm from mesenteric margin.  Lymphovascular invasion: Not identified.  Perineural invasion: Not identified.  Tumor deposit: Not identified.  Regional lymph nodes:        Number of lymph nodes involved: 1.        Number of lymph nodes examined: 17.  Pathologic staging:        Primary tumor: pT3.        Regional lymph nodes: pN1a.          [place]/th  [facility]A     CPT CODES:  1. 63006        Gross Description --     Received in formalin labeled \"terminal ileum and right colon\" is an unopened " right colectomy specimen which includes the terminal ileum, cecum without appendix, proximal colon, and a modern amount of attached mesentery fat. The margins are closed with metal sutures. The specimen is 7 cm from the ileal margin to the ileocecal valve and 30 cm from the ileocecal valve to the distal margin. Located 12 cm above the distal margin of resection there is a circumferential tan neoplastic appearing lesion which is 5.3 cm in circumference and up to 5.0 cm in maximum length. The lesion appears to involve the full thickness of the bowel wall and the overlying serosa is focally puckered. The bowel proximal to the lesion is moderately dilated but otherwise unremarkable. The bowel distal to the lesion is also grossly normal. The lesion is located 7 cm from the mesenteric apical soft tissue margin, which appears unremarkable. The attached fat is found to contain multiple grossly benign appearing lymph node candidates. Representative sections are submitted as follows:    1A: Shave adjacent to sutured ileal margin.  1B: Ileocecal valve.  1C and 1D: Lesion with inked on serosa.   1E and 1F: Additional sections of lesion.  1G: Shave adjacent to sutured distal margin.  1H: Shaved mesenteric apex soft tissue margin.  1I-1L: Intact mesenteric lymph node.  1M: Two bisected mesenteric lymph nodes, one inked black.    LW/USO/CMK/th             Microscopic Description --     Performed, incorporated in diagnosis.             Data Review:    Results from last 7 days  Lab Units 10/15/18  0645 10/14/18  2255 10/14/18  0757 10/13/18  0618   SODIUM mmol/L 139  --  143 141   POTASSIUM mmol/L 4.0 4.6 3.3* 3.5   CHLORIDE mmol/L 105  --  108* 109*   CO2 mmol/L 27.5  --  29.2* 24.5   BUN mg/dL 5*  --  6* 10   CREATININE mg/dL 0.61  --  0.57 0.46*   GLUCOSE mg/dL 97  --  91 97   CALCIUM mg/dL 8.2  --  8.2 7.9*       Results from last 7 days  Lab Units 10/15/18  0645 10/14/18  0757 10/13/18  0618   WBC 10*3/mm3 4.12* 4.34* 4.89    HEMOGLOBIN g/dL 10.1* 10.7* 9.7*   HEMATOCRIT % 32.3* 32.9* 31.7*   PLATELETS 10*3/mm3 261 298 248             No results found for: TROPONINT            Invalid input(s): PROT, LABALBU          Glucose   Date/Time Value Ref Range Status   10/15/2018 1113 91 70 - 130 mg/dL Final   10/15/2018 0737 96 70 - 130 mg/dL Final   10/14/2018 2002 99 70 - 130 mg/dL Final   10/14/2018 0743 88 70 - 130 mg/dL Final   10/14/2018 0043 105 70 - 130 mg/dL Final           Patient Active Problem List   Diagnosis Code   • Anemia of chronic illness D63.8   • Hirsutism L68.0   • HLD (hyperlipidemia) E78.5   • Hypothyroidism E03.9   • Insomnia G47.00   • Osteoarthritis M19.90   • Renal insufficiency N28.9   • Seborrheic keratosis L82.1   • Vitamin D deficiency E55.9   • Iron deficiency anemia due to chronic blood loss D50.0   • Irritable bowel syndrome K58.9   • Colitis K52.9   • Vomiting R11.10   • DNR (do not resuscitate) Z66   • 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 Await return of bowel function. See how she does with diet. Path report noted.  Up with PT and ambulate.    Edgar Andrews MD  10/15/2018  1:49 PM

## 2018-10-15 NOTE — THERAPY TREATMENT NOTE
Acute Care - Physical Therapy Treatment Note  [facility]     Patient Name: Kristyn Severino  : 1924  MRN: [id number]  Today's Date: 10/15/2018  Onset of Illness/Injury or Date of Surgery: 10/12/18  Date of Referral to PT: 10/12/18  Referring Physician: Dr. Garth Dalton    Admit Date: 10/6/2018    Visit Dx:    ICD-10-CM ICD-9-CM   1. Small bowel obstruction (CMS/HCC) K56.609 560.9   2. Other specified intestinal obstruction, unspecified whether partial or complete (CMS/HCC) K56.699 560.89   3. 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       Therapy Treatment          Rehabilitation Treatment Summary     Row Name 10/15/18 1435             Treatment Time/Intention    Discipline physical therapist  -      Document Type therapy note (daily note)  -CH      Subjective Information complains of;weakness;fatigue  -CH      Mode of Treatment physical therapy  -      Patient/Family Observations pt sitting in chair, no acute distress noted at rest  -CH      Patient Effort good  -CH      Existing Precautions/Restrictions fall  -CH      Recorded by [CH] Pricilla Short, PT 10/15/18 1549      Row Name 10/15/18 1435             Cognitive Assessment/Intervention    Additional Documentation Cognitive Assessment/Intervention (Group)  -CH      Recorded by [CH] Pricilla Short, PT 10/15/18 1549      Row Name 10/15/18 1435             Cognitive Assessment/Intervention- PT/OT    Orientation Status (Cognition) oriented x 3  -CH      Follows Commands (Cognition) WFL  -CH      Personal Safety Interventions fall  prevention program maintained;gait belt;nonskid shoes/slippers when out of bed  -      Recorded by [] Pricilla Short, PT 10/15/18 1549      Row Name 10/15/18 1435             Bed Mobility Assessment/Treatment    Supine-Sit [place] (Bed Mobility) not tested   sitting in chair  -      Sit-Supine [place] (Bed Mobility) verbal cues;nonverbal cues (demo/gesture);minimum assist (75% patient effort)  -      Recorded by [] Pricilla Short, PT 10/15/18 1549      Row Name 10/15/18 1435             Sit-Stand Transfer    Sit-Stand [place] (Transfers) verbal cues;nonverbal cues (demo/gesture);minimum assist (75% patient effort)  -      Assistive Device (Sit-Stand Transfers) walker, front-wheeled  -      Recorded by [] Pricilla Short, PT 10/15/18 1549      Row Name 10/15/18 1435             Stand-Sit Transfer    Stand-Sit [place] (Transfers) verbal cues;nonverbal cues (demo/gesture);minimum assist (75% patient effort)  -      Assistive Device (Stand-Sit Transfers) walker, front-wheeled  -      Recorded by [] Pricilla Short, PT 10/15/18 1549      Row Name 10/15/18 1435             Gait/Stairs Assessment/Training    [place] Level (Gait) verbal cues;nonverbal cues (demo/gesture);minimum assist (75% patient effort)  -      Assistive Device (Gait) walker, front-wheeled  -      Distance in Feet (Gait) 15  -      Deviations/Abnormal Patterns (Gait) lori decreased;festinating/shuffling  -      Bilateral Gait Deviations forward flexed posture  -      Comment (Gait/Stairs) gait distance limited secondary to complaints of weakness and fatigue  -      Recorded by [] Pricilla Short, PT 10/15/18 1549      Row Name 10/15/18 1435             Positioning and Restraints    Pre-Treatment Position sitting in chair/recliner  -      Post Treatment Position bed  -CH      In Bed supine;call light within reach;encouraged to call for assist;exit alarm on  -       Recorded by [] Pricilla Short, PT 10/15/18 1549      Row Name 10/15/18 1435             Pain Assessment    Additional Documentation Pain Scale: Numbers Pre/Post-Treatment (Group)  -CH      Recorded by [] Pricilla Short, PT 10/15/18 1549      Row Name 10/15/18 1435             Pain Scale: Numbers Pre/Post-Treatment    Pain Scale: Numbers, Pretreatment 0/10 - no pain  -CH      Recorded by [] Pricilla Short, PT 10/15/18 1549      Row Name                Wound 10/09/18 1132 Other (See comments) abdomen incision    Wound - Properties Group Date first assessed: 10/09/18 [SM] Time first assessed: 1132 [SM] Side: Other (See comments) [SM] Location: abdomen [SM] Type: incision [SM] Recorded by:  [] Brady Carcamo, RN 10/09/18 1132    Row Name 10/15/18 1435             Plan of Care Review    Plan of Care Reviewed With patient  -CH      Recorded by [] Pricilla Short, PT 10/15/18 1549      Row Name 10/15/18 1435             Outcome Summary/Treatment Plan (PT)    Anticipated Discharge Disposition (PT) skilled nursing facility  -CH      Recorded by [] Pricilla Short, PT 10/15/18 1549        User Key  (r) = Recorded By, (t) = Taken By, (c) = Cosigned By    Initials Name Effective Dates Discipline     Pricilla Short, PT 04/03/18 -  PT     Brady Carcamo, RN 06/16/16 -  Nurse          Wound 10/09/18 1132 Other (See comments) abdomen incision (Active)   Dressing Appearance dry;intact 10/15/2018  2:26 PM   Closure [place];Open to air 10/15/2018  2:26 PM   Drainage Amount none 10/14/2018  8:08 PM   Dressing Care, Wound open to air 10/14/2018  8:08 PM             Physical Therapy Education     Title: PT OT SLP Therapies (Done)     Topic: Physical Therapy (Done)     Point: Mobility training (Done)    Learning Progress Summary     Learner Status Readiness Method Response Comment Documented by    Patient Done Acceptance E,TB,D DION,NR   10/15/18 1545     Done Acceptance E SCOTITE ASHLEY   10/14/18 1110      Done Acceptance E VU  MS 10/14/18 0333     Done Acceptance E VU  AR 10/13/18 1138     Done Acceptance E VU  MS 10/13/18 0442     Done Acceptance E,TB VU  KH 10/12/18 1519          Point: Home exercise program (Done)    Learning Progress Summary     Learner Status Readiness Method Response Comment Documented by    Patient Done Acceptance E VU,NR   10/14/18 1118     Done Acceptance E VU  MS 10/14/18 0333     Done Acceptance E VU  AR 10/13/18 1138     Done Acceptance E VU  MS 10/13/18 0442          Point: Body mechanics (Done)    Learning Progress Summary     Learner Status Readiness Method Response Comment Documented by    Patient Done Acceptance E,TB,D VU,NR   10/15/18 1549     Done Acceptance E VU,NR   10/14/18 1118     Done Acceptance E VU  MS 10/14/18 0333     Done Acceptance E VU  AR 10/13/18 1138     Done Acceptance E VU  MS 10/13/18 0442     Done Acceptance E,TB VU  KH 10/12/18 1519          Point: Precautions (Done)    Learning Progress Summary     Learner Status Readiness Method Response Comment Documented by    Patient Done Acceptance E,TB,D VU,NR   10/15/18 1549     Done Acceptance E VU  MS 10/14/18 0333     Done Acceptance E VU  AR 10/13/18 1138     Done Acceptance E VU  MS 10/13/18 0442     Done Acceptance E,TB VU   10/12/18 1519                      User Key     Initials Effective Dates Name Provider Type Discipline     04/03/18 -  Pricilla Short, PT Physical Therapist PT     04/03/18 -  Sowmya Hirsch, PT Physical Therapist PT    AR 04/03/18 -  Shanthi Hernandez, PT Physical Therapist PT     06/22/16 -  Jessie Moreira PT Physical Therapist PT    MS 10/30/17 -  Nica Castro, RN Registered Nurse Nurse                    PT Recommendation and Plan  Anticipated Discharge Disposition (PT): skilled nursing facility  Outcome Summary/Treatment Plan (PT)  Anticipated Discharge Disposition (PT): skilled nursing facility  Plan of Care Reviewed With: patient  Outcome Summary: Pt presents with  decreased gait distance today secondary to complaints of weakness and fatigue after reportedly walking with nsg and being in the chair for a while. PT will continue to follow to address strength, mobility, and gait.          Outcome Measures     Row Name 10/15/18 1500 10/14/18 1100 10/13/18 1100       How much help from another person do you currently need...    Turning from your back to your side while in flat bed without using bedrails? 3  -CH 3  -EE 3  -AR    Moving from lying on back to sitting on the side of a flat bed without bedrails? 3  -CH 3  -EE 3  -AR    Moving to and from a bed to a chair (including a wheelchair)? 3  -CH 3  -EE 3  -AR    Standing up from a chair using your arms (e.g., wheelchair, bedside chair)? 3  -CH 3  -EE 3  -AR    Climbing 3-5 steps with a railing? 2  -CH 2  -EE 2  -AR    To walk in hospital room? 3  -CH 3  -EE 3  -AR    AM-PAC 6 Clicks Score 17  - 17  -EE 17  -AR       Functional Assessment    Outcome Measure Options AM-PAC 6 Clicks Basic Mobility (PT)  - AM-PAC 6 Clicks Basic Mobility (PT)  -  --      User Key  (r) = Recorded By, (t) = Taken By, (c) = Cosigned By    Initials Name Provider Type     Pricilla Short, PT Physical Therapist    Sowmya Lyons, PT Physical Therapist    Shanthi Ann, PT Physical Therapist           Time Calculation:         PT Charges     Row Name 10/15/18 1550             Time Calculation    Start Time 1425  -      Stop Time 1435  -      Time Calculation (min) 10 min  -      PT Received On 10/15/18  -      PT - Next Appointment 10/16/18  -         Time Calculation- PT    Total Timed Code Minutes- PT 10 minute(s)  -        User Key  (r) = Recorded By, (t) = Taken By, (c) = Cosigned By    Initials Name Provider Type     Pricilla Short, PT Physical Therapist        Therapy Suggested Charges     Code   Minutes Charges    None           Therapy Charges for Today     Code Description Service Date Service Provider Modifiers  Qty    [id number]  PT THER PROC EA 15 MIN 10/15/2018 Pricilla Short, PT GP 1          PT G-Codes  Outcome Measure Options: AM-PAC 6 Clicks Basic Mobility (PT)  AM-PAC 6 Clicks Score: 17    Pricilla Short, PT  10/15/2018

## 2018-10-15 NOTE — PLAN OF CARE
Problem: Fall Risk (Adult)  Goal: Absence of Fall  Outcome: Ongoing (interventions implemented as appropriate)      Problem: Patient Care Overview  Goal: Plan of Care Review  Outcome: Ongoing (interventions implemented as appropriate)   10/15/18 1503   Coping/Psychosocial   Plan of Care Reviewed With patient   OTHER   Outcome Summary Pt NG tube removed without issues. Diet changed to Full liquid. Will return to [facility] on DC. PT worked with pt and tolerated well. VSS. Will cont to monitor..   Plan of Care Review   Progress no change       Problem: Skin Injury Risk (Adult)  Goal: Identify Related Risk Factors and Signs and Symptoms  Outcome: Ongoing (interventions implemented as appropriate)    Goal: Skin Health and Integrity  Outcome: Ongoing (interventions implemented as appropriate)      Problem: Bowel Obstruction (Adult)  Goal: Signs and Symptoms of Listed Potential Problems Will be Absent, Minimized or Managed (Bowel Obstruction)  Outcome: Ongoing (interventions implemented as appropriate)

## 2018-10-15 NOTE — PLAN OF CARE
Problem: Patient Care Overview  Goal: Plan of Care Review  Outcome: Ongoing (interventions implemented as appropriate)   10/15/18 0416   Coping/Psychosocial   Plan of Care Reviewed With patient   OTHER   Outcome Summary Pt presents with decreased gait distance today secondary to complaints of weakness and fatigue after reportedly walking with nsg and being in the chair for a while. PT will continue to follow to address strength, mobility, and gait.

## 2018-10-15 NOTE — PROGRESS NOTES
Continued Stay Note  [facility]     Patient Name: Kristyn Severino  MRN: [id number]  Today's Date: 10/15/2018    Admit Date: 10/6/2018          Discharge Plan     Row Name 10/15/18 1205       Plan    Plan Plan return to [facility] care.  JAMIL Leonard    Patient/Family in Agreement with Plan yes    Plan Comments Spoke to pt and pt' daughter  (Kya) at bedside.  Pt still with NG but clapped.  Pt confirms plan is to return to [facility].  Joe  ( [phone]) continues to follow for [facility].  Plan return to [facility].  JAMIL Leonard              Discharge Codes    No documentation.           Hanna Hobbs, RN

## 2018-10-15 NOTE — PROGRESS NOTES
Chief Complaint:    S/P Right hemicolectomy, POD 6    Subjective:    The patient is feeling well with no abdominal pain.  Her NG tube has been clamped for 24 hours with no nausea or vomiting.  Residuals are minimal.  She has passed flatus but not had a bowel movement.    Objective:    Temp:  [97.6 °F (36.4 °C)-98.5 °F (36.9 °C)] 97.6 °F (36.4 °C)  Heart Rate:  [70-87] 85  Resp:  [18-20] 20  BP: (114-136)/(67-77) 114/67    Physical Exam   Constitutional: She is cooperative. She does not appear ill. No distress.   Abdominal: Soft. There is no tenderness.   Neurological: She is alert.       Results:    Labs are basically normal.    Impression/Plan:    The patient is POD 6 from a right hemicolectomy.  She is recovering well in the expected postop is resolving.  The NG tube has been removed and she has been started on a full liquid diet.    Jose Contreras Jr., M.D.

## 2018-10-15 NOTE — PROGRESS NOTES
Adult Nutrition  Assessment/PES    Patient Name:  Kristyn Severino  YOB: 1924  MRN: [id number]  Admit Date:  10/6/2018    Assessment Date:  10/15/2018    Comments:  NPO status x 9 days.  S/p R hemicolectomy 10/9.  NG tube clamped.    TPN as an alternate means of nutrition should be considered soon if patient's diet is unable to be advanced.    Will continue to follow.          Reason for Assessment     Row Name 10/15/18 1322          Reason for Assessment    Reason For Assessment identified at risk by screening criteria     Identified At Risk by Screening Criteria other (see comments)   NPO status x 9 days               Anthropometrics     Row Name 10/15/18 1322          Admit Weight    Admit Weight --   139# 10/8        Body Mass Index (BMI)    BMI Assessment BMI 18.5-24.9: normal             Labs/Tests/Procedures/Meds     Row Name 10/15/18 1322          Labs/Procedures/Meds    Lab Results Reviewed reviewed, pertinent     Lab Results Comments BUN, WBC, Hgb, Hct        Diagnostic Tests/Procedures    Diagnostic Test/Procedure Reviewed reviewed, pertinent        Medications    Pertinent Medications Reviewed reviewed, pertinent     Pertinent Medications Comments pepcid, synthroid             Physical Findings     Row Name 10/15/18 1323          Physical Findings    Tubes nasogastric tube   clamped     Skin other (see comments)   B=16, abd inc               Nutrition Prescription Ordered     Row Name 10/15/18 1323          Nutrition Prescription PO    Current PO Diet NPO               Problem/Interventions:                  Intervention Goal     Row Name 10/15/18 1324          Intervention Goal    General Maintain nutrition;Reduce/improve symptoms;Meet nutritional needs for age/condition;Disease management/therapy     PO Initiate feeding     Weight No significant weight loss             Nutrition Intervention     Row Name 10/15/18 1324          Nutrition Intervention    RD/Tech Action Follow Tx  progress;Care plan reviewd;Await begin PO               Education/Evaluation     Row Name 10/15/18 8431          Education    Education Will Instruct as appropriate        Monitor/Evaluation    Monitor Per protocol;I&O;Pertinent labs;Weight;Skin status;Symptoms         Electronically signed by:  Sheila Ballard RD  10/15/18 1:24 PM

## 2018-10-16 LAB
ANION GAP SERPL CALCULATED.3IONS-SCNC: 6.9 MMOL/L
BASOPHILS # BLD AUTO: 0.01 10*3/MM3 (ref 0–0.2)
BASOPHILS NFR BLD AUTO: 0.3 % (ref 0–1.5)
BUN BLD-MCNC: 4 MG/DL (ref 8–23)
BUN/CREAT SERPL: 6.6 (ref 7–25)
CALCIUM SPEC-SCNC: 8.2 MG/DL (ref 8.2–9.6)
CHLORIDE SERPL-SCNC: 106 MMOL/L (ref 98–107)
CO2 SERPL-SCNC: 28.1 MMOL/L (ref 22–29)
CREAT BLD-MCNC: 0.61 MG/DL (ref 0.57–1)
DEPRECATED RDW RBC AUTO: 60.9 FL (ref 37–54)
EOSINOPHIL # BLD AUTO: 0.19 10*3/MM3 (ref 0–0.7)
EOSINOPHIL NFR BLD AUTO: 5 % (ref 0.3–6.2)
ERYTHROCYTE [DISTWIDTH] IN BLOOD BY AUTOMATED COUNT: 17.5 % (ref 11.7–13)
GFR SERPL CREATININE-BSD FRML MDRD: 91 ML/MIN/1.73
GLUCOSE BLD-MCNC: 87 MG/DL (ref 65–99)
HCT VFR BLD AUTO: 29.5 % (ref 35.6–45.5)
HGB BLD-MCNC: 9.5 G/DL (ref 11.9–15.5)
IMM GRANULOCYTES # BLD: 0.01 10*3/MM3 (ref 0–0.03)
IMM GRANULOCYTES NFR BLD: 0.3 % (ref 0–0.5)
LYMPHOCYTES # BLD AUTO: 1.49 10*3/MM3 (ref 0.9–4.8)
LYMPHOCYTES NFR BLD AUTO: 39 % (ref 19.6–45.3)
MCH RBC QN AUTO: 30.5 PG (ref 26.9–32)
MCHC RBC AUTO-ENTMCNC: 32.2 G/DL (ref 32.4–36.3)
MCV RBC AUTO: 94.9 FL (ref 80.5–98.2)
MONOCYTES # BLD AUTO: 0.99 10*3/MM3 (ref 0.2–1.2)
MONOCYTES NFR BLD AUTO: 25.9 % (ref 5–12)
NEUTROPHILS # BLD AUTO: 1.14 10*3/MM3 (ref 1.9–8.1)
NEUTROPHILS NFR BLD AUTO: 29.8 % (ref 42.7–76)
NRBC BLD MANUAL-RTO: 0 /100 WBC (ref 0–0)
PLATELET # BLD AUTO: 303 10*3/MM3 (ref 140–500)
PMV BLD AUTO: 9.6 FL (ref 6–12)
POTASSIUM BLD-SCNC: 3.8 MMOL/L (ref 3.5–5.2)
RBC # BLD AUTO: 3.11 10*6/MM3 (ref 3.9–5.2)
SODIUM BLD-SCNC: 141 MMOL/L (ref 136–145)
WBC NRBC COR # BLD: 3.82 10*3/MM3 (ref 4.5–10.7)

## 2018-10-16 PROCEDURE — 25010000002 MORPHINE PER 10 MG: Performed by: HOSPITALIST

## 2018-10-16 PROCEDURE — 99024 POSTOP FOLLOW-UP VISIT: CPT | Performed by: SURGERY

## 2018-10-16 PROCEDURE — 85025 COMPLETE CBC W/AUTO DIFF WBC: CPT | Performed by: INTERNAL MEDICINE

## 2018-10-16 PROCEDURE — 36415 COLL VENOUS BLD VENIPUNCTURE: CPT | Performed by: INTERNAL MEDICINE

## 2018-10-16 PROCEDURE — 97110 THERAPEUTIC EXERCISES: CPT

## 2018-10-16 PROCEDURE — 80048 BASIC METABOLIC PNL TOTAL CA: CPT | Performed by: INTERNAL MEDICINE

## 2018-10-16 RX ORDER — POLYETHYLENE GLYCOL 3350 17 G/17G
17 POWDER, FOR SOLUTION ORAL DAILY
Status: DISCONTINUED | OUTPATIENT
Start: 2018-10-16 | End: 2018-10-17 | Stop reason: HOSPADM

## 2018-10-16 RX ADMIN — POLYETHYLENE GLYCOL 3350 17 G: 17 POWDER, FOR SOLUTION ORAL at 09:57

## 2018-10-16 RX ADMIN — ZOLPIDEM TARTRATE 2.5 MG: 5 TABLET ORAL at 22:02

## 2018-10-16 RX ADMIN — MORPHINE SULFATE 2 MG: 2 INJECTION, SOLUTION INTRAMUSCULAR; INTRAVENOUS at 14:35

## 2018-10-16 RX ADMIN — MORPHINE SULFATE 2 MG: 2 INJECTION, SOLUTION INTRAMUSCULAR; INTRAVENOUS at 21:55

## 2018-10-16 RX ADMIN — POTASSIUM CHLORIDE, DEXTROSE MONOHYDRATE AND SODIUM CHLORIDE 75 ML/HR: 150; 5; 450 INJECTION, SOLUTION INTRAVENOUS at 20:31

## 2018-10-16 RX ADMIN — POTASSIUM CHLORIDE, DEXTROSE MONOHYDRATE AND SODIUM CHLORIDE 75 ML/HR: 150; 5; 450 INJECTION, SOLUTION INTRAVENOUS at 06:10

## 2018-10-16 RX ADMIN — LEVOTHYROXINE SODIUM 88 MCG: 88 TABLET ORAL at 06:10

## 2018-10-16 RX ADMIN — FAMOTIDINE 20 MG: 10 INJECTION, SOLUTION INTRAVENOUS at 08:14

## 2018-10-16 NOTE — THERAPY TREATMENT NOTE
Acute Care - Physical Therapy Treatment Note  [facility]     Patient Name: Kristyn Severino  : 1924  MRN: [id number]  Today's Date: 10/16/2018  Onset of Illness/Injury or Date of Surgery: 10/12/18  Date of Referral to PT: 10/12/18  Referring Physician: Dr. Garth Dalton    Admit Date: 10/6/2018    Visit Dx:    ICD-10-CM ICD-9-CM   1. Small bowel obstruction (CMS/HCC) K56.609 560.9   2. Other specified intestinal obstruction, unspecified whether partial or complete (CMS/HCC) K56.699 560.89   3. 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       Therapy Treatment          Rehabilitation Treatment Summary     Row Name 10/16/18 1350             Treatment Time/Intention    Discipline physical therapy assistant  -      Document Type therapy note (daily note)  -SM      Subjective Information no complaints  -SM      Mode of Treatment physical therapy  -SM      Patient Effort good  -SM      Existing Precautions/Restrictions fall  -SM      Recorded by [SM] Mae Carcamo, NARCISO 10/16/18 1426      Row Name 10/16/18 1352             Cognitive Assessment/Intervention- PT/OT    Orientation Status (Cognition) oriented x 3  -SM      Follows Commands (Cognition) WFL  -SM      Personal Safety Interventions fall prevention program maintained;gait belt;nonskid shoes/slippers when out of bed  -SM      Recorded by [SM] Mae Carcamo, NARCISO 10/16/18 142      Row Name 10/16/18 1352             Bed Mobility Assessment/Treatment    Bed Mobility Assessment/Treatment supine-sit;sit-supine  -SM      Supine-Sit [place]  (Bed Mobility) supervision  -      Sit-Supine [place] (Bed Mobility) minimum assist (75% patient effort)  -      Bed Mobility, Safety Issues decreased use of legs for bridging/pushing  -      Assistive Device (Bed Mobility) bed rails;head of bed elevated  -      Recorded by [SM] Mae Carcamo, PTA 10/16/18 1426      Row Name 10/16/18 1350             Transfer Assessment/Treatment    Transfer Assessment/Treatment sit-stand transfer;stand-sit transfer  -      Recorded by [SM] Mae Carcamo, PTA 10/16/18 1426      Row Name 10/16/18 1350             Sit-Stand Transfer    Sit-Stand [place] (Transfers) contact guard  -      Assistive Device (Sit-Stand Transfers) walker, front-wheeled  -      Recorded by [SM] Mae Carcamo, [facility] 10/16/18 1426      Row Name 10/16/18 1350             Stand-Sit Transfer    Stand-Sit [place] (Transfers) contact guard  -      Assistive Device (Stand-Sit Transfers) walker, front-wheeled  -SM      Recorded by [SM] Mae Carcamo, [facility] 10/16/18 1426      Row Name 10/16/18 1354             Gait/Stairs Assessment/Training    Gait/Stairs Assessment/Training gait/ambulation independence;gait/ambulation assistive device;distance ambulated;gait pattern;gait deviations  -      [place] Level (Gait) contact guard  -      Assistive Device (Gait) walker, front-wheeled  -      Distance in Feet (Gait) 75  -SM      Pattern (Gait) step-through  -      Deviations/Abnormal Patterns (Gait) lori decreased;stride length decreased  -      Bilateral Gait Deviations forward flexed posture  -      Recorded by [SM] Mae Carcamo, PTA 10/16/18 1426      Row Name 10/16/18 1358             Positioning and Restraints    Pre-Treatment Position in bed  -      Post Treatment Position bed  -SM      In Bed supine;call light within reach;encouraged to call for assist;exit alarm on  -      Recorded by [SM] Mae Carcamo, [facility] 10/16/18 1420       Row Name 10/16/18 1350             Pain Scale: Numbers Pre/Post-Treatment    Pain Scale: Numbers, Pretreatment 0/10 - no pain  -SM      Pain Scale: Numbers, Post-Treatment 0/10 - no pain  -SM      Recorded by [] Mae Carcamo, PTA 10/16/18 1426      Row Name                Wound 10/09/18 1132 Other (See comments) abdomen incision    Wound - Properties Group Date first assessed: 10/09/18 [SMA] Time first assessed: 1132 [SMA] Side: Other (See comments) [SMA] Location: abdomen [SMA] Type: incision [SMA] Recorded by:  [[facility]] Brady Carcamo RN 10/09/18 1132      User Key  (r) = Recorded By, (t) = Taken By, (c) = Cosigned By    Initials Name Effective Dates Discipline    [facility] Brady Carcamo RN 06/16/16 -  Nurse     Mae Carcamo, PTA 03/07/18 -  PT          Wound 10/09/18 1132 Other (See comments) abdomen incision (Active)   Dressing Appearance dry;intact 10/16/2018  8:15 AM   Closure [place];Open to air 10/16/2018  8:15 AM   Drainage Amount none 10/16/2018  8:15 AM   Dressing Care, Wound open to air 10/16/2018 12:22 AM             Physical Therapy Education     Title: PT OT SLP Therapies (Done)     Topic: Physical Therapy (Done)     Point: Mobility training (Done)    Learning Progress Summary     Learner Status Readiness Method Response Comment Documented by    Patient Done Acceptance E,TB VU,NR   10/16/18 1427     Done Acceptance E,TB,D VU,NR   10/15/18 1549     Done Acceptance E VU,NR  EE 10/14/18 1118     Done Acceptance E VU  MS 10/14/18 0333     Done Acceptance E VU  AR 10/13/18 1138     Done Acceptance E VU  MS 10/13/18 0442     Done Acceptance E,TB VU   10/12/18 1519          Point: Home exercise program (Done)    Learning Progress Summary     Learner Status Readiness Method Response Comment Documented by    Patient Done Acceptance E,TB VU,NR   10/16/18 1427     Done Acceptance E VU,NR  EE 10/14/18 1118     Done Acceptance E VU  MS 10/14/18 0333     Done Acceptance E VU  AR 10/13/18 1138      Done Acceptance E VU  MS 10/13/18 0442          Point: Body mechanics (Done)    Learning Progress Summary     Learner Status Readiness Method Response Comment Documented by    Patient Done Acceptance E,TB VU,NR   10/16/18 1427     Done Acceptance E,TB,D VU,NR   10/15/18 1549     Done Acceptance E VU,NR  EE 10/14/18 1118     Done Acceptance E VU  MS 10/14/18 0333     Done Acceptance E VU  AR 10/13/18 1138     Done Acceptance E VU  MS 10/13/18 0442     Done Acceptance E,TB VU   10/12/18 1519          Point: Precautions (Done)    Learning Progress Summary     Learner Status Readiness Method Response Comment Documented by    Patient Done Acceptance E,TB VU,NR   10/16/18 1427     Done Acceptance E,TB,D VU,NR   10/15/18 1549     Done Acceptance E VU  MS 10/14/18 0333     Done Acceptance E VU  AR 10/13/18 1138     Done Acceptance E VU  MS 10/13/18 0442     Done Acceptance E,TB VU   10/12/18 1519                      User Key     Initials Effective Dates Name Provider Type Discipline     04/03/18 -  Pricilla Short, PT Physical Therapist PT     04/03/18 -  Sowmya Hirsch, PT Physical Therapist PT    AR 04/03/18 -  Shanthi Hernandez, PT Physical Therapist PT     03/07/18 -  Mae Carcamo, PTA Physical Therapy Assistant PT     06/22/16 -  Jessie Moreira, PT Physical Therapist PT    MS 10/30/17 -  Nica Castro, RN Registered Nurse Nurse                    PT Recommendation and Plan     Plan of Care Reviewed With: patient  Progress: improving  Outcome Summary: Pt tolerated treatment well this date. Increased gait distance to 75ft w/ Rw and CGA. Required min A w/ LEs getting into bed. PT will continue to address functional mobility deficits as tolerated.          Outcome Measures     Row Name 10/16/18 1400 10/15/18 1500 10/14/18 1100       How much help from another person do you currently need...    Turning from your back to your side while in flat bed without using bedrails? 3  -SM 3  -CH 3  -EE     Moving from lying on back to sitting on the side of a flat bed without bedrails? 3  -SM 3  -CH 3  -EE    Moving to and from a bed to a chair (including a wheelchair)? 3  -SM 3  -CH 3  -EE    Standing up from a chair using your arms (e.g., wheelchair, bedside chair)? 3  -SM 3  -CH 3  -EE    Climbing 3-5 steps with a railing? 2  -SM 2  -CH 2  -EE    To walk in hospital room? 3  -SM 3  -CH 3  -EE    AM-PAC 6 Clicks Score 17  -SM 17  -CH 17  -EE       Functional Assessment    Outcome Measure Options AM-PAC 6 Clicks Basic Mobility (PT)  -SM AM-PAC 6 Clicks Basic Mobility (PT)  -CH AM-PAC 6 Clicks Basic Mobility (PT)  -EE      User Key  (r) = Recorded By, (t) = Taken By, (c) = Cosigned By    Initials Name Provider Type     Pricilla Short, PT Physical Therapist     Sowmya Hirsch, PT Physical Therapist    Mae Aaron PTA Physical Therapy Assistant           Time Calculation:         PT Charges     Row Name 10/16/18 1429             Time Calculation    Start Time 1350  -      Stop Time 1405  -      Time Calculation (min) 15 min  -      PT Received On 10/16/18  -SM      PT - Next Appointment 10/17/18  -SM        User Key  (r) = Recorded By, (t) = Taken By, (c) = Cosigned By    Initials Name Provider Type    Mae Aaron PTA Physical Therapy Assistant        Therapy Suggested Charges     Code   Minutes Charges    None           Therapy Charges for Today     Code Description Service Date Service Provider Modifiers Qty    [id number] HC PT THER PROC EA 15 MIN 10/16/2018 Mae Carcamo PTA GP 1          PT G-Codes  Outcome Measure Options: AM-PAC 6 Clicks Basic Mobility (PT)  AM-PAC 6 Clicks Score: 17    Mae Carcamo PTA  10/16/2018

## 2018-10-16 NOTE — PROGRESS NOTES
"DAILY PROGRESS NOTE  [facility]    Patient Identification:  Name: Kristyn Severino  Age: 89 y.o.  Sex: female  :  1924  MRN: [id number]         Primary Care Physician: Cary Espinal PA-C    Subjective:  Interval History:She feels better.     Objective:    Scheduled Meds:    famotidine 20 mg Intravenous Daily   levothyroxine 88 mcg Oral Q AM   polyethylene glycol 17 g Oral Daily     Continuous Infusions:    dextrose 5 % and sodium chloride 0.45 % with KCl 20 mEq/L 75 mL/hr Last Rate: 75 mL/hr (10/16/18 0610)       Vital signs in last 24 hours:  Temp:  [97.6 °F (36.4 °C)-98.7 °F (37.1 °C)] 98.7 °F (37.1 °C)  Heart Rate:  [63-95] 67  Resp:  [18-20] 20  BP: (109-129)/(58-76) 109/76    Intake/Output:    Intake/Output Summary (Last 24 hours) at 10/16/18 1143  Last data filed at 10/16/18 0610   Gross per 24 hour   Intake             1235 ml   Output                0 ml   Net             1235 ml       Exam:  /76 (BP Location: Left arm, Patient Position: Lying)   Pulse 67   Temp 98.7 °F (37.1 °C) (Oral)   Resp 20   Ht 162.6 cm (64\")   Wt 63 kg (139 lb)   LMP  (LMP Unknown)   SpO2 96%   BMI 23.86 kg/m²     General Appearance:    Alert, cooperative, no distress   Head:    Normocephalic, without obvious abnormality, atraumatic   Eyes:       Throat:   Lips, tongue, gums normal   Neck:   Supple, symmetrical, trachea midline, no JVD   Lungs:     Clear to auscultation bilaterally, respirations unlabored   Chest Wall:    No tenderness or deformity    Heart:    Regular rate and rhythm, S1 and S2 normal, no murmur,no  Rub or gallop   Abdomen:     Soft, surgical changes, bowel sounds active, no masses, no organomegaly    Extremities:   Extremities normal, atraumatic, no cyanosis or edema   Pulses:      Skin:   Skin is warm and dry,  no rashes or palpable lesions   Neurologic:   no focal deficits noted      Lab Results (last 72 hours)     Procedure Component Value Units Date/Time    Basic Metabolic " Panel [[id number]]  (Abnormal) Collected:  10/13/18 0618    Specimen:  Blood Updated:  10/13/18 0653     Glucose 97 mg/dL      BUN 10 mg/dL      Creatinine 0.46 (L) mg/dL      Sodium 141 mmol/L      Potassium 3.5 mmol/L      Chloride 109 (H) mmol/L      CO2 24.5 mmol/L      Calcium 7.9 (L) mg/dL      eGFR Non African Amer 126 mL/min/1.73      BUN/Creatinine Ratio 21.7     Anion Gap 7.5 mmol/L     Narrative:       The MDRD GFR formula is only valid for adults with stable renal function between ages 18 and 70.    CBC & Differential [[id number]] Collected:  10/13/18 0618    Specimen:  Blood Updated:  10/13/18 0633    Narrative:       The following orders were created for panel order CBC & Differential.  Procedure                               Abnormality         Status                     ---------                               -----------         ------                     CBC Auto Differential[[id number]]        Abnormal            Final result                 Please view results for these tests on the individual orders.    CBC Auto Differential [[id number]]  (Abnormal) Collected:  10/13/18 0618    Specimen:  Blood Updated:  10/13/18 0633     WBC 4.89 10*3/mm3      RBC 3.22 (L) 10*6/mm3      Hemoglobin 9.7 (L) g/dL      Hematocrit 31.7 (L) %      MCV 98.4 (H) fL      MCH 30.1 pg      MCHC 30.6 (L) g/dL      RDW 17.6 (H) %      RDW-SD 62.8 (H) fl      MPV 10.0 fL      Platelets 248 10*3/mm3      Neutrophil % 51.1 %      Lymphocyte % 26.6 %      Monocyte % 19.2 (H) %      Eosinophil % 2.9 %      Basophil % 0.2 %      Immature Grans % 0.0 %      Neutrophils, Absolute 2.50 10*3/mm3      Lymphocytes, Absolute 1.30 10*3/mm3      Monocytes, Absolute 0.94 10*3/mm3      Eosinophils, Absolute 0.14 10*3/mm3      Basophils, Absolute 0.01 10*3/mm3      Immature Grans, Absolute 0.00 10*3/mm3     Basic Metabolic Panel [[id number]]  (Abnormal) Collected:  10/12/18 0535    Specimen:  Blood Updated:  10/12/18 0638     Glucose 101 (H) mg/dL       BUN 16 mg/dL      Creatinine 0.53 (L) mg/dL      Sodium 142 mmol/L      Potassium 3.5 mmol/L      Chloride 111 (H) mmol/L      CO2 23.0 mmol/L      Calcium 8.0 (L) mg/dL      eGFR Non African Amer 107 mL/min/1.73      BUN/Creatinine Ratio 30.2 (H)     Anion Gap 8.0 mmol/L     Narrative:       The MDRD GFR formula is only valid for adults with stable renal function between ages 18 and 70.    CBC & Differential [[id number]] Collected:  10/12/18 0535    Specimen:  Blood Updated:  10/12/18 0611    Narrative:       The following orders were created for panel order CBC & Differential.  Procedure                               Abnormality         Status                     ---------                               -----------         ------                     CBC Auto Differential[[id number]]        Abnormal            Final result                 Please view results for these tests on the individual orders.    CBC Auto Differential [[id number]]  (Abnormal) Collected:  10/12/18 0535    Specimen:  Blood Updated:  10/12/18 0611     WBC 6.40 10*3/mm3      RBC 3.04 (L) 10*6/mm3      Hemoglobin 9.0 (L) g/dL      Hematocrit 29.7 (L) %      MCV 97.7 fL      MCH 29.6 pg      MCHC 30.3 (L) g/dL      RDW 18.1 (H) %      RDW-SD 63.6 (H) fl      MPV 10.0 fL      Platelets 237 10*3/mm3      Neutrophil % 65.9 %      Lymphocyte % 18.4 (L) %      Monocyte % 14.1 (H) %      Eosinophil % 1.4 %      Basophil % 0.2 %      Immature Grans % 0.0 %      Neutrophils, Absolute 4.22 10*3/mm3      Lymphocytes, Absolute 1.18 10*3/mm3      Monocytes, Absolute 0.90 10*3/mm3      Eosinophils, Absolute 0.09 10*3/mm3      Basophils, Absolute 0.01 10*3/mm3      Immature Grans, Absolute 0.00 10*3/mm3     Basic Metabolic Panel [[id number]]  (Abnormal) Collected:  10/11/18 0542    Specimen:  Blood Updated:  10/11/18 0647     Glucose 94 mg/dL      BUN 19 mg/dL      Creatinine 0.70 mg/dL      Sodium 142 mmol/L      Potassium 3.5 mmol/L      Chloride 110 (H)  mmol/L      CO2 24.3 mmol/L      Calcium 8.2 mg/dL      eGFR Non African Amer 78 mL/min/1.73      BUN/Creatinine Ratio 27.1 (H)     Anion Gap 7.7 mmol/L     Narrative:       The MDRD GFR formula is only valid for adults with stable renal function between ages 18 and 70.    CBC & Differential [[id number]] Collected:  10/11/18 0542    Specimen:  Blood Updated:  10/11/18 0620    Narrative:       The following orders were created for panel order CBC & Differential.  Procedure                               Abnormality         Status                     ---------                               -----------         ------                     CBC Auto Differential[[id number]]        Abnormal            Final result                 Please view results for these tests on the individual orders.    CBC Auto Differential [[id number]]  (Abnormal) Collected:  10/11/18 0542    Specimen:  Blood Updated:  10/11/18 0620     WBC 6.33 10*3/mm3      RBC 2.93 (L) 10*6/mm3      Hemoglobin 8.7 (L) g/dL      Hematocrit 28.4 (L) %      MCV 96.9 fL      MCH 29.7 pg      MCHC 30.6 (L) g/dL      RDW 18.3 (H) %      RDW-SD 64.2 (H) fl      MPV 10.0 fL      Platelets 238 10*3/mm3      Neutrophil % 72.3 %      Lymphocyte % 11.4 (L) %      Monocyte % 16.0 (H) %      Eosinophil % 0.0 (L) %      Basophil % 0.0 %      Immature Grans % 0.3 %      Neutrophils, Absolute 4.58 10*3/mm3      Lymphocytes, Absolute 0.72 (L) 10*3/mm3      Monocytes, Absolute 1.01 10*3/mm3      Eosinophils, Absolute 0.00 10*3/mm3      Basophils, Absolute 0.00 10*3/mm3      Immature Grans, Absolute 0.02 10*3/mm3     Tissue Pathology Exam [[id number]] Collected:  10/09/18 1020    Specimen:  Tissue from Large Intestine, Right / Ascending Colon Updated:  10/10/18 3094     Case Report --     Surgical Pathology Report                         Case: [id number]                                  Authorizing Provider:  Jung Barillas MD      Collected:           10/09/2018 10:20 AM         "  Ordering Location:     [facility]  Received:            10/09/2018 12:08 PM                                 MAIN OR                                                                      Pathologist:           Phil Hernandez MD                                                                           Specimen:    Large Intestine, Right / Ascending Colon, TERMINAL ILEUM AND RIGHT COLON                    Final Diagnosis --     RIGHT/ASCENDING COLON, TERMINAL ILEUM:         INVASIVE COLONIC ADENOCARCINOMA, GRADE 2 (5.3 CM) OF RIGHT COLON.         TUMOR EXTENDS THROUGH FULL THICKNESS OF MUSCULARIS PROPRIA AND INTO ADIPOSE TISSUE.         METASTATIC CARCINOMA PRESENT IN 1 OF 17 MESENTERIC LYMPH NODES.         MARGINS FREE OF TUMOR.    SYNOPTIC REPORT: (The following synoptic report utilizes the June, 2017 CAP protocol for carcinoma of colon and rectum):    Procedure: Right hemicolectomy.  Tumor site: Right colon.  Tumor size: 5.3 cm.  Macroscopic tumor perforation: Not identified.  Histologic type: Adenocarcinoma.  Histologic grade: Grade 2.  Tumor extension: Tumor invades through muscularis propria into pericolonic tissue.  Margins:         Margins uninvolved by invasive carcinoma, high grade dysplasia, intramucosal adenocarcinoma, and adenoma.        Margins examined: Proximal, distal, and mesenteric margins.        Distance of invasive carcinoma from closest margin: 7 cm from mesenteric margin.  Lymphovascular invasion: Not identified.  Perineural invasion: Not identified.  Tumor deposit: Not identified.  Regional lymph nodes:        Number of lymph nodes involved: 1.        Number of lymph nodes examined: 17.  Pathologic staging:        Primary tumor: pT3.        Regional lymph nodes: pN1a.          CMK/th  IHC/CHAKA     CPT CODES:  1. 25417        Gross Description --     Received in formalin labeled \"terminal ileum " "and right colon\" is an unopened right colectomy specimen which includes the terminal ileum, cecum without appendix, proximal colon, and a modern amount of attached mesentery fat. The margins are closed with metal sutures. The specimen is 7 cm from the ileal margin to the ileocecal valve and 30 cm from the ileocecal valve to the distal margin. Located 12 cm above the distal margin of resection there is a circumferential tan neoplastic appearing lesion which is 5.3 cm in circumference and up to 5.0 cm in maximum length. The lesion appears to involve the full thickness of the bowel wall and the overlying serosa is focally puckered. The bowel proximal to the lesion is moderately dilated but otherwise unremarkable. The bowel distal to the lesion is also grossly normal. The lesion is located 7 cm from the mesenteric apical soft tissue margin, which appears unremarkable. The attached fat is found to contain multiple grossly benign appearing lymph node candidates. Representative sections are submitted as follows:    1A: Shave adjacent to sutured ileal margin.  1B: Ileocecal valve.  1C and 1D: Lesion with inked on serosa.   1E and 1F: Additional sections of lesion.  1G: Shave adjacent to sutured distal margin.  1H: Shaved mesenteric apex soft tissue margin.  1I-1L: Intact mesenteric lymph node.  1M: Two bisected mesenteric lymph nodes, one inked black.    LW/USO/CMK/th             Microscopic Description --     Performed, incorporated in diagnosis.             Data Review:    Results from last 7 days  Lab Units 10/16/18  0640 10/15/18  0645 10/14/18  2255 10/14/18  0752   SODIUM mmol/L 141 139  --  143   POTASSIUM mmol/L 3.8 4.0 4.6 3.3*   CHLORIDE mmol/L 106 105  --  108*   CO2 mmol/L 28.1 27.5  --  29.2*   BUN mg/dL 4* 5*  --  6*   CREATININE mg/dL 0.61 0.61  --  0.57   GLUCOSE mg/dL 87 97  --  91   CALCIUM mg/dL 8.2 8.2  --  8.2       Results from last 7 days  Lab Units 10/16/18  0640 10/15/18  0645 10/14/18  0757   WBC " 10*3/mm3 3.82* 4.12* 4.34*   HEMOGLOBIN g/dL 9.5* 10.1* 10.7*   HEMATOCRIT % 29.5* 32.3* 32.9*   PLATELETS 10*3/mm3 303 261 298             No results found for: TROPONINT            Invalid input(s): PROT, LABALBU          Glucose   Date/Time Value Ref Range Status   10/15/2018 2050 118 70 - 130 mg/dL Final   10/15/2018 1623 102 70 - 130 mg/dL Final   10/15/2018 1113 91 70 - 130 mg/dL Final   10/15/2018 0737 96 70 - 130 mg/dL Final   10/14/2018 2002 99 70 - 130 mg/dL Final   10/14/2018 0743 88 70 - 130 mg/dL Final   10/14/2018 0043 105 70 - 130 mg/dL Final           Patient Active Problem List   Diagnosis Code   • Anemia of chronic illness D63.8   • Hirsutism L68.0   • HLD (hyperlipidemia) E78.5   • Hypothyroidism E03.9   • Insomnia G47.00   • Osteoarthritis M19.90   • Renal insufficiency N28.9   • Seborrheic keratosis L82.1   • Vitamin D deficiency E55.9   • Iron deficiency anemia due to chronic blood loss D50.0   • Irritable bowel syndrome K58.9   • Colitis K52.9   • Vomiting R11.10   • DNR (do not resuscitate) Z66   • 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 Await return of bowel function. See how she does with diet. Path report noted.  Up with PT and ambulate.  Maybe SNU tomorrow.    Edgar Andrews MD  10/16/2018  11:43 AM

## 2018-10-16 NOTE — PLAN OF CARE
Problem: Fall Risk (Adult)  Goal: Identify Related Risk Factors and Signs and Symptoms  Outcome: Outcome(s) achieved Date Met: 10/16/18   10/16/18 0318   Fall Risk (Adult)   Related Risk Factors (Fall Risk) environment unfamiliar   Signs and Symptoms (Fall Risk) presence of risk factors     Goal: Absence of Fall  Outcome: Ongoing (interventions implemented as appropriate)      Problem: Patient Care Overview  Goal: Plan of Care Review  Outcome: Ongoing (interventions implemented as appropriate)   10/16/18 0318   Coping/Psychosocial   Plan of Care Reviewed With patient   OTHER   Outcome Summary Patient alert and oriented. VSS. Afib on monitor- new dx on 9/26. Walked to end of smith and back. IV fluids continued. Tolerating full liquid diet. Still no BM, but patient is passing gas. Medicated for pain x 1. Sleeping well this shift. No signs of acute distress noted. WIll continue to monitor.    Plan of Care Review   Progress no change       Problem: Skin Injury Risk (Adult)  Goal: Identify Related Risk Factors and Signs and Symptoms  Outcome: Outcome(s) achieved Date Met: 10/16/18    Goal: Skin Health and Integrity  Outcome: Ongoing (interventions implemented as appropriate)      Problem: Bowel Obstruction (Adult)  Goal: Signs and Symptoms of Listed Potential Problems Will be Absent, Minimized or Managed (Bowel Obstruction)  Outcome: Ongoing (interventions implemented as appropriate)

## 2018-10-16 NOTE — PROGRESS NOTES
Chief Complaint:    S/P Right hemicolectomy, POD 7    Subjective:    The patient is feeling well.  She is tolerating a diet with no nausea or vomiting.  She has minimal abdominal pain.  She continues to pass flatus but still has not had a bowel movement.    Objective:    Temp:  [97.6 °F (36.4 °C)-98.7 °F (37.1 °C)] 98.7 °F (37.1 °C)  Heart Rate:  [63-95] 67  Resp:  [18-20] 20  BP: (109-129)/(58-76) 109/76    Physical Exam   Constitutional: She is cooperative. She does not appear ill. No distress.   Pulmonary/Chest: Effort normal. No respiratory distress.   Abdominal: Soft. There is no tenderness.   Incision: Intact with no evidence of infection.   Neurological: She is alert.       Results:    Labs are basically normal and stable.    Impression/Plan:    The patient is POD 7 from a right hemicolectomy for colon cancer.  Her expected postop ileus is slowly resolving.  Her diet will be advanced and she will be given daily doses of MiraLAX to stimulate GI activity.    Jose Contreras Jr., M.D.

## 2018-10-16 NOTE — PLAN OF CARE
Problem: Patient Care Overview  Goal: Plan of Care Review  Outcome: Ongoing (interventions implemented as appropriate)   10/16/18 6430   Coping/Psychosocial   Plan of Care Reviewed With patient   OTHER   Outcome Summary Pt tolerated treatment well this date. Increased gait distance to 75ft w/ Rw and CGA. Required min A w/ LEs getting into bed. PT will continue to address functional mobility deficits as tolerated.   Plan of Care Review   Progress improving

## 2018-10-17 VITALS
OXYGEN SATURATION: 95 % | RESPIRATION RATE: 16 BRPM | WEIGHT: 139 LBS | HEART RATE: 82 BPM | DIASTOLIC BLOOD PRESSURE: 69 MMHG | BODY MASS INDEX: 23.73 KG/M2 | HEIGHT: 64 IN | TEMPERATURE: 97.8 F | SYSTOLIC BLOOD PRESSURE: 93 MMHG

## 2018-10-17 LAB
ANION GAP SERPL CALCULATED.3IONS-SCNC: 7.2 MMOL/L
BASOPHILS # BLD AUTO: 0.02 10*3/MM3 (ref 0–0.2)
BASOPHILS NFR BLD AUTO: 0.5 % (ref 0–1.5)
BUN BLD-MCNC: 4 MG/DL (ref 8–23)
BUN/CREAT SERPL: 5.6 (ref 7–25)
CALCIUM SPEC-SCNC: 8.2 MG/DL (ref 8.2–9.6)
CHLORIDE SERPL-SCNC: 106 MMOL/L (ref 98–107)
CO2 SERPL-SCNC: 26.8 MMOL/L (ref 22–29)
CREAT BLD-MCNC: 0.71 MG/DL (ref 0.57–1)
DEPRECATED RDW RBC AUTO: 61.8 FL (ref 37–54)
EOSINOPHIL # BLD AUTO: 0.14 10*3/MM3 (ref 0–0.7)
EOSINOPHIL NFR BLD AUTO: 3.5 % (ref 0.3–6.2)
ERYTHROCYTE [DISTWIDTH] IN BLOOD BY AUTOMATED COUNT: 17.6 % (ref 11.7–13)
GFR SERPL CREATININE-BSD FRML MDRD: 77 ML/MIN/1.73
GLUCOSE BLD-MCNC: 97 MG/DL (ref 65–99)
HCT VFR BLD AUTO: 28.4 % (ref 35.6–45.5)
HGB BLD-MCNC: 9 G/DL (ref 11.9–15.5)
IMM GRANULOCYTES # BLD: 0.01 10*3/MM3 (ref 0–0.03)
IMM GRANULOCYTES NFR BLD: 0.3 % (ref 0–0.5)
LYMPHOCYTES # BLD AUTO: 1.51 10*3/MM3 (ref 0.9–4.8)
LYMPHOCYTES NFR BLD AUTO: 37.9 % (ref 19.6–45.3)
MCH RBC QN AUTO: 30.2 PG (ref 26.9–32)
MCHC RBC AUTO-ENTMCNC: 31.7 G/DL (ref 32.4–36.3)
MCV RBC AUTO: 95.3 FL (ref 80.5–98.2)
MONOCYTES # BLD AUTO: 0.85 10*3/MM3 (ref 0.2–1.2)
MONOCYTES NFR BLD AUTO: 21.4 % (ref 5–12)
NEUTROPHILS # BLD AUTO: 1.46 10*3/MM3 (ref 1.9–8.1)
NEUTROPHILS NFR BLD AUTO: 36.7 % (ref 42.7–76)
PLATELET # BLD AUTO: 312 10*3/MM3 (ref 140–500)
PMV BLD AUTO: 9.3 FL (ref 6–12)
POTASSIUM BLD-SCNC: 3.9 MMOL/L (ref 3.5–5.2)
RBC # BLD AUTO: 2.98 10*6/MM3 (ref 3.9–5.2)
SODIUM BLD-SCNC: 140 MMOL/L (ref 136–145)
WBC NRBC COR # BLD: 3.98 10*3/MM3 (ref 4.5–10.7)

## 2018-10-17 PROCEDURE — 99024 POSTOP FOLLOW-UP VISIT: CPT | Performed by: SURGERY

## 2018-10-17 PROCEDURE — 85025 COMPLETE CBC W/AUTO DIFF WBC: CPT | Performed by: INTERNAL MEDICINE

## 2018-10-17 PROCEDURE — 80048 BASIC METABOLIC PNL TOTAL CA: CPT | Performed by: INTERNAL MEDICINE

## 2018-10-17 RX ORDER — FAMOTIDINE 20 MG/1
20 TABLET, FILM COATED ORAL DAILY
Status: DISCONTINUED | OUTPATIENT
Start: 2018-10-17 | End: 2018-10-17 | Stop reason: HOSPADM

## 2018-10-17 RX ORDER — FAMOTIDINE 20 MG/1
20 TABLET, FILM COATED ORAL DAILY
Start: 2018-10-18 | End: 2019-07-09

## 2018-10-17 RX ORDER — ZOLPIDEM TARTRATE 5 MG/1
TABLET ORAL
Qty: 5 TABLET | Refills: 0 | Status: SHIPPED | OUTPATIENT
Start: 2018-10-17 | End: 2018-12-07 | Stop reason: SDUPTHER

## 2018-10-17 RX ORDER — POLYETHYLENE GLYCOL 3350 17 G/17G
17 POWDER, FOR SOLUTION ORAL DAILY
Start: 2018-10-18 | End: 2020-10-26

## 2018-10-17 RX ORDER — ACETAMINOPHEN 325 MG/1
650 TABLET ORAL EVERY 4 HOURS PRN
Start: 2018-10-17 | End: 2020-11-30 | Stop reason: SDDI

## 2018-10-17 RX ADMIN — FAMOTIDINE 20 MG: 20 TABLET, FILM COATED ORAL at 10:58

## 2018-10-17 RX ADMIN — POLYETHYLENE GLYCOL 3350 17 G: 17 POWDER, FOR SOLUTION ORAL at 08:27

## 2018-10-17 RX ADMIN — LEVOTHYROXINE SODIUM 88 MCG: 88 TABLET ORAL at 06:13

## 2018-10-17 NOTE — PROGRESS NOTES
Continued Stay Note  [facility]     Patient Name: Kristyn Severino  MRN: [id number]  Today's Date: 10/17/2018    Admit Date: 10/6/2018          Discharge Plan     Row Name 10/17/18 1148       Plan    Plan Plan return to [facility] for skilled care.  JAMIL Leonard    Patient/Family in Agreement with Plan yes    Plan Comments Spoke to pt at bedside.  Pt states plan is to return to [facility].   Joe called and bed is available today and pt can be accepted back.  Discharge summary faxed to [facility].   Daughter to transport.  Packet to RN.  Plan [facility] for skilled care.   JAMIL Leonard              Discharge Codes    No documentation.       Expected Discharge Date and Time     Expected Discharge Date Expected Discharge Time    Oct 17, 2018             Hanna Hobbs RN

## 2018-10-17 NOTE — PLAN OF CARE
Problem: Patient Care Overview  Goal: Plan of Care Review  Outcome: Ongoing (interventions implemented as appropriate)   10/17/18 3777   Coping/Psychosocial   Plan of Care Reviewed With patient   OTHER   Outcome Summary Patient alert and oriented. Medicated for pain x 1. IV fluids continued. VSS. Tolerating diet. Had large BM yesterday. No signs of distress noted. Possible D/C today. Will continue to monitor.    Plan of Care Review   Progress improving       Problem: Skin Injury Risk (Adult)  Goal: Skin Health and Integrity  Outcome: Ongoing (interventions implemented as appropriate)      Problem: Bowel Obstruction (Adult)  Goal: Signs and Symptoms of Listed Potential Problems Will be Absent, Minimized or Managed (Bowel Obstruction)  Outcome: Ongoing (interventions implemented as appropriate)

## 2018-10-17 NOTE — DISCHARGE SUMMARY
PHYSICIAN DISCHARGE SUMMARY                                                                        [facility]    Patient Identification:  Name: Kristyn Severino  Age: 89 y.o.  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 [K56.609] 10/06/2018   • DNR (do not resuscitate) [Z66] 10/07/2018   • Iron deficiency anemia due to chronic blood loss [D50.0] 2018   • Hypothyroidism [E03.9]    • Anemia of chronic illness [D63.8]    • HLD (hyperlipidemia) [E78.5]       Resolved Hospital Problems    Diagnosis Date Noted Date Resolved   • Acidosis [E87.2] 10/10/2018 10/11/2018      Patient Active Problem List   Diagnosis Code   • Anemia of chronic illness D63.8   • Hirsutism L68.0   • HLD (hyperlipidemia) E78.5   • Hypothyroidism E03.9   • Insomnia G47.00   • Osteoarthritis M19.90   • Renal insufficiency N28.9   • Seborrheic keratosis L82.1   • Vitamin D deficiency E55.9   • Iron deficiency anemia due to chronic blood loss D50.0   • Irritable bowel syndrome K58.9   • Colitis K52.9   • Vomiting R11.10   • DNR (do not resuscitate) Z66   • 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 K56.609       PMHX:   Past Medical History:   Diagnosis Date   • Anemia of chronic illness    • PALACIOS (dyspnea on exertion)    • Fatigue    • Hirsutism    • History of bone density study     few years; normal   • History of colonoscopy     never   • History of prior pregnancies     x4   • HLD (hyperlipidemia)    • Hypothyroidism     Hashimoto's diagnosed age 19   • Insomnia    • Lightheadedness    • Osteoarthritis    • Postmenopausal    • Renal insufficiency    • Rhinorrhea    • Seborrheic keratosis    • Vitamin D  deficiency      PSHX:   Past Surgical History:   Procedure Laterality Date   • APPENDECTOMY  03/2010    Dr. De Oliveira   • BREAST BIOPSY     • COLON RESECTION N/A 10/9/2018    Procedure: right hemicolectomy;  Surgeon: Jung Barillas MD;  Location: [facility];  Service: General   • JOINT REPLACEMENT Left 02/07/2011    Dr. Gold   • MAMMO BILATERAL      many years ago   • PAP SMEAR      many years ago       Hospital Course: Kristyn Severino is a 89 y.o. female with a history of colitis recently treated with antibiotics and discharged from this facility that presents to [facility] from rehab complaining of abdominal discomfort and bilious vomiting starting this morning.  She states that she has been constipated for the past 3-4d.  She has had associated abdominal distension.  She was found to have evidence of obstruction on abdominal CT scan. She had had SBO on previous admission which had resolved with conservative management.          The patient was admitted to hospital and seen by general surgery.  Patient had bowel obstruction and underwent surgery for colon resection for colon cancer.  The patient had some degree of postoperative ileus but it resolved after several days in the hospital.  She was still pretty weak and the plan is to go to skilled nursing facility for rehabilitation prior to returning home.  She'll follow-up with surgery in about a week for postop check.  She'll follow-up with her primary care doctor after released from rehabilitation.    Consults:     Consults     Date and Time Order Name Status Description    10/6/2018 2239 Inpatient General Surgery Consult Completed     10/6/2018 2109 Surgery (on-call MD unless specified) Completed     10/6/2018 2109 LHA (on-call MD unless specified) Completed     9/20/2018 1308 Inpatient General Surgery Consult Completed     9/20/2018 1016 Inpatient Gastroenterology Consult Completed           Results from last 7 days  Lab Units  10/17/18  0638   WBC 10*3/mm3 3.98*   HEMOGLOBIN g/dL 9.0*   HEMATOCRIT % 28.4*   PLATELETS 10*3/mm3 312       Results from last 7 days  Lab Units 10/17/18  0638   SODIUM mmol/L 140   POTASSIUM mmol/L 3.9   CHLORIDE mmol/L 106   CO2 mmol/L 26.8   BUN mg/dL 4*   CREATININE mg/dL 0.71   GLUCOSE mg/dL 97   CALCIUM mg/dL 8.2     Significant Diagnostic Studies:   Lab Results   Component Value Date    WBC 3.98 (L) 10/17/2018    HGB 9.0 (L) 10/17/2018    HCT 28.4 (L) 10/17/2018     10/17/2018     Lab Results   Component Value Date     10/17/2018    K 3.9 10/17/2018     10/17/2018    CO2 26.8 10/17/2018    BUN 4 (L) 10/17/2018    CREATININE 0.71 10/17/2018    GLUCOSE 97 10/17/2018     Lab Results   Component Value Date    CALCIUM 8.2 10/17/2018     No results found for: AST, ALT, ALKPHOS  No results found for: APTT, INR  No results found for: COLORU, CLARITYU, SPECGRAV, PHUR, PROTEINUR, GLUCOSEU, KETONESU, BLOODU, NITRITE, LEUKOCYTESUR, BILIRUBINUR, UROBILINOGEN, RBCUA, WBCUA, BACTERIA, UACOMMENT  No results found for: TROPONINT, TROPONINI, BNP  No components found for: HGBA1C;2  No components found for: TSH;2  Imaging Results (all)     Procedure Component Value Units Date/Time    XR Abdomen KUB [[id number]] Collected:  10/12/18 1350     Updated:  10/12/18 1355    Narrative:       XR ABDOMEN KUB-     INDICATIONS: Postoperative ileus     TECHNIQUE: SUPINE VIEWS OF THE ABDOMEN     COMPARISON: 9/23/2018     FINDINGS:     NG tube extends to the distal stomach. Midline skin staples. The bowel  gas pattern is nonspecific, with gas apparent in small bowel and in the  large bowel. Some loops of bowel appear mildly prominent in caliber.  Free intraperitoneal gas cannot be excluded on a supine radiograph.  Continued follow-up is suggested as indications persist. If there is  further clinical concern, follow-up CT can be obtained for further  examination.       Impression:          As described.     This report was  finalized on 10/12/2018 1:52 PM by Dr. Ben Lopez M.D.       CT Abdomen Pelvis With Contrast [[id number]] Collected:  10/06/18 2058     Updated:  10/06/18 2112    Narrative:       CT OF THE ABDOMEN AND PELVIS WITH CONTRAST     HISTORY: Constipation for 3 days     COMPARISON: January 23, 2017     TECHNIQUE: Axial CT imaging was obtained from the diaphragm to symphysis  pubis following administration of IV contrast.     FINDINGS:  Images through the lung bases demonstrate bibasilar atelectasis. There  are also small bilateral pleural effusions, right greater than left.  There is a small hiatal hernia. The patient is noted to have multiple  dilated and fluid-filled loops of small bowel. The patient's cecum also  appears distended transition point actually appears to be at the level  of the hepatic flexure. The remainder of the colon is decompressed.  While I don't see any pneumatosis or free air, free fluid is noted  within the abdomen and pelvis. The adrenal glands appear unremarkable,  as is the spleen. The pancreas is normal. The gallbladder does not  appear distended or thick wall. The liver appears unremarkable. There  are bilateral renal cysts. There is colonic diverticulosis, without  evidence of diverticulitis. Uterus is atrophic. Urinary bladder is  normal. There is discogenic degenerative disease of the spine. No  aggressive osseous abnormalities are seen.       Impression:          1. Multiple dilated and fluid-filled loops of small bowel are seen. The  patient's cecum is also distended, as is the ascending colon. Apparent  transition point appears to be at the level of the hepatic flexure,  findings are concerning for colonic obstruction at this level.  While  benign stricture would be a possibility, I'm suspicious there may be an  underlying neoplastic lesion, although no adjacent adenopathy is seen..  While no pneumatosis or free air is seen, patient is noted to have free  fluid throughout the  abdomen.      Radiation dose reduction techniques were utilized, including automated  exposure control and exposure modulation based on body size.     This report was finalized on 10/6/2018 9:09 PM by Dr. Leanna Martinez M.D.           Lab Results (last 7 days)     Procedure Component Value Units Date/Time    Basic Metabolic Panel [[id number]]  (Abnormal) Collected:  10/17/18 0638    Specimen:  Blood Updated:  10/17/18 0804     Glucose 97 mg/dL      BUN 4 (L) mg/dL      Creatinine 0.71 mg/dL      Sodium 140 mmol/L      Potassium 3.9 mmol/L      Chloride 106 mmol/L      CO2 26.8 mmol/L      Calcium 8.2 mg/dL      eGFR Non African Amer 77 mL/min/1.73      BUN/Creatinine Ratio 5.6 (L)     Anion Gap 7.2 mmol/L     Narrative:       The MDRD GFR formula is only valid for adults with stable renal function between ages 18 and 70.    CBC & Differential [[id number]] Collected:  10/17/18 0638    Specimen:  Blood Updated:  10/17/18 0731    Narrative:       The following orders were created for panel order CBC & Differential.  Procedure                               Abnormality         Status                     ---------                               -----------         ------                     Scan Slide[[id number]]                                                                  CBC Auto Differential[[id number]]        Abnormal            Final result                 Please view results for these tests on the individual orders.    CBC Auto Differential [[id number]]  (Abnormal) Collected:  10/17/18 0638    Specimen:  Blood Updated:  10/17/18 0731     WBC 3.98 (L) 10*3/mm3      RBC 2.98 (L) 10*6/mm3      Hemoglobin 9.0 (L) g/dL      Hematocrit 28.4 (L) %      MCV 95.3 fL      MCH 30.2 pg      MCHC 31.7 (L) g/dL      RDW 17.6 (H) %      RDW-SD 61.8 (H) fl      MPV 9.3 fL      Platelets 312 10*3/mm3      Neutrophil % 36.7 (L) %      Lymphocyte % 37.9 %      Monocyte % 21.4 (H) %      Eosinophil % 3.5 %      Basophil % 0.5 %       Immature Grans % 0.3 %      Neutrophils, Absolute 1.46 (L) 10*3/mm3      Lymphocytes, Absolute 1.51 10*3/mm3      Monocytes, Absolute 0.85 10*3/mm3      Eosinophils, Absolute 0.14 10*3/mm3      Basophils, Absolute 0.02 10*3/mm3      Immature Grans, Absolute 0.01 10*3/mm3     Basic Metabolic Panel [[id number]]  (Abnormal) Collected:  10/16/18 0640    Specimen:  Blood Updated:  10/16/18 0733     Glucose 87 mg/dL      BUN 4 (L) mg/dL      Creatinine 0.61 mg/dL      Sodium 141 mmol/L      Potassium 3.8 mmol/L      Chloride 106 mmol/L      CO2 28.1 mmol/L      Calcium 8.2 mg/dL      eGFR Non African Amer 91 mL/min/1.73      BUN/Creatinine Ratio 6.6 (L)     Anion Gap 6.9 mmol/L     Narrative:       The MDRD GFR formula is only valid for adults with stable renal function between ages 18 and 70.    CBC & Differential [[id number]] Collected:  10/16/18 0640    Specimen:  Blood Updated:  10/16/18 0733    Narrative:       The following orders were created for panel order CBC & Differential.  Procedure                               Abnormality         Status                     ---------                               -----------         ------                     Scan Slide[[id number]]                                                                  CBC Auto Differential[[id number]]        Abnormal            Final result                 Please view results for these tests on the individual orders.    CBC Auto Differential [[id number]]  (Abnormal) Collected:  10/16/18 0640    Specimen:  Blood Updated:  10/16/18 0733     WBC 3.82 (L) 10*3/mm3      RBC 3.11 (L) 10*6/mm3      Hemoglobin 9.5 (L) g/dL      Hematocrit 29.5 (L) %      MCV 94.9 fL      MCH 30.5 pg      MCHC 32.2 (L) g/dL      RDW 17.5 (H) %      RDW-SD 60.9 (H) fl      MPV 9.6 fL      Platelets 303 10*3/mm3      Neutrophil % 29.8 (L) %      Lymphocyte % 39.0 %      Monocyte % 25.9 (H) %      Eosinophil % 5.0 %      Basophil % 0.3 %      Immature Grans % 0.3 %       Neutrophils, Absolute 1.14 (L) 10*3/mm3      Lymphocytes, Absolute 1.49 10*3/mm3      Monocytes, Absolute 0.99 10*3/mm3      Eosinophils, Absolute 0.19 10*3/mm3      Basophils, Absolute 0.01 10*3/mm3      Immature Grans, Absolute 0.01 10*3/mm3      nRBC 0.0 /100 WBC     POC Glucose Once [[id number]]  (Normal) Collected:  10/15/18 2050    Specimen:  Blood Updated:  10/15/18 2052     Glucose 118 mg/dL     Narrative:       Meter: [id number] : [id number] [place] NA    POC Glucose Once [[id number]]  (Normal) Collected:  10/15/18 1623    Specimen:  Blood Updated:  10/15/18 1624     Glucose 102 mg/dL     Narrative:       Meter: [id number] : [id number] Ba Gracia NA    POC Glucose Once [[id number]]  (Normal) Collected:  10/15/18 1113    Specimen:  Blood Updated:  10/15/18 1115     Glucose 91 mg/dL     Narrative:       Meter: [id number] : [id number] Ba Gracia NA    POC Glucose Once [[id number]]  (Normal) Collected:  10/15/18 0737    Specimen:  Blood Updated:  10/15/18 0739     Glucose 96 mg/dL     Narrative:       Meter: [id number] : [id number] Ba Gracia NA    Basic Metabolic Panel [[id number]]  (Abnormal) Collected:  10/15/18 0645    Specimen:  Blood Updated:  10/15/18 0724     Glucose 97 mg/dL      BUN 5 (L) mg/dL      Creatinine 0.61 mg/dL      Sodium 139 mmol/L      Potassium 4.0 mmol/L      Chloride 105 mmol/L      CO2 27.5 mmol/L      Calcium 8.2 mg/dL      eGFR Non African Amer 91 mL/min/1.73      BUN/Creatinine Ratio 8.2     Anion Gap 6.5 mmol/L     Narrative:       The MDRD GFR formula is only valid for adults with stable renal function between ages 18 and 70.    CBC & Differential [[id number]] Collected:  10/15/18 0645    Specimen:  Blood Updated:  10/15/18 0706    Narrative:       The following orders were created for panel order CBC & Differential.  Procedure                               Abnormality         Status                     ---------                               -----------         ------                      Scan Slide[[id number]]                                                                  CBC Auto Differential[[id number]]        Abnormal            Final result                 Please view results for these tests on the individual orders.    CBC Auto Differential [[id number]]  (Abnormal) Collected:  10/15/18 0645    Specimen:  Blood Updated:  10/15/18 0706     WBC 4.12 (L) 10*3/mm3      RBC 3.37 (L) 10*6/mm3      Hemoglobin 10.1 (L) g/dL      Hematocrit 32.3 (L) %      MCV 95.8 fL      MCH 30.0 pg      MCHC 31.3 (L) g/dL      RDW 17.0 (H) %      RDW-SD 59.7 (H) fl      MPV 9.6 fL      Platelets 261 10*3/mm3      Neutrophil % 38.4 (L) %      Lymphocyte % 30.1 %      Monocyte % 25.7 (H) %      Eosinophil % 5.1 %      Basophil % 0.2 %      Immature Grans % 0.5 %      Neutrophils, Absolute 1.58 (L) 10*3/mm3      Lymphocytes, Absolute 1.24 10*3/mm3      Monocytes, Absolute 1.06 10*3/mm3      Eosinophils, Absolute 0.21 10*3/mm3      Basophils, Absolute 0.01 10*3/mm3      Immature Grans, Absolute 0.02 10*3/mm3     Potassium [[id number]]  (Normal) Collected:  10/14/18 2255    Specimen:  Blood Updated:  10/14/18 2339     Potassium 4.6 mmol/L     POC Glucose Once [[id number]]  (Normal) Collected:  10/14/18 2002    Specimen:  Blood Updated:  10/14/18 2004     Glucose 99 mg/dL     Narrative:       Meter: [id number] : [id number] Brisa Daly KWAKU    Basic Metabolic Panel [[id number]]  (Abnormal) Collected:  10/14/18 0757    Specimen:  Blood Updated:  10/14/18 0917     Glucose 91 mg/dL      BUN 6 (L) mg/dL      Creatinine 0.57 mg/dL      Sodium 143 mmol/L      Potassium 3.3 (L) mmol/L      Chloride 108 (H) mmol/L      CO2 29.2 (H) mmol/L      Calcium 8.2 mg/dL      eGFR Non African Amer 99 mL/min/1.73      BUN/Creatinine Ratio 10.5     Anion Gap 5.8 mmol/L     Narrative:       The MDRD GFR formula is only valid for adults with stable renal function between ages 18 and 70.    CBC & Differential [[id number]]  Collected:  10/14/18 0757    Specimen:  Blood Updated:  10/14/18 0858    Narrative:       The following orders were created for panel order CBC & Differential.  Procedure                               Abnormality         Status                     ---------                               -----------         ------                     Scan Slide[[id number]]                                                                  CBC Auto Differential[[id number]]        Abnormal            Final result                 Please view results for these tests on the individual orders.    CBC Auto Differential [[id number]]  (Abnormal) Collected:  10/14/18 0757    Specimen:  Blood Updated:  10/14/18 0858     WBC 4.34 (L) 10*3/mm3      RBC 3.43 (L) 10*6/mm3      Hemoglobin 10.7 (L) g/dL      Hematocrit 32.9 (L) %      MCV 95.9 fL      MCH 31.2 pg      MCHC 32.5 g/dL      RDW 17.4 (H) %      RDW-SD 61.1 (H) fl      MPV 9.7 fL      Platelets 298 10*3/mm3      Neutrophil % 40.7 (L) %      Lymphocyte % 31.6 %      Monocyte % 22.6 (H) %      Eosinophil % 4.6 %      Basophil % 0.5 %      Immature Grans % 0.2 %      Neutrophils, Absolute 1.77 (L) 10*3/mm3      Lymphocytes, Absolute 1.37 10*3/mm3      Monocytes, Absolute 0.98 10*3/mm3      Eosinophils, Absolute 0.20 10*3/mm3      Basophils, Absolute 0.02 10*3/mm3      Immature Grans, Absolute 0.01 10*3/mm3     POC Glucose Once [[id number]]  (Normal) Collected:  10/14/18 0743    Specimen:  Blood Updated:  10/14/18 0745     Glucose 88 mg/dL     Narrative:       Meter: [id number] : [id number] Teddy Raygoza CNA    POC Glucose Once [[id number]]  (Normal) Collected:  10/14/18 0043    Specimen:  Blood Updated:  10/14/18 0044     Glucose 105 mg/dL     Narrative:       Meter: [id number] : [id number] Anirudh AMES    Basic Metabolic Panel [[id number]]  (Abnormal) Collected:  10/13/18 0618    Specimen:  Blood Updated:  10/13/18 0653     Glucose 97 mg/dL      BUN 10 mg/dL      Creatinine 0.46 (L) mg/dL       Sodium 141 mmol/L      Potassium 3.5 mmol/L      Chloride 109 (H) mmol/L      CO2 24.5 mmol/L      Calcium 7.9 (L) mg/dL      eGFR Non African Amer 126 mL/min/1.73      BUN/Creatinine Ratio 21.7     Anion Gap 7.5 mmol/L     Narrative:       The MDRD GFR formula is only valid for adults with stable renal function between ages 18 and 70.    CBC & Differential [[id number]] Collected:  10/13/18 0618    Specimen:  Blood Updated:  10/13/18 0633    Narrative:       The following orders were created for panel order CBC & Differential.  Procedure                               Abnormality         Status                     ---------                               -----------         ------                     CBC Auto Differential[[id number]]        Abnormal            Final result                 Please view results for these tests on the individual orders.    CBC Auto Differential [[id number]]  (Abnormal) Collected:  10/13/18 0618    Specimen:  Blood Updated:  10/13/18 0633     WBC 4.89 10*3/mm3      RBC 3.22 (L) 10*6/mm3      Hemoglobin 9.7 (L) g/dL      Hematocrit 31.7 (L) %      MCV 98.4 (H) fL      MCH 30.1 pg      MCHC 30.6 (L) g/dL      RDW 17.6 (H) %      RDW-SD 62.8 (H) fl      MPV 10.0 fL      Platelets 248 10*3/mm3      Neutrophil % 51.1 %      Lymphocyte % 26.6 %      Monocyte % 19.2 (H) %      Eosinophil % 2.9 %      Basophil % 0.2 %      Immature Grans % 0.0 %      Neutrophils, Absolute 2.50 10*3/mm3      Lymphocytes, Absolute 1.30 10*3/mm3      Monocytes, Absolute 0.94 10*3/mm3      Eosinophils, Absolute 0.14 10*3/mm3      Basophils, Absolute 0.01 10*3/mm3      Immature Grans, Absolute 0.00 10*3/mm3     Basic Metabolic Panel [[id number]]  (Abnormal) Collected:  10/12/18 0535    Specimen:  Blood Updated:  10/12/18 0638     Glucose 101 (H) mg/dL      BUN 16 mg/dL      Creatinine 0.53 (L) mg/dL      Sodium 142 mmol/L      Potassium 3.5 mmol/L      Chloride 111 (H) mmol/L      CO2 23.0 mmol/L      Calcium 8.0 (L)  mg/dL      eGFR Non African Amer 107 mL/min/1.73      BUN/Creatinine Ratio 30.2 (H)     Anion Gap 8.0 mmol/L     Narrative:       The MDRD GFR formula is only valid for adults with stable renal function between ages 18 and 70.    CBC & Differential [[id number]] Collected:  10/12/18 0535    Specimen:  Blood Updated:  10/12/18 0611    Narrative:       The following orders were created for panel order CBC & Differential.  Procedure                               Abnormality         Status                     ---------                               -----------         ------                     CBC Auto Differential[[id number]]        Abnormal            Final result                 Please view results for these tests on the individual orders.    CBC Auto Differential [[id number]]  (Abnormal) Collected:  10/12/18 0535    Specimen:  Blood Updated:  10/12/18 0611     WBC 6.40 10*3/mm3      RBC 3.04 (L) 10*6/mm3      Hemoglobin 9.0 (L) g/dL      Hematocrit 29.7 (L) %      MCV 97.7 fL      MCH 29.6 pg      MCHC 30.3 (L) g/dL      RDW 18.1 (H) %      RDW-SD 63.6 (H) fl      MPV 10.0 fL      Platelets 237 10*3/mm3      Neutrophil % 65.9 %      Lymphocyte % 18.4 (L) %      Monocyte % 14.1 (H) %      Eosinophil % 1.4 %      Basophil % 0.2 %      Immature Grans % 0.0 %      Neutrophils, Absolute 4.22 10*3/mm3      Lymphocytes, Absolute 1.18 10*3/mm3      Monocytes, Absolute 0.90 10*3/mm3      Eosinophils, Absolute 0.09 10*3/mm3      Basophils, Absolute 0.01 10*3/mm3      Immature Grans, Absolute 0.00 10*3/mm3     Basic Metabolic Panel [[id number]]  (Abnormal) Collected:  10/11/18 0542    Specimen:  Blood Updated:  10/11/18 0647     Glucose 94 mg/dL      BUN 19 mg/dL      Creatinine 0.70 mg/dL      Sodium 142 mmol/L      Potassium 3.5 mmol/L      Chloride 110 (H) mmol/L      CO2 24.3 mmol/L      Calcium 8.2 mg/dL      eGFR Non African Amer 78 mL/min/1.73      BUN/Creatinine Ratio 27.1 (H)     Anion Gap 7.7 mmol/L     Narrative:        The MDRD GFR formula is only valid for adults with stable renal function between ages 18 and 70.    CBC & Differential [[id number]] Collected:  10/11/18 0542    Specimen:  Blood Updated:  10/11/18 0620    Narrative:       The following orders were created for panel order CBC & Differential.  Procedure                               Abnormality         Status                     ---------                               -----------         ------                     CBC Auto Differential[[id number]]        Abnormal            Final result                 Please view results for these tests on the individual orders.    CBC Auto Differential [[id number]]  (Abnormal) Collected:  10/11/18 0542    Specimen:  Blood Updated:  10/11/18 0620     WBC 6.33 10*3/mm3      RBC 2.93 (L) 10*6/mm3      Hemoglobin 8.7 (L) g/dL      Hematocrit 28.4 (L) %      MCV 96.9 fL      MCH 29.7 pg      MCHC 30.6 (L) g/dL      RDW 18.3 (H) %      RDW-SD 64.2 (H) fl      MPV 10.0 fL      Platelets 238 10*3/mm3      Neutrophil % 72.3 %      Lymphocyte % 11.4 (L) %      Monocyte % 16.0 (H) %      Eosinophil % 0.0 (L) %      Basophil % 0.0 %      Immature Grans % 0.3 %      Neutrophils, Absolute 4.58 10*3/mm3      Lymphocytes, Absolute 0.72 (L) 10*3/mm3      Monocytes, Absolute 1.01 10*3/mm3      Eosinophils, Absolute 0.00 10*3/mm3      Basophils, Absolute 0.00 10*3/mm3      Immature Grans, Absolute 0.02 10*3/mm3     Tissue Pathology Exam [[id number]] Collected:  10/09/18 1020    Specimen:  Tissue from Large Intestine, Right / Ascending Colon Updated:  10/10/18 1404     Case Report --     Surgical Pathology Report                         Case: [id number]                                  Authorizing Provider:  Jung Barillas MD      Collected:           10/09/2018 10:20 AM          Ordering Location:     [facility]  Received:            10/09/2018 12:08 PM                                 MAIN OR                                               "                        Pathologist:           Phil Hernandez MD                                                                           Specimen:    Large Intestine, Right / Ascending Colon, TERMINAL ILEUM AND RIGHT COLON                    Final Diagnosis --     RIGHT/ASCENDING COLON, TERMINAL ILEUM:         INVASIVE COLONIC ADENOCARCINOMA, GRADE 2 (5.3 CM) OF RIGHT COLON.         TUMOR EXTENDS THROUGH FULL THICKNESS OF MUSCULARIS PROPRIA AND INTO ADIPOSE TISSUE.         METASTATIC CARCINOMA PRESENT IN 1 OF 17 MESENTERIC LYMPH NODES.         MARGINS FREE OF TUMOR.    SYNOPTIC REPORT: (The following synoptic report utilizes the June, 2017 [facility] protocol for carcinoma of colon and rectum):    Procedure: Right hemicolectomy.  Tumor site: Right colon.  Tumor size: 5.3 cm.  Macroscopic tumor perforation: Not identified.  Histologic type: Adenocarcinoma.  Histologic grade: Grade 2.  Tumor extension: Tumor invades through muscularis propria into pericolonic tissue.  Margins:         Margins uninvolved by invasive carcinoma, high grade dysplasia, intramucosal adenocarcinoma, and adenoma.        Margins examined: Proximal, distal, and mesenteric margins.        Distance of invasive carcinoma from closest margin: 7 cm from mesenteric margin.  Lymphovascular invasion: Not identified.  Perineural invasion: Not identified.  Tumor deposit: Not identified.  Regional lymph nodes:        Number of lymph nodes involved: 1.        Number of lymph nodes examined: 17.  Pathologic staging:        Primary tumor: pT3.        Regional lymph nodes: pN1a.          CMK/th  IHC/CHAKA     CPT CODES:  1. 12731        Gross Description --     Received in formalin labeled \"terminal ileum and right colon\" is an unopened right colectomy specimen which includes the terminal ileum, cecum without appendix, proximal colon, and a modern amount of attached mesentery fat. " "The margins are closed with metal sutures. The specimen is 7 cm from the ileal margin to the ileocecal valve and 30 cm from the ileocecal valve to the distal margin. Located 12 cm above the distal margin of resection there is a circumferential tan neoplastic appearing lesion which is 5.3 cm in circumference and up to 5.0 cm in maximum length. The lesion appears to involve the full thickness of the bowel wall and the overlying serosa is focally puckered. The bowel proximal to the lesion is moderately dilated but otherwise unremarkable. The bowel distal to the lesion is also grossly normal. The lesion is located 7 cm from the mesenteric apical soft tissue margin, which appears unremarkable. The attached fat is found to contain multiple grossly benign appearing lymph node candidates. Representative sections are submitted as follows:    1A: Shave adjacent to sutured ileal margin.  1B: Ileocecal valve.  1C and 1D: Lesion with inked on serosa.   1E and 1F: Additional sections of lesion.  1G: Shave adjacent to sutured distal margin.  1H: Shaved mesenteric apex soft tissue margin.  1I-1L: Intact mesenteric lymph node.  1M: Two bisected mesenteric lymph nodes, one inked black.    LW/USO/CMK/th             Microscopic Description --     Performed, incorporated in diagnosis.             /70 (BP Location: Left arm, Patient Position: Lying)   Pulse 75   Temp 97.3 °F (36.3 °C) (Oral)   Resp 16   Ht 162.6 cm (64\")   Wt 63 kg (139 lb)   LMP  (LMP Unknown)   SpO2 92%   BMI 23.86 kg/m²     Discharge Exam:  General Appearance:    Alert, cooperative, no distress                          Head:    Normocephalic, without obvious abnormality, atraumatic                          Eyes:                            Throat:   Lips, tongue, gums normal                          Neck:   Supple, symmetrical, trachea midline, no JVD                        Lungs:     Clear to auscultation bilaterally, respirations unlabored               "  Chest Wall:    No tenderness or deformity                        Heart:    Regular rate and rhythm, S1 and S2 normal, no murmur,no  Rub                                       or gallop                  Abdomen:     Soft, non-tender, bowel sounds active, no masses, no                                                        organomegaly                  Extremities:   Extremities normal, atraumatic, no cyanosis or edema                             Skin:   Skin is warm and dry,  no rashes or palpable lesions                  Neurologic:   no focal deficits noted     Disposition:  Skilled nursing facility    Patient Instructions:      Discharge Medications      New Medications      Instructions Start Date   acetaminophen 325 MG tablet  Commonly known as:  TYLENOL   650 mg, Oral, Every 4 Hours PRN      famotidine 20 MG tablet  Commonly known as:  PEPCID   20 mg, Oral, Daily      polyethylene glycol packet  Commonly known as:  MIRALAX   17 g, Oral, Daily         Changes to Medications      Instructions Start Date   levothyroxine 75 MCG tablet  Commonly known as:  UNITHROID  What changed:  how much to take   75 mcg, Oral, Daily         Continue These Medications      Instructions Start Date   ACIDOPHILUS PO   Oral      ENSURE NUTRA SHAKE HI-CAMMY liquid   1 can, Oral, 4 Times Daily, PRN meal supplementation      folic acid 400 MCG tablet  Commonly known as:  FOLVITE   400 mcg, Oral, Daily      multivitamin with minerals tablet   1 tablet, Oral, Daily      SUPER B COMPLEX PO   Oral      TRAVATAN Z 0.004 % solution ophthalmic solution  Generic drug:  travoprost (AVERY free)   1 drop, Both Eyes, Nightly      VITAMIN B-6 PO   100 mg, Oral, Daily      zolpidem 5 MG tablet  Commonly known as:  AMBIEN   1/2 -1 tabs PO Q HS PRN insomnia         Stop These Medications    hyoscyamine 0.125 MG SL tablet  Commonly known as:  LEVSIN     simethicone 125 MG chewable tablet  Commonly known as:  MYLICON          Future Appointments  Date Time  Provider Department [place]   10[facility]23/2018 9:30 AM [facility]   10/23/2018 10:00 AM Melvin Kenny MD [facility] CBC EAST  [facility] [place]   10/25/2018 10:00 AM Josie Lawson APRN [facility] GE EA [facility] None   12/26/2018 11:40 AM [facility] END [facility] None   1/7/2019 11:40 AM Paz Lozano, APRN [facility] END [facility] None   4/23/2019 11:00 AM [facility] END [facility] None   5/7/2019 11:00 AM Yumiko Olivo MD [facility] END [facility] None     Follow-up Information     Jung Barillas MD Follow up in 1 week(s).    Specialty:  General Surgery  Contact information:  [address]  [place] [address]  [phone]             Cary Espinal, PA-C .    Specialty:  Family Medicine  Contact information:  [address]  [place] [address]  [phone]                 Discharge Order     Start     Ordered    10/17/18 1129  Discharge patient  Once     Expected Discharge Date:  10/17/18    Discharge Disposition:  Skilled Nursing Facility (DC - External)    Physician of Record for Attribution - Please select from Treatment Team:  ALBERTO ANDREWS [[id number]]    Review needed by CMO to determine Physician of Record:  No       Question Answer Comment   Physician of Record for Attribution - Please select from Treatment Team ALBERTO ANDREWS    Review needed by CMO to determine Physician of Record No        10/17/18 1129          Total time spent discharging patient including evaluation,post hospitalization follow up,  medication and post hospitalization instructions and education total time exceeds 30 minutes.    Signed:  Alberto Andrews MD  10/17/2018  11:29 AM

## 2018-10-17 NOTE — PROGRESS NOTES
Postoperative day #8 right hemicolectomy    Subjective:  No complaints.  Minimal pain.  Tolerating a solid diet although her appetite is still relatively poor.  Passing flatus and bowel movement yesterday.  Still feels weak but is on her feet and walking with assistance.    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  Appears to gotten over her postoperative ileus and is now eating adequately.  Okay for discharge from my standpoint.  Follow up with me in approximately 1 week.  \  Jung Barillas MD  General and Endoscopic Surgery  [facility]    [address], [address]  [place], [place], [address]  P: [phone]  F: [phone]

## 2018-10-18 ENCOUNTER — EPISODE CHANGES (OUTPATIENT)
Dept: SOCIAL WORK | Facility: HOSPITAL | Age: 83
End: 2018-10-18

## 2018-10-18 ENCOUNTER — EPISODE CHANGES (OUTPATIENT)
Dept: CASE MANAGEMENT | Facility: OTHER | Age: 83
End: 2018-10-18

## 2018-10-18 NOTE — PROGRESS NOTES
Case Management Discharge Note    Final Note: Pt discharged to [facility] on 10/17.   JAMIL Leonard    Destination - Selection Complete     Service Request Status Selected Specialties Address Phone Number Fax Number    [facility] [address] [place] [id number] [phone]      Durable Medical Equipment     No service has been selected for the patient.      Dialysis/Infusion     No service has been selected for the patient.      Home Medical Care     No service has been selected for the patient.      Social Care     No service has been selected for the patient.        Other: Other (Private Auto)    Final Discharge Disposition Code: 03 - skilled nursing facility (SNF)

## 2018-10-22 ENCOUNTER — PATIENT OUTREACH (OUTPATIENT)
Dept: CASE MANAGEMENT | Facility: OTHER | Age: 83
End: 2018-10-22

## 2018-10-22 ENCOUNTER — EPISODE CHANGES (OUTPATIENT)
Dept: CASE MANAGEMENT | Facility: OTHER | Age: 83
End: 2018-10-22

## 2018-10-22 NOTE — OUTREACH NOTE
Skilled Nursing Facility Discharge Flowsheet:     Skilled Nursing Facility Discharge Assessment 10/22/2018   Acute Facility Discharged From [facility]   Acute Discharge Date 10/17/2018   Name of the Skilled Nursing Facility? [facility]   Tier Level of the Skilled Nursing Facility 3   Purpose of SNF Admission PT;SN   Estimated length of stay for the patient? TBD   Who is the insurance provider or payor of patient stay? Medicare   Progression of Patient? -   Skilled Nursing Discharge Date? -

## 2018-10-24 ENCOUNTER — OFFICE VISIT (OUTPATIENT)
Dept: SURGERY | Facility: CLINIC | Age: 83
End: 2018-10-24

## 2018-10-24 DIAGNOSIS — C18.2 MALIGNANT NEOPLASM OF ASCENDING COLON (HCC): Primary | ICD-10-CM

## 2018-10-24 PROCEDURE — 99024 POSTOP FOLLOW-UP VISIT: CPT | Performed by: SURGERY

## 2018-10-24 NOTE — PROGRESS NOTES
Follow-up right hemicolectomy for cancer    Subjective:  Patient is now little over 2 weeks out from right colectomy and seems to be recovering well.  No specific complaints, tolerating a diet and bowels functioning adequately.  Minimal pain.    Objective:  Abdomen is soft and benign, incision looks good, some redness around the staples.    Assessment and plan:  Near obstructing right colon cancer status post unprepped right colectomy.  Patient has come through this remarkably well given her age.  Pathology hopefully is favorable with a T3 N1 lesion.  I'm going to refer her to medical oncology but I doubt she would be a candidate for chemotherapy.  She will follow up with me in 4 weeks.    Jung Barillas MD  General and Endoscopic Surgery  [facility]    [address], [address]  [place], [place], [address]  P: [phone]  F: [phone]

## 2018-11-12 RX ORDER — ERGOCALCIFEROL 1.25 MG/1
CAPSULE ORAL
Qty: 13 CAPSULE | Refills: 3 | Status: SHIPPED | OUTPATIENT
Start: 2018-11-12 | End: 2019-01-07 | Stop reason: SDUPTHER

## 2018-11-28 ENCOUNTER — OFFICE VISIT (OUTPATIENT)
Dept: SURGERY | Facility: CLINIC | Age: 83
End: 2018-11-28

## 2018-11-28 DIAGNOSIS — C18.2 MALIGNANT NEOPLASM OF ASCENDING COLON (HCC): Primary | ICD-10-CM

## 2018-11-28 PROCEDURE — 99024 POSTOP FOLLOW-UP VISIT: CPT | Performed by: SURGERY

## 2018-11-28 RX ORDER — HYOSCYAMINE SULFATE 0.125 MG
1 TABLET ORAL EVERY 4 HOURS PRN
Refills: 0 | COMMUNITY
Start: 2018-11-21 | End: 2019-07-09

## 2018-11-28 NOTE — PROGRESS NOTES
Follow-up right colectomy    Subjective:  Patient's now about 6 weeks out from a right colectomy for a near obstructing colon cancer.  Overall she is feeling better, eating pretty well and has 2-3 soft bowel movements per day.    Objective:  Abdomen is soft and benign, no hernia and incision nicely healed.    Assessment and plan:  Now status post T3 N1 right colon cancer.  Again discussed with the patient the option of hematology oncology evaluation, but patient states she would not take chemotherapy.  This is probably appropriate given her age.  She seems to have recovered without evidence for complication.  Colonoscopy would typically be considered in one year's time, but I don't think that that is indicated in her case either.      Jung Barillas MD  General and Endoscopic Surgery  [facility]    [address], [address]  [place], [place], [address]  P: [phone]  F: [phone]

## 2018-12-04 ENCOUNTER — PATIENT OUTREACH (OUTPATIENT)
Dept: CASE MANAGEMENT | Facility: OTHER | Age: 83
End: 2018-12-04

## 2018-12-04 NOTE — OUTREACH NOTE
"Care Management Plan 12/4/2018   Lifestyle Goals Routine follow-up with doctor(s);Increase physical activity   Barriers Disease education   Self Management Increase Physical Activities;Medication Adherence   Care Gaps Addressed Colonoscopy;Other (See Comment)   Care Gaps Addressed Hep A and zoster   Does patient have depression diagnosis? No   Advanced Directives: Patient Has   Ed Visits past 12 months: 2 or 3   Hospitalizations past 12 months 2 or 3   Discharged From: [facility]   Discharged to: rehab   Admit Date: 10/6/18   Discharge Date: 10/17/18   Discharge destination: SNF   Goal Progress Making Progress Toward Goal(s)   Readiness Scale 9   Confidence Scale 8   Health Literacy Good     The main concerns and/or symptoms the patient would like to address are: Pt. Recently discharged from [facility] rehab 11/22/18 with [facility]. Pt. States her nurse was there today and 'gave her a good report\" Pt. Reports she is feeling stronger, appetite is good, having BM's daily. Reviewed hydration and diet. Pt has good family support \"my daughter is a nurse\" Health Maintenance Gaps all reviewed, pt.states she will see Cary Espinal and discuss these with her.     Education/instruction provided by Care Coordinator: Explained role of Care Advisor and contact information given to patient.Nurse provided patient education.No other questions or concerns voiced at this time.    Follow Up Outreach Due ~ 2weeks    Hanna Beltre RN    "

## 2018-12-07 ENCOUNTER — OFFICE VISIT (OUTPATIENT)
Dept: FAMILY MEDICINE CLINIC | Facility: CLINIC | Age: 83
End: 2018-12-07

## 2018-12-07 VITALS
DIASTOLIC BLOOD PRESSURE: 80 MMHG | SYSTOLIC BLOOD PRESSURE: 138 MMHG | TEMPERATURE: 98.4 F | RESPIRATION RATE: 16 BRPM | HEART RATE: 88 BPM | BODY MASS INDEX: 21 KG/M2 | OXYGEN SATURATION: 100 % | HEIGHT: 64 IN | WEIGHT: 123 LBS

## 2018-12-07 DIAGNOSIS — Z85.038 HISTORY OF COLON CANCER: ICD-10-CM

## 2018-12-07 DIAGNOSIS — Z09 HOSPITAL DISCHARGE FOLLOW-UP: Primary | ICD-10-CM

## 2018-12-07 DIAGNOSIS — D50.0 IRON DEFICIENCY ANEMIA DUE TO CHRONIC BLOOD LOSS: ICD-10-CM

## 2018-12-07 DIAGNOSIS — F51.01 PRIMARY INSOMNIA: ICD-10-CM

## 2018-12-07 DIAGNOSIS — D63.8 ANEMIA OF CHRONIC ILLNESS: ICD-10-CM

## 2018-12-07 PROCEDURE — 99214 OFFICE O/P EST MOD 30 MIN: CPT | Performed by: PHYSICIAN ASSISTANT

## 2018-12-07 NOTE — PATIENT INSTRUCTIONS
Stop Ambien if you develop excessive daytime drowsiness and/or sleep walking.  Avoid driving if drowsy.  Do not drink alcohol with this medication.  Ambien is a controlled substance and requires you to be seen for an appointment every 3 months for a medication check refill.  Use the lowest effective dose.  Low glycemic index diet  Exercise 30 minutes most days of the week  Make sure you get results on any labs or tests we ordered today  We discussed medications and how to take them as prescribed  Sleep 6-8 hours each night if possible  If you have not signed up for [employer], please activate your code ASAP from your After Visit Summary today    LDL goal <100  LDL goal if heart disease <70  HDL goal >60  Triglyceride goal <150  BP goal =<130/80  Fasting glucose <100

## 2018-12-08 LAB
ALBUMIN SERPL-MCNC: 4.1 G/DL (ref 3.5–5.2)
ALBUMIN/GLOB SERPL: 1.4 G/DL
ALP SERPL-CCNC: 56 U/L (ref 39–117)
ALT SERPL-CCNC: 11 U/L (ref 1–33)
AST SERPL-CCNC: 19 U/L (ref 1–32)
BASOPHILS # BLD AUTO: 0.03 10*3/MM3 (ref 0–0.2)
BASOPHILS NFR BLD AUTO: 0.6 % (ref 0–1.5)
BILIRUB SERPL-MCNC: 0.4 MG/DL (ref 0.1–1.2)
BUN SERPL-MCNC: 19 MG/DL (ref 8–23)
BUN/CREAT SERPL: 23.5 (ref 7–25)
CALCIUM SERPL-MCNC: 9.9 MG/DL (ref 8.2–9.6)
CHLORIDE SERPL-SCNC: 103 MMOL/L (ref 98–107)
CO2 SERPL-SCNC: 24.5 MMOL/L (ref 22–29)
CREAT SERPL-MCNC: 0.81 MG/DL (ref 0.57–1)
EOSINOPHIL # BLD AUTO: 0.06 10*3/MM3 (ref 0–0.7)
EOSINOPHIL NFR BLD AUTO: 1.1 % (ref 0.3–6.2)
ERYTHROCYTE [DISTWIDTH] IN BLOOD BY AUTOMATED COUNT: 16.5 % (ref 11.7–13)
FERRITIN SERPL-MCNC: 136.2 NG/ML (ref 13–150)
GLOBULIN SER CALC-MCNC: 3 GM/DL
GLUCOSE SERPL-MCNC: 103 MG/DL (ref 65–99)
HCT VFR BLD AUTO: 31.8 % (ref 35.6–45.5)
HGB BLD-MCNC: 9.7 G/DL (ref 11.9–15.5)
IMM GRANULOCYTES # BLD: 0 10*3/MM3 (ref 0–0.03)
IMM GRANULOCYTES NFR BLD: 0 % (ref 0–0.5)
IRON SATN MFR SERPL: 26 % (ref 20–50)
IRON SERPL-MCNC: 79 MCG/DL (ref 37–145)
LYMPHOCYTES # BLD AUTO: 2.39 10*3/MM3 (ref 0.9–4.8)
LYMPHOCYTES NFR BLD AUTO: 45.2 % (ref 19.6–45.3)
MCH RBC QN AUTO: 30.5 PG (ref 26.9–32)
MCHC RBC AUTO-ENTMCNC: 30.5 G/DL (ref 32.4–36.3)
MCV RBC AUTO: 100 FL (ref 80.5–98.2)
MONOCYTES # BLD AUTO: 0.61 10*3/MM3 (ref 0.2–1.2)
MONOCYTES NFR BLD AUTO: 11.5 % (ref 5–12)
NEUTROPHILS # BLD AUTO: 2.2 10*3/MM3 (ref 1.9–8.1)
NEUTROPHILS NFR BLD AUTO: 41.6 % (ref 42.7–76)
PLATELET # BLD AUTO: 369 10*3/MM3 (ref 140–500)
POTASSIUM SERPL-SCNC: 4.4 MMOL/L (ref 3.5–5.2)
PROT SERPL-MCNC: 7.1 G/DL (ref 6–8.5)
RBC # BLD AUTO: 3.18 10*6/MM3 (ref 3.9–5.2)
SODIUM SERPL-SCNC: 141 MMOL/L (ref 136–145)
TIBC SERPL-MCNC: 303 MCG/DL
UIBC SERPL-MCNC: 224 MCG/DL
WBC # BLD AUTO: 5.29 10*3/MM3 (ref 4.5–10.7)

## 2018-12-09 RX ORDER — ZOLPIDEM TARTRATE 5 MG/1
TABLET ORAL
Qty: 30 TABLET | Refills: 2 | Status: SHIPPED | OUTPATIENT
Start: 2018-12-09 | End: 2019-05-08 | Stop reason: SDUPTHER

## 2018-12-10 LAB
SPECIMEN STATUS: NORMAL
WRITTEN AUTHORIZATION: NORMAL

## 2018-12-11 ENCOUNTER — RESULTS ENCOUNTER (OUTPATIENT)
Dept: ENDOCRINOLOGY | Age: 83
End: 2018-12-11

## 2018-12-11 DIAGNOSIS — E78.2 MIXED HYPERLIPIDEMIA: ICD-10-CM

## 2018-12-11 DIAGNOSIS — E06.3 HYPOTHYROIDISM DUE TO HASHIMOTO'S THYROIDITIS: ICD-10-CM

## 2018-12-11 DIAGNOSIS — E55.9 VITAMIN D DEFICIENCY: ICD-10-CM

## 2018-12-11 DIAGNOSIS — E83.52 HYPERCALCEMIA: ICD-10-CM

## 2018-12-11 DIAGNOSIS — E03.8 HYPOTHYROIDISM DUE TO HASHIMOTO'S THYROIDITIS: ICD-10-CM

## 2018-12-18 ENCOUNTER — PATIENT OUTREACH (OUTPATIENT)
Dept: CASE MANAGEMENT | Facility: OTHER | Age: 83
End: 2018-12-18

## 2018-12-18 NOTE — OUTREACH NOTE
"Pt. Continues to have Home Health visits. The nurse was there today and will plan on seeing pt. Until the end of the month. Pt. Reports weight is stable at 119 lbs, appetite is good. She is eating 5-6 small meals a day vs 3 large ones. Pt. Is having a daily BM due to eating shredded wheat each day. Hydration is good, she is trying to drink 2 large glasses of water each day. Reviewed Health Maintenance gaps \"I am not interested in doing any of that\" Nurse provided patient education.No other questions or concerns voiced at this time.  "

## 2019-01-07 ENCOUNTER — OFFICE VISIT (OUTPATIENT)
Dept: ENDOCRINOLOGY | Age: 84
End: 2019-01-07

## 2019-01-07 VITALS
DIASTOLIC BLOOD PRESSURE: 60 MMHG | BODY MASS INDEX: 20.49 KG/M2 | HEIGHT: 64 IN | SYSTOLIC BLOOD PRESSURE: 122 MMHG | WEIGHT: 120 LBS

## 2019-01-07 DIAGNOSIS — E55.9 VITAMIN D DEFICIENCY: ICD-10-CM

## 2019-01-07 DIAGNOSIS — E03.8 HYPOTHYROIDISM DUE TO HASHIMOTO'S THYROIDITIS: Primary | ICD-10-CM

## 2019-01-07 DIAGNOSIS — E06.3 HYPOTHYROIDISM DUE TO HASHIMOTO'S THYROIDITIS: Primary | ICD-10-CM

## 2019-01-07 DIAGNOSIS — E78.2 MIXED HYPERLIPIDEMIA: ICD-10-CM

## 2019-01-07 PROCEDURE — 99214 OFFICE O/P EST MOD 30 MIN: CPT | Performed by: NURSE PRACTITIONER

## 2019-01-07 RX ORDER — LEVOTHYROXINE SODIUM 88 UG/1
88 TABLET ORAL DAILY
Qty: 90 TABLET | Refills: 1 | Status: SHIPPED | OUTPATIENT
Start: 2019-01-07 | End: 2019-05-07 | Stop reason: ALTCHOICE

## 2019-01-07 RX ORDER — ERGOCALCIFEROL 1.25 MG/1
50000 CAPSULE ORAL WEEKLY
Qty: 13 CAPSULE | Refills: 3 | Status: SHIPPED | OUTPATIENT
Start: 2019-01-07 | End: 2019-05-07 | Stop reason: SDUPTHER

## 2019-01-07 NOTE — PROGRESS NOTES
Subjective    Kristyn Severino is a 89 y.o. female. she is here to be seen for hypothyroidism.     Hypothyroid and vitamin D def      Hypothyroidism   This is a chronic problem. The current episode started more than 1 year ago. The problem occurs constantly. The problem has been gradually improving. Pertinent negatives include no fatigue, myalgias or rash. Nothing aggravates the symptoms. Treatments tried: meds and labs. The treatment provided mild relief.        Evaluation history:  TSH   Date Value Ref Range Status   08/24/2018 0.020 (L) 0.270 - 4.200 mIU/mL Final     Free T4   Date Value Ref Range Status   08/24/2018 1.36 0.93 - 1.70 ng/dL Final     T3, Free   Date Value Ref Range Status   08/24/2018 6.3 (H) 2.0 - 4.4 pg/mL Final       Current medications:  Current Outpatient Medications   Medication Sig Dispense Refill   • acetaminophen (TYLENOL) 325 MG tablet Take 2 tablets by mouth Every 4 (Four) Hours As Needed for Mild Pain , Headache or Fever.     • B Complex-C (SUPER B COMPLEX PO) Take  by mouth.     • famotidine (PEPCID) 20 MG tablet Take 1 tablet by mouth Daily.     • folic acid (FOLVITE) 400 MCG tablet Take 400 mcg by mouth Daily.     • hyoscyamine (ANASPAZ,LEVSIN) 0.125 MG tablet Take 1 tablet by mouth Every 4 (Four) Hours As Needed.  0   • Lactobacillus (ACIDOPHILUS PO) Take 1 capsule by mouth Daily.     • Multiple Vitamins-Minerals (MULTIVITAMIN WITH MINERALS) tablet Take 1 tablet by mouth daily.     • Nutritional Supplements (ENSURE NUTRA SHAKE HI-CAMMY) liquid Take 1 can by mouth 4 (Four) Times a Day. PRN meal supplementation 120 can 5   • polyethylene glycol (MIRALAX) packet Take 17 g by mouth Daily.     • Pyridoxine HCl (VITAMIN B-6 PO) Take 100 mg by mouth daily.     • TRAVATAN Z 0.004 % solution ophthalmic solution Administer 1 drop to both eyes Every Night.     • vitamin D (ERGOCALCIFEROL) 51866 units capsule capsule Take 1 capsule by mouth 1 (One) Time Per Week. 13 capsule 3   • zolpidem  (AMBIEN) 5 MG tablet 1/2 -1 tabs PO Q HS PRN insomnia 30 tablet 2   • levothyroxine (UNITHROID) 88 MCG tablet Take 1 tablet by mouth Daily. unithroid 90 tablet 1     No current facility-administered medications for this visit.        The following portions of the patient's history were reviewed and updated as appropriate:   Past Medical History:   Diagnosis Date   • Anemia of chronic illness    • Colon cancer (CMS/HCC)     Invasive adenocarcinoma of right colon, grade 2 (5.3 cm)   • PALACIOS (dyspnea on exertion)    • Fatigue    • Hirsutism    • History of bone density study     few years; normal   • History of colonoscopy     never   • History of prior pregnancies     x4   • HLD (hyperlipidemia)    • Hypothyroidism     Hashimoto's diagnosed age 19   • Insomnia    • Lightheadedness    • Osteoarthritis    • Postmenopausal    • Renal insufficiency    • Rhinorrhea    • Seborrheic keratosis    • Vitamin D deficiency      Past Surgical History:   Procedure Laterality Date   • APPENDECTOMY N/A 2010    Dr. Addy De Oliveira   • BREAST BIOPSY     • CARPAL TUNNEL RELEASE Right    • COLON RESECTION N/A 10/9/2018    Procedure: right hemicolectomy;  Surgeon: Jung Barillas MD;  Location: [facility];  Service: General   • MAMMO BILATERAL      many years ago   • PAP SMEAR      many years ago   • REPLACEMENT TOTAL KNEE Left 2011    Dr. Marty Gold     Family History   Problem Relation Age of Onset   • Basal cell carcinoma Mother         Ages 35-96   • Nephrolithiasis Mother    • Diabetes Son      OB History      Para Term  AB Living    4 4 4          SAB TAB Ectopic Molar Multiple Live Births                       No Known Allergies  Social History     Socioeconomic History   • Marital status:      Spouse name: Not on file   • Number of children: 3   • Years of education: Some College   • Highest education level: Not on file   Occupational History   • Occupation: RN     Employer: RETIRED      "Comment: [employer]   Tobacco Use   • Smoking status: Never Smoker   • Smokeless tobacco: Never Used   Substance and Sexual Activity   • Alcohol use: Yes     Comment: Occasional   • Drug use: No   • Sexual activity: Defer       Review of Systems  Review of Systems   Constitutional: Negative for fatigue.   HENT: Negative for trouble swallowing.    Eyes: Negative for visual disturbance.   Respiratory: Negative for choking.    Cardiovascular: Negative for palpitations.   Gastrointestinal: Negative for constipation.   Endocrine: Negative for cold intolerance.   Genitourinary: Negative for difficulty urinating.   Musculoskeletal: Negative for myalgias.   Skin: Negative for rash.   Allergic/Immunologic: Negative.    Neurological: Negative for dizziness.   Hematological: Bruises/bleeds easily.   Psychiatric/Behavioral: Negative for sleep disturbance.        Objective    /60   Ht 162.6 cm (64.02\")   Wt 54.4 kg (120 lb)   LMP  (LMP Unknown)   BMI 20.59 kg/m²   Physical Exam   Constitutional: She is oriented to person, place, and time. She appears well-developed and well-nourished. No distress.   HENT:   Head: Normocephalic and atraumatic.   Eyes: EOM are normal. Pupils are equal, round, and reactive to light.   Neck: Normal range of motion. Neck supple.   Cardiovascular: Normal rate, regular rhythm, normal heart sounds and intact distal pulses.   No murmur heard.  Pulmonary/Chest: Effort normal and breath sounds normal.   Abdominal: Soft. Bowel sounds are normal.   Musculoskeletal: Normal range of motion.   Neurological: She is alert and oriented to person, place, and time.   Skin: Skin is warm and dry. Capillary refill takes 2 to 3 seconds. She is not diaphoretic. No pallor.   Psychiatric: She has a normal mood and affect. Her behavior is normal. Judgment and thought content normal.   Nursing note and vitals reviewed.      Lab Review  Lab Results   Component Value Date    TSH 0.020 (L) 08/24/2018     Lab " Results   Component Value Date    FREET4 1.36 08/24/2018      labs from home health dated: 12.26.2018 - see upload      Assessment/Plan      1. Hypothyroidism due to Hashimoto's thyroiditis    2. Vitamin D deficiency    3. Mixed hyperlipidemia    . This diagnosis was discussed and reviewed with the patient including the advantages of drug therapy.     1. Orders placed during this encounter include:  Orders Placed This Encounter   Procedures   • Comprehensive Metabolic Panel     Standing Status:   Future   • Lipid Panel     Standing Status:   Future     Standing Expiration Date:   1/7/2020     Order Specific Question:   [facility] Has the patient fasted?     Answer:   No   • Microalbumin / Creatinine Urine Ratio - Urine, Clean Catch     Standing Status:   Future   • Uric Acid     Standing Status:   Future   • Vitamin D 25 Hydroxy     Standing Status:   Future   • TSH     Standing Status:   Future   • Thyroid Antibodies     Standing Status:   Future   • T4     Standing Status:   Future   • T3     Standing Status:   Future       Medications prescribed:  Outpatient Encounter Medications as of 1/7/2019   Medication Sig Dispense Refill   • acetaminophen (TYLENOL) 325 MG tablet Take 2 tablets by mouth Every 4 (Four) Hours As Needed for Mild Pain , Headache or Fever.     • B Complex-C (SUPER B COMPLEX PO) Take  by mouth.     • famotidine (PEPCID) 20 MG tablet Take 1 tablet by mouth Daily.     • folic acid (FOLVITE) 400 MCG tablet Take 400 mcg by mouth Daily.     • hyoscyamine (ANASPAZ,LEVSIN) 0.125 MG tablet Take 1 tablet by mouth Every 4 (Four) Hours As Needed.  0   • Lactobacillus (ACIDOPHILUS PO) Take 1 capsule by mouth Daily.     • Multiple Vitamins-Minerals (MULTIVITAMIN WITH MINERALS) tablet Take 1 tablet by mouth daily.     • Nutritional Supplements (ENSURE NUTRA SHAKE HI-CAMMY) liquid Take 1 can by mouth 4 (Four) Times a Day. PRN meal supplementation 120 can 5   • polyethylene glycol (MIRALAX) packet Take 17 g by mouth  Daily.     • Pyridoxine HCl (VITAMIN B-6 PO) Take 100 mg by mouth daily.     • TRAVATAN Z 0.004 % solution ophthalmic solution Administer 1 drop to both eyes Every Night.     • vitamin D (ERGOCALCIFEROL) 64623 units capsule capsule Take 1 capsule by mouth 1 (One) Time Per Week. 13 capsule 3   • zolpidem (AMBIEN) 5 MG tablet 1/2 -1 tabs PO Q HS PRN insomnia 30 tablet 2   • [DISCONTINUED] levothyroxine (UNITHROID) 75 MCG tablet Take 1 tablet by mouth Daily. (Patient taking differently: Take 88 mcg by mouth Daily.) 90 tablet 1   • [DISCONTINUED] vitamin D (ERGOCALCIFEROL) 94803 units capsule capsule TAKE 1 CAPSULE BY MOUTH 1 (ONE) TIME PER WEEK. 13 capsule 3   • levothyroxine (UNITHROID) 88 MCG tablet Take 1 tablet by mouth Daily. unithroid 90 tablet 1     No facility-administered encounter medications on file as of 1/7/2019.        2. Repeat s-TSH in before patient's next visit.    3. The risks and benefits of my recommendations, as well as other treatment options were discussed with the patient today. Questions were answered.    4. Return if symptoms worsen or fail to improve, for Recheck. Keep appt with Dr Olivo - labs 2 weeks prior

## 2019-01-18 ENCOUNTER — PATIENT OUTREACH (OUTPATIENT)
Dept: CASE MANAGEMENT | Facility: OTHER | Age: 84
End: 2019-01-18

## 2019-01-18 NOTE — OUTREACH NOTE
Pt. Reports Home Health has discharged from seeing her, her weight has been stable and her appetite is good. She is having a BM daily. Reviewed high protein foods and possible use of protein shakes between meals. Nurse provided patient education.No other questions or concerns voiced at this time.

## 2019-02-22 ENCOUNTER — PATIENT OUTREACH (OUTPATIENT)
Dept: CASE MANAGEMENT | Facility: OTHER | Age: 84
End: 2019-02-22

## 2019-02-26 ENCOUNTER — EPISODE CHANGES (OUTPATIENT)
Dept: CASE MANAGEMENT | Facility: OTHER | Age: 84
End: 2019-02-26

## 2019-04-07 ENCOUNTER — RESULTS ENCOUNTER (OUTPATIENT)
Dept: ENDOCRINOLOGY | Age: 84
End: 2019-04-07

## 2019-04-07 DIAGNOSIS — E78.2 MIXED HYPERLIPIDEMIA: ICD-10-CM

## 2019-04-07 DIAGNOSIS — E03.8 HYPOTHYROIDISM DUE TO HASHIMOTO'S THYROIDITIS: ICD-10-CM

## 2019-04-07 DIAGNOSIS — E06.3 HYPOTHYROIDISM DUE TO HASHIMOTO'S THYROIDITIS: ICD-10-CM

## 2019-04-07 DIAGNOSIS — E55.9 VITAMIN D DEFICIENCY: ICD-10-CM

## 2019-04-23 ENCOUNTER — LAB (OUTPATIENT)
Dept: ENDOCRINOLOGY | Age: 84
End: 2019-04-23

## 2019-04-23 DIAGNOSIS — E06.3 HYPOTHYROIDISM DUE TO HASHIMOTO'S THYROIDITIS: ICD-10-CM

## 2019-04-23 DIAGNOSIS — E03.8 HYPOTHYROIDISM DUE TO HASHIMOTO'S THYROIDITIS: ICD-10-CM

## 2019-04-23 DIAGNOSIS — E55.9 VITAMIN D DEFICIENCY: ICD-10-CM

## 2019-04-23 DIAGNOSIS — E78.2 MIXED HYPERLIPIDEMIA: ICD-10-CM

## 2019-04-23 DIAGNOSIS — L68.0 HIRSUTISM: ICD-10-CM

## 2019-04-23 DIAGNOSIS — E78.2 MIXED HYPERLIPIDEMIA: Primary | ICD-10-CM

## 2019-04-25 LAB
25(OH)D3+25(OH)D2 SERPL-MCNC: 50 NG/ML (ref 30–100)
ACTH PLAS-MCNC: 16.8 PG/ML (ref 7.2–63.3)
ALBUMIN SERPL-MCNC: 4.4 G/DL (ref 3.5–5.2)
ALBUMIN/GLOB SERPL: 1.4 G/DL
ALP SERPL-CCNC: 49 U/L (ref 39–117)
ALT SERPL-CCNC: 14 U/L (ref 1–33)
AST SERPL-CCNC: 19 U/L (ref 1–32)
BILIRUB SERPL-MCNC: 0.6 MG/DL (ref 0.2–1.2)
BUN SERPL-MCNC: 23 MG/DL (ref 8–23)
BUN/CREAT SERPL: 23.7 (ref 7–25)
CA-I SERPL ISE-MCNC: 5.4 MG/DL (ref 4.5–5.6)
CALCIUM SERPL-MCNC: 10.2 MG/DL (ref 8.2–9.6)
CHLORIDE SERPL-SCNC: 104 MMOL/L (ref 98–107)
CHOLEST SERPL-MCNC: 213 MG/DL (ref 0–200)
CO2 SERPL-SCNC: 26.7 MMOL/L (ref 22–29)
CORTIS SERPL-MCNC: 14.2 UG/DL
CREAT SERPL-MCNC: 0.97 MG/DL (ref 0.57–1)
GLOBULIN SER CALC-MCNC: 3.1 GM/DL
GLUCOSE SERPL-MCNC: 91 MG/DL (ref 65–99)
HDLC SERPL-MCNC: 87 MG/DL (ref 40–60)
INTERPRETATION: NORMAL
LDLC SERPL CALC-MCNC: 104 MG/DL (ref 0–100)
PHOSPHATE SERPL-MCNC: 3.6 MG/DL (ref 2.5–4.5)
POTASSIUM SERPL-SCNC: 4.5 MMOL/L (ref 3.5–5.2)
PROT SERPL-MCNC: 7.5 G/DL (ref 6–8.5)
PTH-INTACT SERPL-MCNC: 37 PG/ML (ref 15–65)
SODIUM SERPL-SCNC: 140 MMOL/L (ref 136–145)
T3FREE SERPL-MCNC: 1.9 PG/ML (ref 2–4.4)
T4 FREE SERPL-MCNC: 1.05 NG/DL (ref 0.93–1.7)
T4 SERPL-MCNC: 4.39 MCG/DL (ref 4.5–11.7)
THYROGLOB AB SERPL-ACNC: <1 IU/ML
THYROGLOB SERPL-MCNC: 1.4 NG/ML
THYROGLOB SERPL-MCNC: NORMAL NG/ML
TRIGL SERPL-MCNC: 112 MG/DL (ref 0–150)
TSH SERPL DL<=0.005 MIU/L-ACNC: 1.23 MIU/ML (ref 0.27–4.2)
URATE SERPL-MCNC: 4.8 MG/DL (ref 2.4–5.7)
VLDLC SERPL CALC-MCNC: 22.4 MG/DL

## 2019-05-03 RX ORDER — ZOLPIDEM TARTRATE 5 MG/1
TABLET ORAL
Qty: 30 TABLET | Refills: 2 | OUTPATIENT
Start: 2019-05-03

## 2019-05-07 ENCOUNTER — OFFICE VISIT (OUTPATIENT)
Dept: ENDOCRINOLOGY | Age: 84
End: 2019-05-07

## 2019-05-07 VITALS
SYSTOLIC BLOOD PRESSURE: 128 MMHG | HEIGHT: 64 IN | DIASTOLIC BLOOD PRESSURE: 80 MMHG | BODY MASS INDEX: 21.95 KG/M2 | WEIGHT: 128.6 LBS | RESPIRATION RATE: 16 BRPM

## 2019-05-07 DIAGNOSIS — E03.8 HYPOTHYROIDISM DUE TO HASHIMOTO'S THYROIDITIS: Primary | ICD-10-CM

## 2019-05-07 DIAGNOSIS — E55.9 VITAMIN D DEFICIENCY: ICD-10-CM

## 2019-05-07 DIAGNOSIS — L68.0 HIRSUTISM: ICD-10-CM

## 2019-05-07 DIAGNOSIS — E06.3 HYPOTHYROIDISM DUE TO HASHIMOTO'S THYROIDITIS: Primary | ICD-10-CM

## 2019-05-07 DIAGNOSIS — E78.2 MIXED HYPERLIPIDEMIA: ICD-10-CM

## 2019-05-07 PROCEDURE — 99214 OFFICE O/P EST MOD 30 MIN: CPT | Performed by: INTERNAL MEDICINE

## 2019-05-07 RX ORDER — LEVOTHYROXINE SODIUM 0.07 MG/1
75 TABLET ORAL DAILY
Qty: 90 TABLET | Refills: 3 | Status: SHIPPED | OUTPATIENT
Start: 2019-05-07 | End: 2020-05-11

## 2019-05-07 RX ORDER — ERGOCALCIFEROL 1.25 MG/1
50000 CAPSULE ORAL WEEKLY
Qty: 13 CAPSULE | Refills: 3 | Status: SHIPPED | OUTPATIENT
Start: 2019-05-07 | End: 2019-08-07 | Stop reason: SDUPTHER

## 2019-05-07 RX ORDER — LEVOTHYROXINE SODIUM 0.07 MG/1
75 TABLET ORAL DAILY
Refills: 1 | COMMUNITY
Start: 2019-03-03 | End: 2019-05-07 | Stop reason: SDUPTHER

## 2019-05-07 NOTE — PROGRESS NOTES
"Subjective   Kristyn Severino is a 89 y.o. female seen for follow up for hypothyroidism, vit d deficiency, hyperlipidemia, hirsutism, lab review. Patient denies any problems or concerns.     History of Present Illness this is a 89-year-old female known patient with hypothyroidism and dyslipidemia as well as a hirsutism and vitamin D deficiency.  Over the course of last 6 months she has had no significant health problems for which to go to the ER or hospital.    /80   Resp 16   Ht 162.6 cm (64\")   Wt 58.3 kg (128 lb 9.6 oz)   LMP  (LMP Unknown)   BMI 22.07 kg/m²      No Known Allergies    Current Outpatient Medications:   •  acetaminophen (TYLENOL) 325 MG tablet, Take 2 tablets by mouth Every 4 (Four) Hours As Needed for Mild Pain , Headache or Fever., Disp: , Rfl:   •  B Complex-C (SUPER B COMPLEX PO), Take  by mouth., Disp: , Rfl:   •  famotidine (PEPCID) 20 MG tablet, Take 1 tablet by mouth Daily., Disp: , Rfl:   •  folic acid (FOLVITE) 400 MCG tablet, Take 400 mcg by mouth Daily., Disp: , Rfl:   •  hyoscyamine (ANASPAZ,LEVSIN) 0.125 MG tablet, Take 1 tablet by mouth Every 4 (Four) Hours As Needed., Disp: , Rfl: 0  •  Lactobacillus (ACIDOPHILUS PO), Take 1 capsule by mouth Daily., Disp: , Rfl:   •  levothyroxine (SYNTHROID, LEVOTHROID) 75 MCG tablet, Take 75 mcg by mouth Daily., Disp: , Rfl: 1  •  Multiple Vitamins-Minerals (MULTIVITAMIN WITH MINERALS) tablet, Take 1 tablet by mouth daily., Disp: , Rfl:   •  Nutritional Supplements (ENSURE NUTRA SHAKE HI-CAMMY) liquid, Take 1 can by mouth 4 (Four) Times a Day. PRN meal supplementation, Disp: 120 can, Rfl: 5  •  polyethylene glycol (MIRALAX) packet, Take 17 g by mouth Daily., Disp: , Rfl:   •  Pyridoxine HCl (VITAMIN B-6 PO), Take 100 mg by mouth daily., Disp: , Rfl:   •  TRAVATAN Z 0.004 % solution ophthalmic solution, Administer 1 drop to both eyes Every Night., Disp: , Rfl:   •  vitamin D (ERGOCALCIFEROL) 78957 units capsule capsule, Take 1 capsule by " mouth 1 (One) Time Per Week., Disp: 13 capsule, Rfl: 3  •  zolpidem (AMBIEN) 5 MG tablet, 1/2 -1 tabs PO Q HS PRN insomnia, Disp: 30 tablet, Rfl: 2      The following portions of the patient's history were reviewed and updated as appropriate: allergies, current medications, past family history, past medical history, past social history, past surgical history and problem list.    Review of Systems   Constitutional: Negative.    HENT: Negative.    Eyes: Negative.    Respiratory: Negative.    Cardiovascular: Negative.    Gastrointestinal: Negative.    Endocrine: Negative.    Genitourinary: Negative.    Musculoskeletal: Negative.    Skin: Negative.    Allergic/Immunologic: Negative.    Neurological: Negative.    Hematological: Negative.    Psychiatric/Behavioral: Negative.        Objective   Physical Exam   Constitutional: She is oriented to person, place, and time. She appears well-developed and well-nourished. No distress.   HENT:   Head: Normocephalic and atraumatic.   Right Ear: External ear normal.   Left Ear: External ear normal.   Nose: Nose normal.   Mouth/Throat: Oropharynx is clear and moist. No oropharyngeal exudate.   Eyes: Conjunctivae and EOM are normal. Pupils are equal, round, and reactive to light. Right eye exhibits no discharge. Left eye exhibits no discharge. No scleral icterus.   Neck: Normal range of motion. Neck supple. No JVD present. No tracheal deviation present. No thyromegaly present.   Cardiovascular: Normal rate, regular rhythm, normal heart sounds and intact distal pulses. Exam reveals no gallop and no friction rub.   No murmur heard.  Pulmonary/Chest: Effort normal and breath sounds normal. No stridor. No respiratory distress. She has no wheezes. She has no rales. She exhibits no tenderness.   Abdominal: Soft. Bowel sounds are normal. She exhibits no distension and no mass. There is no tenderness. There is no rebound and no guarding. No hernia.   Musculoskeletal: Normal range of motion.  She exhibits no edema, tenderness or deformity.   Lymphadenopathy:     She has no cervical adenopathy.   Neurological: She is alert and oriented to person, place, and time. She has normal reflexes. She displays normal reflexes. No cranial nerve deficit or sensory deficit. She exhibits normal muscle tone. Coordination normal.   Skin: Skin is warm and dry. No rash noted. She is not diaphoretic. No erythema. No pallor.   Psychiatric: She has a normal mood and affect. Her behavior is normal. Judgment and thought content normal.   Nursing note and vitals reviewed.       Results for orders placed or performed in visit on 04/23/19   Cortisol   Result Value Ref Range    Cortisol 14.2 ug/dL   ACTH   Result Value Ref Range    ACTH 16.8 7.2 - 63.3 pg/mL   Calcium, Ionized   Result Value Ref Range    Ionized Calcium 5.4 4.5 - 5.6 mg/dL   Phosphorus   Result Value Ref Range    Phosphorus 3.6 2.5 - 4.5 mg/dL   PTH, Intact   Result Value Ref Range    PTH, Intact 37 15 - 65 pg/mL   Vitamin D 25 Hydroxy   Result Value Ref Range    25 Hydroxy, Vitamin D 50.0 30.0 - 100.0 ng/ml   Uric Acid   Result Value Ref Range    Uric Acid 4.8 2.4 - 5.7 mg/dL   T4, Free   Result Value Ref Range    Free T4 1.05 0.93 - 1.70 ng/dL   T4 & TSH ([facility])   Result Value Ref Range    TSH 1.230 0.270 - 4.200 mIU/mL    T4, Total 4.39 (L) 4.50 - 11.70 mcg/dL   T3, Free   Result Value Ref Range    T3, Free 1.9 (L) 2.0 - 4.4 pg/mL   Lipid Panel   Result Value Ref Range    Total Cholesterol 213 (H) 0 - 200 mg/dL    Triglycerides 112 0 - 150 mg/dL    HDL Cholesterol 87 (H) 40 - 60 mg/dL    VLDL Cholesterol 22.4 mg/dL    LDL Cholesterol  104 (H) 0 - 100 mg/dL   Comprehensive Metabolic Panel   Result Value Ref Range    Glucose 91 65 - 99 mg/dL    BUN 23 8 - 23 mg/dL    Creatinine 0.97 0.57 - 1.00 mg/dL    eGFR Non African Am 53 (L) >60 mL/min/1.73    eGFR African Am 65 >60 mL/min/1.73    BUN/Creatinine Ratio 23.7 7.0 - 25.0    Sodium 140 136 - 145 mmol/L     Potassium 4.5 3.5 - 5.2 mmol/L    Chloride 104 98 - 107 mmol/L    Total CO2 26.7 22.0 - 29.0 mmol/L    Calcium 10.2 (H) 8.2 - 9.6 mg/dL    Total Protein 7.5 6.0 - 8.5 g/dL    Albumin 4.40 3.50 - 5.20 g/dL    Globulin 3.1 gm/dL    A/G Ratio 1.4 g/dL    Total Bilirubin 0.6 0.2 - 1.2 mg/dL    Alkaline Phosphatase 49 39 - 117 U/L    AST (SGOT) 19 1 - 32 U/L    ALT (SGPT) 14 1 - 33 U/L   Comprehensive Thyroglobulin   Result Value Ref Range    Thyroglobulin Ab <1.0 IU/mL    Thyroglobulin 1.4 ng/mL    Thyroglobulin (TG-LIBBY) Comment ng/mL   Cardiovascular Risk Assessment   Result Value Ref Range    Interpretation Note          Assessment/Plan   Diagnoses and all orders for this visit:    Hypothyroidism due to Hashimoto's thyroiditis  -     T3, Free; Future  -     T4 & TSH ([facility]); Future  -     T4, Free; Future  -     Uric Acid; Future  -     Vitamin D 25 Hydroxy; Future  -     Comprehensive Metabolic Panel; Future  -     Lipid Panel; Future    Mixed hyperlipidemia  -     T3, Free; Future  -     T4 & TSH ([facility]); Future  -     T4, Free; Future  -     Uric Acid; Future  -     Vitamin D 25 Hydroxy; Future  -     Comprehensive Metabolic Panel; Future  -     Lipid Panel; Future    Vitamin D deficiency  -     vitamin D (ERGOCALCIFEROL) 66745 units capsule capsule; Take 1 capsule by mouth 1 (One) Time Per Week.  -     T3, Free; Future  -     T4 & TSH ([facility]); Future  -     T4, Free; Future  -     Uric Acid; Future  -     Vitamin D 25 Hydroxy; Future  -     Comprehensive Metabolic Panel; Future  -     Lipid Panel; Future    Hirsutism  -     T3, Free; Future  -     T4 & TSH ([facility]); Future  -     T4, Free; Future  -     Uric Acid; Future  -     Vitamin D 25 Hydroxy; Future  -     Comprehensive Metabolic Panel; Future  -     Lipid Panel; Future    Other orders  -     levothyroxine (SYNTHROID, LEVOTHROID) 75 MCG tablet; Take 1 tablet by mouth Daily.        In summary I saw and examined this 89-year-old female for  above-mentioned problems.  I reviewed her laboratory evaluation of April 23, 2019 and provided her with a hard copy of it.  Overall she is clinically and metabolically stable and therefore we will continue all her current prescriptions.  She will see Ms. Paz Lozano in 6 months or sooner if needed with laboratory evaluation prior to each office visit.

## 2019-05-08 ENCOUNTER — OFFICE VISIT (OUTPATIENT)
Dept: FAMILY MEDICINE CLINIC | Facility: CLINIC | Age: 84
End: 2019-05-08

## 2019-05-08 VITALS
WEIGHT: 128 LBS | OXYGEN SATURATION: 94 % | HEART RATE: 84 BPM | SYSTOLIC BLOOD PRESSURE: 130 MMHG | HEIGHT: 64 IN | DIASTOLIC BLOOD PRESSURE: 80 MMHG | TEMPERATURE: 97.4 F | BODY MASS INDEX: 21.85 KG/M2 | RESPIRATION RATE: 16 BRPM

## 2019-05-08 DIAGNOSIS — Z00.00 MEDICARE ANNUAL WELLNESS VISIT, INITIAL: ICD-10-CM

## 2019-05-08 DIAGNOSIS — F51.01 PRIMARY INSOMNIA: Primary | ICD-10-CM

## 2019-05-08 DIAGNOSIS — D64.9 LOW HEMOGLOBIN: ICD-10-CM

## 2019-05-08 PROCEDURE — G0439 PPPS, SUBSEQ VISIT: HCPCS | Performed by: PHYSICIAN ASSISTANT

## 2019-05-08 PROCEDURE — 99213 OFFICE O/P EST LOW 20 MIN: CPT | Performed by: PHYSICIAN ASSISTANT

## 2019-05-08 RX ORDER — ZOLPIDEM TARTRATE 5 MG/1
TABLET ORAL
Qty: 30 TABLET | Refills: 2 | Status: SHIPPED | OUTPATIENT
Start: 2019-05-08 | End: 2019-08-07 | Stop reason: SDUPTHER

## 2019-05-08 NOTE — PROGRESS NOTES
Subjective   Kristyn Severino is a 89 y.o. female.     History of Present Illness   I watch her anemia of chronic disease and has been to hematologist in past for iron infusions.   I have her seeing Endocrine for the thyroid   Intol to statins  Kristyn Severino 89 y.o. female presents for follow up of insomnia with onset of symptoms years ago. Patient describes symptoms as early morning awakening, frequent night time awakening, difficulty falling asleep and non-restful sleep. Patient has found no relief with Trazodone, Melatonin, going to bed at the same time every night and getting up at the same time and avoiding naps. Associated symptoms include: fatigue if unable to take Rx . Patient denies history of addiction Symptoms have been well-controlled with taking current prescription medication.  The patient has failed multiple OTC medications for insomnia. Intolerant to Trazodone.  They are well controlled on current Rx and will continue to try to take Rx PRN.  She will use the lowest effective dose.  The patient has read and signed the [facility] Controlled Substance Contract.  I will continue to see patient for regular follow up appointments and be prescribed the lowest effective dose.  JAXON has been reviewed by me and is updated every 3 months. The patient is aware of the potential for addiction and dependence.  She denies that Ambien (Zolpidem) causes excessive daytime drowsiness and sleep walking.  Patient voices understanding to take Ambien (Zolpidem) and go straight to bed. Patient must be able to sleep 7 hours or more when taking this.  Patient will hold Rx and contact me if they experience any impaired mental alertness the next day.     I really want her to go down to 1/2 tab and stop; worried about fall risk  She lives alone           The following portions of the patient's history were reviewed and updated as appropriate: allergies, current medications, past family history, past medical history, past  social history, past surgical history and problem list.    Review of Systems   Constitutional: Negative for activity change, appetite change and unexpected weight change.   HENT: Negative for nosebleeds and trouble swallowing.    Eyes: Negative for pain and visual disturbance.   Respiratory: Negative for chest tightness, shortness of breath and wheezing.    Cardiovascular: Negative for chest pain and palpitations.   Gastrointestinal: Negative for abdominal pain and blood in stool.   Endocrine: Negative.    Genitourinary: Negative for difficulty urinating and hematuria.   Musculoskeletal: Negative for joint swelling.   Skin: Negative for color change and rash.   Allergic/Immunologic: Negative.    Neurological: Negative for syncope and speech difficulty.   Hematological: Negative for adenopathy.   Psychiatric/Behavioral: Positive for sleep disturbance. Negative for agitation, confusion and dysphoric mood.   All other systems reviewed and are negative.      Objective   Physical Exam   Constitutional: She is oriented to person, place, and time. She appears well-developed and well-nourished. No distress.   HENT:   Head: Normocephalic and atraumatic.   Eyes: Conjunctivae and EOM are normal. Pupils are equal, round, and reactive to light. Right eye exhibits no discharge. Left eye exhibits no discharge. No scleral icterus.   Neck: Normal range of motion. Neck supple. No tracheal deviation present.   Cardiovascular: Normal rate, regular rhythm, normal heart sounds, intact distal pulses and normal pulses. Exam reveals no gallop.   No murmur heard.  Pulmonary/Chest: Effort normal and breath sounds normal. No respiratory distress. She has no wheezes. She has no rales.   Musculoskeletal: Normal range of motion.   Neurological: She is alert and oriented to person, place, and time. She exhibits normal muscle tone. Coordination normal.   Skin: Skin is warm. No rash noted. No erythema. No pallor.   Psychiatric: She has a normal mood  and affect. Her behavior is normal. Judgment and thought content normal.   Nursing note and vitals reviewed.      Assessment/Plan   Problems Addressed this Visit        Other    Insomnia - Primary    Relevant Orders    CBC & Differential    Iron Profile    Ferritin    Vitamin B12    Folate      Other Visit Diagnoses     Low hemoglobin        Relevant Orders    CBC & Differential    Iron Profile    Ferritin    Vitamin B12    Folate          Refill Ambien and nothing else works  Update labs for the anemia  F/u endocrine as planned  Stay off iron

## 2019-05-08 NOTE — PROGRESS NOTES
QUICK REFERENCE INFORMATION:  The ABCs of the Annual Wellness Visit    Subsequent Medicare Wellness Visit     HEALTH RISK ASSESSMENT    : 1924    Recent Hospitalizations:  Recently treated at the following:  [facility].  [facility]      Current Medical Providers:  Patient Care Team:  Cary Espinal PA-C as PCP - General (Family Medicine)  Jeanine Richey MD as PCP - Claims Attributed  Henry Maurer MD as Consulting Physician (Gastroenterology)  Marty Gold MD as Consulting Physician (Orthopedic Surgery)  Yumiko Olivo MD as Consulting Physician (Endocrinology)  Viktor Melgar MD as Consulting Physician (Cardiology)  Cary Espinal PA-C as Referring Physician (Family Medicine)  Melvin Kenny MD as Consulting Physician (Hematology and Oncology)        Smoking Status:  Social History     Tobacco Use   Smoking Status Never Smoker   Smokeless Tobacco Never Used       Alcohol Consumption:  Social History     Substance and Sexual Activity   Alcohol Use Yes    Comment: Occasional       Depression Screen:   PHQ-2/PHQ-9 Depression Screening 2019   Little interest or pleasure in doing things 0   Feeling down, depressed, or hopeless 0   Total Score 0       Health Habits and Functional and Cognitive Screening:  Functional & Cognitive Status 2019   Do you have difficulty preparing food and eating? No   Do you have difficulty bathing yourself, getting dressed or grooming yourself? No   Do you have difficulty using the toilet? No   Do you have difficulty moving around from place to place? No   Do you have trouble with steps or getting out of a bed or a chair? No   In the past year have you fallen or experienced a near fall? No   Current Diet Well Balanced Diet   Dental Exam Up to date   Eye Exam Up to date   Exercise (times per week) 5 times per week   Current Exercise Activities Include Walking   Do you need help using the phone?  No   Are you deaf or do you have  serious difficulty hearing?  Yes   Do you need help with transportation? No   Do you need help shopping? No   Do you need help preparing meals?  No   Do you need help with housework?  Yes   Do you need help with laundry? No   Do you need help taking your medications? No   Do you need help managing money? No   Do you ever drive or ride in a car without wearing a seat belt? No   Have you felt unusual stress, anger or loneliness in the last month? No   Who do you live with? Alone   If you need help, do you have trouble finding someone available to you? No   Have you been bothered in the last four weeks by sexual problems? No   Do you have difficulty concentrating, remembering or making decisions? No           Does the patient have evidence of cognitive impairment? No    Asiprin use counseling: Taking ASA appropriately as indicated      Recent Lab Results:    Lab Results   Component Value Date    GLU 91 04/23/2019     Lab Results   Component Value Date    HGBA1C 5.00 04/28/2017     Lab Results   Component Value Date    TRIG 112 04/23/2019    HDL 87 (H) 04/23/2019    VLDL 22.4 04/23/2019           Age-appropriate Screening Schedule:  Refer to the list below for future screening recommendations based on patient's age, sex and/or medical conditions. Orders for these recommended tests are listed in the plan section. The patient has been provided with a written plan.    Health Maintenance   Topic Date Due   • COLONOSCOPY  03/14/2016   • ZOSTER VACCINE (2 of 2) 09/26/2016   • DXA SCAN  04/28/2019   • INFLUENZA VACCINE  08/01/2019   • LIPID PANEL  04/23/2020   • TDAP/TD VACCINES (2 - Td) 08/01/2026   • PNEUMOCOCCAL VACCINES (65+ LOW/MEDIUM RISK)  Completed   • MAMMOGRAM  Discontinued        Subjective   History of Present Illness    Kristyn Severino is a 89 y.o. female who presents for an Annual Wellness Visit.    The following portions of the patient's history were reviewed and updated as appropriate: allergies, current  medications, past family history, past medical history, past social history, past surgical history and problem list.    Outpatient Medications Prior to Visit   Medication Sig Dispense Refill   • acetaminophen (TYLENOL) 325 MG tablet Take 2 tablets by mouth Every 4 (Four) Hours As Needed for Mild Pain , Headache or Fever.     • B Complex-C (SUPER B COMPLEX PO) Take  by mouth.     • famotidine (PEPCID) 20 MG tablet Take 1 tablet by mouth Daily.     • folic acid (FOLVITE) 400 MCG tablet Take 400 mcg by mouth Daily.     • hyoscyamine (ANASPAZ,LEVSIN) 0.125 MG tablet Take 1 tablet by mouth Every 4 (Four) Hours As Needed.  0   • Lactobacillus (ACIDOPHILUS PO) Take 1 capsule by mouth Daily.     • levothyroxine (SYNTHROID, LEVOTHROID) 75 MCG tablet Take 1 tablet by mouth Daily. 90 tablet 3   • Multiple Vitamins-Minerals (MULTIVITAMIN WITH MINERALS) tablet Take 1 tablet by mouth daily.     • Nutritional Supplements (ENSURE NUTRA SHAKE HI-CAMMY) liquid Take 1 can by mouth 4 (Four) Times a Day. PRN meal supplementation 120 can 5   • polyethylene glycol (MIRALAX) packet Take 17 g by mouth Daily.     • Pyridoxine HCl (VITAMIN B-6 PO) Take 100 mg by mouth daily.     • TRAVATAN Z 0.004 % solution ophthalmic solution Administer 1 drop to both eyes Every Night.     • vitamin D (ERGOCALCIFEROL) 16163 units capsule capsule Take 1 capsule by mouth 1 (One) Time Per Week. 13 capsule 3   • zolpidem (AMBIEN) 5 MG tablet 1/2 -1 tabs PO Q HS PRN insomnia 30 tablet 2     No facility-administered medications prior to visit.        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    Review of Systems    Compared to one year ago, the patient feels her physical health is the same.  Compared to one year ago, the patient feels her mental health is the same.    Objective     Physical Exam    Vitals:    05/08/19 1155   BP: 130/80   BP Location: Left arm   Patient Position: Sitting   Cuff Size: Large Adult   Pulse: 84   Resp: 16   Temp: 97.4 °F (36.3 °C)   TempSrc: Oral   SpO2: 94%   Weight: 58.1 kg (128 lb)   Height: 162.6 cm (64\")   PainSc: 0-No pain       Patient's Body mass index is 21.97 kg/m². BMI is within normal parameters. No follow-up required..      Assessment/Plan   Patient Self-Management and Personalized Health Advice  The patient has been provided with information about: exercise and fall prevention and preventive services including:   · not wanting to mammo or DEXA.    Visit Diagnoses:  No diagnosis found.    No orders of the defined types were placed in this encounter.      Outpatient Encounter Medications as of 5/8/2019   Medication Sig Dispense Refill   • acetaminophen (TYLENOL) 325 MG tablet Take 2 tablets by mouth Every 4 (Four) Hours As Needed for Mild Pain , Headache or Fever.     • B Complex-C (SUPER B COMPLEX PO) Take  by mouth.     • famotidine (PEPCID) 20 MG tablet Take 1 tablet by mouth Daily.     • folic acid (FOLVITE) 400 MCG tablet Take 400 mcg by mouth Daily.     • hyoscyamine (ANASPAZ,LEVSIN) 0.125 MG tablet Take 1 tablet by mouth Every 4 (Four) Hours As Needed.  0   • Lactobacillus (ACIDOPHILUS PO) Take 1 capsule by mouth Daily.     • levothyroxine (SYNTHROID, LEVOTHROID) 75 MCG tablet Take 1 tablet by mouth Daily. 90 tablet 3   • Multiple Vitamins-Minerals (MULTIVITAMIN WITH MINERALS) tablet Take 1 tablet by mouth daily.     • Nutritional " Supplements (ENSURE NUTRA SHAKE HI-CAMMY) liquid Take 1 can by mouth 4 (Four) Times a Day. PRN meal supplementation 120 can 5   • polyethylene glycol (MIRALAX) packet Take 17 g by mouth Daily.     • Pyridoxine HCl (VITAMIN B-6 PO) Take 100 mg by mouth daily.     • TRAVATAN Z 0.004 % solution ophthalmic solution Administer 1 drop to both eyes Every Night.     • vitamin D (ERGOCALCIFEROL) 92901 units capsule capsule Take 1 capsule by mouth 1 (One) Time Per Week. 13 capsule 3   • zolpidem (AMBIEN) 5 MG tablet 1/2 -1 tabs PO Q HS PRN insomnia 30 tablet 2     No facility-administered encounter medications on file as of 5/8/2019.        Reviewed use of high risk medication in the elderly: yes  Reviewed for potential of harmful drug interactions in the elderly: yes    Follow Up:  No Follow-up on file.     An After Visit Summary and PPPS with all of these plans were given to the patient.

## 2019-05-09 LAB
BASOPHILS # BLD AUTO: 0.04 10*3/MM3 (ref 0–0.2)
BASOPHILS NFR BLD AUTO: 0.9 % (ref 0–1.5)
EOSINOPHIL # BLD AUTO: 0.1 10*3/MM3 (ref 0–0.4)
EOSINOPHIL NFR BLD AUTO: 2.2 % (ref 0.3–6.2)
ERYTHROCYTE [DISTWIDTH] IN BLOOD BY AUTOMATED COUNT: 13.6 % (ref 12.3–15.4)
FERRITIN SERPL-MCNC: 57.2 NG/ML (ref 13–150)
FOLATE SERPL-MCNC: >20 NG/ML (ref 4.78–24.2)
HCT VFR BLD AUTO: 31.9 % (ref 34–46.6)
HGB BLD-MCNC: 10.2 G/DL (ref 12–15.9)
IMM GRANULOCYTES # BLD AUTO: 0.01 10*3/MM3 (ref 0–0.05)
IMM GRANULOCYTES NFR BLD AUTO: 0.2 % (ref 0–0.5)
IRON SATN MFR SERPL: 31 % (ref 20–50)
IRON SERPL-MCNC: 116 MCG/DL (ref 37–145)
LYMPHOCYTES # BLD AUTO: 2.24 10*3/MM3 (ref 0.7–3.1)
LYMPHOCYTES NFR BLD AUTO: 50 % (ref 19.6–45.3)
MCH RBC QN AUTO: 30.5 PG (ref 26.6–33)
MCHC RBC AUTO-ENTMCNC: 32 G/DL (ref 31.5–35.7)
MCV RBC AUTO: 95.5 FL (ref 79–97)
MONOCYTES # BLD AUTO: 0.49 10*3/MM3 (ref 0.1–0.9)
MONOCYTES NFR BLD AUTO: 10.9 % (ref 5–12)
NEUTROPHILS # BLD AUTO: 1.6 10*3/MM3 (ref 1.7–7)
NEUTROPHILS NFR BLD AUTO: 35.8 % (ref 42.7–76)
NRBC BLD AUTO-RTO: 0 /100 WBC (ref 0–0.2)
PLATELET # BLD AUTO: 294 10*3/MM3 (ref 140–450)
RBC # BLD AUTO: 3.34 10*6/MM3 (ref 3.77–5.28)
TIBC SERPL-MCNC: 377 MCG/DL
UIBC SERPL-MCNC: 261 MCG/DL (ref 112–346)
VIT B12 SERPL-MCNC: 573 PG/ML (ref 211–946)
WBC # BLD AUTO: 4.48 10*3/MM3 (ref 3.4–10.8)

## 2019-07-09 ENCOUNTER — OFFICE VISIT (OUTPATIENT)
Dept: GASTROENTEROLOGY | Facility: CLINIC | Age: 84
End: 2019-07-09

## 2019-07-09 VITALS
TEMPERATURE: 98.1 F | SYSTOLIC BLOOD PRESSURE: 124 MMHG | BODY MASS INDEX: 21.99 KG/M2 | DIASTOLIC BLOOD PRESSURE: 72 MMHG | WEIGHT: 128.8 LBS | HEIGHT: 64 IN

## 2019-07-09 DIAGNOSIS — C18.2 MALIGNANT NEOPLASM OF ASCENDING COLON (HCC): Primary | ICD-10-CM

## 2019-07-09 PROCEDURE — 99212 OFFICE O/P EST SF 10 MIN: CPT | Performed by: INTERNAL MEDICINE

## 2019-07-09 RX ORDER — SODIUM PHOSPHATE,MONO-DIBASIC 19G-7G/118
ENEMA (ML) RECTAL
COMMUNITY
End: 2021-10-11 | Stop reason: HOSPADM

## 2019-07-09 NOTE — PROGRESS NOTES
Chief Complaint   Patient presents with   • Colon re-section follow up     Subjective     HPI     Kristyn Severino is a 89 y.o. female who presents today for follow up.    Patient has hx of  T3N1 colon cancer of ascending colon s/p resection in late 2018.  She has done remarkably well since that resection given her advanced age.  She did see Dr. Claire in follow-up and they discussed possible referral to hematology oncology but the patient stated she would not want any chemotherapy for her cancer and this was deferred.  We also discussed performing surveillance colonoscopy as we would usually recommend in the patient status post colon resection but both parties agreed that this was probably not in her best interest given her age.      Past Medical History:   Diagnosis Date   • Anemia of chronic illness    • Colon cancer (CMS/HCC)     Invasive adenocarcinoma of right colon, grade 2 (5.3 cm)   • PALACIOS (dyspnea on exertion)    • Fatigue    • Hirsutism    • History of bone density study     few years; normal   • History of colonoscopy     never   • History of prior pregnancies     x4   • HLD (hyperlipidemia)    • Hypothyroidism     Hashimoto's diagnosed age 19   • Insomnia    • Lightheadedness    • Osteoarthritis    • Postmenopausal    • Renal insufficiency    • Rhinorrhea    • Seborrheic keratosis    • Vitamin D deficiency        Social History     Socioeconomic History   • Marital status:      Spouse name: Not on file   • Number of children: 3   • Years of education: Some College   • Highest education level: Not on file   Occupational History   • Occupation: RN     Employer: RETIRED     Comment: [employer]   Tobacco Use   • Smoking status: Never Smoker   • Smokeless tobacco: Never Used   Substance and Sexual Activity   • Alcohol use: Yes     Comment: Occasional   • Drug use: No   • Sexual activity: Defer         Current Outpatient Medications:   •  acetaminophen (TYLENOL) 325 MG tablet, Take 2 tablets  by mouth Every 4 (Four) Hours As Needed for Mild Pain , Headache or Fever., Disp: , Rfl:   •  B Complex-C (SUPER B COMPLEX PO), Take  by mouth., Disp: , Rfl:   •  glucosamine-chondroitin 500-400 MG capsule capsule, Take  by mouth 3 (Three) Times a Day With Meals., Disp: , Rfl:   •  Lactobacillus (ACIDOPHILUS PO), Take 1 capsule by mouth Daily., Disp: , Rfl:   •  levothyroxine (SYNTHROID, LEVOTHROID) 75 MCG tablet, Take 1 tablet by mouth Daily., Disp: 90 tablet, Rfl: 3  •  Multiple Vitamins-Minerals (MULTIVITAMIN WITH MINERALS) tablet, Take 1 tablet by mouth daily., Disp: , Rfl:   •  Nutritional Supplements (ENSURE NUTRA SHAKE HI-CAMMY) liquid, Take 1 can by mouth 4 (Four) Times a Day. PRN meal supplementation, Disp: 120 can, Rfl: 5  •  vitamin D (ERGOCALCIFEROL) 69867 units capsule capsule, Take 1 capsule by mouth 1 (One) Time Per Week., Disp: 13 capsule, Rfl: 3  •  zolpidem (AMBIEN) 5 MG tablet, 1/2 -1 tabs PO Q HS PRN insomnia, Disp: 30 tablet, Rfl: 2  •  polyethylene glycol (MIRALAX) packet, Take 17 g by mouth Daily., Disp: , Rfl:     Review of Systems   Constitutional: Negative.    Gastrointestinal: Negative.        Objective   Vitals:    07/09/19 1441   BP: 124/72   Temp: 98.1 °F (36.7 °C)       Physical Exam   Constitutional: She is oriented to person, place, and time. She appears well-developed and well-nourished.   HENT:   Head: Normocephalic and atraumatic.   Abdominal: Soft. Bowel sounds are normal. She exhibits no distension and no mass. There is no tenderness. No hernia.   Neurological: She is alert and oriented to person, place, and time.   Skin: Skin is warm and dry.   Psychiatric: She has a normal mood and affect. Her behavior is normal. Judgment and thought content normal.   Vitals reviewed.      Assessment/Plan   Assessment:     1. Malignant neoplasm of ascending colon (CMS/HCC)      Plan:   Patient doing quite well post-operatively from her R hemicolectomy for obstructing colon cancer.    Oncology  referral and surveillance colonoscopy were previously discussed with patient but she declines.  She can follow up as needed          Henry Maurer M.D.  [facility]  [address] [place] [address]  Office: [phone]

## 2019-08-05 ENCOUNTER — TELEPHONE (OUTPATIENT)
Dept: FAMILY MEDICINE CLINIC | Facility: CLINIC | Age: 84
End: 2019-08-05

## 2019-08-05 RX ORDER — ZOLPIDEM TARTRATE 5 MG/1
TABLET ORAL
Qty: 30 TABLET | Refills: 2 | OUTPATIENT
Start: 2019-08-05

## 2019-08-07 ENCOUNTER — OFFICE VISIT (OUTPATIENT)
Dept: FAMILY MEDICINE CLINIC | Facility: CLINIC | Age: 84
End: 2019-08-07

## 2019-08-07 VITALS
SYSTOLIC BLOOD PRESSURE: 120 MMHG | BODY MASS INDEX: 21.17 KG/M2 | DIASTOLIC BLOOD PRESSURE: 72 MMHG | WEIGHT: 124 LBS | HEART RATE: 62 BPM | TEMPERATURE: 98.3 F | HEIGHT: 64 IN | OXYGEN SATURATION: 98 % | RESPIRATION RATE: 16 BRPM

## 2019-08-07 DIAGNOSIS — D63.8 ANEMIA OF CHRONIC ILLNESS: Primary | ICD-10-CM

## 2019-08-07 DIAGNOSIS — E55.9 VITAMIN D DEFICIENCY: ICD-10-CM

## 2019-08-07 DIAGNOSIS — F51.01 PRIMARY INSOMNIA: ICD-10-CM

## 2019-08-07 PROCEDURE — 99213 OFFICE O/P EST LOW 20 MIN: CPT | Performed by: PHYSICIAN ASSISTANT

## 2019-08-07 RX ORDER — ERGOCALCIFEROL 1.25 MG/1
50000 CAPSULE ORAL WEEKLY
Qty: 13 CAPSULE | Refills: 3 | Status: SHIPPED | OUTPATIENT
Start: 2019-08-07 | End: 2020-05-11

## 2019-08-07 RX ORDER — ZOLPIDEM TARTRATE 5 MG/1
TABLET ORAL
Qty: 30 TABLET | Refills: 2 | Status: SHIPPED | OUTPATIENT
Start: 2019-08-07 | End: 2019-11-06 | Stop reason: SDUPTHER

## 2019-08-07 NOTE — PATIENT INSTRUCTIONS
Stop Ambien if you develop excessive daytime drowsiness and/or sleep walking.  Avoid driving if drowsy.  Do not drink alcohol with this medication.  Ambien is a controlled substance and requires you to be seen for an appointment every 3 months for a medication check refill.  Use the lowest effective dose.

## 2019-08-07 NOTE — PROGRESS NOTES
Subjective   Kristyn Severino is a 89 y.o. female.     History of Present Illness   I watch her anemia of chronic disease and has been to hematologist in past for iron infusions.   I have her seeing Endocrine for the thyroid   Intol to statins  Kristyn Severino 89 y.o. female presents for follow up of insomnia with onset of symptoms years ago. Patient describes symptoms as early morning awakening, frequent night time awakening, difficulty falling asleep and non-restful sleep. Patient has found no relief with Trazodone, Melatonin, going to bed at the same time every night and getting up at the same time and avoiding naps. Associated symptoms include: fatigue if unable to take Rx . Patient denies history of addiction Symptoms have been well-controlled with taking current prescription medication.  The patient has failed multiple OTC medications for insomnia. Intolerant to Trazodone.  They are well controlled on current Rx and will continue to try to take Rx PRN.  She will use the lowest effective dose.  The patient has read and signed the [facility] Controlled Substance Contract.  I will continue to see patient for regular follow up appointments and be prescribed the lowest effective dose.  JAXON has been reviewed by me and is updated every 3 months. The patient is aware of the potential for addiction and dependence.  She denies that Ambien (Zolpidem) causes excessive daytime drowsiness and sleep walking.  Patient voices understanding to take Ambien (Zolpidem) and go straight to bed. Patient must be able to sleep 7 hours or more when taking this.  Patient will hold Rx and contact me if they experience any impaired mental alertness the next day.     I really want her to go down to 1/2 tab and stop; worried about fall risk  She lives alone           She is moving; to apt    Just saw GI     Patient has hx of  T3N1 colon cancer of ascending colon s/p resection in late 2018.  She has done remarkably well since that  resection given her advanced age.  She did see Dr. Claire in follow-up and they discussed possible referral to hematology oncology but the patient stated she would not want any chemotherapy for her cancer and this was deferred.  We also discussed performing surveillance colonoscopy as we would usually recommend in the patient status post colon resection but both parties agreed that this was probably not in her best interest given her age.  Patient doing quite well post-operatively from her R hemicolectomy for obstructing colon cancer.    Oncology referral and surveillance colonoscopy were previously discussed with patient but she declines.  She can follow up as needed      labs are current  The following portions of the patient's history were reviewed and updated as appropriate: allergies, current medications, past family history, past medical history, past social history, past surgical history and problem list.    Review of Systems   Constitutional: Negative for activity change, appetite change and unexpected weight change.   HENT: Negative for nosebleeds and trouble swallowing.    Eyes: Negative for pain and visual disturbance.   Respiratory: Negative for chest tightness, shortness of breath and wheezing.    Cardiovascular: Negative for chest pain and palpitations.   Gastrointestinal: Negative for abdominal pain and blood in stool.   Endocrine: Negative.    Genitourinary: Negative for difficulty urinating and hematuria.   Musculoskeletal: Negative for joint swelling.   Skin: Negative for color change and rash.   Allergic/Immunologic: Negative.    Neurological: Negative for syncope and speech difficulty.   Hematological: Negative for adenopathy.   Psychiatric/Behavioral: Positive for sleep disturbance. Negative for agitation, confusion and dysphoric mood.   All other systems reviewed and are negative.      Objective   Physical Exam   Constitutional: She is oriented to person, place, and time. She appears  well-developed and well-nourished. No distress.   HENT:   Head: Normocephalic and atraumatic.   Eyes: Conjunctivae and EOM are normal. Pupils are equal, round, and reactive to light. Right eye exhibits no discharge. Left eye exhibits no discharge. No scleral icterus.   Neck: Normal range of motion. Neck supple. No tracheal deviation present.   Cardiovascular: Normal rate, regular rhythm, normal heart sounds, intact distal pulses and normal pulses. Exam reveals no gallop.   No murmur heard.  Pulmonary/Chest: Effort normal and breath sounds normal. No respiratory distress. She has no wheezes. She has no rales.   Musculoskeletal: Normal range of motion.   Neurological: She is alert and oriented to person, place, and time. She exhibits normal muscle tone. Coordination normal.   Skin: Skin is warm. No rash noted. No erythema. No pallor.   Psychiatric: She has a normal mood and affect. Her behavior is normal. Judgment and thought content normal.   Nursing note and vitals reviewed.      Assessment/Plan   Kristyn was seen today for insomnia.    Diagnoses and all orders for this visit:    Anemia of chronic illness    Vitamin D deficiency  -     vitamin D (ERGOCALCIFEROL) 33390 units capsule capsule; Take 1 capsule by mouth 1 (One) Time Per Week.    Primary insomnia    refill Ambien and talked about 1/2 tab---fall risk  F/u endocrine  GI if need; hx recent colon cancer

## 2019-10-07 NOTE — PLAN OF CARE
Problem: Fall Risk (Adult)  Goal: Identify Related Risk Factors and Signs and Symptoms  Outcome: Ongoing (interventions implemented as appropriate)    Goal: Absence of Fall  Outcome: Ongoing (interventions implemented as appropriate)      Problem: Patient Care Overview  Goal: Plan of Care Review  Outcome: Ongoing (interventions implemented as appropriate)   10/09/18 1660   Coping/Psychosocial   Plan of Care Reviewed With patient   OTHER   Outcome Summary Patient had surgery today. Patient complained of pain. Pain med was given. Patient has NG tube to left nare at 65 cm. Midline incision clean, dry, and intact. Vital signs are stable. Wynn catheter in place. No signs of distress. Will continue to monitor.    Plan of Care Review   Progress no change       Problem: Skin Injury Risk (Adult)  Goal: Identify Related Risk Factors and Signs and Symptoms  Outcome: Ongoing (interventions implemented as appropriate)    Goal: Skin Health and Integrity  Outcome: Ongoing (interventions implemented as appropriate)         Detail Level: Detailed Quality 431: Preventive Care And Screening: Unhealthy Alcohol Use - Screening: Patient screened for unhealthy alcohol use using a single question and scores less than 2 times per year Quality 402: Tobacco Use And Help With Quitting Among Adolescents: Patient screened for tobacco and never smoked Quality 130: Documentation Of Current Medications In The Medical Record: Current Medications Documented

## 2019-10-28 ENCOUNTER — RESULTS ENCOUNTER (OUTPATIENT)
Dept: ENDOCRINOLOGY | Age: 84
End: 2019-10-28

## 2019-10-28 DIAGNOSIS — E55.9 VITAMIN D DEFICIENCY: ICD-10-CM

## 2019-10-28 DIAGNOSIS — E78.2 MIXED HYPERLIPIDEMIA: ICD-10-CM

## 2019-10-28 DIAGNOSIS — E03.8 HYPOTHYROIDISM DUE TO HASHIMOTO'S THYROIDITIS: ICD-10-CM

## 2019-10-28 DIAGNOSIS — E06.3 HYPOTHYROIDISM DUE TO HASHIMOTO'S THYROIDITIS: ICD-10-CM

## 2019-10-28 DIAGNOSIS — L68.0 HIRSUTISM: ICD-10-CM

## 2019-10-30 RX ORDER — ZOLPIDEM TARTRATE 5 MG/1
TABLET ORAL
Qty: 30 TABLET | Refills: 2 | OUTPATIENT
Start: 2019-10-30

## 2019-10-30 NOTE — TELEPHONE ENCOUNTER
Cary gave 30 days with 2 refills on the 8/7 appt. Should last her until 11/7, where she will need appt with Cary.

## 2019-11-06 ENCOUNTER — OFFICE VISIT (OUTPATIENT)
Dept: FAMILY MEDICINE CLINIC | Facility: CLINIC | Age: 84
End: 2019-11-06

## 2019-11-06 VITALS
BODY MASS INDEX: 21.85 KG/M2 | DIASTOLIC BLOOD PRESSURE: 71 MMHG | OXYGEN SATURATION: 98 % | RESPIRATION RATE: 16 BRPM | HEART RATE: 71 BPM | WEIGHT: 128 LBS | TEMPERATURE: 98.6 F | HEIGHT: 64 IN | SYSTOLIC BLOOD PRESSURE: 129 MMHG

## 2019-11-06 DIAGNOSIS — F51.01 PRIMARY INSOMNIA: Primary | ICD-10-CM

## 2019-11-06 PROCEDURE — 99212 OFFICE O/P EST SF 10 MIN: CPT | Performed by: FAMILY MEDICINE

## 2019-11-06 RX ORDER — INFLUENZA A VIRUS A/MICHIGAN/45/2015 X-275 (H1N1) ANTIGEN (FORMALDEHYDE INACTIVATED), INFLUENZA A VIRUS A/SINGAPORE/INFIMH-16-0019/2016 IVR-186 (H3N2) ANTIGEN (FORMALDEHYDE INACTIVATED), AND INFLUENZA B VIRUS B/MARYLAND/15/2016 BX-69A (A B/COLORADO/6/2017-LIKE VIRUS) ANTIGEN (FORMALDEHYDE INACTIVATED) 60; 60; 60 UG/.5ML; UG/.5ML; UG/.5ML
INJECTION, SUSPENSION INTRAMUSCULAR
Refills: 0 | COMMUNITY
Start: 2019-10-30 | End: 2020-04-29

## 2019-11-06 RX ORDER — ZOLPIDEM TARTRATE 5 MG/1
TABLET ORAL
Qty: 30 TABLET | Refills: 2 | Status: SHIPPED | OUTPATIENT
Start: 2019-11-06 | End: 2020-04-29 | Stop reason: SDUPTHER

## 2019-11-06 NOTE — PROGRESS NOTES
Subjective   Kristyn Severino is a 89 y.o. female.     History of Present Illness     Chief Complaint:   Chief Complaint   Patient presents with   • Insomnia     pt need med refill.        Kristyn Severino 89 y.o. female who presents today for Medical Management of the below listed issues and medication refills.  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  Since the last visit, she has overall felt well.  she has been compliant with   Current Outpatient Medications:   •  acetaminophen (TYLENOL) 325 MG tablet, Take 2 tablets by mouth Every 4 (Four) Hours As Needed for Mild Pain , Headache or Fever., Disp: , Rfl:   •  B Complex-C (SUPER B COMPLEX PO), Take  by mouth., Disp: , Rfl:   •  glucosamine-chondroitin 500-400 MG capsule capsule, Take  by mouth 3 (Three) Times a Day With Meals., Disp: , Rfl:   •  Lactobacillus (ACIDOPHILUS PO), Take 1 capsule by mouth Daily., Disp: , Rfl:   •  levothyroxine (SYNTHROID, LEVOTHROID) 75 MCG tablet, Take 1 tablet by mouth Daily., Disp: 90 tablet, Rfl: 3  •  Multiple Vitamins-Minerals (MULTIVITAMIN WITH MINERALS) tablet, Take 1 tablet by mouth daily., Disp: , Rfl:   •  Nutritional Supplements (ENSURE NUTRA SHAKE HI-CAMMY) liquid, Take 1 can by mouth 4 (Four) Times a Day. PRN meal supplementation, Disp: 120 can, Rfl: 5  •  polyethylene glycol (MIRALAX) packet, Take 17 g by mouth Daily., Disp: , Rfl:   •  vitamin D (ERGOCALCIFEROL) 90507 units capsule capsule, Take 1 capsule by mouth 1 (One) Time Per Week., Disp: 13 capsule, Rfl: 3  •  FLUZONE HIGH-DOSE 0.5 ML suspension prefilled syringe  "injection, PHARMACIST ADMINISTERED IMMUNIZATION ADMINISTERED AT TIME OF DISPENSING, Disp: , Rfl: 0  •  zolpidem (AMBIEN) 5 MG tablet, 1/2 -1 tabs PO Q HS PRN insomnia, Disp: 30 tablet, Rfl: 2.  she denies medication side effects.    All of the other chronic condition(s) listed above are stable w/o issues.    /71   Pulse 71   Temp 98.6 °F (37 °C) (Oral)   Resp 16   Ht 162.6 cm (64\")   Wt 58.1 kg (128 lb)   LMP  (LMP Unknown)   SpO2 98%   BMI 21.97 kg/m²     Results for orders placed or performed in visit on 05/08/19   Iron Profile   Result Value Ref Range    TIBC 377 mcg/dL    UIBC 261 112 - 346 mcg/dL    Iron 116 37 - 145 mcg/dL    Iron Saturation 31 20 - 50 %   Ferritin   Result Value Ref Range    Ferritin 57.20 13.00 - 150.00 ng/mL   Vitamin B12   Result Value Ref Range    Vitamin B-12 573 211 - 946 pg/mL   Folate   Result Value Ref Range    Folate >20.00 4.78 - 24.20 ng/mL   CBC & Differential   Result Value Ref Range    WBC 4.48 3.40 - 10.80 10*3/mm3    RBC 3.34 (L) 3.77 - 5.28 10*6/mm3    Hemoglobin 10.2 (L) 12.0 - 15.9 g/dL    Hematocrit 31.9 (L) 34.0 - 46.6 %    MCV 95.5 79.0 - 97.0 fL    MCH 30.5 26.6 - 33.0 pg    MCHC 32.0 31.5 - 35.7 g/dL    RDW 13.6 12.3 - 15.4 %    Platelets 294 140 - 450 10*3/mm3    Neutrophil Rel % 35.8 (L) 42.7 - 76.0 %    Lymphocyte Rel % 50.0 (H) 19.6 - 45.3 %    Monocyte Rel % 10.9 5.0 - 12.0 %    Eosinophil Rel % 2.2 0.3 - 6.2 %    Basophil Rel % 0.9 0.0 - 1.5 %    Neutrophils Absolute 1.60 (L) 1.70 - 7.00 10*3/mm3    Lymphocytes Absolute 2.24 0.70 - 3.10 10*3/mm3    Monocytes Absolute 0.49 0.10 - 0.90 10*3/mm3    Eosinophils Absolute 0.10 0.00 - 0.40 10*3/mm3    Basophils Absolute 0.04 0.00 - 0.20 10*3/mm3    Immature Granulocyte Rel % 0.2 0.0 - 0.5 %    Immature Grans Absolute 0.01 0.00 - 0.05 10*3/mm3    nRBC 0.0 0.0 - 0.2 /100 WBC           The following portions of the patient's history were reviewed and updated as appropriate: allergies, current medications, " past family history, past medical history, past social history, past surgical history and problem list.    Review of Systems   Constitutional: Negative for activity change, chills, fatigue and fever.   Respiratory: Negative for cough and chest tightness.    Cardiovascular: Negative for chest pain and palpitations.   Gastrointestinal: Negative for abdominal pain and nausea.   Endocrine: Negative for cold intolerance.   Psychiatric/Behavioral: Negative for behavioral problems and dysphoric mood.       Objective   Physical Exam   Constitutional: She appears well-developed and well-nourished.   Neck: Neck supple. No thyromegaly present.   Cardiovascular: Normal rate and regular rhythm.   No murmur heard.  Pulmonary/Chest: Effort normal and breath sounds normal.   Abdominal: Bowel sounds are normal. There is no tenderness.   Neurological: She is alert.   Psychiatric: She has a normal mood and affect. Her behavior is normal.   Nursing note and vitals reviewed.    The patient has read and signed the [facility] Controlled Substance Contract.  I will continue to see patient for regular follow up appointments.  They are well controlled on their medication.  JAXON has been reviewed by me and is updated every 3 months. The patient is aware of the potential for addiction and dependence.    Assessment/Plan   Kristyn was seen today for insomnia.    Diagnoses and all orders for this visit:    Primary insomnia  -     zolpidem (AMBIEN) 5 MG tablet; 1/2 -1 tabs PO Q HS PRN insomnia

## 2019-12-13 ENCOUNTER — OFFICE VISIT (OUTPATIENT)
Dept: FAMILY MEDICINE CLINIC | Facility: CLINIC | Age: 84
End: 2019-12-13

## 2019-12-13 VITALS
SYSTOLIC BLOOD PRESSURE: 138 MMHG | WEIGHT: 135 LBS | HEART RATE: 77 BPM | HEIGHT: 64 IN | TEMPERATURE: 97.9 F | OXYGEN SATURATION: 99 % | BODY MASS INDEX: 23.05 KG/M2 | DIASTOLIC BLOOD PRESSURE: 64 MMHG

## 2019-12-13 DIAGNOSIS — R40.1 CLOUDING OF CONSCIOUSNESS: Primary | ICD-10-CM

## 2019-12-13 DIAGNOSIS — R35.0 URINARY FREQUENCY: ICD-10-CM

## 2019-12-13 DIAGNOSIS — E03.9 ACQUIRED HYPOTHYROIDISM: ICD-10-CM

## 2019-12-13 PROCEDURE — 99214 OFFICE O/P EST MOD 30 MIN: CPT | Performed by: NURSE PRACTITIONER

## 2019-12-13 NOTE — PROGRESS NOTES
"Subjective   Kristyn Severino is a 89 y.o. female.     History of Present Illness   Patient presents to office with CC of \"feeling cloudy headed.\"  She states this has been going on for some time but seems worse the past couple of days.  She denies headache, sinus pressure, cough, fever, chills, fatigue, visual changes, confusion, speech difficulty.  Denies dysuria. Admits she did get up several times last night to urinate but states it may be from drinking fluids too late in the evening.    She is also wondering if symptoms are from Ambien.  She has been on this medication for years. She takes this every night.    The following portions of the patient's history were reviewed and updated as appropriate: allergies, current medications, past family history, past medical history, past social history, past surgical history and problem list.    Review of Systems   Constitutional: Negative for unexpected weight change.   HENT: Negative for congestion, ear pain, sinus pressure and sinus pain.    Eyes: Negative for visual disturbance.   Respiratory: Negative for shortness of breath.    Cardiovascular: Negative for chest pain and palpitations.   Endocrine: Negative for cold intolerance, heat intolerance, polydipsia, polyphagia and polyuria.   Genitourinary: Positive for frequency. Negative for dysuria and urgency.   Neurological: Negative for dizziness, light-headedness and headaches.   Psychiatric/Behavioral: Negative for behavioral problems and confusion. The patient is not nervous/anxious.        Objective   Physical Exam   Constitutional: She is oriented to person, place, and time. She appears well-developed and well-nourished.   Cardiovascular: Normal rate and regular rhythm.   Pulmonary/Chest: Effort normal and breath sounds normal.   Neurological: She is alert and oriented to person, place, and time.   Psychiatric: She has a normal mood and affect. Her behavior is normal. Judgment and thought content normal.   Nursing " note and vitals reviewed.      Assessment/Plan   Kristyn was seen today for pt feels cloudy head..    Diagnoses and all orders for this visit:    Clouding of consciousness    Acquired hypothyroidism  -     Comprehensive metabolic panel  -     Lipid panel  -     CBC and Differential  -     TSH  -     T4, free    Urinary frequency  -     Urinalysis With Culture If Indicated - Urine, Clean Catch      Gave her a bottle of water to drink so that she is able to give urine sample at lab along with her blood work.         Patient will also try reducing dose of ambien to 1/2 tablet to see if this helps symptoms.

## 2019-12-14 LAB
ALBUMIN SERPL-MCNC: 4.3 G/DL (ref 3.5–5.2)
ALBUMIN/GLOB SERPL: 1.3 G/DL
ALP SERPL-CCNC: 57 U/L (ref 39–117)
ALT SERPL-CCNC: 12 U/L (ref 1–33)
AST SERPL-CCNC: 19 U/L (ref 1–32)
BASOPHILS # BLD AUTO: 0.05 10*3/MM3 (ref 0–0.2)
BASOPHILS NFR BLD AUTO: 0.8 % (ref 0–1.5)
BILIRUB SERPL-MCNC: 0.3 MG/DL (ref 0.2–1.2)
BUN SERPL-MCNC: 20 MG/DL (ref 8–23)
BUN/CREAT SERPL: 20 (ref 7–25)
CALCIUM SERPL-MCNC: 9.9 MG/DL (ref 8.2–9.6)
CHLORIDE SERPL-SCNC: 102 MMOL/L (ref 98–107)
CHOLEST SERPL-MCNC: 213 MG/DL (ref 0–200)
CO2 SERPL-SCNC: 27.2 MMOL/L (ref 22–29)
CREAT SERPL-MCNC: 1 MG/DL (ref 0.57–1)
EOSINOPHIL # BLD AUTO: 0.23 10*3/MM3 (ref 0–0.4)
EOSINOPHIL NFR BLD AUTO: 3.5 % (ref 0.3–6.2)
ERYTHROCYTE [DISTWIDTH] IN BLOOD BY AUTOMATED COUNT: 12.4 % (ref 12.3–15.4)
GLOBULIN SER CALC-MCNC: 3.3 GM/DL
GLUCOSE SERPL-MCNC: 92 MG/DL (ref 65–99)
HCT VFR BLD AUTO: 34.4 % (ref 34–46.6)
HDLC SERPL-MCNC: 77 MG/DL (ref 40–60)
HGB BLD-MCNC: 11.5 G/DL (ref 12–15.9)
IMM GRANULOCYTES # BLD AUTO: 0.02 10*3/MM3 (ref 0–0.05)
IMM GRANULOCYTES NFR BLD AUTO: 0.3 % (ref 0–0.5)
LDLC SERPL CALC-MCNC: 109 MG/DL (ref 0–100)
LYMPHOCYTES # BLD AUTO: 2.32 10*3/MM3 (ref 0.7–3.1)
LYMPHOCYTES NFR BLD AUTO: 35.4 % (ref 19.6–45.3)
MCH RBC QN AUTO: 31.3 PG (ref 26.6–33)
MCHC RBC AUTO-ENTMCNC: 33.4 G/DL (ref 31.5–35.7)
MCV RBC AUTO: 93.7 FL (ref 79–97)
MONOCYTES # BLD AUTO: 0.67 10*3/MM3 (ref 0.1–0.9)
MONOCYTES NFR BLD AUTO: 10.2 % (ref 5–12)
NEUTROPHILS # BLD AUTO: 3.26 10*3/MM3 (ref 1.7–7)
NEUTROPHILS NFR BLD AUTO: 49.8 % (ref 42.7–76)
NRBC BLD AUTO-RTO: 0 /100 WBC (ref 0–0.2)
PLATELET # BLD AUTO: 334 10*3/MM3 (ref 140–450)
POTASSIUM SERPL-SCNC: 4.5 MMOL/L (ref 3.5–5.2)
PROT SERPL-MCNC: 7.6 G/DL (ref 6–8.5)
RBC # BLD AUTO: 3.67 10*6/MM3 (ref 3.77–5.28)
SODIUM SERPL-SCNC: 141 MMOL/L (ref 136–145)
T4 FREE SERPL-MCNC: 1.12 NG/DL (ref 0.93–1.7)
TRIGL SERPL-MCNC: 137 MG/DL (ref 0–150)
TSH SERPL DL<=0.005 MIU/L-ACNC: 1.51 UIU/ML (ref 0.27–4.2)
VLDLC SERPL CALC-MCNC: 27.4 MG/DL
WBC # BLD AUTO: 6.55 10*3/MM3 (ref 3.4–10.8)

## 2019-12-16 LAB
APPEARANCE UR: CLEAR
BACTERIA #/AREA URNS HPF: ABNORMAL /HPF
BACTERIA UR CULT: ABNORMAL
BACTERIA UR CULT: ABNORMAL
BILIRUB UR QL STRIP: NEGATIVE
COLOR UR: YELLOW
EPI CELLS #/AREA URNS HPF: ABNORMAL /HPF (ref 0–10)
GLUCOSE UR QL: NEGATIVE
HGB UR QL STRIP: NEGATIVE
KETONES UR QL STRIP: NEGATIVE
LEUKOCYTE ESTERASE UR QL STRIP: ABNORMAL
MICRO URNS: ABNORMAL
MUCOUS THREADS URNS QL MICRO: PRESENT /HPF
NITRITE UR QL STRIP: POSITIVE
OTHER ANTIBIOTIC SUSC ISLT: ABNORMAL
PH UR STRIP: 6 [PH] (ref 5–7.5)
PROT UR QL STRIP: NEGATIVE
RBC #/AREA URNS HPF: ABNORMAL /HPF (ref 0–2)
SP GR UR: 1.02 (ref 1–1.03)
URINALYSIS REFLEX: ABNORMAL
UROBILINOGEN UR STRIP-MCNC: 0.2 MG/DL (ref 0.2–1)
WBC #/AREA URNS HPF: ABNORMAL /HPF (ref 0–5)

## 2019-12-17 DIAGNOSIS — R35.0 URINARY FREQUENCY: Primary | ICD-10-CM

## 2019-12-17 RX ORDER — NITROFURANTOIN 25; 75 MG/1; MG/1
100 CAPSULE ORAL 2 TIMES DAILY
Qty: 14 CAPSULE | Refills: 0 | Status: SHIPPED | OUTPATIENT
Start: 2019-12-17 | End: 2020-01-16

## 2020-01-16 ENCOUNTER — OFFICE VISIT (OUTPATIENT)
Dept: FAMILY MEDICINE CLINIC | Facility: CLINIC | Age: 85
End: 2020-01-16

## 2020-01-16 VITALS
WEIGHT: 140 LBS | DIASTOLIC BLOOD PRESSURE: 70 MMHG | OXYGEN SATURATION: 100 % | HEART RATE: 74 BPM | SYSTOLIC BLOOD PRESSURE: 124 MMHG | RESPIRATION RATE: 16 BRPM | TEMPERATURE: 97.8 F | BODY MASS INDEX: 23.9 KG/M2 | HEIGHT: 64 IN

## 2020-01-16 DIAGNOSIS — E06.3 HYPOTHYROIDISM DUE TO HASHIMOTO'S THYROIDITIS: Primary | ICD-10-CM

## 2020-01-16 DIAGNOSIS — E03.8 HYPOTHYROIDISM DUE TO HASHIMOTO'S THYROIDITIS: Primary | ICD-10-CM

## 2020-01-16 DIAGNOSIS — M25.50 MULTIPLE JOINT PAIN: ICD-10-CM

## 2020-01-16 DIAGNOSIS — M19.93 OTHER SECONDARY OSTEOARTHRITIS, UNSPECIFIED SITE: ICD-10-CM

## 2020-01-16 DIAGNOSIS — N18.30 CKD (CHRONIC KIDNEY DISEASE) STAGE 3, GFR 30-59 ML/MIN (HCC): ICD-10-CM

## 2020-01-16 PROCEDURE — 99213 OFFICE O/P EST LOW 20 MIN: CPT | Performed by: PHYSICIAN ASSISTANT

## 2020-01-16 RX ORDER — TRAZODONE HYDROCHLORIDE 50 MG/1
TABLET ORAL
Qty: 30 TABLET | Refills: 5 | Status: SHIPPED | OUTPATIENT
Start: 2020-01-16 | End: 2020-04-29

## 2020-01-16 NOTE — PROGRESS NOTES
"Subjective   Kristyn Severino is a 89 y.o. female.     History of Present Illness   Kristyn Severino 89 y.o. female /70 (BP Location: Left arm, Patient Position: Sitting, Cuff Size: Adult)   Pulse 74   Temp 97.8 °F (36.6 °C) (Oral)   Resp 16   Ht 162.6 cm (64.02\")   Wt 63.5 kg (140 lb)   LMP  (LMP Unknown)   SpO2 100%   BMI 24.02 kg/m²  who presents today for paperwork so she can use chart 3.  He is here with her friend Bernie.  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    I did see the notes from last visit when she saw Wen.  I do agree that we need to try to get her off Ambien I am concerned about her risk with her age and with her possibility of being impaired if she gets up in the middle of the night including falling hitting her head head trauma and breaking bone.   Dr. Vigil and I did sit down and discuss this and will proceed with having her taper off the Ambien over a week and then do a trial of trazodone 50 mg but started a half a tablet and see if she can take this to help her sleep or nothing at all.  I do want a note that I am filling out her form citing her osteoarthritis and neuromuscular condition related to her chronic back pain and inability to stand for  Long period of time.  She also uses a walker for balance and due to her back pain.  We will suggest that she have a chart 3 that has a lift.    The following portions of the patient's history were reviewed and updated as appropriate: allergies, current medications, past " family history, past medical history, past social history, past surgical history and problem list.    Review of Systems   Constitutional: Negative for activity change, appetite change and unexpected weight change.   HENT: Negative for nosebleeds and trouble swallowing.    Eyes: Negative for pain and visual disturbance.   Respiratory: Negative for chest tightness, shortness of breath and wheezing.    Cardiovascular: Negative for chest pain and palpitations.   Gastrointestinal: Negative for abdominal pain and blood in stool.   Endocrine: Negative.    Genitourinary: Negative for difficulty urinating and hematuria.   Musculoskeletal: Negative for joint swelling.   Skin: Negative for color change and rash.   Allergic/Immunologic: Negative.    Neurological: Negative for syncope and speech difficulty.   Hematological: Negative for adenopathy.   Psychiatric/Behavioral: Positive for sleep disturbance. Negative for agitation, confusion and dysphoric mood.   All other systems reviewed and are negative.      Objective   Physical Exam   Constitutional: She is oriented to person, place, and time. She appears well-developed and well-nourished. No distress.   HENT:   Head: Normocephalic and atraumatic.   Eyes: Pupils are equal, round, and reactive to light. Conjunctivae and EOM are normal. Right eye exhibits no discharge. Left eye exhibits no discharge. No scleral icterus.   Neck: Normal range of motion. Neck supple. No tracheal deviation present.   Cardiovascular: Normal rate, regular rhythm, normal heart sounds and normal pulses. Exam reveals no gallop.   No murmur heard.  Pulmonary/Chest: Effort normal and breath sounds normal. No respiratory distress. She has no wheezes. She has no rales.   Neurological: She is alert and oriented to person, place, and time. She exhibits normal muscle tone.   Skin: Skin is warm. No rash noted. No erythema. No pallor.   Psychiatric: She has a normal mood and affect. Her behavior is normal.  Judgment and thought content normal.   Nursing note and vitals reviewed.      Assessment/Plan   Problems Addressed this Visit        Endocrine    Hypothyroidism - Primary       Nervous and Auditory    Multiple joint pain       Musculoskeletal and Integument    Osteoarthritis       Genitourinary    CKD (chronic kidney disease) stage 3, GFR 30-59 ml/min (CMS/[facility])          I have her taper Ambien for the next week and then try trazodone to see if this helps with sleeping due to her our concerns about impairment  Will clear her for talk 3 and do recommend that she use her walker at all times

## 2020-01-21 ENCOUNTER — TELEPHONE (OUTPATIENT)
Dept: FAMILY MEDICINE CLINIC | Facility: CLINIC | Age: 85
End: 2020-01-21

## 2020-01-21 DIAGNOSIS — M79.641 PAIN IN BOTH HANDS: Primary | ICD-10-CM

## 2020-01-21 DIAGNOSIS — M79.642 PAIN IN BOTH HANDS: Primary | ICD-10-CM

## 2020-02-28 ENCOUNTER — HOSPITAL ENCOUNTER (OUTPATIENT)
Facility: HOSPITAL | Age: 85
Setting detail: HOSPITAL OUTPATIENT SURGERY
Discharge: HOME | End: 2020-02-28
Attending: PLASTIC SURGERY | Admitting: PLASTIC SURGERY

## 2020-02-28 ENCOUNTER — ANESTHESIA (OUTPATIENT)
Dept: PERIOP | Facility: HOSPITAL | Age: 85
End: 2020-02-28

## 2020-02-28 ENCOUNTER — ANESTHESIA EVENT (OUTPATIENT)
Dept: PERIOP | Facility: HOSPITAL | Age: 85
End: 2020-02-28

## 2020-02-28 VITALS
TEMPERATURE: 97.6 F | SYSTOLIC BLOOD PRESSURE: 172 MMHG | WEIGHT: 146.61 LBS | HEART RATE: 72 BPM | HEIGHT: 65 IN | BODY MASS INDEX: 24.43 KG/M2 | DIASTOLIC BLOOD PRESSURE: 82 MMHG | RESPIRATION RATE: 18 BRPM | OXYGEN SATURATION: 99 %

## 2020-02-28 PROCEDURE — 93005 ELECTROCARDIOGRAM TRACING: CPT | Performed by: PLASTIC SURGERY

## 2020-02-28 PROCEDURE — 93010 ELECTROCARDIOGRAM REPORT: CPT | Performed by: INTERNAL MEDICINE

## 2020-02-28 RX ORDER — PROMETHAZINE HYDROCHLORIDE 25 MG/1
25 SUPPOSITORY RECTAL ONCE AS NEEDED
Status: CANCELLED | OUTPATIENT
Start: 2020-02-28

## 2020-02-28 RX ORDER — HYDROCODONE BITARTRATE AND ACETAMINOPHEN 7.5; 325 MG/1; MG/1
1 TABLET ORAL ONCE AS NEEDED
Status: CANCELLED | OUTPATIENT
Start: 2020-02-28

## 2020-02-28 RX ORDER — OXYCODONE AND ACETAMINOPHEN 7.5; 325 MG/1; MG/1
1 TABLET ORAL EVERY 4 HOURS PRN
Status: CANCELLED | OUTPATIENT
Start: 2020-02-28 | End: 2020-03-09

## 2020-02-28 RX ORDER — HYDROMORPHONE HYDROCHLORIDE 1 MG/ML
0.5 INJECTION, SOLUTION INTRAMUSCULAR; INTRAVENOUS; SUBCUTANEOUS
Status: CANCELLED | OUTPATIENT
Start: 2020-02-28

## 2020-02-28 RX ORDER — SODIUM CHLORIDE 0.9 % (FLUSH) 0.9 %
3 SYRINGE (ML) INJECTION EVERY 12 HOURS SCHEDULED
Status: DISCONTINUED | OUTPATIENT
Start: 2020-02-28 | End: 2020-02-28 | Stop reason: HOSPADM

## 2020-02-28 RX ORDER — ONDANSETRON 2 MG/ML
4 INJECTION INTRAMUSCULAR; INTRAVENOUS ONCE AS NEEDED
Status: CANCELLED | OUTPATIENT
Start: 2020-02-28

## 2020-02-28 RX ORDER — PROMETHAZINE HYDROCHLORIDE 25 MG/ML
6.25 INJECTION, SOLUTION INTRAMUSCULAR; INTRAVENOUS ONCE AS NEEDED
Status: CANCELLED | OUTPATIENT
Start: 2020-02-28

## 2020-02-28 RX ORDER — IBUPROFEN 600 MG/1
600 TABLET ORAL EVERY 6 HOURS PRN
Status: DISCONTINUED | OUTPATIENT
Start: 2020-02-28 | End: 2020-02-28 | Stop reason: HOSPADM

## 2020-02-28 RX ORDER — HYDRALAZINE HYDROCHLORIDE 20 MG/ML
5 INJECTION INTRAMUSCULAR; INTRAVENOUS
Status: CANCELLED | OUTPATIENT
Start: 2020-02-28

## 2020-02-28 RX ORDER — SODIUM CHLORIDE 0.9 % (FLUSH) 0.9 %
3-10 SYRINGE (ML) INJECTION AS NEEDED
Status: DISCONTINUED | OUTPATIENT
Start: 2020-02-28 | End: 2020-02-28 | Stop reason: HOSPADM

## 2020-02-28 RX ORDER — MIDAZOLAM HYDROCHLORIDE 1 MG/ML
2 INJECTION INTRAMUSCULAR; INTRAVENOUS
Status: DISCONTINUED | OUTPATIENT
Start: 2020-02-28 | End: 2020-02-28 | Stop reason: HOSPADM

## 2020-02-28 RX ORDER — EPHEDRINE SULFATE 50 MG/ML
5 INJECTION, SOLUTION INTRAVENOUS ONCE AS NEEDED
Status: CANCELLED | OUTPATIENT
Start: 2020-02-28

## 2020-02-28 RX ORDER — BACITRACIN ZINC 500 [USP'U]/G
OINTMENT TOPICAL AS NEEDED
Status: DISCONTINUED | OUTPATIENT
Start: 2020-02-28 | End: 2020-02-28 | Stop reason: HOSPADM

## 2020-02-28 RX ORDER — FAMOTIDINE 10 MG/ML
20 INJECTION, SOLUTION INTRAVENOUS ONCE
Status: COMPLETED | OUTPATIENT
Start: 2020-02-28 | End: 2020-02-28

## 2020-02-28 RX ORDER — HYDROCODONE BITARTRATE AND ACETAMINOPHEN 5; 325 MG/1; MG/1
1-2 TABLET ORAL EVERY 4 HOURS PRN
Qty: 30 TABLET | Refills: 0 | Status: SHIPPED | OUTPATIENT
Start: 2020-02-28 | End: 2020-10-26

## 2020-02-28 RX ORDER — MIDAZOLAM HYDROCHLORIDE 1 MG/ML
1 INJECTION INTRAMUSCULAR; INTRAVENOUS
Status: DISCONTINUED | OUTPATIENT
Start: 2020-02-28 | End: 2020-02-28 | Stop reason: HOSPADM

## 2020-02-28 RX ORDER — LIDOCAINE HYDROCHLORIDE 10 MG/ML
0.5 INJECTION, SOLUTION EPIDURAL; INFILTRATION; INTRACAUDAL; PERINEURAL ONCE AS NEEDED
Status: DISCONTINUED | OUTPATIENT
Start: 2020-02-28 | End: 2020-02-28 | Stop reason: HOSPADM

## 2020-02-28 RX ORDER — IBUPROFEN 400 MG/1
400 TABLET ORAL EVERY 6 HOURS PRN
COMMUNITY
End: 2020-11-30

## 2020-02-28 RX ORDER — MAGNESIUM HYDROXIDE 1200 MG/15ML
LIQUID ORAL AS NEEDED
Status: DISCONTINUED | OUTPATIENT
Start: 2020-02-28 | End: 2020-02-28 | Stop reason: HOSPADM

## 2020-02-28 RX ORDER — ASPIRIN 81 MG/1
81 TABLET ORAL DAILY
COMMUNITY
End: 2020-03-23 | Stop reason: SDUPTHER

## 2020-02-28 RX ORDER — SODIUM CHLORIDE, SODIUM LACTATE, POTASSIUM CHLORIDE, CALCIUM CHLORIDE 600; 310; 30; 20 MG/100ML; MG/100ML; MG/100ML; MG/100ML
9 INJECTION, SOLUTION INTRAVENOUS CONTINUOUS
Status: DISCONTINUED | OUTPATIENT
Start: 2020-02-28 | End: 2020-02-28 | Stop reason: HOSPADM

## 2020-02-28 RX ORDER — LIDOCAINE HYDROCHLORIDE 5 MG/ML
INJECTION, SOLUTION INFILTRATION; INTRAVENOUS AS NEEDED
Status: DISCONTINUED | OUTPATIENT
Start: 2020-02-28 | End: 2020-02-28 | Stop reason: SURG

## 2020-02-28 RX ORDER — ASPIRIN 325 MG
650 TABLET ORAL EVERY 4 HOURS PRN
COMMUNITY
End: 2020-10-26

## 2020-02-28 RX ORDER — FENTANYL CITRATE 50 UG/ML
50 INJECTION, SOLUTION INTRAMUSCULAR; INTRAVENOUS
Status: CANCELLED | OUTPATIENT
Start: 2020-02-28

## 2020-02-28 RX ORDER — FLUMAZENIL 0.1 MG/ML
0.2 INJECTION INTRAVENOUS AS NEEDED
Status: CANCELLED | OUTPATIENT
Start: 2020-02-28

## 2020-02-28 RX ORDER — PROMETHAZINE HYDROCHLORIDE 25 MG/1
25 TABLET ORAL ONCE AS NEEDED
Status: CANCELLED | OUTPATIENT
Start: 2020-02-28

## 2020-02-28 RX ORDER — FENTANYL CITRATE 50 UG/ML
100 INJECTION, SOLUTION INTRAMUSCULAR; INTRAVENOUS
Status: DISCONTINUED | OUTPATIENT
Start: 2020-02-28 | End: 2020-02-28 | Stop reason: HOSPADM

## 2020-02-28 RX ADMIN — LIDOCAINE HYDROCHLORIDE 45 ML: 5 INJECTION, SOLUTION INFILTRATION; INTRAVENOUS at 13:11

## 2020-02-28 RX ADMIN — FAMOTIDINE 20 MG: 10 INJECTION INTRAVENOUS at 12:50

## 2020-02-28 RX ADMIN — SODIUM CHLORIDE, POTASSIUM CHLORIDE, SODIUM LACTATE AND CALCIUM CHLORIDE 9 ML/HR: 600; 310; 30; 20 INJECTION, SOLUTION INTRAVENOUS at 12:50

## 2020-02-28 NOTE — ANESTHESIA PREPROCEDURE EVALUATION
Anesthesia Evaluation     no history of anesthetic complications:               Airway   Mallampati: II  no difficulty expected  Dental - normal exam     Pulmonary - normal exam   (+) shortness of breath,   (-) COPD, asthma, sleep apnea, not a smoker    PE comment: nonlabored  Cardiovascular - normal exam    Rhythm: regular  Rate: normal    (+) PALACIOS, hyperlipidemia,   (-) hypertension, valvular problems/murmurs, past MI, CAD, dysrhythmias, angina      Neuro/Psych- negative ROS  (-) seizures, TIA, CVA  GI/Hepatic/Renal/Endo    (+)   renal disease (CKD),   (-) GERD, liver disease, diabetes    ROS Comment: Colon obstruction;  Colitis    Musculoskeletal     (+) arthralgias,   Abdominal    Substance History      OB/GYN          Other   arthritis, blood dyscrasia (Anemia ),                       Anesthesia Plan    ASA 3     [place] block       Anesthetic plan, all risks, benefits, and alternatives have been provided, discussed and informed consent has been obtained with: patient.    Plan discussed with CRNA.

## 2020-02-28 NOTE — ANESTHESIA POSTPROCEDURE EVALUATION
Patient: Kristyn Severino    Procedure Summary     Date:  02/28/20 Room / Location:  [facility]  /  [facility]    Anesthesia Start:  1301 Anesthesia Stop:  1404    Procedure:  DECOMPRESSION OF CARPAL TUNNEL (Left Hand) Diagnosis:      Surgeon:  Marty Plata MD Provider:  Haider Osborn MD    Anesthesia Type:  [place]yn[place] block ASA Status:  3          Anesthesia Type: [place] block    Vitals  Vitals Value Taken Time   /82 2/28/2020  2:00 PM   Temp 36.4 °C (97.6 °F) 2/28/2020  2:00 PM   Pulse 72 2/28/2020  2:00 PM   Resp 18 2/28/2020  2:00 PM   SpO2 99 % 2/28/2020  2:00 PM           Post Anesthesia Care and Evaluation    Patient location during evaluation: bedside  Patient participation: complete - patient participated  Level of consciousness: awake  Pain score: 1  Pain management: adequate  Airway patency: patent  Anesthetic complications: No anesthetic complications  PONV Status: controlled  Cardiovascular status: acceptable  Respiratory status: acceptable  Hydration status: acceptable    Comments: ---------------------------               02/28/20                      1400         ---------------------------   BP:          172/82         Pulse:         72           Resp:          18           Temp:   36.4 °C (97.6 °F)   SpO2:          99%         ---------------------------

## 2020-03-23 ENCOUNTER — TELEPHONE (OUTPATIENT)
Dept: FAMILY MEDICINE CLINIC | Facility: CLINIC | Age: 85
End: 2020-03-23

## 2020-03-23 RX ORDER — ASPIRIN 81 MG/1
81 TABLET ORAL DAILY
Qty: 90 TABLET | Refills: 3 | Status: SHIPPED | OUTPATIENT
Start: 2020-03-23 | End: 2021-06-13

## 2020-04-29 ENCOUNTER — TELEMEDICINE (OUTPATIENT)
Dept: FAMILY MEDICINE CLINIC | Facility: CLINIC | Age: 85
End: 2020-04-29

## 2020-04-29 DIAGNOSIS — F51.01 PRIMARY INSOMNIA: Primary | ICD-10-CM

## 2020-04-29 DIAGNOSIS — F51.01 PRIMARY INSOMNIA: ICD-10-CM

## 2020-04-29 PROCEDURE — 99421 OL DIG E/M SVC 5-10 MIN: CPT | Performed by: PHYSICIAN ASSISTANT

## 2020-04-29 RX ORDER — ZOLPIDEM TARTRATE 5 MG/1
TABLET ORAL
Qty: 30 TABLET | Refills: 2 | Status: SHIPPED | OUTPATIENT
Start: 2020-04-29 | End: 2020-08-07 | Stop reason: SDUPTHER

## 2020-04-29 NOTE — PROGRESS NOTES
Subjective   Kristyn Severino is a 89 y.o. female.   You have chosen to receive care through a telephone visit. Do you consent to use a telephone visit for your medical care today? Yes      History of Present Illness   Kristyn Severino 89 y.o. female presents for follow up of insomnia with onset of symptoms years ago. Patient describes symptoms as frequent night time awakening and difficulty falling asleep. Patient has found no relief with Trazodone, OTC Benadryl, Tylenol PM OTC, Melatonin, avoiding exercise prior to bedtime, going to bed at the same time every night and getting up at the same time and avoiding naps. Associated symptoms include: frustration . Patient denies history of addiction Symptoms have Not been controlled with trazodone and makes her very shaky the next day.  She feels more impaired on trazodone.  I had her try trazodone minimize her risk of falling because she is 89 years old. .  The patient has failed multiple OTC medications for insomnia.  They are well controlled on current Rx and will continue to try to take Rx PRN.  She will use the lowest effective dose.  The patient has read and signed the [facility] Controlled Substance Contract.  I will continue to see patient for regular follow up appointments and be prescribed the lowest effective dose.  JAXON has been reviewed by me and is updated every 3 months. The patient is aware of the potential for addiction and dependence.  She denies that Ambien (Zolpidem) causes excessive daytime drowsiness and sleep walking.  Patient voices understanding to take Ambien (Zolpidem) and go straight to bed. Patient must be able to sleep 7 hours or more when taking this.  Patient will hold Rx and contact me if they experience any impaired mental alertness the next day.    Patient is verbally acknowledging that I have informed her that Ambien can make her impaired it can make her at risk to fall if she were to get up in the middle the night.  She could  fall hit her head and I could also make her impaired the next day confused lightheaded.  She explains that Ambien did not do this in the past that worked very well she wants to assume the risk and have me restart the Ambien.  Also talked about taking half of a 5 mg to minimize dose.. She understands I am very concerned about her taking Ambien and the risk associated with impairment and falls.  Again, she wants to assume the risk and take the medication.   She failed trazodone.no help; adverse effects.  The following portions of the patient's history were reviewed and updated as appropriate: allergies, current medications, past family history, past medical history, past social history, past surgical history and problem list.    Review of Systems   Constitutional: Negative for activity change, appetite change and unexpected weight change.   HENT: Negative for nosebleeds and trouble swallowing.    Eyes: Negative for pain and visual disturbance.   Respiratory: Negative for chest tightness, shortness of breath and wheezing.    Cardiovascular: Negative for chest pain and palpitations.   Gastrointestinal: Negative for abdominal pain and blood in stool.   Endocrine: Negative.    Genitourinary: Negative for difficulty urinating and hematuria.   Musculoskeletal: Negative for joint swelling.   Skin: Negative for color change and rash.   Allergic/Immunologic: Negative.    Neurological: Negative for syncope and speech difficulty.   Hematological: Negative for adenopathy.   Psychiatric/Behavioral: Positive for sleep disturbance. Negative for agitation and confusion.   All other systems reviewed and are negative.      Objective   Physical Exam   Constitutional: She is oriented to person, place, and time. No distress.   Pulmonary/Chest: Effort normal.   Neurological: She is alert and oriented to person, place, and time.   Psychiatric: She has a normal mood and affect. Judgment and thought content normal.       Assessment/Plan    Kristyn was seen today for insomnia.    Diagnoses and all orders for this visit:    Primary insomnia      She understands not to drink alcohol with taking Ambien at any time  We will discuss Ambien with Dr. Vigil and make sure he knows of her verbal understanding of the risk associated with taking Ambien at 89 years old with impairment and falls.   Time spent was 10 minutes

## 2020-05-11 DIAGNOSIS — E55.9 VITAMIN D DEFICIENCY: ICD-10-CM

## 2020-05-11 RX ORDER — LEVOTHYROXINE SODIUM 0.07 MG/1
TABLET ORAL
Qty: 90 TABLET | Refills: 1 | Status: SHIPPED | OUTPATIENT
Start: 2020-05-11 | End: 2020-05-11

## 2020-05-11 RX ORDER — LEVOTHYROXINE SODIUM 0.07 MG/1
TABLET ORAL
Qty: 90 TABLET | Refills: 3 | Status: SHIPPED | OUTPATIENT
Start: 2020-05-11 | End: 2021-05-05 | Stop reason: SDUPTHER

## 2020-05-11 RX ORDER — ERGOCALCIFEROL 1.25 MG/1
CAPSULE ORAL
Qty: 13 CAPSULE | Refills: 3 | Status: SHIPPED | OUTPATIENT
Start: 2020-05-11 | End: 2020-10-26 | Stop reason: SDUPTHER

## 2020-05-11 RX ORDER — ERGOCALCIFEROL 1.25 MG/1
CAPSULE ORAL
Qty: 13 CAPSULE | Refills: 3 | Status: SHIPPED | OUTPATIENT
Start: 2020-05-11 | End: 2020-05-11

## 2020-08-07 ENCOUNTER — TELEMEDICINE (OUTPATIENT)
Dept: FAMILY MEDICINE CLINIC | Facility: CLINIC | Age: 85
End: 2020-08-07

## 2020-08-07 DIAGNOSIS — F51.01 PRIMARY INSOMNIA: Primary | ICD-10-CM

## 2020-08-07 PROCEDURE — 99441 PR PHYS/QHP TELEPHONE EVALUATION 5-10 MIN: CPT | Performed by: FAMILY MEDICINE

## 2020-08-07 RX ORDER — ZOLPIDEM TARTRATE 5 MG/1
2.5 TABLET ORAL NIGHTLY PRN
Qty: 30 TABLET | Refills: 0 | Status: SHIPPED | OUTPATIENT
Start: 2020-08-07 | End: 2020-11-30 | Stop reason: SDDI

## 2020-08-07 NOTE — PROGRESS NOTES
Subjective   Kristyn Severino is a 89 y.o. female.     History of Present Illness     89-year-old female is a new patient to me presents today via video visit to discuss primary insomnia which needs refill of her Ambien.  Reports she has not heard back for refill.    Patient was informed of serious adverse effects including risk for falls and cognitive impairment.  Patient aware and wishes continue with this medication.    Refuses referral for CBT-I or further work-up in sleep medicine clinic.    Discussed that this medication is not intended for chronic use especially in geriatric population.    The following portions of the patient's history were reviewed and updated as appropriate: allergies, current medications, past family history, past medical history, past social history, past surgical history and problem list.    Review of Systems   Constitutional: Negative for chills and fever.   HENT: Negative for congestion.    Respiratory: Negative for cough and shortness of breath.    Cardiovascular: Negative for chest pain, palpitations and leg swelling.   Gastrointestinal: Negative for abdominal pain, diarrhea and vomiting.   Psychiatric/Behavioral: Positive for sleep disturbance.       Objective   Physical Exam  Patient unable to connect to video.          Assessment/Plan     Kristyn was seen today for insomnia.    Diagnoses and all orders for this visit:    Primary insomnia  -     zolpidem (Ambien) 5 MG tablet; Take 0.5 tablets by mouth At Night As Needed (Insomnia).      Will refill for patient today.  Advised her to continue to follow with her primary care for further refills as I do not prescribe this medication chronically for any patients.  Patient expressed understanding and agreeable to plan.    Unable to connect a video during visit.  Completed as a telephone visit.  Time spent during visit: 10 minutes.

## 2020-09-04 DIAGNOSIS — F51.01 PRIMARY INSOMNIA: ICD-10-CM

## 2020-09-04 RX ORDER — ZOLPIDEM TARTRATE 5 MG/1
TABLET ORAL
Qty: 30 TABLET | Refills: 0 | OUTPATIENT
Start: 2020-09-04

## 2020-10-03 DIAGNOSIS — F51.01 PRIMARY INSOMNIA: ICD-10-CM

## 2020-10-05 RX ORDER — ZOLPIDEM TARTRATE 5 MG/1
TABLET ORAL
Qty: 30 TABLET | Refills: 0 | OUTPATIENT
Start: 2020-10-05

## 2020-10-26 ENCOUNTER — OFFICE VISIT (OUTPATIENT)
Dept: FAMILY MEDICINE CLINIC | Facility: CLINIC | Age: 85
End: 2020-10-26

## 2020-10-26 VITALS
BODY MASS INDEX: 26.29 KG/M2 | WEIGHT: 154 LBS | DIASTOLIC BLOOD PRESSURE: 67 MMHG | TEMPERATURE: 97.3 F | HEART RATE: 69 BPM | SYSTOLIC BLOOD PRESSURE: 122 MMHG | HEIGHT: 64 IN

## 2020-10-26 DIAGNOSIS — R19.7 DIARRHEA, UNSPECIFIED TYPE: ICD-10-CM

## 2020-10-26 DIAGNOSIS — G89.29 CHRONIC NONINTRACTABLE HEADACHE, UNSPECIFIED HEADACHE TYPE: ICD-10-CM

## 2020-10-26 DIAGNOSIS — C18.2 MALIGNANT NEOPLASM OF ASCENDING COLON (HCC): ICD-10-CM

## 2020-10-26 DIAGNOSIS — R53.1 WEAKNESS: ICD-10-CM

## 2020-10-26 DIAGNOSIS — R41.3 MEMORY LOSS: ICD-10-CM

## 2020-10-26 DIAGNOSIS — E78.2 HYPERLIPIDEMIA, MIXED: ICD-10-CM

## 2020-10-26 DIAGNOSIS — R35.0 URINARY FREQUENCY: Primary | ICD-10-CM

## 2020-10-26 DIAGNOSIS — R51.9 CHRONIC NONINTRACTABLE HEADACHE, UNSPECIFIED HEADACHE TYPE: ICD-10-CM

## 2020-10-26 DIAGNOSIS — N30.00 ACUTE CYSTITIS WITHOUT HEMATURIA: ICD-10-CM

## 2020-10-26 DIAGNOSIS — E55.9 VITAMIN D DEFICIENCY: ICD-10-CM

## 2020-10-26 LAB
BILIRUB BLD-MCNC: NEGATIVE MG/DL
GLUCOSE UR STRIP-MCNC: NEGATIVE MG/DL
KETONES UR QL: NEGATIVE
LEUKOCYTE EST, POC: ABNORMAL
NITRITE UR-MCNC: NEGATIVE MG/ML
PH UR: 5 [PH] (ref 5–8)
PROT UR STRIP-MCNC: NEGATIVE MG/DL
RBC # UR STRIP: NEGATIVE /UL
SP GR UR: 1.03 (ref 1–1.03)
UROBILINOGEN UR QL: NORMAL

## 2020-10-26 PROCEDURE — 81003 URINALYSIS AUTO W/O SCOPE: CPT | Performed by: PHYSICIAN ASSISTANT

## 2020-10-26 PROCEDURE — 99214 OFFICE O/P EST MOD 30 MIN: CPT | Performed by: PHYSICIAN ASSISTANT

## 2020-10-26 RX ORDER — ERGOCALCIFEROL 1.25 MG/1
50000 CAPSULE ORAL
Qty: 13 CAPSULE | Refills: 3 | Status: SHIPPED | OUTPATIENT
Start: 2020-10-26 | End: 2021-06-22 | Stop reason: SDUPTHER

## 2020-10-26 RX ORDER — CIPROFLOXACIN 250 MG/1
250 TABLET, FILM COATED ORAL 2 TIMES DAILY
Qty: 10 TABLET | Refills: 0 | Status: SHIPPED | OUTPATIENT
Start: 2020-10-26 | End: 2020-11-30

## 2020-10-26 RX ORDER — CHOLECALCIFEROL (VITAMIN D3) 125 MCG
5 CAPSULE ORAL
COMMUNITY
End: 2021-07-19 | Stop reason: ALTCHOICE

## 2020-10-26 NOTE — PROGRESS NOTES
Subjective   Kristyn Severino is a 89 y.o. female.     History of Present Illness     She is having diarrhea twice a week.  Food does not digest well----some vegetables contribute to diarrhea; has hx colon cancer.  I want her to see GI doc.     Kristyn Severino 89 y.o.female complains of urinary symptoms. she complains of frequency. She has had symptoms for several weeks. The symptoms are mild.  Patient denies fever, gross blood in urine, dysuria and abdominal pain.  Patient has tried  nothing for relief of symptoms.  Patient does not have a history of recurrent UTI. Patient does not have a history of pyelonephritis.    Will send urine for cx and start Rx for UTI for now     also having episodes of weakness and memory issues--get labs and make f/u appt to go over this.  Having h/a's and memory concerns also today----onset was   I need to her f/u for this and treat a few problems today. Onset was a year ago. CT head also    The following portions of the patient's history were reviewed and updated as appropriate: allergies, current medications, past family history, past medical history, past social history, past surgical history and problem list.    Review of Systems   Constitutional: Positive for activity change and fatigue. Negative for appetite change and unexpected weight change.   HENT: Positive for hearing loss. Negative for nosebleeds and trouble swallowing.    Eyes: Negative for pain and visual disturbance.   Respiratory: Negative for chest tightness, shortness of breath and wheezing.    Cardiovascular: Negative for chest pain and palpitations.   Gastrointestinal: Negative for abdominal pain and blood in stool.   Endocrine: Positive for polyuria.   Genitourinary: Positive for frequency and urgency. Negative for difficulty urinating and hematuria.   Musculoskeletal: Positive for back pain. Negative for joint swelling.   Skin: Negative for color change and rash.   Allergic/Immunologic: Negative.    Neurological:  Positive for weakness and light-headedness. Negative for syncope and speech difficulty.   Hematological: Negative for adenopathy.   Psychiatric/Behavioral: Positive for sleep disturbance. Negative for agitation and confusion.   All other systems reviewed and are negative.      Objective   Physical Exam  Vitals signs and nursing note reviewed.   Constitutional:       General: She is not in acute distress.     Appearance: Normal appearance. She is well-developed. She is not ill-appearing, toxic-appearing or diaphoretic.   HENT:      Head: Normocephalic.      Right Ear: External ear normal.      Left Ear: External ear normal.      Nose: Nose normal.   Eyes:      Conjunctiva/sclera: Conjunctivae normal.      Pupils: Pupils are equal, round, and reactive to light.   Neck:      Musculoskeletal: Normal range of motion and neck supple.   Cardiovascular:      Rate and Rhythm: Normal rate and regular rhythm.      Pulses: Normal pulses.      Heart sounds: Normal heart sounds. No murmur.   Pulmonary:      Effort: Pulmonary effort is normal.      Breath sounds: Normal breath sounds. No rhonchi.   Abdominal:      Tenderness: There is no abdominal tenderness.   Musculoskeletal: Normal range of motion.      Right lower leg: Edema present.      Left lower leg: Edema present.      Comments: Trace pedal edema = bilat     Skin:     General: Skin is warm and dry.      Findings: No rash.   Neurological:      Mental Status: She is alert and oriented to person, place, and time. Mental status is at baseline.      Motor: Weakness present.   Psychiatric:         Mood and Affect: Mood normal. Affect is not inappropriate.         Behavior: Behavior normal.         Thought Content: Thought content normal.         Judgment: Judgment normal.         Assessment/Plan   Diagnoses and all orders for this visit:    1. Urinary frequency (Primary)  -     POCT urinalysis dipstick, automated  -     Comprehensive metabolic panel  -     Lipid panel  -      CBC and Differential  -     TSH  -     T4, Free  -     T3, Free  -     Vitamin B12  -     Folate  -     Vitamin D 25 Hydroxy  -     Ambulatory Referral to Neurology  -     CT Head Without Contrast; Future  -     Urinalysis With Microscopic - Urine, Clean Catch  -     Urine Culture - Urine, Urine, Clean Catch    2. Malignant neoplasm of ascending colon (CMS/HCC)  -     Ambulatory Referral to Gastroenterology  -     Comprehensive metabolic panel  -     Lipid panel  -     CBC and Differential  -     TSH  -     T4, Free  -     T3, Free  -     Vitamin B12  -     Folate  -     Vitamin D 25 Hydroxy  -     Ambulatory Referral to Neurology  -     CT Head Without Contrast; Future    3. Acute cystitis without hematuria  -     Comprehensive metabolic panel  -     Lipid panel  -     CBC and Differential  -     TSH  -     T4, Free  -     T3, Free  -     Vitamin B12  -     Folate  -     Vitamin D 25 Hydroxy  -     Ambulatory Referral to Neurology  -     CT Head Without Contrast; Future    4. Diarrhea, unspecified type  -     Ambulatory Referral to Gastroenterology  -     Comprehensive metabolic panel  -     Lipid panel  -     CBC and Differential  -     TSH  -     T4, Free  -     T3, Free  -     Vitamin B12  -     Folate  -     Vitamin D 25 Hydroxy  -     Ambulatory Referral to Neurology  -     CT Head Without Contrast; Future    5. Weakness  -     Comprehensive metabolic panel  -     Lipid panel  -     CBC and Differential  -     TSH  -     T4, Free  -     T3, Free  -     Vitamin B12  -     Folate  -     Vitamin D 25 Hydroxy  -     Ambulatory Referral to Neurology  -     CT Head Without Contrast; Future    6. Memory loss  -     Comprehensive metabolic panel  -     Lipid panel  -     CBC and Differential  -     TSH  -     T4, Free  -     T3, Free  -     Vitamin B12  -     Folate  -     Vitamin D 25 Hydroxy  -     Ambulatory Referral to Neurology  -     CT Head Without Contrast; Future    7. Chronic nonintractable headache,  unspecified headache type  -     Comprehensive metabolic panel  -     Lipid panel  -     CBC and Differential  -     TSH  -     T4, Free  -     T3, Free  -     Vitamin B12  -     Folate  -     Vitamin D 25 Hydroxy  -     Ambulatory Referral to Neurology  -     CT Head Without Contrast; Future    8. Vitamin D deficiency  -     Comprehensive metabolic panel  -     Lipid panel  -     CBC and Differential  -     TSH  -     T4, Free  -     T3, Free  -     Vitamin B12  -     Folate  -     Vitamin D 25 Hydroxy  -     Ambulatory Referral to Neurology  -     CT Head Without Contrast; Future    9. Hyperlipidemia, mixed  -     Comprehensive metabolic panel  -     Lipid panel  -     CBC and Differential  -     TSH  -     T4, Free  -     T3, Free  -     Vitamin B12  -     Folate  -     Vitamin D 25 Hydroxy  -     Ambulatory Referral to Neurology  -     CT Head Without Contrast; Future    Other orders  -     ciprofloxacin (Cipro) 250 MG tablet; Take 1 tablet by mouth 2 (Two) Times a Day. For UTI  Dispense: 10 tablet; Refill: 0        Refer to GI for diarrhea episodes and hx colon cancer  Treat UTI while waiting for cx  Labs --episodes weakness  Avoid sleep aids d/t age  Use back brace

## 2020-10-26 NOTE — PATIENT INSTRUCTIONS

## 2020-10-27 LAB
25(OH)D3+25(OH)D2 SERPL-MCNC: 55.9 NG/ML (ref 30–100)
ALBUMIN SERPL-MCNC: 4.2 G/DL (ref 3.5–5.2)
ALBUMIN/GLOB SERPL: 1.6 G/DL
ALP SERPL-CCNC: 52 U/L (ref 39–117)
ALT SERPL-CCNC: 12 U/L (ref 1–33)
AST SERPL-CCNC: 19 U/L (ref 1–32)
BASOPHILS # BLD AUTO: 0.04 10*3/MM3 (ref 0–0.2)
BASOPHILS NFR BLD AUTO: 0.7 % (ref 0–1.5)
BILIRUB SERPL-MCNC: 0.4 MG/DL (ref 0–1.2)
BUN SERPL-MCNC: 23 MG/DL (ref 8–23)
BUN/CREAT SERPL: 24.5 (ref 7–25)
CALCIUM SERPL-MCNC: 9.6 MG/DL (ref 8.2–9.6)
CHLORIDE SERPL-SCNC: 103 MMOL/L (ref 98–107)
CHOLEST SERPL-MCNC: 217 MG/DL (ref 0–200)
CO2 SERPL-SCNC: 26.5 MMOL/L (ref 22–29)
CREAT SERPL-MCNC: 0.94 MG/DL (ref 0.57–1)
EOSINOPHIL # BLD AUTO: 0.13 10*3/MM3 (ref 0–0.4)
EOSINOPHIL NFR BLD AUTO: 2.1 % (ref 0.3–6.2)
ERYTHROCYTE [DISTWIDTH] IN BLOOD BY AUTOMATED COUNT: 12.6 % (ref 12.3–15.4)
FOLATE SERPL-MCNC: >20 NG/ML (ref 4.78–24.2)
GLOBULIN SER CALC-MCNC: 2.6 GM/DL
GLUCOSE SERPL-MCNC: 94 MG/DL (ref 65–99)
HCT VFR BLD AUTO: 34.8 % (ref 34–46.6)
HDLC SERPL-MCNC: 70 MG/DL (ref 40–60)
HGB BLD-MCNC: 11.9 G/DL (ref 12–15.9)
IMM GRANULOCYTES # BLD AUTO: 0.01 10*3/MM3 (ref 0–0.05)
IMM GRANULOCYTES NFR BLD AUTO: 0.2 % (ref 0–0.5)
LDLC SERPL CALC-MCNC: 125 MG/DL (ref 0–100)
LYMPHOCYTES # BLD AUTO: 2.16 10*3/MM3 (ref 0.7–3.1)
LYMPHOCYTES NFR BLD AUTO: 35.6 % (ref 19.6–45.3)
MCH RBC QN AUTO: 31.6 PG (ref 26.6–33)
MCHC RBC AUTO-ENTMCNC: 34.2 G/DL (ref 31.5–35.7)
MCV RBC AUTO: 92.3 FL (ref 79–97)
MONOCYTES # BLD AUTO: 0.6 10*3/MM3 (ref 0.1–0.9)
MONOCYTES NFR BLD AUTO: 9.9 % (ref 5–12)
NEUTROPHILS # BLD AUTO: 3.12 10*3/MM3 (ref 1.7–7)
NEUTROPHILS NFR BLD AUTO: 51.5 % (ref 42.7–76)
NRBC BLD AUTO-RTO: 0 /100 WBC (ref 0–0.2)
PLATELET # BLD AUTO: 321 10*3/MM3 (ref 140–450)
POTASSIUM SERPL-SCNC: 4.5 MMOL/L (ref 3.5–5.2)
PROT SERPL-MCNC: 6.8 G/DL (ref 6–8.5)
RBC # BLD AUTO: 3.77 10*6/MM3 (ref 3.77–5.28)
SODIUM SERPL-SCNC: 139 MMOL/L (ref 136–145)
T3FREE SERPL-MCNC: 2.2 PG/ML (ref 2–4.4)
T4 FREE SERPL-MCNC: 1.17 NG/DL (ref 0.93–1.7)
TRIGL SERPL-MCNC: 124 MG/DL (ref 0–150)
TSH SERPL DL<=0.005 MIU/L-ACNC: 1.17 UIU/ML (ref 0.27–4.2)
VIT B12 SERPL-MCNC: 856 PG/ML (ref 211–946)
VLDLC SERPL CALC-MCNC: 22 MG/DL (ref 5–40)
WBC # BLD AUTO: 6.06 10*3/MM3 (ref 3.4–10.8)

## 2020-10-28 LAB
APPEARANCE UR: CLEAR
BACTERIA #/AREA URNS HPF: ABNORMAL /[HPF]
BACTERIA UR CULT: NORMAL
BACTERIA UR CULT: NORMAL
BILIRUB UR QL STRIP: NEGATIVE
CASTS URNS MICRO: ABNORMAL
CASTS URNS QL MICRO: PRESENT /LPF
COLOR UR: YELLOW
EPI CELLS #/AREA URNS HPF: ABNORMAL /HPF (ref 0–10)
GLUCOSE UR QL: NEGATIVE
HGB UR QL STRIP: NEGATIVE
KETONES UR QL STRIP: NEGATIVE
LEUKOCYTE ESTERASE UR QL STRIP: ABNORMAL
MICRO URNS: ABNORMAL
MUCOUS THREADS URNS QL MICRO: PRESENT
NITRITE UR QL STRIP: NEGATIVE
PH UR STRIP: 5 [PH] (ref 5–7.5)
PROT UR QL STRIP: NEGATIVE
RBC #/AREA URNS HPF: ABNORMAL /HPF (ref 0–2)
SP GR UR: 1.02 (ref 1–1.03)
UROBILINOGEN UR STRIP-MCNC: 0.2 MG/DL (ref 0.2–1)
WBC #/AREA URNS HPF: ABNORMAL /HPF (ref 0–5)

## 2020-11-04 ENCOUNTER — APPOINTMENT (OUTPATIENT)
Dept: CT IMAGING | Facility: HOSPITAL | Age: 85
End: 2020-11-04

## 2020-11-12 ENCOUNTER — APPOINTMENT (OUTPATIENT)
Dept: CT IMAGING | Facility: HOSPITAL | Age: 85
End: 2020-11-12

## 2020-11-12 ENCOUNTER — TELEPHONE (OUTPATIENT)
Dept: FAMILY MEDICINE CLINIC | Facility: CLINIC | Age: 85
End: 2020-11-12

## 2020-11-18 ENCOUNTER — TELEPHONE (OUTPATIENT)
Dept: GASTROENTEROLOGY | Facility: CLINIC | Age: 85
End: 2020-11-18

## 2020-11-18 ENCOUNTER — OFFICE VISIT (OUTPATIENT)
Dept: FAMILY MEDICINE CLINIC | Facility: CLINIC | Age: 85
End: 2020-11-18

## 2020-11-18 DIAGNOSIS — R19.7 DIARRHEA, UNSPECIFIED TYPE: Primary | ICD-10-CM

## 2020-11-18 PROCEDURE — 99443 PR PHYS/QHP TELEPHONE EVALUATION 21-30 MIN: CPT | Performed by: PHYSICIAN ASSISTANT

## 2020-11-18 RX ORDER — OCTISALATE, AVOBENZONE, HOMOSALATE, AND OCTOCRYLENE 29.4; 29.4; 49; 25.48 MG/ML; MG/ML; MG/ML; MG/ML
LOTION TOPICAL
Qty: 30 CAPSULE | Refills: 1
Start: 2020-11-18 | End: 2020-11-30 | Stop reason: SDDI

## 2020-11-18 NOTE — PROGRESS NOTES
Subjective   Kristyn Severino is a 89 y.o. female.   You have chosen to receive care through a telehealth visit.  Do you consent to use a video/audio connection for your medical care today? Yes    History of Present Illness   Kristyn Severino 89 y.o. female LMP  (LMP Unknown)  who presents today for diarrhea and seeing food in her stool.   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  We want to watch her potassium at the ER it was 3.5 but otherwise electrolytes were all in range her GFR are was 51 and this is consistent with her usual labs.  Liver enzymes were in range white count was not elevated.  Urine was negative and microscopic urine negative.  She took Imodium and no help but some help with BRAT diet.    She will see GI==DR Villavicencio  I will need her to have stool studies---  Hx colon cancer with colectomy     Has diarrhea 5 times a day--does not wake up from sleep!!!!! Usually after she eats.  Has cramping and now mucus  last several days----no black stools; saw dot of bright red blood last PM. On the tissue    No vomiting ; has passed gas today---sore in abd pain; feels empty  Was seeing food in stool  Son on phone with her    The following portions of the patient's history were reviewed and updated as appropriate: allergies, current medications, past family history, past medical history, past social history, past surgical history and problem list.    Review of Systems   Constitutional: Negative for activity change, appetite change and unexpected weight change.   HENT: Negative for nosebleeds and trouble swallowing.    Eyes: Negative for pain and visual disturbance.   Respiratory: Negative for chest tightness, shortness of breath and wheezing.    Cardiovascular: Negative for chest pain and palpitations.   Gastrointestinal: Negative for abdominal pain and blood in stool.   Endocrine: Negative.    Genitourinary: Negative for difficulty urinating and hematuria.   Musculoskeletal: Negative for joint swelling.   Skin: Negative for color change and rash.   Allergic/Immunologic: Negative.    Neurological: Negative for syncope and speech difficulty.   Hematological: Negative for adenopathy.   Psychiatric/Behavioral: Negative for agitation and confusion.   All other systems reviewed and are negative.      Objective   Physical Exam  Pulmonary:      Effort: Pulmonary effort is normal.      Breath sounds: No stridor.   Neurological:      Mental Status: She is alert.   Psychiatric:         Mood and Affect: Mood normal.         Behavior: Behavior normal.         Assessment/Plan   Diagnoses and all orders for this visit:    1. Diarrhea, unspecified type (Primary)  -     Clostridium Difficile Toxin, PCR - Stool, Per Rectum  -     Stool Culture (Reference Lab) - Stool, Per Rectum  -     Ova & Parasite Examination - Stool, Per Rectum  -     Fecal Lactoferrin - Stool, Per Rectum        Try probiotic  Needs to see GI  Collect stools ----  Did take  Imodium today and for the first time worked with starting BRAT diet---no stool yet today;   Time spent 21 minutes    I did contact Dr BLAKE--GI\  Erasto, MD Teddy Vogel Amy F. PARAE             If stool studies negative could consider a trial of Xifaxin 550mg TID x 2 weeks

## 2020-11-18 NOTE — TELEPHONE ENCOUNTER
----- Message from Nadia Almaraz sent at 11/18/2020 10:56 AM EST -----  Contact: [phone]  Patient would like to speak to an RN.  She has been having diarrhea x 2 wks.  Please call.

## 2020-11-18 NOTE — PATIENT INSTRUCTIONS
Diarrhea, Adult  Diarrhea is frequent loose and watery bowel movements. Diarrhea can make you feel weak and cause you to become dehydrated. Dehydration can make you tired and thirsty, cause you to have a dry mouth, and decrease how often you urinate.  Diarrhea typically lasts 2-3 days. However, it can last longer if it is a sign of something more serious. It is important to treat your diarrhea as told by your health care provider.  Follow these instructions at home:  Eating and drinking         Follow these recommendations as told by your health care provider:  · Take an oral rehydration solution (ORS). This is an over-the-counter medicine that helps return your body to its normal balance of nutrients and water. It is found at pharmacies and retail stores.  · Drink plenty of fluids, such as water, ice chips, diluted fruit juice, and low-calorie sports drinks. You can drink milk also, if desired.  · Avoid drinking fluids that contain a lot of sugar or caffeine, such as energy drinks, sports drinks, and soda.  · Eat bland, easy-to-digest foods in small amounts as you are able. These foods include bananas, applesauce, rice, lean meats, toast, and crackers.  · Avoid alcohol.  · Avoid spicy or fatty foods.    Medicines  · Take over-the-counter and prescription medicines only as told by your health care provider.  · If you were prescribed an antibiotic medicine, take it as told by your health care provider. Do not stop using the antibiotic even if you start to feel better.  General instructions    · Wash your hands often using soap and water. If soap and water are not available, use a hand . Others in the household should wash their hands as well. Hands should be washed:  ? After using the toilet or changing a diaper.  ? Before preparing, cooking, or serving food.  ? While caring for a sick person or while visiting someone in a hospital.  · Drink enough fluid to keep your urine pale yellow.  · Rest at home while  you recover.  · Watch your condition for any changes.  · Take a warm bath to relieve any burning or pain from frequent diarrhea episodes.  · Keep all follow-up visits as told by your health care provider. This is important.  Contact a health care provider if:  · You have a fever.  · Your diarrhea gets worse.  · You have new symptoms.  · You cannot keep fluids down.  · You feel light-headed or dizzy.  · You have a headache.  · You have muscle cramps.  Get help right away if:  · You have chest pain.  · You feel extremely weak or you faint.  · You have bloody or black stools or stools that look like tar.  · You have severe pain, cramping, or bloating in your abdomen.  · You have trouble breathing or you are breathing very quickly.  · Your heart is beating very quickly.  · Your skin feels cold and clammy.  · You feel confused.  · You have signs of dehydration, such as:  ? Dark urine, very little urine, or no urine.  ? Cracked lips.  ? Dry mouth.  ? Sunken eyes.  ? Sleepiness.  ? Weakness.  Summary  · Diarrhea is frequent loose and watery bowel movements. Diarrhea can make you feel weak and cause you to become dehydrated.  · Drink enough fluids to keep your urine pale yellow.  · Make sure that you wash your hands after using the toilet. If soap and water are not available, use hand .  · Contact a health care provider if your diarrhea gets worse or you have new symptoms.  · Get help right away if you have signs of dehydration.  This information is not intended to replace advice given to you by your health care provider. Make sure you discuss any questions you have with your health care provider.  Document Released: 12/08/2003 Document Revised: 05/05/2020 Document Reviewed: 05/24/2019  [employer] Patient Education © 2020 [employer]

## 2020-11-18 NOTE — TELEPHONE ENCOUNTER
Called pt and pt is reporting diarrhea that has been mustard consistency.    Diarrhea was explosive. Pt went to [facility] last Sunday and she states that they did not find anything. Records under care everywhere.   Family MD ordered stools studies and pt is going to bring samples to us.  Pt is on Brat diet. Pt hard of hearing , also spoke with her son. He reports that his mother has not had diarrhea today .  She is taking imodium and she is going to bring the stools specimens She was advised per her pcp to bring stool specimens to us.  She denies a fever and chills. Advised will update Dr Maurer.

## 2020-11-19 NOTE — TELEPHONE ENCOUNTER
Yes her pcp reached out earlier today, said she was ordering stool studies and referred her back to see us.  She can drop stool studies here or with PCP whichever is most convenient. Per Dr Maurer.     Called pt and number continues to ring.

## 2020-11-23 NOTE — TELEPHONE ENCOUNTER
Called pt and pt reports that she has turned in her stools studies to us on Thurs.  Ro to check to see if she can find specimens.

## 2020-11-30 ENCOUNTER — OFFICE VISIT (OUTPATIENT)
Dept: FAMILY MEDICINE CLINIC | Facility: CLINIC | Age: 85
End: 2020-11-30

## 2020-11-30 VITALS
OXYGEN SATURATION: 98 % | WEIGHT: 149.6 LBS | BODY MASS INDEX: 25.54 KG/M2 | HEART RATE: 72 BPM | SYSTOLIC BLOOD PRESSURE: 120 MMHG | TEMPERATURE: 97.6 F | HEIGHT: 64 IN | RESPIRATION RATE: 16 BRPM | DIASTOLIC BLOOD PRESSURE: 50 MMHG

## 2020-11-30 DIAGNOSIS — R19.7 DIARRHEA, UNSPECIFIED TYPE: Primary | ICD-10-CM

## 2020-11-30 PROCEDURE — 99213 OFFICE O/P EST LOW 20 MIN: CPT | Performed by: PHYSICIAN ASSISTANT

## 2020-11-30 NOTE — PATIENT INSTRUCTIONS
Diarrhea, Adult  Diarrhea is frequent loose and watery bowel movements. Diarrhea can make you feel weak and cause you to become dehydrated. Dehydration can make you tired and thirsty, cause you to have a dry mouth, and decrease how often you urinate.  Diarrhea typically lasts 2-3 days. However, it can last longer if it is a sign of something more serious. It is important to treat your diarrhea as told by your health care provider.  Follow these instructions at home:  Eating and drinking         Follow these recommendations as told by your health care provider:  · Take an oral rehydration solution (ORS). This is an over-the-counter medicine that helps return your body to its normal balance of nutrients and water. It is found at pharmacies and retail stores.  · Drink plenty of fluids, such as water, ice chips, diluted fruit juice, and low-calorie sports drinks. You can drink milk also, if desired.  · Avoid drinking fluids that contain a lot of sugar or caffeine, such as energy drinks, sports drinks, and soda.  · Eat bland, easy-to-digest foods in small amounts as you are able. These foods include bananas, applesauce, rice, lean meats, toast, and crackers.  · Avoid alcohol.  · Avoid spicy or fatty foods.    Medicines  · Take over-the-counter and prescription medicines only as told by your health care provider.  · If you were prescribed an antibiotic medicine, take it as told by your health care provider. Do not stop using the antibiotic even if you start to feel better.  General instructions    · Wash your hands often using soap and water. If soap and water are not available, use a hand . Others in the household should wash their hands as well. Hands should be washed:  ? After using the toilet or changing a diaper.  ? Before preparing, cooking, or serving food.  ? While caring for a sick person or while visiting someone in a hospital.  · Drink enough fluid to keep your urine pale yellow.  · Rest at home while  you recover.  · Watch your condition for any changes.  · Take a warm bath to relieve any burning or pain from frequent diarrhea episodes.  · Keep all follow-up visits as told by your health care provider. This is important.  Contact a health care provider if:  · You have a fever.  · Your diarrhea gets worse.  · You have new symptoms.  · You cannot keep fluids down.  · You feel light-headed or dizzy.  · You have a headache.  · You have muscle cramps.  Get help right away if:  · You have chest pain.  · You feel extremely weak or you faint.  · You have bloody or black stools or stools that look like tar.  · You have severe pain, cramping, or bloating in your abdomen.  · You have trouble breathing or you are breathing very quickly.  · Your heart is beating very quickly.  · Your skin feels cold and clammy.  · You feel confused.  · You have signs of dehydration, such as:  ? Dark urine, very little urine, or no urine.  ? Cracked lips.  ? Dry mouth.  ? Sunken eyes.  ? Sleepiness.  ? Weakness.  Summary  · Diarrhea is frequent loose and watery bowel movements. Diarrhea can make you feel weak and cause you to become dehydrated.  · Drink enough fluids to keep your urine pale yellow.  · Make sure that you wash your hands after using the toilet. If soap and water are not available, use hand .  · Contact a health care provider if your diarrhea gets worse or you have new symptoms.  · Get help right away if you have signs of dehydration.  This information is not intended to replace advice given to you by your health care provider. Make sure you discuss any questions you have with your health care provider.  Document Revised: 05/05/2020 Document Reviewed: 05/24/2019  Elsevier Patient Education © 2020 [employer]

## 2020-11-30 NOTE — PROGRESS NOTES
"Subjective   Kristyn Severino is a 89 y.o. female.     History of Present Illness   Kristyn Severino 89 y.o. female /50 (BP Location: Left arm, Patient Position: Sitting, Cuff Size: Adult)   Pulse 72   Temp 97.6 °F (36.4 °C)   Resp 16   Ht 163.8 cm (64.49\")   Wt 67.9 kg (149 lb 9.6 oz)   LMP  (LMP Unknown)   SpO2 98%   BMI 25.29 kg/m²  who presents today for diarrhea and just had telephone visit with her on 11-  I noted:  .onset soft and diarrhea stools 2 mos ago, worse with frequency and staying watery for 2 weeks    She feels hot--she feels the GI upset  Patient had right hemicolectomy on 10/8/2018 for colon cancer T3N1--- patient elected not to see oncology for any continued treatment that may have included chemotherapy  She went to ER 11-15-20--[facility]  97.8 temp  94  She did have CT abd/pelvis and negative, I do want a note her hemoglobin was still 11.9 just like it was when I did labs 3 weeks ago.  We want to watch her potassium at the ER it was 3.5 but otherwise electrolytes were all in range her GFR are was 51 and this is consistent with her usual labs.  Liver enzymes were in range white count was not elevated.  Urine was negative and microscopic urine negative.  She took Imodium and no help but some help with BRAT diet.     She will see GI==DR Villavicencio  I will need her to have stool studies---  Hx colon cancer with colectomy      Has diarrhea 5 times a day--does not wake up from sleep!!!!! Usually after she eats.  Has cramping and now mucus last several days----no black stools; saw dot of bright red blood last PM. 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    She returned stool samples to GI office, not [facility]  I can Rx   I did review her CT and labs from ER as noted in last visit.    Weight stable now --stopped losing      NOW, today, still on BRAT diet and only 1-2 stools a day; loose to watery.  Was complete water 5-6- times a day.  Has an abd cramp just prior to BM only.  Still no blood or black.  Not waking her up.  I cannot send Xifaxin 550mg TID X 2 weeks yet-----she needs negative stool studies and I did collaborate with Dr Maurer.  Has appt 12-15-20.  Problem is that she took stool samples to his office--the collection kits and NOT to [facility] as I instructed.  No results--cannot find  No change in condition. Not worse    The following portions of the patient's history were reviewed and updated as appropriate: allergies, current medications, past family history, past medical history, past social history, past surgical history and problem list.    Review of Systems   Constitutional: Negative for activity change, appetite change, fever and unexpected weight change.   HENT: Negative for nosebleeds and trouble swallowing.    Eyes: Negative for pain and visual disturbance.   Respiratory: Negative for choking, chest tightness, shortness of breath, wheezing and stridor.    Cardiovascular: Negative for chest pain and palpitations.   Gastrointestinal: Positive for diarrhea. Negative for abdominal pain, blood in stool and vomiting.   Endocrine: Negative.  Negative for polydipsia.   Genitourinary: Negative for difficulty urinating, genital sores and hematuria.   Musculoskeletal: Negative for joint swelling.   Skin: Negative for color change and rash.    Allergic/Immunologic: Negative.  Negative for immunocompromised state.   Neurological: Negative for seizures, syncope, facial asymmetry and speech difficulty.   Hematological: Negative for adenopathy.   Psychiatric/Behavioral: Negative for agitation, behavioral problems, confusion, self-injury and suicidal ideas.   All other systems reviewed and are negative.      Objective   Physical Exam  Vitals signs and nursing note reviewed.   Constitutional:       General: She is not in acute distress.     Appearance: Normal appearance. She is well-developed. She is not ill-appearing, toxic-appearing or diaphoretic.   HENT:      Head: Normocephalic.      Right Ear: External ear normal.      Left Ear: External ear normal.      Nose: Nose normal.   Eyes:      General: No scleral icterus.     Conjunctiva/sclera: Conjunctivae normal.      Pupils: Pupils are equal, round, and reactive to light.   Neck:      Musculoskeletal: Normal range of motion and neck supple.   Cardiovascular:      Rate and Rhythm: Normal rate and regular rhythm.      Pulses: Normal pulses.      Heart sounds: Normal heart sounds. No murmur.   Pulmonary:      Effort: Pulmonary effort is normal.      Breath sounds: Normal breath sounds. No rhonchi.   Abdominal:      General: Abdomen is flat. Bowel sounds are normal. There is no distension.      Palpations: There is no mass.      Tenderness: There is no abdominal tenderness.   Musculoskeletal: Normal range of motion.      Right lower leg: Edema present.      Left lower leg: Edema present.      Comments: Trace pedal edema = bilat     Skin:     General: Skin is warm and dry.      Findings: No rash.   Neurological:      Mental Status: She is alert and oriented to person, place, and time. Mental status is at baseline.      Motor: Weakness present.   Psychiatric:         Mood and Affect: Mood normal. Affect is not inappropriate.         Behavior: Behavior normal.         Thought Content: Thought content normal.          Judgment: Judgment normal.         Assessment/Plan   Diagnoses and all orders for this visit:    1. Diarrhea, unspecified type (Primary)      Still need results of stool studies to know how to proceed.  I need to co Xifaxin 550mg TID X 2 weeks if confirm negative stool studies and I did collaborate with Dr Erasto YADAV .  Has appt 12-15-20.  Problem is that she took stool samples to his office--the collection kits and NOT to [facility] as I instructed.  Concern with her hx colon cancer and declined referral to oncology        Henry Maurer MD [place], [place], DHARMESH             I do not see any stool studies resulted for Ms Carreon.      I had to reorder stool studies--I called pt; take samples back to [facility]

## 2020-12-08 LAB
BACTERIA SPEC CULT: NORMAL
BACTERIA SPEC CULT: NORMAL
C DIFF TOX GENS STL QL NAA+PROBE: NEGATIVE
CAMPYLOBACTER STL CULT: NORMAL
E COLI SXT STL QL IA: NEGATIVE
LACTOFERRIN STL-MCNC: <1 UG/ML(G) (ref 0–7.24)
O+P SPEC MICRO: NORMAL
O+P STL CONC: NORMAL
SALM + SHIG STL CULT: NORMAL

## 2020-12-09 ENCOUNTER — TELEPHONE (OUTPATIENT)
Dept: FAMILY MEDICINE CLINIC | Facility: CLINIC | Age: 85
End: 2020-12-09

## 2020-12-15 ENCOUNTER — OFFICE VISIT (OUTPATIENT)
Dept: GASTROENTEROLOGY | Facility: CLINIC | Age: 85
End: 2020-12-15

## 2020-12-15 VITALS — BODY MASS INDEX: 24.04 KG/M2 | WEIGHT: 144.3 LBS | HEIGHT: 65 IN

## 2020-12-15 DIAGNOSIS — R19.7 DIARRHEA, UNSPECIFIED TYPE: Primary | ICD-10-CM

## 2020-12-15 PROCEDURE — 99212 OFFICE O/P EST SF 10 MIN: CPT | Performed by: INTERNAL MEDICINE

## 2020-12-15 NOTE — PROGRESS NOTES
Chief Complaint   Patient presents with   • Diarrhea     Subjective     HPI  Kristyn Severino is a 89 y.o. female who presents today for follow-up.    Kristyn has been having some issues with diarrhea over the last couple of weeks.  She reports stool is often loose and somewhat urgent usually postprandially.  She can have a bowel movement 4-5 times per day.  She denies any nocturnal waking.  She has had stool studies performed which have been unremarkable.  Even her fecal lactoferrin was normal.  She does use some over-the-counter Imodium with success.  She recently was started on Xifaxan she is 5 days into a 14-day course and does notice some improvement with Xifaxan as well.  She is not had any blood or mucus in her stool.  She does have a history of a prior partial colectomy due to colon cancer.    Past Medical History:   Diagnosis Date   • Anemia of chronic illness    • Colon cancer (CMS/HCC)     Invasive adenocarcinoma of right colon, grade 2 (5.3 cm)   • PALACIOS (dyspnea on exertion)    • Fatigue    • Hirsutism    • History of bone density study     few years; normal   • History of colonoscopy     never   • History of prior pregnancies     x4   • HLD (hyperlipidemia)    • Hypothyroidism     Hashimoto's diagnosed age 19   • Insomnia    • Lightheadedness    • Osteoarthritis    • Postmenopausal    • Renal insufficiency    • Rhinorrhea    • Seborrheic keratosis    • Vitamin D deficiency        Social History     Socioeconomic History   • Marital status:      Spouse name: Not on file   • Number of children: 3   • Years of education: Some College   • Highest education level: Not on file   Occupational History   • Occupation: RN     Employer: RETIRED     Comment: [employer]   Tobacco Use   • Smoking status: Never Smoker   • Smokeless tobacco: Never Used   Substance and Sexual Activity   • Alcohol use: Yes     Comment: Occasional   • Drug use: No   • Sexual activity: Defer         Current Outpatient  Medications:   •  aspirin 81 MG EC tablet, Take 1 tablet by mouth Daily., Disp: 90 tablet, Rfl: 3  •  B Complex-C (SUPER B COMPLEX PO), Take  by mouth., Disp: , Rfl:   •  levothyroxine (SYNTHROID, LEVOTHROID) 75 MCG tablet, TAKE 1 TABLET BY MOUTH EVERY DAY, Disp: 90 tablet, Rfl: 3  •  melatonin 5 MG tablet tablet, Take 5 mg by mouth., Disp: , Rfl:   •  Nutritional Supplements (ENSURE NUTRA SHAKE HI-CAMMY) liquid, Take 1 can by mouth 4 (Four) Times a Day. PRN meal supplementation, Disp: 120 can, Rfl: 5  •  riFAXIMin (XIFAXAN) 550 MG tablet, Take 1 tablet by mouth Every 8 (Eight) Hours for 14 days. For IBS diarrhea, Disp: 42 tablet, Rfl: 0  •  glucosamine-chondroitin 500-400 MG capsule capsule, Take  by mouth 3 (Three) Times a Day With Meals., Disp: , Rfl:   •  Multiple Vitamins-Minerals (MULTIVITAMIN WITH MINERALS) tablet, Take 1 tablet by mouth daily., Disp: , Rfl:   •  psyllium (Metamucil) 0.52 g capsule, Take 3 capsules by mouth Daily., Disp: 90 capsule, Rfl: 11  •  vitamin D (ERGOCALCIFEROL) 1.25 MG (80146 UT) capsule capsule, Take 1 capsule by mouth Every 7 (Seven) Days., Disp: 13 capsule, Rfl: 3    Review of Systems   Constitutional: Negative.    Gastrointestinal: Positive for diarrhea.       Objective   There were no vitals filed for this visit.    Physical Exam  Vitals signs reviewed.   Constitutional:       Appearance: She is well-developed.   Neck:      Musculoskeletal: Normal range of motion and neck supple.   Cardiovascular:      Rate and Rhythm: Normal rate and regular rhythm.      Heart sounds: Normal heart sounds.   Pulmonary:      Effort: Pulmonary effort is normal. No respiratory distress.      Breath sounds: Normal breath sounds. No wheezing.   Abdominal:      General: Bowel sounds are normal.      Palpations: Abdomen is soft.   Skin:     General: Skin is warm and dry.   Neurological:      Mental Status: She is alert and oriented to person, place, and time.   Psychiatric:         Mood and Affect: Mood  and affect normal.         Behavior: Behavior normal.         Thought Content: Thought content normal.         Cognition and Memory: Memory normal.         Judgment: Judgment normal.         Assessment/Plan   Assessment:     1. Diarrhea, unspecified type      Plan:   She seems to be improving with as needed Imodium and Xifaxan.  Advised her to make sure she completes the full 14-day course of Xifaxan she may continue to see improvement for up to 2 to 3 weeks after completing the antibiotic.  I did review her stool studies which were reassuringly normal and did not suggest any underlying inflammatory process.  She continues to have issues after Xifaxan may consider addition of a bile acid sequestrant such as Colestid given her history of prior colonic resection.        Henry Maurer M.D.  [facility]  [address] [place] [address]  Office: [phone]

## 2021-04-20 ENCOUNTER — TELEPHONE (OUTPATIENT)
Dept: FAMILY MEDICINE CLINIC | Facility: CLINIC | Age: 86
End: 2021-04-20

## 2021-04-20 NOTE — TELEPHONE ENCOUNTER
PATIENT IS REQUESTING A CALL BACK FROM RILEY ROSALES' NURSE. SHE STATES THAT SHE HAS DIARRHEA AGAIN    PATIENT CAN BE REACHED AT  [phone]

## 2021-04-23 NOTE — TELEPHONE ENCOUNTER
Spoke to pt she is still having diarrhea. BRAT DIET is not working stated medication she took for this issue last time helped her out and was really wanting another RX sent to the pharmacy Thanks

## 2021-04-23 NOTE — TELEPHONE ENCOUNTER
Caller: Kristyn Severino    Relationship to patient: Self    Best call back number: [phone]    Patient is needing: PATIENT RETURNED CALL AND SAID THAT SHE WOULD LIKE TO SPEAK WITH THE NURSE ABOUT DIARRHEA AND OVERALL FEELING BAD    WARM TRANSFERRED TO OFFICE

## 2021-04-26 ENCOUNTER — TELEPHONE (OUTPATIENT)
Dept: GASTROENTEROLOGY | Facility: CLINIC | Age: 86
End: 2021-04-26

## 2021-04-26 NOTE — TELEPHONE ENCOUNTER
----- Message from Arnulfo Chen sent at 4/26/2021  1:52 PM EDT -----  Regarding: pt update  Contact: [phone]  Pt is eating rice diet, slowly adding more foods, and is improving. And not using any medications.

## 2021-04-29 ENCOUNTER — TELEPHONE (OUTPATIENT)
Dept: FAMILY MEDICINE CLINIC | Facility: CLINIC | Age: 86
End: 2021-04-29

## 2021-04-29 NOTE — TELEPHONE ENCOUNTER
Caller: Kristyn Severino    Relationship: Self    Best call back number: [phone]    What medication are you requesting: WHATEVER HER PCP RECOMMENDS    What are your current symptoms: LOOSE STOOL WITH MUCUS, PAIN IN HIPS AND BACK WHEN SHE WALKS IN THE MORNING AND THROUGHOUT THE DAY    How long have you been experiencing symptoms: FOR A COUPLE OF WEEKS    Have you had these symptoms before:    [x] Yes  [] No    Have you been treated for these symptoms before:   [x] Yes  [] No    If a prescription is needed, what is your preferred pharmacy and phone number: [facility] - [place], [place] - [address] [place] [address] - [phone]  - [phone] FX      PLEASE ADVISE THE PATIENT WHEN OR IF A MEDICATION IS SENT IN TO THE PHARMACY.

## 2021-04-29 NOTE — TELEPHONE ENCOUNTER
Patient is calling complaining of still having diarrhea and wanted her to follow-up with you before I intervened.  She is wanting to refill Xifaxan???

## 2021-05-04 NOTE — TELEPHONE ENCOUNTER
Henry Kyle MD Davis, Amy F., PA-C; [facility] Clinical 2 Pool  Caller: Unspecified (5 days ago, 11:57 AM)  Ok thanks for the update.     Malina/Shanti, can we send Kristyn a refill on her Xifaxin and schedule her for office f/u with Dionne     Called Xifaxan into [facility].  Called pt and advised. Pt verbalized understanding. Pt states she will call back and make an appt, b/c she needs to get transportation.

## 2021-05-05 ENCOUNTER — OFFICE VISIT (OUTPATIENT)
Dept: FAMILY MEDICINE CLINIC | Facility: CLINIC | Age: 86
End: 2021-05-05

## 2021-05-05 DIAGNOSIS — E03.8 HYPOTHYROIDISM DUE TO HASHIMOTO'S THYROIDITIS: Primary | ICD-10-CM

## 2021-05-05 DIAGNOSIS — E06.3 HYPOTHYROIDISM DUE TO HASHIMOTO'S THYROIDITIS: Primary | ICD-10-CM

## 2021-05-05 DIAGNOSIS — N18.31 STAGE 3A CHRONIC KIDNEY DISEASE (HCC): ICD-10-CM

## 2021-05-05 DIAGNOSIS — E55.9 VITAMIN D DEFICIENCY: ICD-10-CM

## 2021-05-05 DIAGNOSIS — D63.8 ANEMIA OF CHRONIC ILLNESS: ICD-10-CM

## 2021-05-05 PROBLEM — K56.609 UNSPECIFIED INTESTINAL OBSTRUCTION, UNSPECIFIED AS TO PARTIAL VERSUS COMPLETE OBSTRUCTION (HCC): Status: ACTIVE | Noted: 2018-10-17

## 2021-05-05 PROBLEM — M19.90 ARTHRITIS: Status: ACTIVE | Noted: 2021-05-05

## 2021-05-05 PROBLEM — D64.9 ANEMIA: Status: ACTIVE | Noted: 2018-09-26

## 2021-05-05 PROBLEM — K52.838 OTHER MICROSCOPIC COLITIS: Status: ACTIVE | Noted: 2018-09-26

## 2021-05-05 PROBLEM — H40.52X3: Status: ACTIVE | Noted: 2018-09-26

## 2021-05-05 PROBLEM — E03.9 HYPOTHYROIDISM: Status: ACTIVE | Noted: 2021-05-05

## 2021-05-05 PROBLEM — G47.00 INSOMNIA, UNSPECIFIED: Status: ACTIVE | Noted: 2018-09-26

## 2021-05-05 PROBLEM — Q15.9: Status: ACTIVE | Noted: 2018-09-26

## 2021-05-05 PROBLEM — K58.8 OTHER IRRITABLE BOWEL SYNDROME: Status: ACTIVE | Noted: 2018-09-26

## 2021-05-05 PROBLEM — M62.81 GENERALIZED MUSCLE WEAKNESS: Status: ACTIVE | Noted: 2018-10-17

## 2021-05-05 PROCEDURE — 99442 PR PHYS/QHP TELEPHONE EVALUATION 11-20 MIN: CPT | Performed by: PHYSICIAN ASSISTANT

## 2021-05-05 RX ORDER — LEVOTHYROXINE SODIUM 0.07 MG/1
75 TABLET ORAL DAILY
Qty: 90 TABLET | Refills: 3 | Status: SHIPPED | OUTPATIENT
Start: 2021-05-05 | End: 2021-07-14 | Stop reason: SDUPTHER

## 2021-05-05 NOTE — PROGRESS NOTES
Subjective   Kristyn Severino is a 89 y.o. female.     History of Present Illness   You have chosen to receive care through a telehealth visit.  Do you consent to use a video/audio connection for your medical care today? Yes--phone   Since the last visit, she has overall felt tired.  She has Hypothyroidism well controlled on current medication and will continue Rx, Vitamin D deficiency and labs are at goal >30 ng/mL and Mixed hyperlipidemia patient intolerant to statins.  Anemia of chronic disease stable.  she has been compliant with current medications have reviewed them.  The patient denies medication side effects.  Will refill medications. LMP  (LMP Unknown)     Results for orders placed or performed in visit on 11/30/20   Clostridium Difficile Toxin, PCR - Stool, Per Rectum    Specimen: Per Rectum; Stool    STOOL   Result Value Ref Range    C difficile Toxin Gene NAAT Negative Negative   Ova & Parasite Examination - Stool, Per Rectum    Specimen: Per Rectum; Stool    STOOL   Result Value Ref Range    Ova + Parasite Exam Final report     Ova + Parasite Result 1 Comment    Stool Culture (Reference Lab) - Stool, Per Rectum    Specimen: Per Rectum; Stool    STOOL   Result Value Ref Range    Salmonella/Shigella Screen Final report     Result 1 Comment     Campylobacter Culture Final report     Result 1 Comment     E coli, Shiga toxin Assay Negative Negative   Fecal Lactoferrin - Stool, Per Rectum    Specimen: Per Rectum; Stool   Result Value Ref Range    Lactoferrin, Quant <1.00 0.00 - 7.24 ug/mL(g)     Patient declines DEXA  BMI Readings from Last 1 Encounters:   12/15/20 24.39 kg/m²       Patient is intolerant to statins  I see in GI notes that Dr. Maurer did ok refill Xifaxan and called to [facility].    The following portions of the patient's history were reviewed and updated as appropriate: allergies, current medications, past family history, past medical history, past social history, past surgical history and  problem list.    Review of Systems   Constitutional: Positive for fatigue. Negative for fever.   HENT: Negative for nosebleeds and trouble swallowing.    Eyes: Negative for visual disturbance.   Respiratory: Negative for choking and stridor.    Cardiovascular: Negative for chest pain.   Gastrointestinal: Negative for blood in stool.   Endocrine: Negative for polydipsia.   Genitourinary: Negative for genital sores and hematuria.   Musculoskeletal: Negative for joint swelling.   Skin: Negative for color change and rash.   Allergic/Immunologic: Negative for immunocompromised state.   Neurological: Positive for light-headedness. Negative for seizures, facial asymmetry and speech difficulty.   Hematological: Negative for adenopathy.   Psychiatric/Behavioral: Positive for sleep disturbance. Negative for behavioral problems, self-injury and suicidal ideas.       Objective   Physical Exam  Pulmonary:      Effort: No respiratory distress.      Breath sounds: No stridor.   Neurological:      Mental Status: She is alert and oriented to person, place, and time.   Psychiatric:         Mood and Affect: Mood normal.         Behavior: Behavior normal.         Thought Content: Thought content normal.         Judgment: Judgment normal.         Assessment/Plan   Diagnoses and all orders for this visit:    1. Hypothyroidism due to Hashimoto's thyroiditis (Primary)    2. Vitamin D deficiency    3. Anemia of chronic illness    4. Stage 3a chronic kidney disease (CMS/HCC)    Other orders  -     levothyroxine (SYNTHROID, LEVOTHROID) 75 MCG tablet; Take 1 tablet by mouth Daily. For thyroid  Dispense: 90 tablet; Refill: 3      She will restart her B complex vitamin and increase fluids due to suspected orthostasis from poor fluid intake  Do suggest follow-up labs  Plan, Kristyn Severino, was seen today.  she was seen for Hypothyroidism well controlled, continue medication, Vitamin D deficiency and supplemented and Mixed hyperlipidemia  intolerant to statins.  Phone time 11 min

## 2021-05-06 ENCOUNTER — TELEPHONE (OUTPATIENT)
Dept: FAMILY MEDICINE CLINIC | Facility: CLINIC | Age: 86
End: 2021-05-06

## 2021-05-06 RX ORDER — CIPROFLOXACIN 250 MG/1
250 TABLET, FILM COATED ORAL 2 TIMES DAILY
Qty: 10 TABLET | Refills: 0 | Status: SHIPPED | OUTPATIENT
Start: 2021-05-06 | End: 2021-06-22

## 2021-05-06 NOTE — TELEPHONE ENCOUNTER
Pt called stated she saw you yesterday, She stated she knows she has a bladder injection. She stated she is drinking a lot of water but cant seem to urin ? Can you help her

## 2021-05-06 NOTE — TELEPHONE ENCOUNTER
Pt states she was seen yesterday and mentioned it.  Pt states it is hard for her to come into office in short notice.  Please advise.  Thanks

## 2021-05-06 NOTE — TELEPHONE ENCOUNTER
Caller: Kristyn Severino    Relationship: Self    Best call back number: [phone]    What medication are you requesting: ANTIBIOTICS    What are your current symptoms: URINARY FREQUENCY/URGENCY    How long have you been experiencing symptoms: A FEW DAYS    Have you had these symptoms before:    [x] Yes  [] No    Have you been treated for these symptoms before:   [x] Yes  [] No    If a prescription is needed, what is your preferred pharmacy and phone number: [facility] - [place], [place] - [address] [place] [address] - [phone]  - [phone]     Additional notes:

## 2021-05-10 ENCOUNTER — TELEPHONE (OUTPATIENT)
Dept: FAMILY MEDICINE CLINIC | Facility: CLINIC | Age: 86
End: 2021-05-10

## 2021-05-10 DIAGNOSIS — R35.0 URINARY FREQUENCY: Primary | ICD-10-CM

## 2021-05-10 NOTE — TELEPHONE ENCOUNTER
I sent the Cipro the day after her telephone encounter for a different problem.  I sent the Cipro as a courtesy because she just had the appointment for the UTI.  I ordered a urine culture and urine micro for her to go to the lab and do and also there is blood work I still want her to do  Need to know if she is having fever, what her symptoms are like if she burning frequent urgent?  Should she have a yeast infection?      Est [facility]

## 2021-05-10 NOTE — TELEPHONE ENCOUNTER
PATIENT CALLED IN REQUESTING A CALL BACK FROM THE NURSE REGARDING THE MEDICATION NOT WORKING FOR THE BLADDER INFECTION.    BEST CALL BACK # [phone]

## 2021-05-10 NOTE — TELEPHONE ENCOUNTER
Pt states she has completed medication.  She states she still feels like she has the infection.  Please advise.  Thanks

## 2021-06-10 ENCOUNTER — TELEPHONE (OUTPATIENT)
Dept: FAMILY MEDICINE CLINIC | Facility: CLINIC | Age: 86
End: 2021-06-10

## 2021-06-10 NOTE — TELEPHONE ENCOUNTER
Left message with [facility] marcos fischer told ok for VERBAL ORDERS FOR HOME HEALTH NURSING,   PHYSICAL THERAPY/OCCUPATIONAL

## 2021-06-10 NOTE — TELEPHONE ENCOUNTER
Caller: EDILBERTO CHAVEZ     Relationship:[facility]     Best call back number:[phone]    What orders are you requesting (i.e. lab or imaging):  REQUESTING VERBAL ORDERS FOR HOME HEALTH NURSING,   PHYSICAL THERAPY/OCCUPATIONAL THERAPY EVALUATION       In what timeframe would the patient need to come in: ASAP       Additional notes: PATIENT DISCHARGED FROM [facility] 6-10-21.

## 2021-06-13 RX ORDER — ASPIRIN 81 MG/1
TABLET ORAL
Qty: 90 TABLET | Refills: 3 | Status: SHIPPED | OUTPATIENT
Start: 2021-06-13 | End: 2021-07-19 | Stop reason: SDUPTHER

## 2021-06-14 ENCOUNTER — TELEPHONE (OUTPATIENT)
Dept: FAMILY MEDICINE CLINIC | Facility: CLINIC | Age: 86
End: 2021-06-14

## 2021-06-14 RX ORDER — SODIUM PHOSPHATE,MONO-DIBASIC 19G-7G/118
ENEMA (ML) RECTAL
Status: CANCELLED | OUTPATIENT
Start: 2021-06-14

## 2021-06-14 RX ORDER — FUROSEMIDE 40 MG/1
40 TABLET ORAL DAILY
Qty: 30 TABLET | Refills: 0 | Status: SHIPPED | OUTPATIENT
Start: 2021-06-14 | End: 2021-07-19 | Stop reason: ALTCHOICE

## 2021-06-14 RX ORDER — ASPIRIN 81 MG/1
81 TABLET ORAL DAILY
Qty: 30 TABLET | Refills: 0 | Status: SHIPPED | OUTPATIENT
Start: 2021-06-14

## 2021-06-14 RX ORDER — POTASSIUM CHLORIDE 14.9 MG/ML
20 INJECTION INTRAVENOUS ONCE
Status: CANCELLED | OUTPATIENT
Start: 2021-06-14 | End: 2021-06-14

## 2021-06-14 RX ORDER — DIGOXIN 125 MCG
125 TABLET ORAL
Qty: 30 TABLET | Refills: 0 | Status: CANCELLED | OUTPATIENT
Start: 2021-06-14

## 2021-06-14 RX ORDER — POTASSIUM CHLORIDE 20 MEQ/1
20 TABLET, EXTENDED RELEASE ORAL DAILY
Qty: 30 TABLET | Refills: 0 | Status: SHIPPED | OUTPATIENT
Start: 2021-06-14 | End: 2021-06-22

## 2021-06-14 RX ORDER — LEVOTHYROXINE SODIUM 0.07 MG/1
75 TABLET ORAL DAILY
Qty: 90 TABLET | Refills: 3 | Status: CANCELLED | OUTPATIENT
Start: 2021-06-14

## 2021-06-14 RX ORDER — METOPROLOL TARTRATE 75 MG/1
37.5 TABLET, FILM COATED ORAL DAILY
Qty: 15 TABLET | Refills: 0 | Status: CANCELLED | OUTPATIENT
Start: 2021-06-14

## 2021-06-14 RX ORDER — DIGOXIN 125 MCG
125 TABLET ORAL
Qty: 30 TABLET | Refills: 0 | Status: SHIPPED | OUTPATIENT
Start: 2021-06-14 | End: 2021-06-22 | Stop reason: SDUPTHER

## 2021-06-14 RX ORDER — METOPROLOL TARTRATE 75 MG/1
37.5 TABLET, FILM COATED ORAL DAILY
Qty: 15 TABLET | Refills: 0 | Status: SHIPPED | OUTPATIENT
Start: 2021-06-14 | End: 2021-06-22 | Stop reason: SDUPTHER

## 2021-06-14 RX ORDER — ASPIRIN 81 MG/1
81 TABLET ORAL DAILY
Qty: 90 TABLET | Refills: 3 | Status: CANCELLED | OUTPATIENT
Start: 2021-06-14

## 2021-06-14 RX ORDER — POTASSIUM CHLORIDE 20 MEQ/1
20 TABLET, EXTENDED RELEASE ORAL DAILY
Qty: 30 TABLET | Refills: 0 | Status: CANCELLED | OUTPATIENT
Start: 2021-06-14

## 2021-06-14 RX ORDER — CHOLECALCIFEROL (VITAMIN D3) 125 MCG
5 CAPSULE ORAL
Status: CANCELLED | OUTPATIENT
Start: 2021-06-14

## 2021-06-14 RX ORDER — ASPIRIN 81 MG/1
81 TABLET ORAL DAILY
Qty: 30 TABLET | Refills: 0 | Status: CANCELLED | OUTPATIENT
Start: 2021-06-14

## 2021-06-14 RX ORDER — FUROSEMIDE 40 MG/1
40 TABLET ORAL DAILY
Qty: 30 TABLET | Refills: 0 | Status: CANCELLED | OUTPATIENT
Start: 2021-06-14

## 2021-06-14 NOTE — TELEPHONE ENCOUNTER
Caller: Kristyn Severino    Relationship: Self    Best call back number: [phone]    Medication needed:   Requested Prescriptions     Pending Prescriptions Disp Refills   • aspirin (Aspirin Adult Low Strength) 81 MG EC tablet 90 tablet 3     Sig: Take 1 tablet by mouth Daily.   • glucosamine-chondroitin 500-400 MG capsule capsule       Sig: Take  by mouth 3 (Three) Times a Day With Meals.   • melatonin 5 MG tablet tablet       Sig: Take 1 tablet by mouth.   • levothyroxine (SYNTHROID, LEVOTHROID) 75 MCG tablet 90 tablet 3     Sig: Take 1 tablet by mouth Daily. For thyroid       When do you need the refill by:     What additional details did the patient provide when requesting the medication:     Does the patient have less than a 3 day supply:  [x] Yes  [] No    What is the patient's preferred pharmacy:  [facility] [address]  [phone]

## 2021-06-14 NOTE — TELEPHONE ENCOUNTER
DELETE AFTER REVIEWING: Telephone encounter to be sent to the clinical pool     Caller: Kristyn Severino    Relationship: Self    Best call back number: [phone]    What medication are you requesting: LASIX 40MG, METOPROLOL 37.5MG, POTASSIUM 25MG, ZIFAXAN 550 MG, ELIQUIS 2.5MG, DIGOXIN 125MG, ASPRIN 81MG    What are your current symptoms:     How long have you been experiencing symptoms:     Have you had these symptoms before:    [] Yes  [] No    Have you been treated for these symptoms before:   [] Yes  [] No    If a prescription is needed, what is your preferred pharmacy and phone number:  [facility]  [address] [facility]  [phone]    Additional notes: PATIENT SAYS THAT SHE WAS RELEASED FROM REHAB AND SHE NEEDS PRESCRIPTIONS WRITTEN FOR THESE MEDICATIONS.  SHE HAS A LIST OF ALL OF THESE MEDICATIONS.

## 2021-06-14 NOTE — TELEPHONE ENCOUNTER
Pt scheduled for telephone visit on 6/22/2021.  Pt states she is out of meds.  Please advise?  Thanks

## 2021-06-14 NOTE — TELEPHONE ENCOUNTER
Please Advise, I called pt to schedule appointment.  Pt states she can not come out to in office visit or do video visit.  Thanks

## 2021-06-15 ENCOUNTER — TELEPHONE (OUTPATIENT)
Dept: FAMILY MEDICINE CLINIC | Facility: CLINIC | Age: 86
End: 2021-06-15

## 2021-06-15 NOTE — TELEPHONE ENCOUNTER
Caller: ALBINA [facility]/ XIOMARA [place] call back number:[phone]    What orders are you requesting (i.e. lab or imaging): VERBAL ORDERS FOR PHYSICAL THERAPY 2 X 2 WEEKS AND 1 X 4 WEEKS.     PATIENT IS ALSO NEEDING A NEW ROLADER FROM [facility]. THE WHEEL LOCK IS BROKEN AND PATIENT HAD A FALL LAST NIGHT PUTTING ON HER SHOES. NO INJURIES OCCURRED      [facility] FAX:  [phone]

## 2021-06-15 NOTE — TELEPHONE ENCOUNTER
Caller: REGGIE    Relationship: Home Health    Best call back number: [phone]    What orders are you requesting (i.e. lab or imaging): VERBAL ORDERS FOR SKILLED NURSING, PHYSICAL AND OCCUPATIONAL THERAPY    In what timeframe would the patient need to come in: ASAP    Where will you receive your lab/imaging services: IN HER HOME    Additional notes: PLEASE CALL WITH VERBAL ORDERS, THOSE CAN BE LEFT ON VOICEMAIL OF [facility]

## 2021-06-15 NOTE — TELEPHONE ENCOUNTER
Okay to do all.  She may have to have an appointment for any medical supplies to be covered by Medicare.  She may want to consider buying this herself

## 2021-06-16 NOTE — TELEPHONE ENCOUNTER
Called Ben with [facility] to give verbal for PT.  I LM for a returned phone call.  Thanks

## 2021-06-21 ENCOUNTER — TELEPHONE (OUTPATIENT)
Dept: FAMILY MEDICINE CLINIC | Facility: CLINIC | Age: 86
End: 2021-06-21

## 2021-06-21 NOTE — TELEPHONE ENCOUNTER
Caller: MARINA    Relationship to patient: Home Health    Best call back number: [phone]    Patient is needing: MARINA [facility] CALLED IN STATING THE PATIENT IS ON LIQUID VANCOMYCIN 4 TIMES A DAY DUE TO CDIFF. PATIENT STATES SHE HAS DIARRHEA, AND SHE THINKS IT IS FROM THE MEDICINE. STATED PATIENT CALLED HER THIS MORNING AND SAID WAS UP AT 4AM WITH DIARRHEA. MARINA STATED SHE WOULD BE GOING TO SEE HER TODAY, AND SHE WILL RE EDUCATE HER ON HOW THE MEDICATION WORKS, JUST WANTED TO MAKE OFFICE AWARE. STATED SHE THINKS PATIENT WANTS TO COME OFF OF THE VANCOMYCIN, AND BE PUT ON SOMETHING ELSE. PLEASE CALL AND ADVISE.

## 2021-06-22 ENCOUNTER — OFFICE VISIT (OUTPATIENT)
Dept: FAMILY MEDICINE CLINIC | Facility: CLINIC | Age: 86
End: 2021-06-22

## 2021-06-22 DIAGNOSIS — C18.2 MALIGNANT NEOPLASM OF ASCENDING COLON (HCC): ICD-10-CM

## 2021-06-22 DIAGNOSIS — R19.7 DIARRHEA, UNSPECIFIED TYPE: ICD-10-CM

## 2021-06-22 DIAGNOSIS — E03.9 HYPOTHYROIDISM, ACQUIRED: ICD-10-CM

## 2021-06-22 DIAGNOSIS — N18.31 STAGE 3A CHRONIC KIDNEY DISEASE (HCC): ICD-10-CM

## 2021-06-22 DIAGNOSIS — I48.91 ATRIAL FIBRILLATION, UNSPECIFIED TYPE (HCC): Primary | ICD-10-CM

## 2021-06-22 DIAGNOSIS — E55.9 VITAMIN D DEFICIENCY: ICD-10-CM

## 2021-06-22 DIAGNOSIS — R73.01 IMPAIRED FASTING GLUCOSE: ICD-10-CM

## 2021-06-22 DIAGNOSIS — D63.8 ANEMIA OF CHRONIC ILLNESS: ICD-10-CM

## 2021-06-22 PROBLEM — M62.59 MUSCLE WASTING AND ATROPHY, NOT ELSEWHERE CLASSIFIED, MULTIPLE SITES: Status: ACTIVE | Noted: 2021-05-17

## 2021-06-22 PROBLEM — R53.1 WEAKNESS: Status: ACTIVE | Noted: 2021-05-17

## 2021-06-22 PROBLEM — M13.0 POLYARTHRITIS, UNSPECIFIED: Status: ACTIVE | Noted: 2021-05-16

## 2021-06-22 PROBLEM — R06.02 SHORTNESS OF BREATH: Status: ACTIVE | Noted: 2021-05-16

## 2021-06-22 PROBLEM — R26.89 OTHER ABNORMALITIES OF GAIT AND MOBILITY: Status: ACTIVE | Noted: 2021-05-17

## 2021-06-22 PROBLEM — M19.90 ARTHRITIS: Status: ACTIVE | Noted: 2021-06-22

## 2021-06-22 PROCEDURE — 99443 PR PHYS/QHP TELEPHONE EVALUATION 21-30 MIN: CPT | Performed by: PHYSICIAN ASSISTANT

## 2021-06-22 RX ORDER — ALUMINUM ZIRCONIUM OCTACHLOROHYDREX GLY 16 G/100G
1 GEL TOPICAL DAILY
Qty: 30 CAPSULE | Refills: 2 | Status: SHIPPED | OUTPATIENT
Start: 2021-06-22 | End: 2022-06-22

## 2021-06-22 RX ORDER — IPRATROPIUM BROMIDE AND ALBUTEROL SULFATE 2.5; .5 MG/3ML; MG/3ML
3 SOLUTION RESPIRATORY (INHALATION)
COMMUNITY
Start: 2021-05-16 | End: 2021-07-19 | Stop reason: ALTCHOICE

## 2021-06-22 RX ORDER — METOPROLOL TARTRATE 37.5 MG/1
1 TABLET, FILM COATED ORAL EVERY 12 HOURS SCHEDULED
COMMUNITY
Start: 2021-05-21 | End: 2021-06-22

## 2021-06-22 RX ORDER — POTASSIUM CHLORIDE 20 MEQ/1
20 TABLET, EXTENDED RELEASE ORAL DAILY
Qty: 30 TABLET | Refills: 0 | Status: SHIPPED | OUTPATIENT
Start: 2021-06-22 | End: 2021-10-11 | Stop reason: HOSPADM

## 2021-06-22 RX ORDER — METOPROLOL TARTRATE 75 MG/1
37.5 TABLET, FILM COATED ORAL DAILY
Qty: 15 TABLET | Refills: 2 | Status: SHIPPED | OUTPATIENT
Start: 2021-06-22 | End: 2021-07-14 | Stop reason: SDUPTHER

## 2021-06-22 RX ORDER — ERGOCALCIFEROL 1.25 MG/1
50000 CAPSULE ORAL
Qty: 13 CAPSULE | Refills: 3 | Status: SHIPPED | OUTPATIENT
Start: 2021-06-22 | End: 2021-07-23 | Stop reason: SDUPTHER

## 2021-06-22 RX ORDER — GLUCOSAMINE SULFATE 500 MG
2 CAPSULE ORAL EVERY 24 HOURS
COMMUNITY
Start: 2021-05-17 | End: 2021-07-19 | Stop reason: ALTCHOICE

## 2021-06-22 RX ORDER — LOPERAMIDE HYDROCHLORIDE 2 MG/1
2 TABLET ORAL
COMMUNITY
Start: 2021-05-18 | End: 2021-07-19 | Stop reason: ALTCHOICE

## 2021-06-22 RX ORDER — DIGOXIN 125 MCG
125 TABLET ORAL
Qty: 30 TABLET | Refills: 2 | Status: SHIPPED | OUTPATIENT
Start: 2021-06-22 | End: 2021-07-23 | Stop reason: SDUPTHER

## 2021-06-22 RX ORDER — DIPHENOXYLATE HYDROCHLORIDE AND ATROPINE SULFATE 2.5; .025 MG/1; MG/1
1 TABLET ORAL EVERY 24 HOURS
COMMUNITY
Start: 2021-05-17 | End: 2021-07-19 | Stop reason: ALTCHOICE

## 2021-06-22 RX ORDER — VANCOMYCIN HYDROCHLORIDE 250 MG/5ML
POWDER, FOR SOLUTION ORAL
COMMUNITY
Start: 2021-06-10 | End: 2021-07-19 | Stop reason: ALTCHOICE

## 2021-06-22 RX ORDER — LANOLIN ALCOHOL/MO/W.PET/CERES
1 CREAM (GRAM) TOPICAL
COMMUNITY
Start: 2021-05-17 | End: 2021-07-19 | Stop reason: ALTCHOICE

## 2021-06-22 NOTE — PROGRESS NOTES
Subjective   Kristyn Severino is a 89 y.o. female.   You have chosen to receive care through a telehealth visit.  Do you consent to use a video/audio connection for your medical care today? Yes    History of Present Illness   Kristyn Severino 89 y.o. female presents today for Hospital follow up.  she was treated 5-11-21 to 5-15-21 for new on set a fib  .  I reviewed all of the labs and diagnostic testing and noted: Labs with potassium at 4.2 while inpatient, creatinine 1.07, sodium 135, glucose 96, hemoglobin 11.3, hematocrit 33.1, platelets 366, WBC 5.8 and labs were dated 5/15/2021.  Note on discharge summary patient was admitted for shortness of breath and found to have a new acute onset A. fib with RVR.  Cardiology was consulted and patient was placed on Cardizem and weaned to p.o. metoprolol.  Her A. fib and shortness of breath improved.  Patient is currently hemodynamically stable and will be discharged to rehab.  Patient did have CT of the abdomen and pelvis without contrast note she did have a chronic appearing T12 compression deformity and acute mild to moderate superior endplate fracture of L3 and there is no malalignment at this level.  Patient also noted to have ileocolonic anastomosis.  Noted heart was enlarged, small layering pleural effusions and accompanying atelectasis, noncirrhotic liver, gallbladder contracted, no lymph node enlargement, bladder distended, uterus present, distal colonic diverticulosis, no large or small bowel wall thickening or surrounding inflammatory changes.  proBNP was 488 on 5/11/2021.  Urine micro was negative for microscopic blood that is all the data I have to review  The patient's medications were changed:  Current outpatient and discharge medications have been reconciled for the patient.  Reviewed by: Cary Espinal PA-C    she does have a follow up appointment with a specialist:         She was inpatient at [facility]  New onset a fib  1. New onset atrial fibrillation with  intermittent RVR of unknown duration    -[id number]  2. Acute, new onset HFrEF 35% (echo 5/11/2021)    Left ventricle size is normal. Mild concentric left ventricular hypertrophy.    Diastolic filling parameters are uncertain, secondary to underlying atrial fibrillation.    Left ventricular systolic function is moderately reduced with LV EF ~ 35 %.    Beat to beat variability due to atrial fibrillation.    Normal right ventricular size and function.    Mild aortic regurgitation is noted.    Mild to Moderate mitral regurgitation is present.    3. Hyponatremia, resolved  4. Treated hypothyroidism  5. History of colon cancer s/p bowel resection,  in remission  6. Anxiety    Did see cardiology wants to keep her heart rate between 101 110 bpm.  They started her on metoprolol tartrate 37.5 mg twice daily and digoxin 250 mcg daily    I did make call to home health--JAMIL Barroso called me back    Went over med list  Not taking Dig, not taking Eliquis      Will need to see cardiologist. Missed appt today      Less diarrhea--still mucus  Having 4 BM daily daily; not green, brown; no smell, +mucus--not waking her up; soft now; not diarrhea  The following portions of the patient's history were reviewed and updated as appropriate: allergies, current medications, past family history, past medical history, past social history, past surgical history and problem list.    Review of Systems   Constitutional: Positive for activity change and appetite change. Negative for fever.   HENT: Positive for hearing loss. Negative for nosebleeds and trouble swallowing.    Eyes: Negative for visual disturbance.   Respiratory: Negative for choking and stridor.    Cardiovascular: Negative for chest pain.   Gastrointestinal: Positive for diarrhea. Negative for blood in stool.   Endocrine: Negative for polydipsia.   Genitourinary: Negative for genital sores and hematuria.   Musculoskeletal: Negative for joint swelling.   Skin: Negative for color  change and rash.   Allergic/Immunologic: Negative for immunocompromised state.   Neurological: Positive for weakness. Negative for seizures, facial asymmetry and speech difficulty.   Hematological: Negative for adenopathy.   Psychiatric/Behavioral: Negative for behavioral problems, self-injury and suicidal ideas.       Objective   Physical Exam  Neurological:      Mental Status: She is alert and oriented to person, place, and time.         Assessment/Plan   Diagnoses and all orders for this visit:    1. Atrial fibrillation, unspecified type (CMS/HCC) (Primary)  -     Ambulatory Referral to Cardiology    2. Vitamin D deficiency  -     vitamin D (ERGOCALCIFEROL) 1.25 MG (40967 UT) capsule capsule; Take 1 capsule by mouth Every 7 (Seven) Days.  Dispense: 13 capsule; Refill: 3    3. Hypothyroidism, acquired    4. Impaired fasting glucose    5. Diarrhea, unspecified type  Comments:  c diff    6. Malignant neoplasm of ascending colon (CMS/[facility])    7. Anemia of chronic illness    8. Stage 3a chronic kidney disease (CMS/[facility])    Other orders  -     potassium chloride (K-DUR,KLOR-CON) 20 MEQ CR tablet; Take 1 tablet by mouth Daily. Take only days taking Lasix  Dispense: 30 tablet; Refill: 0  -     Probiotic Product (Align) capsule; Take 1 capsule by mouth Daily.  Dispense: 30 capsule; Refill: 2        Do want her to start probiotic  She need stool for C diff  Fax orders to [facility];  [phone]  I talked to Kya---  I have spent 50 minutes on this patient today--I have called her daughter Kya, I have called her home health nurse, I have made orders and I will fax it to home health, need to check her stool to see if she still has C. difficile before I put her on additional vancomycin  Daughter Kya ask for cardiac appointment with [facility] and I will ask for an urgent appointment  Made sure patient and daughter both are aware that not taking the Eliquis in rate control medicines of the digoxin and metoprolol can  cause stroke and death--- they understood and will contact pharmacy about obtaining meds.  I will order labs as well as stool studies

## 2021-06-22 NOTE — PATIENT INSTRUCTIONS
Fall Prevention in the Home, Adult  Falls can cause injuries and can affect people from all age groups. There are many simple things that you can do to make your home safe and to help prevent falls. Ask for help when making these changes, if needed.  What actions can I take to prevent falls?  General instructions  · Use good lighting in all rooms. Replace any light bulbs that burn out.  · Turn on lights if it is dark. Use night-lights.  · Place frequently used items in easy-to-reach places. Lower the shelves around your home if necessary.  · Set up furniture so that there are clear paths around it. Avoid moving your furniture around.  · Remove throw rugs and other tripping hazards from the floor.  · Avoid walking on wet floors.  · Fix any uneven floor surfaces.  · Add color or contrast paint or tape to grab bars and handrails in your home. Place contrasting color strips on the first and last steps of stairways.  · When you use a stepladder, make sure that it is completely opened and that the sides are firmly locked. Have someone hold the ladder while you are using it. Do not climb a closed stepladder.  · Be aware of any and all pets.  What can I do in the bathroom?         · Keep the floor dry. Immediately clean up any water that spills onto the floor.  · Remove soap buildup in the tub or shower on a regular basis.  · Use non-skid mats or decals on the floor of the tub or shower.  · Attach bath mats securely with double-sided, non-slip rug tape.  · If you need to sit down while you are in the shower, use a plastic, non-slip stool.  · Install grab bars by the toilet and in the tub and shower. Do not use towel bars as grab bars.  What can I do in the bedroom?  · Make sure that a bedside light is easy to reach.  · Do not use oversized bedding that drapes onto the floor.  · Have a firm chair that has side arms to use for getting dressed.  What can I do in the kitchen?  · Clean up any spills right away.  · If you need to  reach for something above you, use a sturdy step stool that has a grab bar.  · Keep electrical cables out of the way.  · Do not use floor polish or wax that makes floors slippery. If you must use wax, make sure that it is non-skid floor wax.  What can I do in the stairways?  · Do not leave any items on the stairs.  · Make sure that you have a light switch at the top of the stairs and the bottom of the stairs. Have them installed if you do not have them.  · Make sure that there are handrails on both sides of the stairs. Fix handrails that are broken or loose. Make sure that handrails are as long as the stairways.  · Install non-slip stair treads on all stairs in your home.  · Avoid having throw rugs at the top or bottom of stairways, or secure the rugs with carpet tape to prevent them from moving.  · Choose a carpet design that does not hide the edge of steps on the stairway.  · Check any carpeting to make sure that it is firmly attached to the stairs. Fix any carpet that is loose or worn.  What can I do on the outside of my home?  · Use bright outdoor lighting.  · Regularly repair the edges of walkways and driveways and fix any cracks.  · Remove high doorway thresholds.  · Trim any shrubbery on the main path into your home.  · Regularly check that handrails are securely fastened and in good repair. Both sides of any steps should have handrails.  · Install guardrails along the edges of any raised decks or porches.  · Clear walkways of debris and clutter, including tools and rocks.  · Have leaves, snow, and ice cleared regularly.  · Use sand or salt on walkways during winter months.  · In the garage, clean up any spills right away, including grease or oil spills.  What other actions can I take?  · Wear closed-toe shoes that fit well and support your feet. Wear shoes that have rubber soles or low heels.  · Use mobility aids as needed, such as canes, walkers, scooters, and crutches.  · Review your medicines with your  health care provider. Some medicines can cause dizziness or changes in blood pressure, which increase your risk of falling.  Talk with your health care provider about other ways that you can decrease your risk of falls. This may include working with a physical therapist or  to improve your strength, balance, and endurance.  Where to find more information  · Centers for Disease Control and Prevention, [place]: https://www.cdc.gov  · National [place] [employer] Aging: https://td0dkyt.all.nih.gov  Contact a health care provider if:  · You are afraid of falling at home.  · You feel weak, drowsy, or dizzy at home.  · You fall at home.  Summary  · There are many simple things that you can do to make your home safe and to help prevent falls.  · Ways to make your home safe include removing tripping hazards and installing grab bars in the bathroom.  · Ask for help when making these changes in your home.  This information is not intended to replace advice given to you by your health care provider. Make sure you discuss any questions you have with your health care provider.  Document Revised: 11/30/2018 Document Reviewed: 08/02/2018  [employer] Patient Education © 2021 [employer]      Atrial Fibrillation    Atrial fibrillation is a type of heartbeat that is irregular or fast. If you have this condition, your heart beats without any order. This makes it hard for your heart to pump blood in a normal way.  Atrial fibrillation may come and go, or it may become a long-lasting problem. If this condition is not treated, it can put you at higher risk for stroke, heart failure, and other heart problems.  What are the causes?  This condition may be caused by diseases that damage the heart. They include:  · High blood pressure.  · Heart failure.  · Heart valve disease.  · Heart surgery.  Other causes include:  · Diabetes.  · Thyroid disease.  · Being overweight.  · Kidney disease.  Sometimes the cause is not known.  What increases the  risk?  You are more likely to develop this condition if:  · You are older.  · You smoke.  · You exercise often and very hard.  · You have a family history of this condition.  · You are a man.  · You use drugs.  · You drink a lot of alcohol.  · You have lung conditions, such as emphysema, pneumonia, or COPD.  · You have sleep apnea.  What are the signs or symptoms?  Common symptoms of this condition include:  · A feeling that your heart is beating very fast.  · Chest pain or discomfort.  · Feeling short of breath.  · Suddenly feeling light-headed or weak.  · Getting tired easily during activity.  · Fainting.  · Sweating.  In some cases, there are no symptoms.  How is this treated?  Treatment for this condition depends on underlying conditions and how you feel when you have atrial fibrillation. They include:  · Medicines to:  ? Prevent blood clots.  ? Treat heart rate or heart rhythm problems.  · Using devices, such as a pacemaker, to correct heart rhythm problems.  · Doing surgery to remove the part of the heart that sends bad signals.  · Closing an area where clots can form in the heart (left atrial appendage).  In some cases, your doctor will treat other underlying conditions.  Follow these instructions at home:  Medicines  · Take over-the-counter and prescription medicines only as told by your doctor.  · Do not take any new medicines without first talking to your doctor.  · If you are taking blood thinners:  ? Talk with your doctor before you take any medicines that have aspirin or NSAIDs, such as ibuprofen, in them.  ? Take your medicine exactly as told by your doctor. Take it at the same time each day.  ? Avoid activities that could hurt or bruise you. Follow instructions about how to prevent falls.  ? Wear a bracelet that says you are taking blood thinners. Or, carry a card that lists what medicines you take.  Lifestyle         · Do not use any products that have nicotine or tobacco in them. These include  "cigarettes, e-cigarettes, and chewing tobacco. If you need help quitting, ask your doctor.  · Eat heart-healthy foods. Talk with your doctor about the right eating plan for you.  · Exercise regularly as told by your doctor.  · Do not drink alcohol.  · Lose weight if you are overweight.  · Do not use drugs, including cannabis.  General instructions  · If you have a condition that causes breathing to stop for a short period of time (apnea), treat it as told by your doctor.  · Keep a healthy weight. Do not use diet pills unless your doctor says they are safe for you. Diet pills may make heart problems worse.  · Keep all follow-up visits as told by your doctor. This is important.  Contact a doctor if:  · You notice a change in the speed, rhythm, or strength of your heartbeat.  · You are taking a blood-thinning medicine and you get more bruising.  · You get tired more easily when you move or exercise.  · You have a sudden change in weight.  Get help right away if:    · You have pain in your chest or your belly (abdomen).  · You have trouble breathing.  · You have side effects of blood thinners, such as blood in your vomit, poop (stool), or pee (urine), or bleeding that cannot stop.  · You have any signs of a stroke. \"BE FAST\" is an easy way to remember the main warning signs:  ? B - Balance. Signs are dizziness, sudden trouble walking, or loss of balance.  ? E - Eyes. Signs are trouble seeing or a change in how you see.  ? F - Face. Signs are sudden weakness or loss of feeling in the face, or the face or eyelid drooping on one side.  ? A - Arms. Signs are weakness or loss of feeling in an arm. This happens suddenly and usually on one side of the body.  ? S - Speech. Signs are sudden trouble speaking, slurred speech, or trouble understanding what people say.  ? T - Time. Time to call emergency services. Write down what time symptoms started.  · You have other signs of a stroke, such as:  ? A sudden, very bad headache with " no known cause.  ? Feeling like you may vomit (nausea).  ? Vomiting.  ? A seizure.  These symptoms may be an emergency. Do not wait to see if the symptoms will go away. Get medical help right away. Call your local emergency services (911 in the [place] Do not drive yourself to the hospital.  Summary  · Atrial fibrillation is a type of heartbeat that is irregular or fast.  · You are at higher risk of this condition if you smoke, are older, have diabetes, or are overweight.  · Follow your doctor's instructions about medicines, diet, exercise, and follow-up visits.  · Get help right away if you have signs or symptoms of a stroke.  · Get help right away if you cannot catch your breath, or you have chest pain or discomfort.  This information is not intended to replace advice given to you by your health care provider. Make sure you discuss any questions you have with your health care provider.  Document Revised: 06/10/2020 Document Reviewed: 06/10/2020  Elsevier Patient Education © 2021 [employer]

## 2021-06-24 ENCOUNTER — TELEPHONE (OUTPATIENT)
Dept: FAMILY MEDICINE CLINIC | Facility: CLINIC | Age: 86
End: 2021-06-24

## 2021-06-24 NOTE — TELEPHONE ENCOUNTER
MARINA WITH [facility]  CALLED IN REGARDS TO PATIENT WHICH CALLED HER FOR DIARRHEA AND SHE HAS C-DIFF..    PLEASE CALL [phone]    MARINA IS WANTING TO KNOW WHO HER GI DRDeven IS .   SHE COULD REFER HER TO HER GI DR[facility]even

## 2021-07-01 ENCOUNTER — TELEPHONE (OUTPATIENT)
Dept: FAMILY MEDICINE CLINIC | Facility: CLINIC | Age: 86
End: 2021-07-01

## 2021-07-01 NOTE — TELEPHONE ENCOUNTER
I want her to have stool studies to evaluate the diarrhea and had orders faxed to home health to obtain

## 2021-07-01 NOTE — TELEPHONE ENCOUNTER
Pt states she has been having a lot of diarrhea.  She is wanting to know if she needs to take anything for it.  She states it is getting worse.  Please advise.  Thanks

## 2021-07-07 ENCOUNTER — TELEPHONE (OUTPATIENT)
Dept: FAMILY MEDICINE CLINIC | Facility: CLINIC | Age: 86
End: 2021-07-07

## 2021-07-08 ENCOUNTER — TELEPHONE (OUTPATIENT)
Dept: FAMILY MEDICINE CLINIC | Facility: CLINIC | Age: 86
End: 2021-07-08

## 2021-07-08 NOTE — TELEPHONE ENCOUNTER
PATIENT CALLED STATING THAT SHE HAS HAND RESTLESS LEGS FOR THE LAST TWO MONTHS AND SHE WOULD LIKE TO SEE IF [facility] ROSALES COULD GIVE HER A MEDICATION TO TRY AND HELP STOP THE SYMPTOMS.     THE PATIENT STATED THAT SHE HASN'T BEEN ABLE TO SLEEP WELL DUE TO THIS, AND IT'S STARTING TO TAKE A TOLL ON HER.    PLEASE ADVISE  [phone]     [facility] - [place], [place] - [address] [facility] - [phone]  - [phone]   [phone]

## 2021-07-14 ENCOUNTER — OFFICE VISIT (OUTPATIENT)
Dept: GASTROENTEROLOGY | Facility: CLINIC | Age: 86
End: 2021-07-14

## 2021-07-14 VITALS — TEMPERATURE: 97.5 F | HEIGHT: 65 IN | BODY MASS INDEX: 23.16 KG/M2 | WEIGHT: 139 LBS

## 2021-07-14 DIAGNOSIS — A09 DIARRHEA OF INFECTIOUS ORIGIN: Primary | ICD-10-CM

## 2021-07-14 DIAGNOSIS — A04.72 COLITIS, CLOSTRIDIUM DIFFICILE: ICD-10-CM

## 2021-07-14 PROCEDURE — 99213 OFFICE O/P EST LOW 20 MIN: CPT | Performed by: INTERNAL MEDICINE

## 2021-07-14 RX ORDER — LEVOTHYROXINE SODIUM 0.07 MG/1
75 TABLET ORAL DAILY
Qty: 90 TABLET | Refills: 3 | Status: SHIPPED | OUTPATIENT
Start: 2021-07-14

## 2021-07-14 RX ORDER — METOPROLOL TARTRATE 75 MG/1
37.5 TABLET, FILM COATED ORAL DAILY
Qty: 15 TABLET | Refills: 0 | Status: SHIPPED | OUTPATIENT
Start: 2021-07-14 | End: 2021-10-11 | Stop reason: HOSPADM

## 2021-07-14 NOTE — TELEPHONE ENCOUNTER
Caller: NURSE     Relationship: Other    Best call back number: [phone]      Medication needed:   Requested Prescriptions     Pending Prescriptions Disp Refills   • Metoprolol Tartrate 75 MG tablet 15 tablet 2     Sig: Take 37.5 mg by mouth Daily. Pt will take 1/2 tablet daily.  Thaks       When do you need the refill by: WITHIN THE NEXT 4 DAYS    What additional details did the patient provide when requesting the medication: PATIENT HAS 4 TABLETS LEFT     Does the patient have less than a 3 day supply:  [] Yes  [x] No    What is the patient's preferred pharmacy:  [facility] - [place], [place] - [address] [place] [address] - [phone]  - [phone] FX

## 2021-07-14 NOTE — PROGRESS NOTES
Chief Complaint   Patient presents with   • Diarrhea     Subjective     HPI  Kristyn Severino is a 89 y.o. female who presents today for office follow up.     Kristyn had an episode of C. difficile in June of this year which was treated with oral vancomycin.  This was probably precipitated by a round of antibiotics for a urinary tract infection preceding her C. difficile.  She had a somewhat slow recovery with some residual symptoms for a period of time after completing her vancomycin but today tells me that she is really doing quite well.  She is taking Imodium once a day and on this regimen is having 1 soft but formed bowel movement per day.  She has no abdominal pain.  Appetite is overall appropriate for her age.  Follow up C diff study from last week was negative.     Objective   Vitals:    07/14/21 0956   Temp: 97.5 °F (36.4 °C)       Physical Exam  Vitals reviewed.   Constitutional:       Appearance: She is well-developed.   Cardiovascular:      Rate and Rhythm: Normal rate and regular rhythm.      Heart sounds: Normal heart sounds.   Pulmonary:      Effort: Pulmonary effort is normal. No respiratory distress.      Breath sounds: Normal breath sounds. No wheezing.   Abdominal:      General: Bowel sounds are normal.      Palpations: Abdomen is soft.   Musculoskeletal:      Cervical back: Normal range of motion and neck supple.   Skin:     General: Skin is warm and dry.   Neurological:      Mental Status: She is alert and oriented to person, place, and time.   Psychiatric:         Mood and Affect: Mood and affect normal.         Behavior: Behavior normal.         Thought Content: Thought content normal.         Cognition and Memory: Memory normal.         Judgment: Judgment normal.                Assessment/Plan   Assessment:     1. Diarrhea of infectious origin    2. Colitis, Clostridium difficile      Plan:   Kristyn presents today for follow-up she is recovering from a recent episode of C. difficile but seems  to be doing quite well currently.  A recent stool test for C. difficile is returned negative.  She can continue with once a day Imodium.  Given her age I would not recommend endoscopic evaluation. She will f/u again as needed.             Henry Maurer M.D.  [facility]  [address] [place] [address]  Office: [phone]

## 2021-07-14 NOTE — TELEPHONE ENCOUNTER
Caller: Kristyn Severino    Relationship: Self    Best call back number: [phone]    Medication needed:   Requested Prescriptions     Pending Prescriptions Disp Refills   • Metoprolol Tartrate 75 MG tablet 15 tablet 2     Sig: Take 37.5 mg by mouth Daily. Pt will take 1/2 tablet daily.  Thaks   • levothyroxine (SYNTHROID, LEVOTHROID) 75 MCG tablet 90 tablet 3     Sig: Take 1 tablet by mouth Daily. For thyroid       When do you need the refill by: ASAP    Does the patient have less than a 3 day supply:  [x] Yes  [] No    What is the patient's preferred pharmacy: [facility] - [place], [place] - [address] - [phone]  - [phone] FX

## 2021-07-19 ENCOUNTER — TELEPHONE (OUTPATIENT)
Dept: CARDIOLOGY | Facility: CLINIC | Age: 86
End: 2021-07-19

## 2021-07-19 ENCOUNTER — OFFICE VISIT (OUTPATIENT)
Dept: CARDIOLOGY | Facility: CLINIC | Age: 86
End: 2021-07-19

## 2021-07-19 VITALS
DIASTOLIC BLOOD PRESSURE: 78 MMHG | SYSTOLIC BLOOD PRESSURE: 124 MMHG | HEART RATE: 114 BPM | BODY MASS INDEX: 23.32 KG/M2 | HEIGHT: 65 IN | WEIGHT: 140 LBS

## 2021-07-19 DIAGNOSIS — I48.19 PERSISTENT ATRIAL FIBRILLATION (HCC): Primary | ICD-10-CM

## 2021-07-19 PROCEDURE — 99204 OFFICE O/P NEW MOD 45 MIN: CPT | Performed by: INTERNAL MEDICINE

## 2021-07-19 PROCEDURE — 93000 ELECTROCARDIOGRAM COMPLETE: CPT | Performed by: INTERNAL MEDICINE

## 2021-07-19 RX ORDER — POTASSIUM CHLORIDE 750 MG/1
10 TABLET, FILM COATED, EXTENDED RELEASE ORAL DAILY
Qty: 30 TABLET | Refills: 11 | Status: SHIPPED | OUTPATIENT
Start: 2021-07-19 | End: 2021-10-11 | Stop reason: HOSPADM

## 2021-07-19 RX ORDER — FUROSEMIDE 40 MG/1
40 TABLET ORAL DAILY
Qty: 30 TABLET | Refills: 11 | Status: SHIPPED | OUTPATIENT
Start: 2021-07-19 | End: 2021-10-11 | Stop reason: HOSPADM

## 2021-07-19 NOTE — TELEPHONE ENCOUNTER
Pt's daughter called bk to let you know her mom does need you to send in a prescription to [facility].    Thanks,  Shanda

## 2021-07-20 NOTE — PROGRESS NOTES
"      CARDIOLOGY    Viktor Melgar MD    ENCOUNTER DATE:  07/19/2021    Kristyn Severino / 89 y.o. / female        CHIEF COMPLAINT / REASON FOR OFFICE VISIT     Atrial Fibrillation      HISTORY OF PRESENT ILLNESS       HPI  Kristyn Severino is a 89 y.o. female who presents today for evaluation of new onset atrial fibrillation.  Patient was found to have a fast heart rate and ultimately was diagnosed with atrial fibrillation.  He was placed on appropriate medications and was referred here for further evaluation/treatment.  She was started on the lower dose of Eliquis which she has been tolerating well.  She initially was very short of breath with the atrial fibrillation but with treatment she said she felt much better until several days ago when she started getting short of breath again.      The following portions of the patient's history were reviewed and updated as appropriate: allergies, current medications, past family history, past medical history, past social history, past surgical history and problem list.      VITAL SIGNS     Visit Vitals  /78 (BP Location: Left arm)   Pulse 114   Ht 163.8 cm (64.5\")   Wt 63.5 kg (140 lb)   LMP  (LMP Unknown)   BMI 23.66 kg/m²         Wt Readings from Last 3 Encounters:   07/19/21 63.5 kg (140 lb)   07/14/21 63 kg (139 lb)   12/15/20 65.5 kg (144 lb 4.8 oz)     Body mass index is 23.66 kg/m².      REVIEW OF SYSTEMS   Review of Systems   All other systems reviewed and are negative.          PHYSICAL EXAMINATION     Vitals reviewed.   Constitutional:       Appearance: Healthy appearance.   Pulmonary:      Effort: Pulmonary effort is normal.      Breath sounds: Normal breath sounds.   Cardiovascular:      PMI at left midclavicular line. Normal rate. Irregularly irregular rhythm. Normal S1. Normal S2.      Murmurs: There is no murmur.      No gallop. No click. No rub.   Pulses:     Intact distal pulses.   Edema:     Thigh: bilateral 1+ edema of the thigh.     " Pretibial: bilateral 1+ edema of the pretibial area.     Ankle: bilateral 1+ edema of the ankle.     Feet: bilateral 1+ edema of the feet.  Neurological:      Mental Status: Alert.           REVIEWED DATA       ECG 12 Lead    Date/Time: 7/19/2021 9:30 AM  Performed by: Viktor Melgar MD  Authorized by: Viktor Melgar MD   Comparison: compared with previous ECG from 5/30/2017  Similar to previous ECG  Rhythm: atrial fibrillation    Clinical impression: abnormal EKG            Cardiac Procedures:  1.     Lipid Panel    Lipid Panel 10/26/20   Total Cholesterol 217 (A)   Triglycerides 124   HDL Cholesterol 70 (A)   VLDL Cholesterol 22   LDL Cholesterol  125 (A)   (A) Abnormal value                ASSESSMENT & PLAN      Diagnosis Plan   1. Persistent atrial fibrillation (CMS/HCC)  Holter Monitor - 24 Hour         SUMMARY/DISCUSSION  1. New onset/newly diagnosed atrial fibrillation.  Patient was initially treated medication and did well.  She said the past several days however she noted to have more swelling was a bit more short of breath.  One of the things that she was on before was Lasix which was not on her list currently.  Her daughter ultimately went home with her and do the delay in this dictation without that she was not on it this is probably why she started swelling again.  She is placed back on Lasix and potassium and will reevaluate her.  The other concern is her heart rate was elevated today running at 114.  I also placed a Holter monitor on her to see what her heart rates are ranging.  If they are greater than 100 on the average obviously we will have to control her better.  2. Heart failure probably secondary to atrial fibrillation again I put her back on her Lasix we will see how she responds.  3. DO NOT RESUSCITATE was noted.  4. Discussed risk of anticoagulation explained to her that.  Patient actually is very steady and denies falling.  I did review again and stressed the importance and  benefits of anticoagulation versus the risks.  5. Patient to follow-up in 6 weeks        MEDICATIONS         Discharge Medications          Accurate as of July 19, 2021 11:59 PM. If you have any questions, ask your nurse or doctor.            New Medications      Instructions Start Date   furosemide 40 MG tablet  Commonly known as: LASIX  Started by: Viktor Melgar MD   40 mg, Oral, Daily      potassium chloride 10 MEQ CR tablet  Started by: Viktor Melgar MD   10 mEq, Oral, Daily         Continue These Medications      Instructions Start Date   Align capsule   1 capsule, Oral, Daily      apixaban 2.5 MG tablet tablet  Commonly known as: ELIQUIS   2.5 mg, Oral, Every 12 Hours Scheduled      aspirin 81 MG EC tablet   81 mg, Oral, Daily      digoxin 125 MCG tablet  Commonly known as: Lanoxin   125 mcg, Oral, Daily Digoxin      glucosamine-chondroitin 500-400 MG capsule capsule   Oral, 3 Times Daily With Meals      levothyroxine 75 MCG tablet  Commonly known as: SYNTHROID, LEVOTHROID   75 mcg, Oral, Daily, For thyroid      Metoprolol Tartrate 75 MG tablet   37.5 mg, Oral, Daily, Pt will take 1/2 tablet daily.  [place]      multivitamin with minerals tablet tablet  Generic drug: multivitamin with minerals   1 tablet, Oral, Daily      potassium chloride 20 MEQ CR tablet  Commonly known as: K-DUR,KLOR-CON   20 mEq, Oral, Daily, Take only days taking Lasix      SUPER B COMPLEX PO   Oral      vitamin D 1.25 MG (20183 UT) capsule capsule  Commonly known as: ERGOCALCIFEROL   50,000 Units, Oral, Every 7 Days                 **Dragon Disclaimer:   Much of this encounter note is an electronic transcription/translation of spoken language to printed text. The electronic translation of spoken language may permit erroneous, or at times, nonsensical words or phrases to be inadvertently transcribed. Although I have reviewed the note for such errors, some may still exist.

## 2021-07-21 ENCOUNTER — TELEPHONE (OUTPATIENT)
Dept: FAMILY MEDICINE CLINIC | Facility: CLINIC | Age: 86
End: 2021-07-21

## 2021-07-21 NOTE — TELEPHONE ENCOUNTER
Caller: MARINA    Relationship: Other    Best call back number: [phone]    What is the best time to reach you: ANY TIME    Who are you requesting to speak with (clinical staff, provider,  specific staff member): CLINICAL - RILEY ROSALES    What was the call regarding: MARINA FROM [facility] CALLED REGARDING THE PATIENT. SHE WANTED TO LET RILEY ROSALES KNOW THAT THE PATIENT IS NOT HAVING ANY DIARRHEA AND HAS BEEN TAKING LOMOTIL. MARINA NOTED THAT TOMORROW THE PATIENT IS HAVING A HOLTER MONITOR AT THE CARDIOLOGIST. PATIENT WANTS TO KNOW IF SHE NEEDS TO KEEP TAKING THE B COMPLEX VITAMINS. MARINA ALSO NOTED THAT HER HEARTRATE WAS  AND SHE HASN'T TAKEN HER ZIGOXIN YET.    PLEASE CALL AND ADVISE MARINA

## 2021-07-21 NOTE — TELEPHONE ENCOUNTER
B complex vitamins are fine and she needs to follow-up with cardiology in regards to her heart rate

## 2021-07-23 ENCOUNTER — OFFICE VISIT (OUTPATIENT)
Dept: FAMILY MEDICINE CLINIC | Facility: CLINIC | Age: 86
End: 2021-07-23

## 2021-07-23 DIAGNOSIS — E78.2 MIXED HYPERLIPIDEMIA: ICD-10-CM

## 2021-07-23 DIAGNOSIS — E03.8 HYPOTHYROIDISM DUE TO HASHIMOTO'S THYROIDITIS: Primary | ICD-10-CM

## 2021-07-23 DIAGNOSIS — N18.31 STAGE 3A CHRONIC KIDNEY DISEASE (HCC): ICD-10-CM

## 2021-07-23 DIAGNOSIS — E06.3 HYPOTHYROIDISM DUE TO HASHIMOTO'S THYROIDITIS: Primary | ICD-10-CM

## 2021-07-23 DIAGNOSIS — I48.19 OTHER PERSISTENT ATRIAL FIBRILLATION (HCC): ICD-10-CM

## 2021-07-23 DIAGNOSIS — D63.8 ANEMIA OF CHRONIC ILLNESS: ICD-10-CM

## 2021-07-23 DIAGNOSIS — G25.81 RLS (RESTLESS LEGS SYNDROME): ICD-10-CM

## 2021-07-23 DIAGNOSIS — E55.9 VITAMIN D DEFICIENCY: ICD-10-CM

## 2021-07-23 PROCEDURE — 99443 PR PHYS/QHP TELEPHONE EVALUATION 21-30 MIN: CPT | Performed by: PHYSICIAN ASSISTANT

## 2021-07-23 RX ORDER — DIGOXIN 125 MCG
125 TABLET ORAL
Qty: 30 TABLET | Refills: 11 | Status: SHIPPED | OUTPATIENT
Start: 2021-07-23

## 2021-07-23 RX ORDER — ERGOCALCIFEROL 1.25 MG/1
50000 CAPSULE ORAL
Qty: 13 CAPSULE | Refills: 3 | Status: SHIPPED | OUTPATIENT
Start: 2021-07-23

## 2021-07-23 RX ORDER — ROPINIROLE 0.5 MG/1
TABLET, FILM COATED ORAL
Qty: 30 TABLET | Refills: 5 | Status: SHIPPED | OUTPATIENT
Start: 2021-07-23

## 2021-07-23 NOTE — PROGRESS NOTES
Subjective   Kristyn Severino is a 89 y.o. female.   You have chosen to receive care through a telehealth visit.  Do you consent to use a video/audio connection for your medical care today? Yes    History of Present Illness    Since the last visit, she has overall felt better.  She has Hyperlipidemia and working on this with diet and exercise, Hypothyroidism and must update labs to continue treatment and Vitamin D deficiency and will update labs for continued management.  she has been compliant with current medications have reviewed them.  The patient denies medication side effects.  Will refill medications. LMP  (LMP Unknown)   Intol to statins    Results for orders placed or performed during the hospital encounter of 07/22/21   Holter Monitor - 24 Hour   Result Value Ref Range    Target HR (85%) 105 bpm    Max. Pred. HR (100%) 124 bpm     BMI Readings from Last 1 Encounters:   07/19/21 23.66 kg/m²         She did f/u with cardio---saw Dr Melgar for her persistent A. fib diagnoses and did note some congestive heart failure secondary to her A. fib and i checking a Holter monitor and has her on furosemide with potassium.  I do want to make sure we follow-up on all labs and include renal functions and potassium along with a dig level.  She reports that the furosemide is worked very well for her edema and she is down 3 pounds from the loss of fluid.  She overall feels okay and she is taken Imodium daily and not having diarrhea and very happy with this.  Recently saw GI doc and did okay this. due to restless legs diagnosis we will also check ferritin.  She feels her legs need to move during sleep;  She reports this in last few mos.     The following portions of the patient's history were reviewed and updated as appropriate: allergies, current medications, past family history, past medical history, past social history, past surgical history and problem list.    Review of Systems   Constitutional: Negative for activity  change, appetite change and unexpected weight change.   HENT: Positive for hearing loss. Negative for nosebleeds and trouble swallowing.    Eyes: Negative for pain and visual disturbance.   Respiratory: Negative for chest tightness, shortness of breath and wheezing.    Cardiovascular: Negative for chest pain and palpitations.   Gastrointestinal: Negative for abdominal pain and blood in stool.   Endocrine: Negative.    Genitourinary: Negative for difficulty urinating and hematuria.   Musculoskeletal: Negative for joint swelling.   Skin: Negative for color change and rash.   Allergic/Immunologic: Negative.    Neurological: Negative for syncope and speech difficulty.   Hematological: Negative for adenopathy.   Psychiatric/Behavioral: Negative for agitation, confusion and dysphoric mood. The patient is not nervous/anxious.    All other systems reviewed and are negative.      Objective   Physical Exam  Pulmonary:      Breath sounds: No stridor.   Neurological:      Mental Status: She is alert and oriented to person, place, and time.   Psychiatric:         Mood and Affect: Mood normal.         Behavior: Behavior normal.         Thought Content: Thought content normal.         Judgment: Judgment normal.         Assessment/Plan   Diagnoses and all orders for this visit:    1. Hypothyroidism due to Hashimoto's thyroiditis (Primary)  -     Digoxin level  -     Comprehensive metabolic panel  -     Lipid panel  -     CBC and Differential  -     TSH  -     Vitamin B12  -     Folate  -     Vitamin D 25 Hydroxy  -     T3, Free  -     T4, Free  -     Magnesium  -     Ferritin  -     Iron Profile    2. Anemia of chronic illness  -     Digoxin level  -     Comprehensive metabolic panel  -     Lipid panel  -     CBC and Differential  -     TSH  -     Vitamin B12  -     Folate  -     Vitamin D 25 Hydroxy  -     T3, Free  -     T4, Free  -     Magnesium  -     Ferritin  -     Iron Profile    3. Mixed hyperlipidemia  -     Digoxin  level  -     Comprehensive metabolic panel  -     Lipid panel  -     CBC and Differential  -     TSH  -     Vitamin B12  -     Folate  -     Vitamin D 25 Hydroxy  -     T3, Free  -     T4, Free  -     Magnesium  -     Ferritin  -     Iron Profile    4. Vitamin D deficiency  -     Digoxin level  -     Comprehensive metabolic panel  -     Lipid panel  -     CBC and Differential  -     TSH  -     Vitamin B12  -     Folate  -     Vitamin D 25 Hydroxy  -     T3, Free  -     T4, Free  -     Magnesium  -     Ferritin  -     Iron Profile    5. Stage 3a chronic kidney disease (CMS/HCC)  -     Digoxin level  -     Comprehensive metabolic panel  -     Lipid panel  -     CBC and Differential  -     TSH  -     Vitamin B12  -     Folate  -     Vitamin D 25 Hydroxy  -     T3, Free  -     T4, Free  -     Magnesium  -     Ferritin  -     Iron Profile    6. Other persistent atrial fibrillation (CMS/HCC)  Comments:  and CHF secondary to a fib   Orders:  -     Digoxin level  -     Comprehensive metabolic panel  -     Lipid panel  -     CBC and Differential  -     TSH  -     Vitamin B12  -     Folate  -     Vitamin D 25 Hydroxy  -     T3, Free  -     T4, Free  -     Magnesium  -     Ferritin  -     Iron Profile    7. RLS (restless legs syndrome)  -     Digoxin level  -     Comprehensive metabolic panel  -     Lipid panel  -     CBC and Differential  -     TSH  -     Vitamin B12  -     Folate  -     Vitamin D 25 Hydroxy  -     T3, Free  -     T4, Free  -     Magnesium  -     Ferritin  -     Iron Profile      Will start Requip--may need to increase dose over time.  And will also check ferritin and iron due to restless leg diagnosis  Plan, Kristyn Severino, was seen today.  she was seen for Hyperlipidemia and will work on this with diet and exercise, Atrial Fibrillation and remains on medication for treatment, Atrial Fibrillation and remains under the care of their cardiologist for medical management, Hypothyroidism and will need to update  labs for continued treatment and Vitamin D deficiency and will update labs .    Mail her lab orders  Also ok cont daily imodium  F/u cardio for a fib      Time 24 min

## 2021-07-23 NOTE — PATIENT INSTRUCTIONS
Restless Legs Syndrome  Restless legs syndrome is a condition that causes uncomfortable feelings or sensations in the legs, especially while sitting or lying down. The sensations usually cause an overwhelming urge to move the legs. The arms can also sometimes be affected.  The condition can range from mild to severe. The symptoms often interfere with a person's ability to sleep.  What are the causes?  The cause of this condition is not known.  What increases the risk?  The following factors may make you more likely to develop this condition:  · Being older than 50.  · Pregnancy.  · Being a woman. In general, the condition is more common in women than in men.  · A family history of the condition.  · Having iron deficiency.  · Overuse of caffeine, nicotine, or alcohol.  · Certain medical conditions, such as kidney disease, Parkinson's disease, or nerve damage.  · Certain medicines, such as those for high blood pressure, nausea, colds, allergies, depression, and some heart conditions.  What are the signs or symptoms?  The main symptom of this condition is uncomfortable sensations in the legs, such as:  · Pulling.  · Tingling.  · Prickling.  · Throbbing.  · Crawling.  · Burning.  Usually, the sensations:  · Affect both sides of the body.  · Are worse when you sit or lie down.  · Are worse at night. These may wake you up or make it difficult to fall asleep.  · Make you have a strong urge to move your legs.  · Are temporarily relieved by moving your legs.  The arms can also be affected, but this is rare. People who have this condition often have tiredness during the day because of their lack of sleep at night.  How is this diagnosed?  This condition may be diagnosed based on:  · Your symptoms.  · Blood tests.  In some cases, you may be monitored in a sleep lab by a specialist (a sleep study). This can detect any disruptions in your sleep.  How is this treated?  This condition is treated by managing the symptoms. This may  include:  · Lifestyle changes, such as exercising, using relaxation techniques, and avoiding caffeine, alcohol, or tobacco.  · Medicines. Anti-seizure medicines may be tried first.  Follow these instructions at home:  General instructions  · Take over-the-counter and prescription medicines only as told by your health care provider.  · Use methods to help relieve the uncomfortable sensations, such as:  ? Massaging your legs.  ? Walking or stretching.  ? Taking a cold or hot bath.  · Keep all follow-up visits as told by your health care provider. This is important.  Lifestyle         · Practice good sleep habits. For example, go to bed and get up at the same time every day. Most adults should get 7-9 hours of sleep each night.  · Exercise regularly. Try to get at least 30 minutes of exercise most days of the week.  · Practice ways of relaxing, such as yoga or meditation.  · Avoid caffeine and alcohol.  · Do not use any products that contain nicotine or tobacco, such as cigarettes and e-cigarettes. If you need help quitting, ask your health care provider.  Contact a health care provider if:  · Your symptoms get worse or they do not improve with treatment.  Summary  · Restless legs syndrome is a condition that causes uncomfortable feelings or sensations in the legs, especially while sitting or lying down.  · The symptoms often interfere with a person's ability to sleep.  · This condition is treated by managing the symptoms. You may need to make lifestyle changes or take medicines.  This information is not intended to replace advice given to you by your health care provider. Make sure you discuss any questions you have with your health care provider.  Document Revised: 02/05/2021 Document Reviewed: 01/07/2019  Elsevier Patient Education © 2021 [employer]

## 2021-07-29 ENCOUNTER — TELEPHONE (OUTPATIENT)
Dept: CARDIOLOGY | Facility: CLINIC | Age: 86
End: 2021-07-29

## 2021-07-29 NOTE — TELEPHONE ENCOUNTER
The patient is calling for the results of her monitor. They have requested the results to be mailed as well.     The results have been mailed.

## 2021-07-30 ENCOUNTER — TELEPHONE (OUTPATIENT)
Dept: CARDIOLOGY | Facility: CLINIC | Age: 86
End: 2021-07-30

## 2021-08-04 LAB
25(OH)D3+25(OH)D2 SERPL-MCNC: 73.1 NG/ML (ref 30–100)
ALBUMIN SERPL-MCNC: 4.3 G/DL (ref 3.5–5.2)
ALBUMIN/GLOB SERPL: 1.4 G/DL
ALP SERPL-CCNC: 58 U/L (ref 39–117)
ALT SERPL-CCNC: 14 U/L (ref 1–33)
AST SERPL-CCNC: 29 U/L (ref 1–32)
BASOPHILS # BLD AUTO: 0.07 10*3/MM3 (ref 0–0.2)
BASOPHILS NFR BLD AUTO: 1 % (ref 0–1.5)
BILIRUB SERPL-MCNC: 0.7 MG/DL (ref 0–1.2)
BUN SERPL-MCNC: 27 MG/DL (ref 8–23)
BUN/CREAT SERPL: 24.1 (ref 7–25)
CALCIUM SERPL-MCNC: 10.4 MG/DL (ref 8.2–9.6)
CHLORIDE SERPL-SCNC: 93 MMOL/L (ref 98–107)
CHOLEST SERPL-MCNC: 233 MG/DL (ref 0–200)
CO2 SERPL-SCNC: 32.2 MMOL/L (ref 22–29)
CREAT SERPL-MCNC: 1.12 MG/DL (ref 0.57–1)
DIGOXIN SERPL-MCNC: 0.9 NG/ML (ref 0.6–1.2)
EOSINOPHIL # BLD AUTO: 0.1 10*3/MM3 (ref 0–0.4)
EOSINOPHIL NFR BLD AUTO: 1.4 % (ref 0.3–6.2)
ERYTHROCYTE [DISTWIDTH] IN BLOOD BY AUTOMATED COUNT: 13.2 % (ref 12.3–15.4)
FERRITIN SERPL-MCNC: 68.3 NG/ML (ref 13–150)
FOLATE SERPL-MCNC: >20 NG/ML (ref 4.78–24.2)
GLOBULIN SER CALC-MCNC: 3 GM/DL
GLUCOSE SERPL-MCNC: 97 MG/DL (ref 65–99)
HCT VFR BLD AUTO: 41 % (ref 34–46.6)
HDLC SERPL-MCNC: 59 MG/DL (ref 40–60)
HGB BLD-MCNC: 13 G/DL (ref 12–15.9)
IMM GRANULOCYTES # BLD AUTO: 0.01 10*3/MM3 (ref 0–0.05)
IMM GRANULOCYTES NFR BLD AUTO: 0.1 % (ref 0–0.5)
IRON SATN MFR SERPL: 23 % (ref 20–50)
IRON SERPL-MCNC: 98 MCG/DL (ref 37–145)
LDLC SERPL CALC-MCNC: 150 MG/DL (ref 0–100)
LYMPHOCYTES # BLD AUTO: 2.78 10*3/MM3 (ref 0.7–3.1)
LYMPHOCYTES NFR BLD AUTO: 40.3 % (ref 19.6–45.3)
MAGNESIUM SERPL-MCNC: 2.3 MG/DL (ref 1.7–2.3)
MCH RBC QN AUTO: 30 PG (ref 26.6–33)
MCHC RBC AUTO-ENTMCNC: 31.7 G/DL (ref 31.5–35.7)
MCV RBC AUTO: 94.5 FL (ref 79–97)
MONOCYTES # BLD AUTO: 0.82 10*3/MM3 (ref 0.1–0.9)
MONOCYTES NFR BLD AUTO: 11.9 % (ref 5–12)
NEUTROPHILS # BLD AUTO: 3.12 10*3/MM3 (ref 1.7–7)
NEUTROPHILS NFR BLD AUTO: 45.3 % (ref 42.7–76)
NRBC BLD AUTO-RTO: 0 /100 WBC (ref 0–0.2)
PLATELET # BLD AUTO: 455 10*3/MM3 (ref 140–450)
POTASSIUM SERPL-SCNC: 3.6 MMOL/L (ref 3.5–5.2)
PROT SERPL-MCNC: 7.3 G/DL (ref 6–8.5)
RBC # BLD AUTO: 4.34 10*6/MM3 (ref 3.77–5.28)
SODIUM SERPL-SCNC: 137 MMOL/L (ref 136–145)
T3FREE SERPL-MCNC: 2.3 PG/ML (ref 2–4.4)
T4 FREE SERPL-MCNC: 1.37 NG/DL (ref 0.93–1.7)
TIBC SERPL-MCNC: 431 MCG/DL
TRIGL SERPL-MCNC: 134 MG/DL (ref 0–150)
TSH SERPL DL<=0.005 MIU/L-ACNC: 0.96 UIU/ML (ref 0.27–4.2)
UIBC SERPL-MCNC: 333 MCG/DL (ref 112–346)
VIT B12 SERPL-MCNC: 750 PG/ML (ref 211–946)
VLDLC SERPL CALC-MCNC: 24 MG/DL (ref 5–40)
WBC # BLD AUTO: 6.9 10*3/MM3 (ref 3.4–10.8)

## 2021-08-24 ENCOUNTER — TELEPHONE (OUTPATIENT)
Dept: FAMILY MEDICINE CLINIC | Facility: CLINIC | Age: 86
End: 2021-08-24

## 2021-08-24 NOTE — TELEPHONE ENCOUNTER
PATIENT GOT DISCONNECTED FROM CALL WITH REX    PATIENT IS NEEDING A SCRIPT FOR JULIANEO SENT TO [facility] TO SEE HOW MUCH IT IS. IS THAT SOMETHING THAT CAN BE DONE?    PLEASE ADVISE  [phone]   General: NAD  Pulm: CTA b/l  Cardiac: S1S2 RRR  GI: Soft, NT/ND  PV: Warm and well perfused  Neuro: AO x4, MAN, speech clear.

## 2021-08-24 NOTE — TELEPHONE ENCOUNTER
Pt checked with [facility], pt was told that she needs a script sent over before they can give her a price.  Please advise.  Thanks

## 2021-08-24 NOTE — TELEPHONE ENCOUNTER
Patient should check to see if Xarelto would be a cheaper co-pay on her plan.  This also needs to go through her cardiologist who has her on the medication for A. fib

## 2021-08-24 NOTE — TELEPHONE ENCOUNTER
Caller: Kristyn Severino    Relationship: Self    Best call back number: [phone]      OR        [phone]    What medications are you currently taking:   Current Outpatient Medications on File Prior to Visit   Medication Sig Dispense Refill   • apixaban (ELIQUIS) 2.5 MG tablet tablet Take 1 tablet by mouth Every 12 (Twelve) Hours. For a fib 60 tablet 11   • aspirin (aspirin) 81 MG EC tablet Take 1 tablet by mouth Daily. 30 tablet 0   • B Complex-C (SUPER B COMPLEX PO) Take  by mouth.     • digoxin (Lanoxin) 125 MCG tablet Take 1 tablet by mouth Daily. For a fib 30 tablet 11   • furosemide (LASIX) 40 MG tablet Take 1 tablet by mouth Daily. 30 tablet 11   • glucosamine-chondroitin 500-400 MG capsule capsule Take  by mouth 3 (Three) Times a Day With Meals.     • levothyroxine (SYNTHROID, LEVOTHROID) 75 MCG tablet Take 1 tablet by mouth Daily. For thyroid 90 tablet 3   • Metoprolol Tartrate 75 MG tablet Take 37.5 mg by mouth Daily. Pt will take 1/2 tablet daily.  Thaks 15 tablet 0   • Multiple Vitamins-Minerals (MULTIVITAMIN WITH MINERALS) tablet Take 1 tablet by mouth daily.     • potassium chloride (K-DUR,KLOR-CON) 20 MEQ CR tablet Take 1 tablet by mouth Daily. Take only days taking Lasix 30 tablet 0   • potassium chloride 10 MEQ CR tablet Take 1 tablet by mouth Daily. 30 tablet 11   • Probiotic Product (Align) capsule Take 1 capsule by mouth Daily. 30 capsule 2   • rOPINIRole (Requip) 0.5 MG tablet 1/2 tab PO at HS X 2 days then routine 1 PO 1 hour before bedtime for RLS 30 tablet 5   • vitamin D (ERGOCALCIFEROL) 1.25 MG (92174 UT) capsule capsule Take 1 capsule by mouth Every 7 (Seven) Days. 13 capsule 3     No current facility-administered medications on file prior to visit.          When did you start taking these medications:     Which medication are you concerned about: SHAMEKA    Who prescribed you this medication:PTS HEART DOCTOR    What are your concerns: PATIENT IS CALLING IN STATING THAT SHE  TAKES THIS TWICE A DAY FOR HER HEART BUT JUST LEARNED THAT IT IS OVER 100 DOLLARS AND SHE CANT AFFORD THAT AND WANTS TO KNOW WHAT CAN BE DONE SO THAT SHE CAN GET THIS MEDICATION FILLED.      How long have you had these concerns:

## 2021-09-16 ENCOUNTER — TELEPHONE (OUTPATIENT)
Dept: GASTROENTEROLOGY | Facility: CLINIC | Age: 86
End: 2021-09-16

## 2021-09-16 RX ORDER — LOPERAMIDE HYDROCHLORIDE 2 MG/1
2 CAPSULE ORAL DAILY
Qty: 30 CAPSULE | Refills: 5 | Status: SHIPPED | OUTPATIENT
Start: 2021-09-16 | End: 2021-10-11 | Stop reason: HOSPADM

## 2021-09-16 NOTE — TELEPHONE ENCOUNTER
Called pt, she states she out of Imodium and is asking for an rx.  States she takes it every morning.      Rx entered, msg sent to Dr Maurer to cosign.

## 2021-09-16 NOTE — TELEPHONE ENCOUNTER
----- Message from Arnulfo Sparks sent at 9/16/2021  2:56 PM EDT -----  Regarding: diarrhea  Contact: [phone]  Pt is wanting to talk to nurse, she is still having diarrhea and having issues with the medication she takes for it.

## 2021-10-05 DIAGNOSIS — E55.9 VITAMIN D DEFICIENCY: ICD-10-CM

## 2021-10-05 RX ORDER — ERGOCALCIFEROL 1.25 MG/1
50000 CAPSULE ORAL
Qty: 13 CAPSULE | Refills: 3 | OUTPATIENT
Start: 2021-10-05

## 2021-10-05 NOTE — TELEPHONE ENCOUNTER
Caller: Kristyn Severino    Relationship: Self      Medication requested (name and dosage): apixaban (ELIQUIS) 2.5 MG tablet tablet  vitamin D (ERGOCALCIFEROL) 1.25 MG (20538 UT) capsule capsule      Pharmacy where request should be sent: [facility] - [place], [place] - [address] - [phone]  - [phone] FX    Additional details provided by patient: PATIENT IS OUT OF BOTH    Best call back number: [phone]     Does the patient have less than a 3 day supply:  [x] Yes  [] No    Arnulfo MIRANDA Rep   10/05/21 12:55 EDT

## 2021-10-07 ENCOUNTER — APPOINTMENT (OUTPATIENT)
Dept: GENERAL RADIOLOGY | Facility: HOSPITAL | Age: 86
End: 2021-10-07

## 2021-10-07 ENCOUNTER — HOSPITAL ENCOUNTER (INPATIENT)
Facility: HOSPITAL | Age: 86
LOS: 4 days | Discharge: NURSING, REHAB OR LONG-TERM CARE | End: 2021-10-11
Attending: EMERGENCY MEDICINE | Admitting: ORTHOPAEDIC SURGERY

## 2021-10-07 ENCOUNTER — HOSPITAL ENCOUNTER (OUTPATIENT)
Facility: HOSPITAL | Age: 86
Setting detail: HOSPITAL OUTPATIENT SURGERY
End: 2021-10-07
Attending: ORTHOPAEDIC SURGERY | Admitting: ORTHOPAEDIC SURGERY

## 2021-10-07 DIAGNOSIS — S72.002A LEFT DISPLACED FEMORAL NECK FRACTURE (HCC): Primary | ICD-10-CM

## 2021-10-07 PROBLEM — Z85.038 HISTORY OF COLON CANCER: Status: ACTIVE | Noted: 2021-10-07

## 2021-10-07 PROBLEM — I48.20 CHRONIC ATRIAL FIBRILLATION (HCC): Status: ACTIVE | Noted: 2021-05-17

## 2021-10-07 PROBLEM — Z79.01 CHRONIC ANTICOAGULATION: Status: ACTIVE | Noted: 2021-10-07

## 2021-10-07 PROBLEM — I48.11 LONGSTANDING PERSISTENT ATRIAL FIBRILLATION (HCC): Status: ACTIVE | Noted: 2021-05-17

## 2021-10-07 PROBLEM — H91.90 HARD OF HEARING: Status: ACTIVE | Noted: 2021-10-07

## 2021-10-07 LAB
ABO GROUP BLD: NORMAL
ALBUMIN SERPL-MCNC: 4.1 G/DL (ref 3.5–5.2)
ALBUMIN/GLOB SERPL: 1.3 G/DL
ALP SERPL-CCNC: 113 U/L (ref 39–117)
ALT SERPL W P-5'-P-CCNC: 27 U/L (ref 1–33)
ANION GAP SERPL CALCULATED.3IONS-SCNC: 9.2 MMOL/L (ref 5–15)
APTT PPP: 27.2 SECONDS (ref 22.7–35.4)
AST SERPL-CCNC: 63 U/L (ref 1–32)
BASOPHILS # BLD AUTO: 0.05 10*3/MM3 (ref 0–0.2)
BASOPHILS NFR BLD AUTO: 0.5 % (ref 0–1.5)
BILIRUB SERPL-MCNC: 0.7 MG/DL (ref 0–1.2)
BLD GP AB SCN SERPL QL: NEGATIVE
BUN SERPL-MCNC: 23 MG/DL (ref 8–23)
BUN/CREAT SERPL: 24.7 (ref 7–25)
CALCIUM SPEC-SCNC: 10 MG/DL (ref 8.2–9.6)
CHLORIDE SERPL-SCNC: 97 MMOL/L (ref 98–107)
CO2 SERPL-SCNC: 31.8 MMOL/L (ref 22–29)
CREAT SERPL-MCNC: 0.93 MG/DL (ref 0.57–1)
DEPRECATED RDW RBC AUTO: 43 FL (ref 37–54)
EOSINOPHIL # BLD AUTO: 0.09 10*3/MM3 (ref 0–0.4)
EOSINOPHIL NFR BLD AUTO: 0.9 % (ref 0.3–6.2)
ERYTHROCYTE [DISTWIDTH] IN BLOOD BY AUTOMATED COUNT: 12.8 % (ref 12.3–15.4)
GFR SERPL CREATININE-BSD FRML MDRD: 56 ML/MIN/1.73
GLOBULIN UR ELPH-MCNC: 3.2 GM/DL
GLUCOSE SERPL-MCNC: 89 MG/DL (ref 65–99)
HCT VFR BLD AUTO: 34.6 % (ref 34–46.6)
HGB BLD-MCNC: 11.3 G/DL (ref 12–15.9)
IMM GRANULOCYTES # BLD AUTO: 0.04 10*3/MM3 (ref 0–0.05)
IMM GRANULOCYTES NFR BLD AUTO: 0.4 % (ref 0–0.5)
INR PPP: 1.07 (ref 0.9–1.1)
LYMPHOCYTES # BLD AUTO: 2.01 10*3/MM3 (ref 0.7–3.1)
LYMPHOCYTES NFR BLD AUTO: 20.5 % (ref 19.6–45.3)
MCH RBC QN AUTO: 30.2 PG (ref 26.6–33)
MCHC RBC AUTO-ENTMCNC: 32.7 G/DL (ref 31.5–35.7)
MCV RBC AUTO: 92.5 FL (ref 79–97)
MONOCYTES # BLD AUTO: 1.1 10*3/MM3 (ref 0.1–0.9)
MONOCYTES NFR BLD AUTO: 11.2 % (ref 5–12)
NEUTROPHILS NFR BLD AUTO: 6.52 10*3/MM3 (ref 1.7–7)
NEUTROPHILS NFR BLD AUTO: 66.5 % (ref 42.7–76)
NRBC BLD AUTO-RTO: 0 /100 WBC (ref 0–0.2)
NT-PROBNP SERPL-MCNC: 3224 PG/ML (ref 0–1800)
PLATELET # BLD AUTO: 312 10*3/MM3 (ref 140–450)
PMV BLD AUTO: 9.7 FL (ref 6–12)
POTASSIUM SERPL-SCNC: 4 MMOL/L (ref 3.5–5.2)
PROT SERPL-MCNC: 7.3 G/DL (ref 6–8.5)
PROTHROMBIN TIME: 13.7 SECONDS (ref 11.7–14.2)
QT INTERVAL: 293 MS
RBC # BLD AUTO: 3.74 10*6/MM3 (ref 3.77–5.28)
RH BLD: POSITIVE
SARS-COV-2 RNA PNL SPEC NAA+PROBE: NOT DETECTED
SODIUM SERPL-SCNC: 138 MMOL/L (ref 136–145)
T&S EXPIRATION DATE: NORMAL
TROPONIN T SERPL-MCNC: 0.02 NG/ML (ref 0–0.03)
WBC # BLD AUTO: 9.81 10*3/MM3 (ref 3.4–10.8)

## 2021-10-07 PROCEDURE — 80053 COMPREHEN METABOLIC PANEL: CPT | Performed by: EMERGENCY MEDICINE

## 2021-10-07 PROCEDURE — 93005 ELECTROCARDIOGRAM TRACING: CPT | Performed by: EMERGENCY MEDICINE

## 2021-10-07 PROCEDURE — 85730 THROMBOPLASTIN TIME PARTIAL: CPT | Performed by: EMERGENCY MEDICINE

## 2021-10-07 PROCEDURE — 71045 X-RAY EXAM CHEST 1 VIEW: CPT

## 2021-10-07 PROCEDURE — 86900 BLOOD TYPING SEROLOGIC ABO: CPT | Performed by: EMERGENCY MEDICINE

## 2021-10-07 PROCEDURE — 25010000002 ONDANSETRON PER 1 MG: Performed by: EMERGENCY MEDICINE

## 2021-10-07 PROCEDURE — 84484 ASSAY OF TROPONIN QUANT: CPT | Performed by: EMERGENCY MEDICINE

## 2021-10-07 PROCEDURE — 73502 X-RAY EXAM HIP UNI 2-3 VIEWS: CPT

## 2021-10-07 PROCEDURE — 99285 EMERGENCY DEPT VISIT HI MDM: CPT

## 2021-10-07 PROCEDURE — 93010 ELECTROCARDIOGRAM REPORT: CPT | Performed by: INTERNAL MEDICINE

## 2021-10-07 PROCEDURE — 83880 ASSAY OF NATRIURETIC PEPTIDE: CPT | Performed by: NURSE PRACTITIONER

## 2021-10-07 PROCEDURE — 87635 SARS-COV-2 COVID-19 AMP PRB: CPT | Performed by: EMERGENCY MEDICINE

## 2021-10-07 PROCEDURE — 25010000002 MORPHINE PER 10 MG: Performed by: EMERGENCY MEDICINE

## 2021-10-07 PROCEDURE — 86901 BLOOD TYPING SEROLOGIC RH(D): CPT | Performed by: EMERGENCY MEDICINE

## 2021-10-07 PROCEDURE — 85025 COMPLETE CBC W/AUTO DIFF WBC: CPT | Performed by: EMERGENCY MEDICINE

## 2021-10-07 PROCEDURE — 86850 RBC ANTIBODY SCREEN: CPT | Performed by: EMERGENCY MEDICINE

## 2021-10-07 PROCEDURE — 85610 PROTHROMBIN TIME: CPT | Performed by: EMERGENCY MEDICINE

## 2021-10-07 RX ORDER — POLYETHYLENE GLYCOL 3350 17 G/17G
17 POWDER, FOR SOLUTION ORAL DAILY PRN
Status: DISCONTINUED | OUTPATIENT
Start: 2021-10-07 | End: 2021-10-11 | Stop reason: SDUPTHER

## 2021-10-07 RX ORDER — CHOLECALCIFEROL (VITAMIN D3) 125 MCG
5 CAPSULE ORAL NIGHTLY PRN
Status: DISCONTINUED | OUTPATIENT
Start: 2021-10-07 | End: 2021-10-11 | Stop reason: HOSPADM

## 2021-10-07 RX ORDER — NITROGLYCERIN 0.4 MG/1
0.4 TABLET SUBLINGUAL
Status: DISCONTINUED | OUTPATIENT
Start: 2021-10-07 | End: 2021-10-11 | Stop reason: HOSPADM

## 2021-10-07 RX ORDER — NALOXONE HCL 0.4 MG/ML
0.4 VIAL (ML) INJECTION
Status: DISCONTINUED | OUTPATIENT
Start: 2021-10-07 | End: 2021-10-11 | Stop reason: HOSPADM

## 2021-10-07 RX ORDER — ONDANSETRON 2 MG/ML
4 INJECTION INTRAMUSCULAR; INTRAVENOUS ONCE
Status: COMPLETED | OUTPATIENT
Start: 2021-10-07 | End: 2021-10-07

## 2021-10-07 RX ORDER — LEVOTHYROXINE SODIUM 0.07 MG/1
75 TABLET ORAL
Status: DISCONTINUED | OUTPATIENT
Start: 2021-10-07 | End: 2021-10-11 | Stop reason: HOSPADM

## 2021-10-07 RX ORDER — MORPHINE SULFATE 2 MG/ML
1 INJECTION, SOLUTION INTRAMUSCULAR; INTRAVENOUS EVERY 4 HOURS PRN
Status: DISCONTINUED | OUTPATIENT
Start: 2021-10-07 | End: 2021-10-11 | Stop reason: HOSPADM

## 2021-10-07 RX ORDER — SODIUM CHLORIDE 0.9 % (FLUSH) 0.9 %
10 SYRINGE (ML) INJECTION AS NEEDED
Status: DISCONTINUED | OUTPATIENT
Start: 2021-10-07 | End: 2021-10-11 | Stop reason: HOSPADM

## 2021-10-07 RX ORDER — CEFAZOLIN SODIUM 2 G/100ML
2 INJECTION, SOLUTION INTRAVENOUS
Status: COMPLETED | OUTPATIENT
Start: 2021-10-08 | End: 2021-10-08

## 2021-10-07 RX ORDER — SODIUM CHLORIDE 9 MG/ML
125 INJECTION, SOLUTION INTRAVENOUS CONTINUOUS
Status: DISCONTINUED | OUTPATIENT
Start: 2021-10-07 | End: 2021-10-07

## 2021-10-07 RX ORDER — HYDROCODONE BITARTRATE AND ACETAMINOPHEN 5; 325 MG/1; MG/1
1 TABLET ORAL EVERY 4 HOURS PRN
Status: DISCONTINUED | OUTPATIENT
Start: 2021-10-07 | End: 2021-10-11 | Stop reason: HOSPADM

## 2021-10-07 RX ORDER — SODIUM CHLORIDE 9 MG/ML
75 INJECTION, SOLUTION INTRAVENOUS CONTINUOUS
Status: DISCONTINUED | OUTPATIENT
Start: 2021-10-07 | End: 2021-10-07

## 2021-10-07 RX ORDER — MORPHINE SULFATE 2 MG/ML
2 INJECTION, SOLUTION INTRAMUSCULAR; INTRAVENOUS ONCE
Status: COMPLETED | OUTPATIENT
Start: 2021-10-07 | End: 2021-10-07

## 2021-10-07 RX ORDER — SODIUM CHLORIDE 9 MG/ML
75 INJECTION, SOLUTION INTRAVENOUS CONTINUOUS
Status: DISCONTINUED | OUTPATIENT
Start: 2021-10-08 | End: 2021-10-09

## 2021-10-07 RX ORDER — SODIUM CHLORIDE 0.9 % (FLUSH) 0.9 %
10 SYRINGE (ML) INJECTION EVERY 12 HOURS SCHEDULED
Status: DISCONTINUED | OUTPATIENT
Start: 2021-10-07 | End: 2021-10-11 | Stop reason: HOSPADM

## 2021-10-07 RX ORDER — AMOXICILLIN 250 MG
2 CAPSULE ORAL DAILY
Status: DISCONTINUED | OUTPATIENT
Start: 2021-10-07 | End: 2021-10-11 | Stop reason: HOSPADM

## 2021-10-07 RX ORDER — DIGOXIN 125 MCG
125 TABLET ORAL
Status: DISCONTINUED | OUTPATIENT
Start: 2021-10-07 | End: 2021-10-11 | Stop reason: HOSPADM

## 2021-10-07 RX ORDER — ACETAMINOPHEN 325 MG/1
650 TABLET ORAL EVERY 4 HOURS PRN
Status: DISCONTINUED | OUTPATIENT
Start: 2021-10-07 | End: 2021-10-11 | Stop reason: HOSPADM

## 2021-10-07 RX ORDER — BISACODYL 10 MG
10 SUPPOSITORY, RECTAL RECTAL DAILY PRN
Status: DISCONTINUED | OUTPATIENT
Start: 2021-10-07 | End: 2021-10-11 | Stop reason: HOSPADM

## 2021-10-07 RX ORDER — ONDANSETRON 4 MG/1
4 TABLET, FILM COATED ORAL EVERY 6 HOURS PRN
Status: DISCONTINUED | OUTPATIENT
Start: 2021-10-07 | End: 2021-10-11 | Stop reason: HOSPADM

## 2021-10-07 RX ADMIN — DOCUSATE SODIUM 50MG AND SENNOSIDES 8.6MG 2 TABLET: 8.6; 5 TABLET, FILM COATED ORAL at 16:34

## 2021-10-07 RX ADMIN — SODIUM CHLORIDE, PRESERVATIVE FREE 10 ML: 5 INJECTION INTRAVENOUS at 16:32

## 2021-10-07 RX ADMIN — ONDANSETRON 4 MG: 2 INJECTION INTRAMUSCULAR; INTRAVENOUS at 08:15

## 2021-10-07 RX ADMIN — SODIUM CHLORIDE 125 ML/HR: 9 INJECTION, SOLUTION INTRAVENOUS at 08:15

## 2021-10-07 RX ADMIN — LEVOTHYROXINE SODIUM 75 MCG: 0.07 TABLET ORAL at 16:34

## 2021-10-07 RX ADMIN — SODIUM CHLORIDE, PRESERVATIVE FREE 10 ML: 5 INJECTION INTRAVENOUS at 20:12

## 2021-10-07 RX ADMIN — MORPHINE SULFATE 2 MG: 2 INJECTION, SOLUTION INTRAMUSCULAR; INTRAVENOUS at 08:17

## 2021-10-07 RX ADMIN — DIGOXIN 125 MCG: 125 TABLET ORAL at 16:34

## 2021-10-07 RX ADMIN — HYDROCODONE BITARTRATE AND ACETAMINOPHEN 1 TABLET: 5; 325 TABLET ORAL at 17:57

## 2021-10-07 RX ADMIN — METOPROLOL TARTRATE 37.5 MG: 25 TABLET, FILM COATED ORAL at 18:09

## 2021-10-07 NOTE — H&P
Patient Name:  Kristyn Severino  YOB: 1924  MRN:  [id number]  Admit Date:  10/7/2021  Patient Care Team:  Cary Espinal PA-C as PCP - General (Family Medicine)  Henry Maurer MD as Consulting Physician (Gastroenterology)  Marty Gold MD as Consulting Physician (Orthopedic Surgery)  Yumiko Olivo MD as Consulting Physician (Endocrinology)  Viktor Melgar MD as Consulting Physician (Cardiology)  Cary Espinal PA-C as Referring Physician (Family Medicine)  Melvin Kenny MD as Consulting Physician (Hematology and Oncology)      Subjective   History Present Illness     Chief Complaint   Patient presents with   • Hip Pain     History of Present Illness   Ms. Severino is a 89 y.o. non-smoker with a history of IBS, colon cancer, atrial fibrillation on chronic anticoagulation, hypothyroidism, CKD3, HLD and HTN that presents to [facility] complaining of hip pain. The patient states she has been in her usual state of health until earlier this morning when she leaned over to  some pills and lost of her balance. She fell on her left hip and immediately was unable to ambulate. She denies any pain unless she attempts to move the leg. She states she was not dizzy and there was no head injury with the fall. She denies any recent chest pain, dyspnea, cough, fever or chills. She has not had any nausea, vomiting or changes in bowel or bladder. She is fairly healthy given her ripe age, but does have a history of new atrial fibrillation in which she is on Eliquis as well as digoxin. She was starting to have some lower extremity edema with this but was placed on oral Lasix through Dr. Melgar who she last saw in July of this year. She states her edema is much improved and she denies any worsening dyspnea. Her last dose of Eliquis was yesterday at 1200.   In the ED, she was hemodynamically stable. X-ray imaging was obtained showing a fracture of the neck of the femur on  the left with superior displacement. CXR was negative with mildly enlarged heart. She was given pain medication and admitted for further care.    Review of Systems   Constitutional: Positive for activity change. Negative for chills, fatigue and fever.   HENT: Negative for congestion and ear pain.    Eyes: Negative for pain and discharge.   Respiratory: Negative for cough, chest tightness and shortness of breath.    Cardiovascular: Negative for chest pain and leg swelling.   Gastrointestinal: Negative for abdominal distention, abdominal pain, constipation, diarrhea, nausea and vomiting.   Genitourinary: Negative for difficulty urinating and dysuria.   Musculoskeletal: Positive for arthralgias and gait problem.   Skin: Negative for color change and pallor.   Neurological: Negative for dizziness and light-headedness.   Psychiatric/Behavioral: Negative for confusion. The patient is not nervous/anxious.       Personal History     Past Medical History:   Diagnosis Date   • Anemia of chronic illness    • Atrial fibrillation (CMS/HCC)    • Colon cancer (CMS/HCC)     Invasive adenocarcinoma of right colon, grade 2 (5.3 cm)   • PALACIOS (dyspnea on exertion)    • Fatigue    • Hirsutism    • History of bone density study     few years; normal   • History of colonoscopy     never   • History of prior pregnancies     x4   • HLD (hyperlipidemia)    • Hypothyroidism     Hashimoto's diagnosed age 19   • Insomnia    • Lightheadedness    • Osteoarthritis    • Postmenopausal    • Renal insufficiency    • Rhinorrhea    • Seborrheic keratosis    • Stage 3a chronic kidney disease (CMS/HCC)    • Vitamin D deficiency      Past Surgical History:   Procedure Laterality Date   • APPENDECTOMY N/A 03/2010    Dr. Addy De Oliveira   • BREAST BIOPSY     • CARPAL TUNNEL RELEASE Right    • CARPAL TUNNEL RELEASE Left 2/28/2020    Procedure: DECOMPRESSION OF CARPAL TUNNEL;  Surgeon: Marty Plata MD;  [facility]: [facility];  Service: Plastics;   Laterality: Left;   • COLON RESECTION N/A 10/9/2018    Procedure: right hemicolectomy;  Surgeon: Jung Barillas MD;  Location: [facility];  Service: General   • MAMMO BILATERAL      many years ago   • PAP SMEAR      many years ago   • REPLACEMENT TOTAL KNEE Left 02/07/2011    Dr. Marty Gold     Family History   Problem Relation Age of Onset   • Basal cell carcinoma Mother         Ages 35-96   • Nephrolithiasis Mother    • Heart disease Mother    • Hypertension Mother    • Diabetes Son      Social History     Tobacco Use   • Smoking status: Never Smoker   • Smokeless tobacco: Never Used   • Tobacco comment: caffeine use 1/2 cup coffee and occas green tea   Substance Use Topics   • Alcohol use: Yes     Comment: Occasional   • Drug use: No     (Not in a hospital admission)    Allergies:  No Known Allergies    Objective    Objective     Vital Signs  Temp:  [98.2 °F (36.8 °C)] 98.2 °F (36.8 °C)  Heart Rate:  [78-91] 82  Resp:  [17-18] 18  BP: (102-128)/(68-83) 102/68  SpO2:  [85 %-97 %] 96 %  on  Flow (L/min):  [2] 2;   Device (Oxygen Therapy): nasal cannula  Body mass index is 20.94 kg/m².    Physical Exam  Vitals and nursing note reviewed.   Constitutional:       General: She is not in acute distress.     Appearance: She is ill-appearing (chronically, but looks younger than stated age.). She is not toxic-appearing.   HENT:      Head: Normocephalic and atraumatic.   Cardiovascular:      Rate and Rhythm: Normal rate. Rhythm irregular.      Pulses: Normal pulses.      Heart sounds: Normal heart sounds.   Pulmonary:      Effort: Pulmonary effort is normal. No respiratory distress.      Breath sounds: Normal breath sounds.   Abdominal:      General: Bowel sounds are normal. There is no distension.      Palpations: Abdomen is soft.      Tenderness: There is no abdominal tenderness.   Musculoskeletal:         General: Swelling (trace BLE) and deformity (left leg shortened and externally rotated) present.       Cervical back: Normal range of motion and neck supple.   Skin:     General: Skin is warm and dry.      Findings: No bruising.   Neurological:      Mental Status: She is alert and oriented to person, place, and time.      Sensory: No sensory deficit.      Motor: Weakness present.   Psychiatric:         Mood and Affect: Mood normal.         Behavior: Behavior normal.        Results Review:  I reviewed the patient's new clinical results.  I reviewed the patient's new imaging results and agree with the interpretation.  I reviewed the patient's other test results and agree with the interpretation  I personally viewed and interpreted the patient's EKG/Telemetry data    Lab Results (last 24 hours)     Procedure Component Value Units Date/Time    CBC & Differential [[id number]]  (Abnormal) Collected: 10/07/21 0756    Specimen: Blood Updated: 10/07/21 0823    Narrative:      The following orders were created for panel order CBC & Differential.  Procedure                               Abnormality         Status                     ---------                               -----------         ------                     CBC Auto Differential[[id number]]        Abnormal            Final result                 Please view results for these tests on the individual orders.    Comprehensive Metabolic Panel [[id number]]  (Abnormal) Collected: 10/07/21 0756    Specimen: Blood Updated: 10/07/21 0832     Glucose 89 mg/dL      BUN 23 mg/dL      Creatinine 0.93 mg/dL      Sodium 138 mmol/L      Potassium 4.0 mmol/L      Chloride 97 mmol/L      CO2 31.8 mmol/L      Calcium 10.0 mg/dL      Total Protein 7.3 g/dL      Albumin 4.10 g/dL      ALT (SGPT) 27 U/L      AST (SGOT) 63 U/L      Alkaline Phosphatase 113 U/L      Total Bilirubin 0.7 mg/dL      eGFR Non African Amer 56 mL/min/1.73      Globulin 3.2 gm/dL      A/G Ratio 1.3 g/dL      BUN/Creatinine Ratio 24.7     Anion Gap 9.2 mmol/L     Narrative:      GFR Normal >60  Chronic Kidney Disease  <60  Kidney Failure <15      Protime-INR [[id number]]  (Normal) Collected: 10/07/21 0756    Specimen: Blood Updated: 10/07/21 0843     Protime 13.7 Seconds      INR 1.07    aPTT [[id number]]  (Normal) Collected: 10/07/21 0756    Specimen: Blood Updated: 10/07/21 0843     PTT 27.2 seconds     CBC Auto Differential [[id number]]  (Abnormal) Collected: 10/07/21 0756    Specimen: Blood Updated: 10/07/21 0823     WBC 9.81 10*3/mm3      RBC 3.74 10*6/mm3      Hemoglobin 11.3 g/dL      Hematocrit 34.6 %      MCV 92.5 fL      MCH 30.2 pg      MCHC 32.7 g/dL      RDW 12.8 %      RDW-SD 43.0 fl      MPV 9.7 fL      Platelets 312 10*3/mm3      Neutrophil % 66.5 %      Lymphocyte % 20.5 %      Monocyte % 11.2 %      Eosinophil % 0.9 %      Basophil % 0.5 %      Immature Grans % 0.4 %      Neutrophils, Absolute 6.52 10*3/mm3      Lymphocytes, Absolute 2.01 10*3/mm3      Monocytes, Absolute 1.10 10*3/mm3      Eosinophils, Absolute 0.09 10*3/mm3      Basophils, Absolute 0.05 10*3/mm3      Immature Grans, Absolute 0.04 10*3/mm3      nRBC 0.0 /100 WBC     Troponin [[id number]]  (Normal) Collected: 10/07/21 0756    Specimen: Blood Updated: 10/07/21 0908     Troponin T 0.022 ng/mL     Narrative:      Troponin T Reference Range:  <= 0.03 ng/mL-   Negative for AMI  >0.03 ng/mL-     Abnormal for myocardial necrosis.  Clinicians would have to utilize clinical acumen, EKG, Troponin and serial changes to determine if it is an Acute Myocardial Infarction or myocardial injury due to an underlying chronic condition.       Results may be falsely decreased if patient taking Biotin.      COVID PRE-OP / PRE-PROCEDURE SCREENING ORDER (NO ISOLATION) - Swab, Nasopharynx [[id number]]  (Normal) Collected: 10/07/21 1001    Specimen: Swab from Nasopharynx Updated: 10/07/21 1035    Narrative:      The following orders were created for panel order COVID PRE-OP / PRE-PROCEDURE SCREENING ORDER (NO ISOLATION) - Swab, Nasopharynx.  Procedure                                Abnormality         Status                     ---------                               -----------         ------                     COVID-19,[facility] IN-HOUSE...[[id number]]  Normal              Final result                 Please view results for these tests on the individual orders.    COVID-19,[facility] IN-HOUSE CEPHEID/HE NP SWAB IN TRANSPORT MEDIA 8-12 HR TAT - Swab, Nasopharynx [[id number]]  (Normal) Collected: 10/07/21 1001    Specimen: Swab from Nasopharynx Updated: 10/07/21 1035     COVID19 Not Detected    Narrative:      Fact sheet for providers: https://www.fda.gov/media/998060/download    Fact sheet for patients: https://www.fda.gov/media/159490/download    Test performed by PCR.          Imaging Results (Last 24 Hours)     Procedure Component Value Units Date/Time    XR Chest 1 View [[id number]] Collected: 10/07/21 0921     Updated: 10/07/21 1050    Narrative:      ONE-VIEW PORTABLE CHEST     HISTORY: Preop for hip surgery. Hip fracture. Hypertension.     FINDINGS: The lungs are well-expanded and clear. The heart is mildly  enlarged and no acute abnormality is seen.     This report was finalized on 10/7/2021 10:47 AM by Dr. Say Osorio M.D.       XR Hip With or Without Pelvis 2 - 3 View Left [[id number]] Collected: 10/07/21 0658     Updated: 10/07/21 0922    Narrative:      TWO-VIEW LEFT HIP AND ONE-VIEW AP PELVIS     HISTORY: Fell. Left hip pain.     FINDINGS: There is a fracture through the neck of the femur with some  superior displacement of the femur relative to the femoral head.     This report was finalized on 10/7/2021 9:19 AM by Dr. Say Osorio M.D.             Results for orders placed during the hospital encounter of 06/09/17    Adult Transthoracic Echo Complete    Interpretation Summary  · Left ventricular systolic function is normal. Calculated EF = 59.1%. Estimated EF was in agreement with the calculated EF. Estimated EF = 59%. Normal left ventricular cavity size and wall thickness  noted. All left ventricular wall segments contract normally. Left ventricular diastolic function was unable to be assessed.  · Moderate mitral annular calcification is present. Mild-to-moderate mitral valve regurgitation is present. No significant mitral valve stenosis is present.  · Mild tricuspid valve regurgitation is present. Estimated right ventricular systolic pressure from tricuspid regurgitation is normal (<35 mmHg).      ECG 12 Lead   Final Result   HEART RATE= 78  bpm   RR Interval= 631  ms   IN Interval=   ms   P Horizontal Axis=   deg   P Front Axis=   deg   QRSD Interval= 97  ms   QT Interval= 293  ms   QRS Axis= -44  deg   T Wave Axis= 123  deg   - ABNORMAL ECG -   Atrial fibrillation   Left axis deviation   Anteroseptal infarct, old   When compared with ECG of 28-Feb-2020 11:44:19,   Significant change in rhythm: previously sinus   Electronically Signed By: Jeevan Marino ([facility]) 07-Oct-2021 10:52:38   Date and Time of Study: 2021-10-07 08:29:02           Assessment/Plan     Active Hospital Problems    Diagnosis  POA   • **Left displaced femoral neck fracture (HCC) [S72.002A]  Yes   • Chronic anticoagulation [Z79.01]  Not Applicable   • Hard of hearing [H91.90]  Yes   • History of colon cancer [Z85.038]  Yes   • Hypothyroidism [E03.9]  Yes   • Chronic atrial fibrillation (HCC) [I48.20]  Yes   • DNR (do not resuscitate) [Z66]  Yes   • Irritable bowel syndrome [K58.9]  Yes   • HLD (hyperlipidemia) [E78.5]  Yes   • Anemia of chronic illness [D63.8]  Yes   • CKD (chronic kidney disease) stage 3, GFR 30-59 ml/min ([facility]) [N18.30]  Yes     Ms. Severino is a 89 year old female who presented to the hospital with complaints of left hip pain following a fall at home.    Left displaced femoral neck fracture: Orthopedic surgery has been consulted. Plans for operative repair tomorrow. Pain medication as needed. PT once cleared after surgery. Will likely need rehab. As far as medical clearance goes, she gives no current  symptoms to suggest ongoing chest pain, new arrhythmia or decompensated heart failure.  Don's RCRI is 1 suggesting risk of major cardiac event is 0.9%.  Suggest proceed with surgery with no further cardiac testing. EKG with atrial fibrillation. Troponin negative. Will obtain BNP for establishing baseline purposes.  Atrial fibrillation/chronic anticoagulation: Eliquis on hold. She is currently rate controlled. Resume her Lopressor and digoxin. Monitor heart rate and BP. Consider cardiology consultation if any issues arise.  CKD3: Renal function stable. Hold diuretic for now. Hydrate starting at midnight as she will be NPO for surgery. Hold Lasix temporarily.  Hypothyroidism: Resume home meds.  Anemia of chronic disease: Stable. Monitor post operatively.    · I discussed the patients findings and my recommendations with patient, family and nursing staff.    VTE Prophylaxis - SCDs.  Code Status - No CPR or intubation.       AZRA Monte  [facility]  10/07/21  14:33 EDT

## 2021-10-07 NOTE — ED TRIAGE NOTES
To ER via EMS from home ([facility]).  Pt fell sideways out of chair to floor landing on left hip.  C/o left hip pain with movement. Slight shortening and external rotation.  Pt denies pain while lying still.        Pt in mask at time of triage.  Triage staff in appropriate PPE.

## 2021-10-07 NOTE — ED NOTES
Pt wearing mask. Myself had mask, and eye wear/PPE when present with pt. Hand hygiene performed before and after pt care.     Mae Morales, PCT  10/07/21 0852

## 2021-10-07 NOTE — ED NOTES
This RN wearing all appropriate PPE during patient encounter. Hand hygiene performed before and during entering room.       Bridget Vazquez, JAMIL  10/07/21 3713

## 2021-10-07 NOTE — ED NOTES
This RN wearing all appropriate PPE during patient encounter. Hand hygiene performed before and during entering room.       Bridget Vazquez, RN  10/07/21 1139

## 2021-10-07 NOTE — ED NOTES
This RN wearing all appropriate PPE during patient encounter. Hand hygiene performed before and during entering room.       Bridget Vazquez, JAMIL  10/07/21 2585

## 2021-10-07 NOTE — ED NOTES
This RN wearing all appropriate PPE during patient encounter. Hand hygiene performed before and during entering room.       Bridget Vazquez, JAMIL  10/07/21 1640

## 2021-10-07 NOTE — ED NOTES
Daughter waiting outside.-- Rosa MUNGUIA  Cell [phone]     Felisha Schneider RN  10/07/21 0516

## 2021-10-07 NOTE — ED PROVIDER NOTES
EMERGENCY DEPARTMENT ENCOUNTER    Room Number:  33/33  Date of encounter:  10/7/2021  PCP: Cary Espinal PA-C  Historian: Patient      HPI:  Chief Complaint: Left hip pain  A complete HPI/ROS/PMH/PSH/SH/FH are unobtainable due to: None    Context: Kristyn Severino is a 89 y.o. female who presents to the ED c/o left hip pain following a fall.  This occurred around 3 AM.  She was bending over to  some pills that fell on the floor when she lost her balance.  She denies hitting her head or having loss of consciousness.  She complains of isolated left hip pain that is constant.  This time, however, she has no pain.  It occurs primarily when she tries to move her hip.  It is moderate to severe in intensity at its worst.      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 10/06/2018   • Multiple joint pain 01/16/2020   • Malignant neoplasm of ascending colon (HCC) 10/26/2020   • Glaucoma associated with chamber angle anomalies, left, severe stage 09/26/2018   • 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   • Generalized muscle weakness 10/17/2018   • Anemia 09/26/2018   • Unspecified intestinal obstruction, unspecified as to partial versus complete obstruction (HCC) 10/17/2018   • Other microscopic colitis 09/26/2018   • Hypothyroidism 05/05/2021   • Insomnia, unspecified 09/26/2018   • Other irritable bowel syndrome 09/26/2018   • Arthritis 05/05/2021   • Other abnormalities of gait and mobility 05/17/2021   • Shortness  of breath 05/16/2021   • Unspecified atrial fibrillation (HCC) 05/17/2021   • Weakness 05/17/2021   • Hypothyroidism 06/22/2021   • Hypothyroidism, unspecified 05/17/2021   • Arthritis 06/22/2021   • Muscle wasting and atrophy, not elsewhere classified, multiple sites 05/17/2021   • Polyarthritis, unspecified 05/16/2021     Resolved Ambulatory Problems     Diagnosis Date Noted   • Hypercalcemia 08/24/2017   • Acidosis 10/10/2018     Past Medical History:   Diagnosis Date   • Atrial fibrillation (CMS/HCC)    • Colon cancer (CMS/HCC)    • PALACIOS (dyspnea on exertion)    • Fatigue    • History of bone density study    • History of colonoscopy    • History of prior pregnancies    • Lightheadedness    • Postmenopausal    • Renal insufficiency    • Rhinorrhea    • Stage 3a chronic kidney disease (CMS/HCC)          PAST SURGICAL HISTORY  Past Surgical History:   Procedure Laterality Date   • APPENDECTOMY N/A 03/2010    Dr. Addy De Oliveira   • BREAST BIOPSY     • CARPAL TUNNEL RELEASE Right    • CARPAL TUNNEL RELEASE Left 2/28/2020    Procedure: DECOMPRESSION OF CARPAL TUNNEL;  Surgeon: Marty Plata MD;  Location: [facility];  Service: Plastics;  Laterality: Left;   • COLON RESECTION N/A 10/9/2018    Procedure: right hemicolectomy;  Surgeon: Jung Barillas MD;  Location: [facility];  Service: General   • MAMMO BILATERAL      many years ago   • PAP SMEAR      many years ago   • REPLACEMENT TOTAL KNEE Left 02/07/2011    Dr. Marty Gold         FAMILY HISTORY  Family History   Problem Relation Age of Onset   • Basal cell carcinoma Mother         Ages 35-96   • Nephrolithiasis Mother    • Heart disease Mother    • Hypertension Mother    • Diabetes Son          SOCIAL HISTORY  Social History     Socioeconomic History   • Marital status:      Spouse name: Not on file   • Number of children: 3   • Years of education: Some College   • Highest education level: Not on file   Tobacco Use   • Smoking status: Never  Smoker   • Smokeless tobacco: Never Used   • Tobacco comment: caffeine use 1/2 cup coffee and occas green tea   Substance and Sexual Activity   • Alcohol use: Yes     Comment: Occasional   • Drug use: No   • Sexual activity: Defer         ALLERGIES  Patient has no known allergies.        REVIEW OF SYSTEMS  Review of Systems     All systems reviewed and negative except for those discussed in HPI.       PHYSICAL EXAM    I have reviewed the triage vital signs and nursing notes.    ED Triage Vitals   Temp Heart Rate Resp BP SpO2   10/07/21 0446 10/07/21 0446 10/07/21 0446 10/07/21 0446 10/07/21 0446   98.2 °F (36.8 °C) 78 18 128/83 97 %      Temp src Heart Rate Source Patient Position BP Location FiO2 (%)   -- 10/07/21 0747 10/07/21 0657 10/07/21 0657 --    Monitor Lying Left arm        Physical Exam  GENERAL: not distressed  HENT: nares patent  EYES: no scleral icterus  CV: regular rhythm, regular rate  RESPIRATORY: normal effort  ABDOMEN: soft, nontender  MUSCULOSKELETAL: no deformity, scalp is atraumatic, no chest wall tenderness, no pain palpation of the upper extremities or right lower extremity, left lower extremity shortened and slightly externally rotated  NEURO: alert, moves all extremities, follows commands  SKIN: warm, dry        LAB RESULTS  Recent Results (from the past 24 hour(s))   Comprehensive Metabolic Panel    Collection Time: 10/07/21  7:56 AM    Specimen: Blood   Result Value Ref Range    Glucose 89 65 - 99 mg/dL    BUN 23 8 - 23 mg/dL    Creatinine 0.93 0.57 - 1.00 mg/dL    Sodium 138 136 - 145 mmol/L    Potassium 4.0 3.5 - 5.2 mmol/L    Chloride 97 (L) 98 - 107 mmol/L    CO2 31.8 (H) 22.0 - 29.0 mmol/L    Calcium 10.0 (H) 8.2 - 9.6 mg/dL    Total Protein 7.3 6.0 - 8.5 g/dL    Albumin 4.10 3.50 - 5.20 g/dL    ALT (SGPT) 27 1 - 33 U/L    AST (SGOT) 63 (H) 1 - 32 U/L    Alkaline Phosphatase 113 39 - 117 U/L    Total Bilirubin 0.7 0.0 - 1.2 mg/dL    eGFR Non African Amer 56 (L) >60 mL/min/1.73     Globulin 3.2 gm/dL    A/G Ratio 1.3 g/dL    BUN/Creatinine Ratio 24.7 7.0 - 25.0    Anion Gap 9.2 5.0 - 15.0 mmol/L   Protime-INR    Collection Time: 10/07/21  7:56 AM    Specimen: Blood   Result Value Ref Range    Protime 13.7 11.7 - 14.2 Seconds    INR 1.07 0.90 - 1.10   aPTT    Collection Time: 10/07/21  7:56 AM    Specimen: Blood   Result Value Ref Range    PTT 27.2 22.7 - 35.4 seconds   CBC Auto Differential    Collection Time: 10/07/21  7:56 AM    Specimen: Blood   Result Value Ref Range    WBC 9.81 3.40 - 10.80 10*3/mm3    RBC 3.74 (L) 3.77 - 5.28 10*6/mm3    Hemoglobin 11.3 (L) 12.0 - 15.9 g/dL    Hematocrit 34.6 34.0 - 46.6 %    MCV 92.5 79.0 - 97.0 fL    MCH 30.2 26.6 - 33.0 pg    MCHC 32.7 31.5 - 35.7 g/dL    RDW 12.8 12.3 - 15.4 %    RDW-SD 43.0 37.0 - 54.0 fl    MPV 9.7 6.0 - 12.0 fL    Platelets 312 140 - 450 10*3/mm3    Neutrophil % 66.5 42.7 - 76.0 %    Lymphocyte % 20.5 19.6 - 45.3 %    Monocyte % 11.2 5.0 - 12.0 %    Eosinophil % 0.9 0.3 - 6.2 %    Basophil % 0.5 0.0 - 1.5 %    Immature Grans % 0.4 0.0 - 0.5 %    Neutrophils, Absolute 6.52 1.70 - 7.00 10*3/mm3    Lymphocytes, Absolute 2.01 0.70 - 3.10 10*3/mm3    Monocytes, Absolute 1.10 (H) 0.10 - 0.90 10*3/mm3    Eosinophils, Absolute 0.09 0.00 - 0.40 10*3/mm3    Basophils, Absolute 0.05 0.00 - 0.20 10*3/mm3    Immature Grans, Absolute 0.04 0.00 - 0.05 10*3/mm3    nRBC 0.0 0.0 - 0.2 /100 WBC   ECG 12 Lead    Collection Time: 10/07/21  8:29 AM   Result Value Ref Range    QT Interval 293 ms       Ordered the above labs and independently reviewed the results.        RADIOLOGY  XR Hip With or Without Pelvis 2 - 3 View Left    Result Date: 10/7/2021  TWO-VIEW LEFT HIP AND ONE-VIEW AP PELVIS  HISTORY: Fell. Left hip pain.  FINDINGS: There is a fracture through the neck of the femur with some superior displacement of the femur relative to the femoral head.        I ordered the above noted radiological studies. Reviewed by me and discussed with  radiologist.  See dictation for official radiology interpretation.      PROCEDURES    Procedures      MEDICATIONS GIVEN IN ER    Medications   sodium chloride 0.9 % infusion (125 mL/hr Intravenous New Bag 10/7/21 0815)   sodium chloride 0.9 % flush 10 mL (has no administration in time range)   morphine injection 2 mg (2 mg Intravenous Given 10/7/21 0817)   ondansetron (ZOFRAN) injection 4 mg (4 mg Intravenous Given 10/7/21 0815)         PROGRESS, DATA ANALYSIS, CONSULTS, AND MEDICAL DECISION MAKING    All labs have been independently reviewed by me.  All radiology studies have been reviewed by me and discussed with radiologist dictating the report.   EKG's independently viewed and interpreted by me.  Discussion below represents my analysis of pertinent findings related to patient's condition, differential diagnosis, treatment plan and final disposition.    Patient presents with low femoral neck fracture following a fall.  She has no evidence of any head trauma based on exam.  Additionally, she denies hitting her head.  Not believe that she needs a CT scan of her head at this time.    Patient does live at home by herself.    ED Course as of Oct 07 0848   Thu Oct 07, 2021   0804 X-ray of the left hip interpreted by myself shows left femoral neck fracture.    [TD]   0836 EKG interpreted by myself.  Time 8:29 AM.  Atrial fibrillation.  Heart rate 78.  Left axis deviation.  Prolonged R wave progression.  No acute ST abnormality.    [TD]   0839 Medical chart review, old EKG obtained from 2/28/2020.  Sinus rhythm.  Heart rate 57.  Left axis deviation.  Low voltage in the precordial leads.    [TD]   0844 Patient denies use of anticoagulation.  However on medical chart review, it appears that she is on a low-dose of Eliquis.    [TD]      ED Course User Index  [TD] Marty Espinal II, MD       I discussed the case with Dr. Contreras, hospitalist earlier today.  She will admit.    I discussed the case with Dr. Nelson,  orthopedics.  We reviewed the patient's imaging and medications.  He will plan for operative repair of the patient's left hip.    PPE: The patient wore a surgical mask throughout the entire patient encounter. I wore an N95.    AS OF 08:48 EDT VITALS:    BP - 126/72  HR - 91  TEMP - 98.2 °F (36.8 °C)  O2 SATS - 95%        DIAGNOSIS  Final diagnoses:   Left displaced femoral neck fracture (HCC)         DISPOSITION  Admit           Marty Espinal II, MD  10/07/21 0849

## 2021-10-07 NOTE — ED NOTES
This RN wearing all appropriate PPE during patient encounter. Hand hygiene performed before and during entering room.       Bridget Vazquez, JAMIL  10/07/21 0861

## 2021-10-07 NOTE — ED NOTES
This RN wearing all appropriate PPE during patient encounter. Hand hygiene performed before and during entering room.       Bridget Vazquez, RN  10/07/21 0754

## 2021-10-07 NOTE — CONSULTS
Orthopaedic Consultation      Patient: Kristyn Severino    Date of Admission: 10/7/2021  7:37 AM    YOB: 1924    Medical Record Number: [id number]    Attending Physician:  Rylee Contreras DO    Consulting Physician:  Ridge Caicedo PA-C        Chief Complaints: Left hip pain status post mechanical fall    History of Present Illness:   The patient is a pleasant 89-year-old non-smoking female who presented to [facility] status post mechanical fall.  She has a past medical history of IBS, colon cancer, atrial fibrillation on chronic Eliquis.  Last dose being yesterday at noon.  Also past medical history of hypothyroidism, chronic kidney disease stage III, hyperlipidemia, and hypertension.  She presented to the hospital status post mechanical fall.  Patient states that she was in her usual state.  She leaned over to  some pills.  She lost her balance.  She fell onto her left hip.  She was unable to ambulate.  She was brought to the emergency room.  X-rays were obtained.  She was found to have left femoral neck fracture.  She currently is being admitted to the hospitalist team.  Orthopedics has been consulted for further definitive management of the fracture.  Eliquis is being held in anticipation for surgery.  Patient is currently on the operative schedule for tomorrow afternoon with Dr. Nelson.    Patient was placed in face mask in first look. Patient was wearing facemask when I entered the room and throughout our encounter. I wore full protective equipment throughout this patient encounter including a face mask, eye shield, gown and gloves. Hand hygiene/washing of hands was performed before donning protective equipment and after removal when leaving the room.    Allergies: No Known Allergies    Medications:   Home Medications:  (Not in a hospital admission)      Current Medications:  Scheduled Meds:[START ON 10/8/2021] ceFAZolin, 2 g, Intravenous, On Call to OR  digoxin, 125 mcg,  Oral, Daily  levothyroxine, 75 mcg, Oral, Q AM  metoprolol tartrate, 37.5 mg, Oral, Daily  senna-docusate sodium, 2 tablet, Oral, Daily  sodium chloride, 10 mL, Intravenous, Q12H      Continuous Infusions:[START ON 10/8/2021] sodium chloride, 75 mL/hr      PRN Meds:.•  acetaminophen  •  bisacodyl  •  HYDROcodone-acetaminophen  •  melatonin  •  Morphine **AND** naloxone  •  nitroglycerin  •  ondansetron  •  polyethylene glycol  •  [COMPLETED] Insert peripheral IV **AND** sodium chloride  •  sodium chloride    Past Medical History:   Diagnosis Date   • Anemia of chronic illness    • Atrial fibrillation (CMS/HCC)    • Colon cancer (CMS/HCC)     Invasive adenocarcinoma of right colon, grade 2 (5.3 cm)   • PALACIOS (dyspnea on exertion)    • Fatigue    • Hirsutism    • History of bone density study     few years; normal   • History of colonoscopy     never   • History of prior pregnancies     x4   • HLD (hyperlipidemia)    • Hypothyroidism     Hashimoto's diagnosed age 19   • Insomnia    • Lightheadedness    • Osteoarthritis    • Postmenopausal    • Renal insufficiency    • Rhinorrhea    • Seborrheic keratosis    • Stage 3a chronic kidney disease (CMS/HCC)    • Vitamin D deficiency      Past Surgical History:   Procedure Laterality Date   • APPENDECTOMY N/A 03/2010    Dr. Addy De Oliveira   • BREAST BIOPSY     • CARPAL TUNNEL RELEASE Right    • CARPAL TUNNEL RELEASE Left 2/28/2020    Procedure: DECOMPRESSION OF CARPAL TUNNEL;  Surgeon: Marty Plata MD;  Location: [facility];  Service: Plastics;  Laterality: Left;   • COLON RESECTION N/A 10/9/2018    Procedure: right hemicolectomy;  Surgeon: Jung Barillas MD;  Location: [facility];  Service: General   • MAMMO BILATERAL      many years ago   • PAP SMEAR      many years ago   • REPLACEMENT TOTAL KNEE Left 02/07/2011    Dr. Marty Gold     Social History     Occupational History   • Occupation: RN     Employer: RETIRED     Comment: [employer]   Tobacco  Use   • Smoking status: Never Smoker   • Smokeless tobacco: Never Used   • Tobacco comment: caffeine use 1/2 cup coffee and occas green tea   Substance and Sexual Activity   • Alcohol use: Yes     Comment: Occasional   • Drug use: No   • Sexual activity: Defer      Social History     Social History Narrative   • Not on file     Family History   Problem Relation Age of Onset   • Basal cell carcinoma Mother         Ages 35-96   • Nephrolithiasis Mother    • Heart disease Mother    • Hypertension Mother    • Diabetes Son          Review of Systems:   No other pertinent positives or negatives other than what is mentioned in the HPI and below.  Constitutional: Negative for fatigue, fever, or weight loss  HEENT: No active headache.  Pulmonary: Patient denies SOA.  Cardiovascular: Patient denies any chest pain.  Gastrointestinal:  Patient denies active vomiting or diarrhea.  Musculoskeletal: Positive for left hip pain.  Neurological: Patient denies active dizziness or loss of consciousness.  Skin: Patient denies any active bleeding.    Vital signs in last 24 hours:  Temp:  [98.2 °F (36.8 °C)] 98.2 °F (36.8 °C)  Heart Rate:  [] 86  Resp:  [17-18] 18  BP: (102-128)/(68-83) 102/68  Vitals:    10/07/21 1231 10/07/21 1325 10/07/21 1400 10/07/21 1500   BP:       BP Location:       Patient Position:       Pulse: 88 88 100 86   Resp:       Temp:       SpO2: 98% 95% 94% 96%   Weight:       Height:              Physical Exam: 89 y.o. female         General Appearance:  Alert, cooperative, in no acute distress    HEENT:    Atraumatic, Pupils are equal   Neck:   Cervical spine midline, no appreciable JVD   Lungs:     Breathing non-labored and chest rise symmetric    Heart:     Abdomen:     Rectal:       Extremities:   Pulses  Neurovascular:   Skin:   Musculoskeletal:      Pulse regular  Soft, Non-tender or distended    Deferred        No clubbing, cyanosis, or edema    Intact    Cranial nerves 2 - 12 grossly intact, sensation  intact    No skin lesions  Focused physical examination of the left lower extremity was performed.  Patient is resting comfortably in the hospital bed.  No acute distress.  No overlying skin changes.  She is nontender to palpation.  Her left lower extremity is shortened and externally rotated.  She has pain with logroll.  Compartments are soft.  She is neurovascularly intact distally.  EHL, FHL, TA, and GS are intact.  DP/TP are 2+.     Diagnostic Tests:    Results from last 7 days   Lab Units 10/07/21  0756   WBC 10*3/mm3 9.81   HEMOGLOBIN g/dL 11.3*   HEMATOCRIT % 34.6   PLATELETS 10*3/mm3 312     Results from last 7 days   Lab Units 10/07/21  0756   SODIUM mmol/L 138   POTASSIUM mmol/L 4.0   CHLORIDE mmol/L 97*   CO2 mmol/L 31.8*   BUN mg/dL 23   CREATININE mg/dL 0.93   GLUCOSE mg/dL 89   CALCIUM mg/dL 10.0*     Results from last 7 days   Lab Units 10/07/21  0756   INR  1.07   APTT seconds 27.2     Study Result    Narrative & Impression   TWO-VIEW LEFT HIP AND ONE-VIEW AP PELVIS     HISTORY: Fell. Left hip pain.     FINDINGS: There is a fracture through the neck of the femur with some  superior displacement of the femur relative to the femoral head.     This report was finalized on 10/7/2021 9:19 AM by Dr. Say Osorio M.D.     Study Result    Narrative & Impression   ONE-VIEW PORTABLE CHEST     HISTORY: Preop for hip surgery. Hip fracture. Hypertension.     FINDINGS: The lungs are well-expanded and clear. The heart is mildly  enlarged and no acute abnormality is seen.     This report was finalized on 10/7/2021 10:47 AM by Dr. Say Osorio M.D.     COVID-19, [facility] IN-HOUSE CEPHEID/HE NP SWAB IN TRANSPORT MEDIA 8-12 HR TAT - Swab, Nasopharynx  Order: [id number] - Part of Panel Order [id number]  Status:  Final result   Visible to patient:  No (not released) Next appt:  None  Specimen Information: Nasopharynx; Swab         0 Result Notes  Component   Ref Range & Units    COVID19   Not Detected - Ref. Range Not  Detected    Resulting Agency  [facility]      Narrative  Performed by:  [facility]  Fact sheet for providers: https://www.fda.gov/media/493965/download     Fact sheet for patients: https://www.fda.gov/media/731822/download     Test performed by PCR.      Specimen Collected: 10/07/21 10:01 Last Resulted: 10/07/21 10:35        Order Details      View Encounter      Lab and Collection Details      Routing      Result History             ECG 12 Lead  Order: [id number]  Status:  Final result   Visible to patient:  No (not released) Next appt:  None   0 Result Notes  Component   Ref Range & Units 10/7/21 0829   QT Interval   ms 293       Narrative & Impression    HEART RATE= 78  bpm  RR Interval= 631  ms  OH Interval=   ms  P Horizontal Axis=   deg  P Front Axis=   deg  QRSD Interval= 97  ms  QT Interval= 293  ms  QRS Axis= -44  deg  T Wave Axis= 123  deg  - ABNORMAL ECG -  Atrial fibrillation  Left axis deviation  Anteroseptal infarct, old  When compared with ECG of 28-Feb-2020 11:44:19,  Significant change in rhythm: previously sinus  Electronically Signed By: Jeevan Marino ([facility]) 07-Oct-2021 10:52:38  Date and Time of Study: 2021-10-07 08:29:02      Specimen Collected: 10/07/21 08:29 Last Resulted: 10/07/21 10:52         Lab Flowsheet      Order Details      View Encounter      Lab and Collection Details      Routing      Result History - Result Edited           Scans on Order [id number]    [facility]20[facility]                Assessment:  Patient Active Problem List   Diagnosis   • Anemia of chronic illness   • Hirsutism   • HLD (hyperlipidemia)   • Hypothyroidism   • Insomnia   • Osteoarthritis   • CKD (chronic kidney disease) stage 3, GFR 30-59 ml/min ([facility])   • Seborrheic keratosis   • Vitamin D deficiency   • Iron deficiency anemia due to chronic blood loss   • Irritable bowel syndrome   • Colitis   • Vomiting   • DNR (do not resuscitate)   • Intestinal obstruction due to colon  cancer   • Multiple joint pain   • Malignant neoplasm of ascending colon (HCC)   • Glaucoma associated with chamber angle anomalies, left, severe stage   • Autoimmune thyroiditis   • Generalized muscle weakness   • Anemia   • Unspecified intestinal obstruction, unspecified as to partial versus complete obstruction (HCC)   • Other microscopic colitis   • Hypothyroidism   • Insomnia, unspecified   • Other irritable bowel syndrome   • Arthritis   • Other abnormalities of gait and mobility   • Shortness of breath   • Chronic atrial fibrillation (HCC)   • Weakness   • Hypothyroidism   • Hypothyroidism, unspecified   • Arthritis   • Muscle wasting and atrophy, not elsewhere classified, multiple sites   • Polyarthritis, unspecified   • Left displaced femoral neck fracture (HCC)   • Chronic anticoagulation   • Hard of hearing   • History of colon cancer         Plan:      I did have an extensive discussion with the patient in regards to her situation in the hospital today.  Have reviewed the x-rays.  She has sustained a left femoral neck fracture status post mechanical fall.  Discussed with her treatment options.  Both nonoperative versus operative treatment options.  Discussed with her the increased risk of morbidity and mortality with nonoperative management.  She understands this.  She wishes to proceed with surgery.  Risk, benefits, and outcomes of left bipolar hemiarthroplasty were discussed.  She understands these risks.  She wishes to proceed.    Patient understands that the risks of surgery include but are not limited to heart attack, stroke, DVT, pulmonary embolism, infection, dislocation, injury to the blood vessels or nerves, leg length inequality, continued pain, need for future surgery, cardiac complications, death, and others.    She understands these risks.  She wishes to proceed.  Patient will be admitted to the hospital.  She has been seen by the hospitalist team.  We are planning for left bipolar  hemiarthroplasty tomorrow afternoon.  We will continue to hold the Eliquis in anticipation for surgery.  Continue SCDs for DVT prophylaxis.  She will remain n.p.o. after midnight.  Further recommendations to follow throughout her hospital course.  All questions answered.  All findings discussed my supervising physician Dr. Anthony Nelson.  He is in agreement with the plan.      Thank you very much for this consultation and allowing us to be a part of the patient's care.    Date: 10/7/2021  Ridge Caicedo PA-C

## 2021-10-07 NOTE — PROGRESS NOTES
Consult received, formal consult note to follow.  Patient with displaced left femoral neck fracture.  Will need operative intervention.  Currently on Eliquis.  Will plan on surgery tomorrow afternoon.    Please hold all chemical anticoagulation  Bed rest, nonweightbearing to the left lower extremity  SCDs for DVT prophylaxis.    Thank you for the consult    Anthony Nelson MD  Orthopaedic Surgeon    [facility]  [phone]

## 2021-10-08 ENCOUNTER — ANESTHESIA EVENT (OUTPATIENT)
Dept: PERIOP | Facility: HOSPITAL | Age: 86
End: 2021-10-08

## 2021-10-08 ENCOUNTER — APPOINTMENT (OUTPATIENT)
Dept: GENERAL RADIOLOGY | Facility: HOSPITAL | Age: 86
End: 2021-10-08

## 2021-10-08 ENCOUNTER — ANESTHESIA (OUTPATIENT)
Dept: PERIOP | Facility: HOSPITAL | Age: 86
End: 2021-10-08

## 2021-10-08 LAB
ANION GAP SERPL CALCULATED.3IONS-SCNC: 7.4 MMOL/L (ref 5–15)
BASOPHILS # BLD AUTO: 0.06 10*3/MM3 (ref 0–0.2)
BASOPHILS NFR BLD AUTO: 0.8 % (ref 0–1.5)
BUN SERPL-MCNC: 23 MG/DL (ref 8–23)
BUN/CREAT SERPL: 23.2 (ref 7–25)
CALCIUM SPEC-SCNC: 8.9 MG/DL (ref 8.2–9.6)
CHLORIDE SERPL-SCNC: 102 MMOL/L (ref 98–107)
CO2 SERPL-SCNC: 27.6 MMOL/L (ref 22–29)
CREAT SERPL-MCNC: 0.99 MG/DL (ref 0.57–1)
DEPRECATED RDW RBC AUTO: 42.8 FL (ref 37–54)
EOSINOPHIL # BLD AUTO: 0.39 10*3/MM3 (ref 0–0.4)
EOSINOPHIL NFR BLD AUTO: 5 % (ref 0.3–6.2)
ERYTHROCYTE [DISTWIDTH] IN BLOOD BY AUTOMATED COUNT: 12.6 % (ref 12.3–15.4)
GFR SERPL CREATININE-BSD FRML MDRD: 52 ML/MIN/1.73
GLUCOSE SERPL-MCNC: 76 MG/DL (ref 65–99)
HCT VFR BLD AUTO: 33.1 % (ref 34–46.6)
HGB BLD-MCNC: 10.8 G/DL (ref 12–15.9)
IMM GRANULOCYTES # BLD AUTO: 0.02 10*3/MM3 (ref 0–0.05)
IMM GRANULOCYTES NFR BLD AUTO: 0.3 % (ref 0–0.5)
LYMPHOCYTES # BLD AUTO: 2.17 10*3/MM3 (ref 0.7–3.1)
LYMPHOCYTES NFR BLD AUTO: 28 % (ref 19.6–45.3)
MAGNESIUM SERPL-MCNC: 2.3 MG/DL (ref 1.7–2.3)
MCH RBC QN AUTO: 30.4 PG (ref 26.6–33)
MCHC RBC AUTO-ENTMCNC: 32.6 G/DL (ref 31.5–35.7)
MCV RBC AUTO: 93.2 FL (ref 79–97)
MONOCYTES # BLD AUTO: 1.13 10*3/MM3 (ref 0.1–0.9)
MONOCYTES NFR BLD AUTO: 14.6 % (ref 5–12)
NEUTROPHILS NFR BLD AUTO: 3.97 10*3/MM3 (ref 1.7–7)
NEUTROPHILS NFR BLD AUTO: 51.3 % (ref 42.7–76)
NRBC BLD AUTO-RTO: 0 /100 WBC (ref 0–0.2)
PHOSPHATE SERPL-MCNC: 3.2 MG/DL (ref 2.5–4.5)
PLATELET # BLD AUTO: 261 10*3/MM3 (ref 140–450)
PMV BLD AUTO: 10 FL (ref 6–12)
POTASSIUM SERPL-SCNC: 4.2 MMOL/L (ref 3.5–5.2)
RBC # BLD AUTO: 3.55 10*6/MM3 (ref 3.77–5.28)
SODIUM SERPL-SCNC: 137 MMOL/L (ref 136–145)
WBC # BLD AUTO: 7.74 10*3/MM3 (ref 3.4–10.8)

## 2021-10-08 PROCEDURE — 25010000002 CLONIDINE PER 1 MG

## 2021-10-08 PROCEDURE — 25010000002 PROPOFOL 10 MG/ML EMULSION: Performed by: ANESTHESIOLOGY

## 2021-10-08 PROCEDURE — 25010000003 CEFAZOLIN IN DEXTROSE 2-4 GM/100ML-% SOLUTION: Performed by: ANESTHESIOLOGY

## 2021-10-08 PROCEDURE — 76000 FLUOROSCOPY <1 HR PHYS/QHP: CPT

## 2021-10-08 PROCEDURE — 84100 ASSAY OF PHOSPHORUS: CPT | Performed by: STUDENT IN AN ORGANIZED HEALTH CARE EDUCATION/TRAINING PROGRAM

## 2021-10-08 PROCEDURE — 25010000002 HYDROMORPHONE PER 4 MG: Performed by: ANESTHESIOLOGY

## 2021-10-08 PROCEDURE — C1776 JOINT DEVICE (IMPLANTABLE): HCPCS | Performed by: ORTHOPAEDIC SURGERY

## 2021-10-08 PROCEDURE — C1889 IMPLANT/INSERT DEVICE, NOC: HCPCS | Performed by: ORTHOPAEDIC SURGERY

## 2021-10-08 PROCEDURE — 63710000001 ONDANSETRON PER 8 MG: Performed by: ORTHOPAEDIC SURGERY

## 2021-10-08 PROCEDURE — 25010000002 EPINEPHRINE 1 MG/ML SOLUTION

## 2021-10-08 PROCEDURE — 80048 BASIC METABOLIC PNL TOTAL CA: CPT | Performed by: STUDENT IN AN ORGANIZED HEALTH CARE EDUCATION/TRAINING PROGRAM

## 2021-10-08 PROCEDURE — 36415 COLL VENOUS BLD VENIPUNCTURE: CPT | Performed by: STUDENT IN AN ORGANIZED HEALTH CARE EDUCATION/TRAINING PROGRAM

## 2021-10-08 PROCEDURE — 25010000002 ONDANSETRON PER 1 MG: Performed by: ANESTHESIOLOGY

## 2021-10-08 PROCEDURE — 25010000002 PHENYLEPHRINE 10 MG/ML SOLUTION: Performed by: NURSE ANESTHETIST, CERTIFIED REGISTERED

## 2021-10-08 PROCEDURE — 25010000002 ROPIVACAINE PER 1 MG

## 2021-10-08 PROCEDURE — 25010000002 MORPHINE PER 10 MG: Performed by: STUDENT IN AN ORGANIZED HEALTH CARE EDUCATION/TRAINING PROGRAM

## 2021-10-08 PROCEDURE — C1713 ANCHOR/SCREW BN/BN,TIS/BN: HCPCS | Performed by: ORTHOPAEDIC SURGERY

## 2021-10-08 PROCEDURE — 85025 COMPLETE CBC W/AUTO DIFF WBC: CPT | Performed by: STUDENT IN AN ORGANIZED HEALTH CARE EDUCATION/TRAINING PROGRAM

## 2021-10-08 PROCEDURE — 83735 ASSAY OF MAGNESIUM: CPT | Performed by: STUDENT IN AN ORGANIZED HEALTH CARE EDUCATION/TRAINING PROGRAM

## 2021-10-08 PROCEDURE — 73501 X-RAY EXAM HIP UNI 1 VIEW: CPT

## 2021-10-08 PROCEDURE — 25010000003 CEFAZOLIN IN DEXTROSE 2-4 GM/100ML-% SOLUTION: Performed by: ORTHOPAEDIC SURGERY

## 2021-10-08 PROCEDURE — 0SRS0J9 REPLACEMENT OF LEFT HIP JOINT, FEMORAL SURFACE WITH SYNTHETIC SUBSTITUTE, CEMENTED, OPEN APPROACH: ICD-10-PCS | Performed by: ORTHOPAEDIC SURGERY

## 2021-10-08 DEVICE — DEV CONTRL TISS STRATAFIX SYMM PDS PLUS VIL CT-1 45CM: Type: IMPLANTABLE DEVICE | Site: HIP | Status: FUNCTIONAL

## 2021-10-08 DEVICE — DEV WND/CLS CONTRL TISS STRATAFIX SPIRAL MNCRYL SH 2/0 20CM: Type: IMPLANTABLE DEVICE | Site: HIP | Status: FUNCTIONAL

## 2021-10-08 DEVICE — DEV CONTRL TISS STRATAFIXSPIRALMNCRYL PLSPS2 REV3/0 45CM: Type: IMPLANTABLE DEVICE | Site: HIP | Status: FUNCTIONAL

## 2021-10-08 DEVICE — CAP PRT HIP UNIPOLAR: Type: IMPLANTABLE DEVICE | Site: HIP | Status: FUNCTIONAL

## 2021-10-08 DEVICE — PALACOS® R IS A FAST-CURING, RADIOPAQUE, POLY(METHYL METHACRYLATE)-BASED BONE CEMENT.PALACOS ® R CONTAINS THE X-RAY CONTRAST MEDIUM ZIRCONIUM DIOXIDE. TO IMPROVE VISIBILITY IN THE SURGICAL FIELD PALACOS ® R HAS BEEN COLOURED WITH CHLOROPHYLL (E141). THE BONE CEMENT IS PREPARED DIRECTLY BEFORE USE BY MIXING A POLYMER POWDER COMPONENT WITH A LIQUID MONOMER COMPONENT. A DUCTILE DOUGH FORMS WHICH CURES WITHIN A FEW MINUTES.
Type: IMPLANTABLE DEVICE | Site: HIP | Status: FUNCTIONAL
Brand: PALACOS®

## 2021-10-08 DEVICE — IMPLANTABLE DEVICE
Type: IMPLANTABLE DEVICE | Site: HIP | Status: FUNCTIONAL
Brand: MULTIPOLAR®

## 2021-10-08 DEVICE — IMPLANTABLE DEVICE
Type: IMPLANTABLE DEVICE | Site: HIP | Status: FUNCTIONAL
Brand: AVENIR®

## 2021-10-08 DEVICE — IMPLANTABLE DEVICE: Type: IMPLANTABLE DEVICE | Site: HIP | Status: FUNCTIONAL

## 2021-10-08 RX ORDER — ONDANSETRON 2 MG/ML
INJECTION INTRAMUSCULAR; INTRAVENOUS AS NEEDED
Status: DISCONTINUED | OUTPATIENT
Start: 2021-10-08 | End: 2021-10-08 | Stop reason: SURG

## 2021-10-08 RX ORDER — ONDANSETRON 2 MG/ML
4 INJECTION INTRAMUSCULAR; INTRAVENOUS ONCE AS NEEDED
Status: DISCONTINUED | OUTPATIENT
Start: 2021-10-08 | End: 2021-10-08 | Stop reason: HOSPADM

## 2021-10-08 RX ORDER — FAMOTIDINE 10 MG/ML
20 INJECTION, SOLUTION INTRAVENOUS ONCE
Status: COMPLETED | OUTPATIENT
Start: 2021-10-08 | End: 2021-10-08

## 2021-10-08 RX ORDER — CEFAZOLIN SODIUM 2 G/100ML
2 INJECTION, SOLUTION INTRAVENOUS EVERY 12 HOURS
Status: COMPLETED | OUTPATIENT
Start: 2021-10-09 | End: 2021-10-10

## 2021-10-08 RX ORDER — FENTANYL CITRATE 50 UG/ML
50 INJECTION, SOLUTION INTRAMUSCULAR; INTRAVENOUS
Status: DISCONTINUED | OUTPATIENT
Start: 2021-10-08 | End: 2021-10-08 | Stop reason: HOSPADM

## 2021-10-08 RX ORDER — LIDOCAINE HYDROCHLORIDE 10 MG/ML
0.5 INJECTION, SOLUTION EPIDURAL; INFILTRATION; INTRACAUDAL; PERINEURAL ONCE AS NEEDED
Status: DISCONTINUED | OUTPATIENT
Start: 2021-10-08 | End: 2021-10-08 | Stop reason: HOSPADM

## 2021-10-08 RX ORDER — PHENYLEPHRINE HYDROCHLORIDE 10 MG/ML
INJECTION INTRAVENOUS AS NEEDED
Status: DISCONTINUED | OUTPATIENT
Start: 2021-10-08 | End: 2021-10-08 | Stop reason: SURG

## 2021-10-08 RX ORDER — SODIUM CHLORIDE, SODIUM LACTATE, POTASSIUM CHLORIDE, CALCIUM CHLORIDE 600; 310; 30; 20 MG/100ML; MG/100ML; MG/100ML; MG/100ML
9 INJECTION, SOLUTION INTRAVENOUS CONTINUOUS
Status: DISCONTINUED | OUTPATIENT
Start: 2021-10-08 | End: 2021-10-08

## 2021-10-08 RX ORDER — HYDROMORPHONE HYDROCHLORIDE 1 MG/ML
0.5 INJECTION, SOLUTION INTRAMUSCULAR; INTRAVENOUS; SUBCUTANEOUS
Status: DISCONTINUED | OUTPATIENT
Start: 2021-10-08 | End: 2021-10-08 | Stop reason: HOSPADM

## 2021-10-08 RX ORDER — LIDOCAINE HYDROCHLORIDE 20 MG/ML
INJECTION, SOLUTION INFILTRATION; PERINEURAL AS NEEDED
Status: DISCONTINUED | OUTPATIENT
Start: 2021-10-08 | End: 2021-10-08 | Stop reason: SURG

## 2021-10-08 RX ORDER — ROCURONIUM BROMIDE 10 MG/ML
INJECTION, SOLUTION INTRAVENOUS AS NEEDED
Status: DISCONTINUED | OUTPATIENT
Start: 2021-10-08 | End: 2021-10-08 | Stop reason: SURG

## 2021-10-08 RX ORDER — LABETALOL HYDROCHLORIDE 5 MG/ML
5 INJECTION, SOLUTION INTRAVENOUS
Status: DISCONTINUED | OUTPATIENT
Start: 2021-10-08 | End: 2021-10-08 | Stop reason: HOSPADM

## 2021-10-08 RX ORDER — FLUMAZENIL 0.1 MG/ML
0.2 INJECTION INTRAVENOUS AS NEEDED
Status: DISCONTINUED | OUTPATIENT
Start: 2021-10-08 | End: 2021-10-08 | Stop reason: HOSPADM

## 2021-10-08 RX ORDER — NALOXONE HCL 0.4 MG/ML
0.2 VIAL (ML) INJECTION AS NEEDED
Status: DISCONTINUED | OUTPATIENT
Start: 2021-10-08 | End: 2021-10-08 | Stop reason: HOSPADM

## 2021-10-08 RX ORDER — MAGNESIUM HYDROXIDE 1200 MG/15ML
LIQUID ORAL AS NEEDED
Status: DISCONTINUED | OUTPATIENT
Start: 2021-10-08 | End: 2021-10-08 | Stop reason: HOSPADM

## 2021-10-08 RX ORDER — SODIUM CHLORIDE 0.9 % (FLUSH) 0.9 %
3-10 SYRINGE (ML) INJECTION AS NEEDED
Status: DISCONTINUED | OUTPATIENT
Start: 2021-10-08 | End: 2021-10-08 | Stop reason: HOSPADM

## 2021-10-08 RX ORDER — ASPIRIN 81 MG/1
81 TABLET ORAL 2 TIMES DAILY
Status: DISCONTINUED | OUTPATIENT
Start: 2021-10-08 | End: 2021-10-09

## 2021-10-08 RX ORDER — PROPOFOL 10 MG/ML
VIAL (ML) INTRAVENOUS AS NEEDED
Status: DISCONTINUED | OUTPATIENT
Start: 2021-10-08 | End: 2021-10-08 | Stop reason: SURG

## 2021-10-08 RX ORDER — DIPHENHYDRAMINE HCL 25 MG
25 CAPSULE ORAL
Status: DISCONTINUED | OUTPATIENT
Start: 2021-10-08 | End: 2021-10-08 | Stop reason: HOSPADM

## 2021-10-08 RX ORDER — IBUPROFEN 600 MG/1
600 TABLET ORAL ONCE AS NEEDED
Status: DISCONTINUED | OUTPATIENT
Start: 2021-10-08 | End: 2021-10-08 | Stop reason: HOSPADM

## 2021-10-08 RX ORDER — TRANEXAMIC ACID 100 MG/ML
INJECTION, SOLUTION INTRAVENOUS AS NEEDED
Status: DISCONTINUED | OUTPATIENT
Start: 2021-10-08 | End: 2021-10-08 | Stop reason: SURG

## 2021-10-08 RX ORDER — DIPHENHYDRAMINE HYDROCHLORIDE 50 MG/ML
12.5 INJECTION INTRAMUSCULAR; INTRAVENOUS
Status: DISCONTINUED | OUTPATIENT
Start: 2021-10-08 | End: 2021-10-08 | Stop reason: HOSPADM

## 2021-10-08 RX ORDER — CEFAZOLIN SODIUM 2 G/100ML
INJECTION, SOLUTION INTRAVENOUS AS NEEDED
Status: DISCONTINUED | OUTPATIENT
Start: 2021-10-08 | End: 2021-10-08 | Stop reason: SURG

## 2021-10-08 RX ORDER — HYDROMORPHONE HCL 110MG/55ML
PATIENT CONTROLLED ANALGESIA SYRINGE INTRAVENOUS AS NEEDED
Status: DISCONTINUED | OUTPATIENT
Start: 2021-10-08 | End: 2021-10-08 | Stop reason: SURG

## 2021-10-08 RX ORDER — OXYCODONE AND ACETAMINOPHEN 10; 325 MG/1; MG/1
1 TABLET ORAL EVERY 4 HOURS PRN
Status: DISCONTINUED | OUTPATIENT
Start: 2021-10-08 | End: 2021-10-08 | Stop reason: HOSPADM

## 2021-10-08 RX ORDER — HYDRALAZINE HYDROCHLORIDE 20 MG/ML
5 INJECTION INTRAMUSCULAR; INTRAVENOUS
Status: DISCONTINUED | OUTPATIENT
Start: 2021-10-08 | End: 2021-10-08 | Stop reason: HOSPADM

## 2021-10-08 RX ORDER — HYDROCODONE BITARTRATE AND ACETAMINOPHEN 7.5; 325 MG/1; MG/1
1 TABLET ORAL ONCE AS NEEDED
Status: DISCONTINUED | OUTPATIENT
Start: 2021-10-08 | End: 2021-10-08 | Stop reason: HOSPADM

## 2021-10-08 RX ORDER — SODIUM CHLORIDE 0.9 % (FLUSH) 0.9 %
3 SYRINGE (ML) INJECTION EVERY 12 HOURS SCHEDULED
Status: DISCONTINUED | OUTPATIENT
Start: 2021-10-08 | End: 2021-10-08 | Stop reason: HOSPADM

## 2021-10-08 RX ORDER — PROMETHAZINE HYDROCHLORIDE 25 MG/1
25 TABLET ORAL ONCE AS NEEDED
Status: DISCONTINUED | OUTPATIENT
Start: 2021-10-08 | End: 2021-10-08 | Stop reason: HOSPADM

## 2021-10-08 RX ORDER — EPHEDRINE SULFATE 50 MG/ML
5 INJECTION, SOLUTION INTRAVENOUS ONCE AS NEEDED
Status: DISCONTINUED | OUTPATIENT
Start: 2021-10-08 | End: 2021-10-08 | Stop reason: HOSPADM

## 2021-10-08 RX ORDER — PROMETHAZINE HYDROCHLORIDE 25 MG/1
25 SUPPOSITORY RECTAL ONCE AS NEEDED
Status: DISCONTINUED | OUTPATIENT
Start: 2021-10-08 | End: 2021-10-08 | Stop reason: HOSPADM

## 2021-10-08 RX ADMIN — HYDROMORPHONE HYDROCHLORIDE 0.25 MG: 2 INJECTION, SOLUTION INTRAMUSCULAR; INTRAVENOUS; SUBCUTANEOUS at 16:23

## 2021-10-08 RX ADMIN — SODIUM CHLORIDE 75 ML/HR: 9 INJECTION, SOLUTION INTRAVENOUS at 21:12

## 2021-10-08 RX ADMIN — LIDOCAINE HYDROCHLORIDE 100 MG: 20 INJECTION, SOLUTION INFILTRATION; PERINEURAL at 16:23

## 2021-10-08 RX ADMIN — PROPOFOL 30 MG: 10 INJECTION, EMULSION INTRAVENOUS at 17:27

## 2021-10-08 RX ADMIN — PROPOFOL 100 MG: 10 INJECTION, EMULSION INTRAVENOUS at 16:23

## 2021-10-08 RX ADMIN — METOPROLOL TARTRATE 5 MG: 5 INJECTION, SOLUTION INTRAVENOUS at 16:23

## 2021-10-08 RX ADMIN — HYDROCODONE BITARTRATE AND ACETAMINOPHEN 1 TABLET: 5; 325 TABLET ORAL at 21:09

## 2021-10-08 RX ADMIN — ONDANSETRON HYDROCHLORIDE 4 MG: 4 TABLET, FILM COATED ORAL at 21:09

## 2021-10-08 RX ADMIN — ROCURONIUM BROMIDE 10 MG: 50 INJECTION INTRAVENOUS at 17:27

## 2021-10-08 RX ADMIN — SODIUM CHLORIDE, POTASSIUM CHLORIDE, SODIUM LACTATE AND CALCIUM CHLORIDE 9 ML/HR: 600; 310; 30; 20 INJECTION, SOLUTION INTRAVENOUS at 15:45

## 2021-10-08 RX ADMIN — CEFAZOLIN SODIUM 2 G: 2 INJECTION, SOLUTION INTRAVENOUS at 16:23

## 2021-10-08 RX ADMIN — FAMOTIDINE 20 MG: 10 INJECTION INTRAVENOUS at 15:44

## 2021-10-08 RX ADMIN — ASPIRIN 81 MG: 81 TABLET, COATED ORAL at 21:09

## 2021-10-08 RX ADMIN — MORPHINE SULFATE 1 MG: 2 INJECTION, SOLUTION INTRAMUSCULAR; INTRAVENOUS at 09:02

## 2021-10-08 RX ADMIN — PHENYLEPHRINE HYDROCHLORIDE 200 MCG: 10 INJECTION, SOLUTION INTRAVENOUS at 17:28

## 2021-10-08 RX ADMIN — SODIUM CHLORIDE 75 ML/HR: 9 INJECTION, SOLUTION INTRAVENOUS at 00:05

## 2021-10-08 RX ADMIN — SUGAMMADEX 200 MG: 100 INJECTION, SOLUTION INTRAVENOUS at 18:06

## 2021-10-08 RX ADMIN — CEFAZOLIN SODIUM 2 G: 2 INJECTION, SOLUTION INTRAVENOUS at 16:08

## 2021-10-08 RX ADMIN — HYDROMORPHONE HYDROCHLORIDE 0.25 MG: 2 INJECTION, SOLUTION INTRAMUSCULAR; INTRAVENOUS; SUBCUTANEOUS at 16:54

## 2021-10-08 RX ADMIN — TRANEXAMIC ACID 1000 MG: 1 INJECTION, SOLUTION INTRAVENOUS at 16:23

## 2021-10-08 RX ADMIN — ROCURONIUM BROMIDE 20 MG: 50 INJECTION INTRAVENOUS at 16:23

## 2021-10-08 RX ADMIN — HYDROCODONE BITARTRATE AND ACETAMINOPHEN 1 TABLET: 5; 325 TABLET ORAL at 00:05

## 2021-10-08 RX ADMIN — PHENYLEPHRINE HYDROCHLORIDE 200 MCG: 10 INJECTION, SOLUTION INTRAVENOUS at 17:43

## 2021-10-08 RX ADMIN — METOPROLOL TARTRATE 5 MG: 5 INJECTION, SOLUTION INTRAVENOUS at 16:33

## 2021-10-08 RX ADMIN — ONDANSETRON 2 MG: 2 INJECTION INTRAMUSCULAR; INTRAVENOUS at 16:59

## 2021-10-08 NOTE — CASE MANAGEMENT/SOCIAL WORK
Discharge Planning Assessment  [facility]     Patient Name: Kristyn Severino  MRN: [id number]  Today's Date: 10/8/2021    Admit Date: 10/7/2021    Discharge Needs Assessment     Row Name 10/08/21 1255       Living Environment    Lives With  alone    Unique Family Situation  Lives in an Independent Living apartment at [facility]    Current Living Arrangements  home/apartment/condo    Primary Care Provided by  self    Provides Primary Care For  no one, unable/limited ability to care for self    Family Caregiver if Needed  child(geronimo), adult    Quality of Family Relationships  helpful;involved;supportive    Able to Return to Prior Arrangements  yes       Resource/Environmental Concerns    Resource/Environmental Concerns  none       Transition Planning    Patient/Family Anticipates Transition to  inpatient rehabilitation facility    Patient/Family Anticipated Services at Transition  skilled nursing    Transportation Anticipated  other (see comments)       Discharge Needs Assessment    Readmission Within the Last 30 Days  no previous admission in last 30 days    Equipment Currently Used at Home  walker, rolling    Concerns to be Addressed  care coordination/care conferences;discharge planning;decision-making    Anticipated Changes Related to Illness  inability to care for self    Equipment Needed After Discharge  none    Discharge Facility/Level of Care Needs  nursing facility, skilled    Provided Post Acute Provider List?  Refused    Refused Provider List Comment  Pt requests to go to [facility] at D/C. States she has been there in the past    Provided Post Acute Provider Quality & Resource List?  Refused    Current Discharge Risk  lives alone        Discharge Plan     Row Name 10/08/21 1259       Plan    Plan  [facility]. Referral pending.    Patient/Family in Agreement with Plan  yes    Plan Comments  Met with pt. and her 2 daughters at bedside. Pt gave permission to speak with family  present. Explained roll of . Face sheet and pharmacy verified. Pt lives in an Independent Living apartment at [facility] with elevator access. DME equipment includes a walker that she states she uses at all times. Pt is independent with ADLs using her walker. [facility]. Pt [facility]C. States she has been there in the past. Declined Medicare Ratings stating she has used them before. Referral placed in [facility] and called [facility]. Pt’s PCP is Cary Espinal PA-C. Uses [facility] in [place]. Pt has a living will document on file but does not name a HCS. Pt having Left hip surgery today. Explained that CCP would follow to assess for discharge needs.  Paddy Meier RN-BC        Continued Care and Services - Admitted Since 10/7/2021     Destination     Service Provider Request Status Selected Services Address Phone Fax Patient [facility]  [facility] - Request Sent N/A [address] [address] [id number] [phone] --                Demographic Summary     Row Name 10/08/21 1255       General Information    Admission Type  inpatient    Arrived From  emergency department    Required Notices Provided  Important Message from Medicare    Reason for Consult  discharge planning;decision-making;care coordination/care conference    Preferred Language  English     Used During This Interaction  no        Functional Status     Row Name 10/08/21 1255       Functional Status    Usual Activity Tolerance  moderate    Current Activity Tolerance  poor       Functional Status, IADL    Medications  assistive equipment    Meal Preparation  assistive equipment    Housekeeping  assistive equipment    Laundry  assistive equipment    Shopping  assistive equipment       Mental Status    General Appearance WDL  WDL       Mental Status Summary    Recent Changes in Mental  Status/Cognitive Functioning  no changes                Paddy Meier, RN

## 2021-10-08 NOTE — PROGRESS NOTES
Name: Kristyn Severino ADMIT: 10/7/2021   : 1924  PCP: Cary Espinal PA-C    MRN: [id number] LOS: 1 days   AGE/SEX: 89 y.o. female  ROOM: E4/1     Subjective   Subjective   Resting comfortably in bed, she likes the pain medicine that she is receiving. Hungry, but n.p.o. for surgery later today. Very hard of hearing, but very pleasant. No new complaints  Denies chest pain, cough, abdominal pain    Review of Systems  As above     Objective   Objective   Vital Signs  Temp:  [97.7 °F (36.5 °C)-98.3 °F (36.8 °C)] 97.7 °F (36.5 °C)  Heart Rate:  [] 71  Resp:  [16-18] 18  BP: ()/(50-75) 102/56  SpO2:  [91 %-98 %] 92 %  on  Flow (L/min):  [2] 2;   Device (Oxygen Therapy): nasal cannula  Body mass index is 20.94 kg/m².  Physical Exam  Constitutional:       General: She is not in acute distress.     Appearance: She is not diaphoretic.      Comments: Frail   HENT:      Ears:      Comments: Very hard of hearing     Mouth/Throat:      Mouth: Mucous membranes are moist.   Cardiovascular:      Rate and Rhythm: Normal rate and regular rhythm.   Pulmonary:      Effort: Pulmonary effort is normal. No respiratory distress.      Breath sounds: No wheezing or rhonchi.   Abdominal:      Palpations: Abdomen is soft.      Tenderness: There is no abdominal tenderness. There is no guarding.   Musculoskeletal:      Comments: Left lower extremity externally rotated   Skin:     General: Skin is warm and dry.   Neurological:      Mental Status: She is alert.         Results Review     I reviewed the patient's new clinical results.  Results from last 7 days   Lab Units 10/08/21  0449 10/07/21  0756   WBC 10*3/mm3 7.74 9.81   HEMOGLOBIN g/dL 10.8* 11.3*   PLATELETS 10*3/mm3 261 312     Results from last 7 days   Lab Units 10/08/21  0449 10/07/21  0756   SODIUM mmol/L 137 138   POTASSIUM mmol/L 4.2 4.0   CHLORIDE mmol/L 102 97*   CO2 mmol/L 27.6 31.8*   BUN mg/dL 23 23   CREATININE mg/dL 0.99 0.93   GLUCOSE mg/dL 76 89    Estimated Creatinine Clearance: 28.4 mL/min (by C-G formula based on SCr of 0.99 mg/dL).  Results from last 7 days   Lab Units 10/07/21  0756   ALBUMIN g/dL 4.10   BILIRUBIN mg/dL 0.7   ALK PHOS U/L 113   AST (SGOT) U/L 63*   ALT (SGPT) U/L 27     Results from last 7 days   Lab Units 10/08/21  0449 10/07/21  0756   CALCIUM mg/dL 8.9 10.0*   ALBUMIN g/dL  --  4.10   MAGNESIUM mg/dL 2.3  --    PHOSPHORUS mg/dL 3.2  --        COVID19   Date Value Ref Range Status   10/07/2021 Not Detected Not Detected - Ref. Range Final     No results found for: HGBA1C, POCGLU    XR Chest 1 View  ONE-VIEW PORTABLE CHEST     HISTORY: Preop for hip surgery. Hip fracture. Hypertension.     FINDINGS: The lungs are well-expanded and clear. The heart is mildly  enlarged and no acute abnormality is seen.     This report was finalized on 10/7/2021 10:47 AM by Dr. Say Osorio M.D.     XR Hip With or Without Pelvis 2 - 3 View Left  TWO-VIEW LEFT HIP AND ONE-VIEW AP PELVIS     HISTORY: Fell. Left hip pain.     FINDINGS: There is a fracture through the neck of the femur with some  superior displacement of the femur relative to the femoral head.     This report was finalized on 10/7/2021 9:19 AM by Dr. Say Osorio M.D.       I reviewed the patient's daily medications.  Scheduled Medications  ceFAZolin, 2 g, Intravenous, On Call to OR  digoxin, 125 mcg, Oral, Daily  levothyroxine, 75 mcg, Oral, Q AM  metoprolol tartrate, 37.5 mg, Oral, Daily  senna-docusate sodium, 2 tablet, Oral, Daily  sodium chloride, 10 mL, Intravenous, Q12H    Infusions  sodium chloride, 75 mL/hr, Last Rate: 75 mL/hr (10/08/21 0005)    Diet  NPO Diet       Assessment/Plan     Active Hospital Problems    Diagnosis  POA   • **Left displaced femoral neck fracture (HCC) [S72.002A]  Yes   • Chronic anticoagulation [Z79.01]  Not Applicable   • Hard of hearing [H91.90]  Yes   • History of colon cancer [Z85.038]  Yes   • Hypothyroidism [E03.9]  Yes   • Chronic atrial  fibrillation (HCC) [I48.20]  Yes   • DNR (do not resuscitate) [Z66]  Yes   • Irritable bowel syndrome [K58.9]  Yes   • HLD (hyperlipidemia) [E78.5]  Yes   • Anemia of chronic illness [D63.8]  Yes   • CKD (chronic kidney disease) stage 3, GFR 30-59 ml/min (HCC) [N18.30]  Yes      Resolved Hospital Problems   No resolved problems to display.       89-year-old female with history of A. fib who presents after mechanical fall resulting in left hip fracture.    Left hip fracture  -OR today with Ortho. Holding home Eliquis. RCRI is 1 suggesting risk of major cardiac event is 0.9%  -Physical therapy postop. Pain control    Chronic atrial fibrillation  -Continue home digoxin and metoprolol. Holding home Eliquis    CKD 3  -Stable at baseline    Hypothyroid  -Resume home Synthroid    Chronic normocytic anemia  -Stable at baseline    · Holding DVT prophylaxis  · DNR.  · Discussed with patient.  · Anticipate discharge to SNU facility in 2-3 days.      Rylee Contreras DO  [facility]  10/08/21  11:22 EDT    Patient was wearing facemask when I entered the room and throughout our encounter.  I wore protective equipment throughout this patient encounter including a face mask, gloves and protective eyewear.  Hand hygiene was performed before donning protective equipment and after removal when leaving the room.

## 2021-10-08 NOTE — PLAN OF CARE
Goal Outcome Evaluation:  Plan of Care Reviewed With: patient   Progress: improving  Outcome Summary: Vss. NPO during shift, scheduled for Sx at 330pm , Pt C/o pain x1 PRN pain med give, IVF going, patient resting through day. Will continue to monitor.

## 2021-10-08 NOTE — ANESTHESIA PREPROCEDURE EVALUATION
" Anesthesia Evaluation     Patient summary reviewed and Nursing notes reviewed   no history of anesthetic complications:  NPO Solid Status: > 8 hours  NPO Liquid Status: > 2 hours           Airway   Mallampati: II  Dental - normal exam     Pulmonary    (+) shortness of breath,   Cardiovascular   Exercise tolerance: good (4-7 METS)    ECG reviewed  PT is on anticoagulation therapy  Rhythm: irregular    (+) dysrhythmias Atrial Fib, PALACIOS, hyperlipidemia,     ROS comment: Atrial fibrillation  Left axis deviation  Anteroseptal infarct, old  When compared with ECG of 28-Feb-2020 11:44:19,    Neuro/Psych  (+) weakness,     GI/Hepatic/Renal/Endo    (+)   renal disease CRI, thyroid problem hypothyroidism    ROS Comment: ibs      Musculoskeletal     Abdominal    Substance History - negative use     OB/GYN negative ob/gyn ROS         Other   arthritis,    history of cancer                    Anesthesia Plan    ASA 3     general   (/80 (BP Location: Right arm, Patient Position: Lying)   Pulse 95   Temp 37.3 °C (99.1 °F) (Oral)   Resp 24   Ht 162.6 cm (64\")   Wt 55.3 kg (122 lb)   LMP  (LMP Unknown)   SpO2 93%   BMI 20.94 kg/m²     I have reviewed the patient's history with the patient and the chart, including all pertinent laboratory results and imaging. I have explained the risks of anesthesia including but not limited to dental damage, corneal abrasion, nerve injury, MI, stroke, and death.    )  intravenous induction     Anesthetic plan, all risks, benefits, and alternatives have been provided, discussed and informed consent has been obtained with: patient.      "

## 2021-10-08 NOTE — CASE MANAGEMENT/SOCIAL WORK
Continued Stay Note  Psychiatric     Patient Name: Kristyn Severino  MRN: [id number]  Today's Date: 10/8/2021    Admit Date: 10/7/2021    Discharge Plan     Row Name 10/08/21 1259       Plan    Plan  [facility]. Referral pending.    Patient/Family in Agreement with Plan  yes    Plan Comments  Met with pt. and her 2 daughters at bedside. Pt gave permission to speak with family present. Explained roll of . Face sheet and pharmacy verified. Pt lives in an Independent Living apartment at [facility] with elevator access. DME equipment includes a walker that she states she uses at all times. Pt is independent with ADLs using her walker. [facility]. Pt requests to go to [facility] at D/C. States she has been there in the past. Declined Medicare Ratings stating she has used them before. Referral placed in [facility] and called [facility]. Pt’s PCP is Cary Espinal PA-C. Uses [facility] in [place]. Pt has a living will document on file but does not name a [facility]. Pt having Left hip surgery today. Explained that CCP would follow to assess for discharge needs.  Paddy Meier RN-BC        Discharge Codes    No documentation.             Paddy Meier RN

## 2021-10-08 NOTE — ANESTHESIA PROCEDURE NOTES
Airway  Urgency: elective    Date/Time: 10/8/2021 4:27 PM  Airway not difficult    General Information and Staff    Patient location during procedure: OR  Anesthesiologist: Marty Carter MD    Indications and Patient Condition  Indications for airway management: airway protection    Preoxygenated: yes  Mask difficulty assessment: 1 - vent by mask    Final Airway Details  Final airway type: endotracheal airway      Successful airway: ETT  Cuffed: yes   Successful intubation technique: direct laryngoscopy  Facilitating devices/methods: anterior pressure/BURP  Blade: Dianne  Blade size: 3  ETT size (mm): 7.5  Cormack-Lehane Classification: grade IIb - view of arytenoids or posterior of glottis only  Placement verified by: chest auscultation and capnometry   Measured from: lips  ETT/EBT  to lips (cm): 21  Number of attempts at approach: 1  Assessment: atraumatic intubation

## 2021-10-08 NOTE — PROGRESS NOTES
Orthopedic Progress Note    Subjective :   Patient seen and examined today.  Patient is resting comfortably in hospital bed.  Patient states pain is controlled.  Patient is currently n.p.o.  Patient is scheduled for a left hip hemiarthroplasty due to a left femoral neck fracture, today with Dr. Anthony Nelson.    Objective :    Vital signs in last 24 hours:  Temp:  [97.7 °F (36.5 °C)-98.3 °F (36.8 °C)] 97.7 °F (36.5 °C)  Heart Rate:  [] 71  Resp:  [16-18] 18  BP: ()/(50-75) 102/56  Vitals:    10/07/21 1837 10/07/21 2016 10/08/21 0003 10/08/21 0732   BP: 102/50 96/69 114/67 102/56   BP Location: Left arm Left arm Left arm Left arm   Patient Position: Lying Lying Sitting Lying   Pulse: 78 64 67 71   Resp: 16 18 18 18   Temp: 98.3 °F (36.8 °C) 98.3 °F (36.8 °C) 98.2 °F (36.8 °C) 97.7 °F (36.5 °C)   TempSrc: Oral Oral Oral Oral   SpO2: 95% 92% 91% 92%   Weight:       Height:           PHYSICAL EXAM:  Patient is calm, in no acute distress, awake and oriented x 3.  No appreciable skin lesions, no signs of infection.  Swelling is appropriate.  Ecchymosis is appropriate in amount.  Left leg slightly shorter with external rotation.  Positive logroll.  Homans test is negative.  Intact EHL, FHL, TA, GS.  Compartments are soft.  Foot is warm and well-perfused.  Patient is neurovascularly intact distally.  2+ dorsal pedal pulse.  2+ posterior tibialis pulse.    LABS:  Results from last 7 days   Lab Units 10/08/21  0449   WBC 10*3/mm3 7.74   HEMOGLOBIN g/dL 10.8*   HEMATOCRIT % 33.1*   PLATELETS 10*3/mm3 261     Results from last 7 days   Lab Units 10/08/21  0449   SODIUM mmol/L 137   POTASSIUM mmol/L 4.2   CHLORIDE mmol/L 102   CO2 mmol/L 27.6   BUN mg/dL 23   CREATININE mg/dL 0.99   GLUCOSE mg/dL 76   CALCIUM mg/dL 8.9     Results from last 7 days   Lab Units 10/07/21  0756   INR  1.07   APTT seconds 27.2       ASSESSMENT:  Left femoral neck fracture.    Plan:  Patient is scheduled for a left hip hemiarthroplasty  due to a left femoral neck fracture, today with Dr. Anthony Nelson.  Remain n.p.o.  Continue to hold Eliquis.  Currently nonweightbearing to the left lower extremity.  Continue SCDs for DVT prophylaxis.  I discussed the left hip bipolar hemiarthroplasty surgical standard risks and specific risks that may consist of bleeding, infection, blood clot, pulmonary embolism, nonunion, malunion, injury to nerves or vessels, weakness, numbness, pain, hardware failure, breakage, loosening, compartment syndrome, loss of function, arthritis, need for additional procedures, limp, cosmetic deformity, leg length discrepancy, reflex sympathetic dystrophy, stiffness and even death.  Patient understands and wishes to proceed.  We will continue to follow postoperatively.    Radha Villarreal PA-C    Date: 10/8/2021  Time: 07:40 EDT

## 2021-10-08 NOTE — ANESTHESIA POSTPROCEDURE EVALUATION
Patient: Kristyn Severino    Procedure Summary     Date: 10/08/21 Room / Location: [facility] OR  / [facility] MAIN OR    Anesthesia Start: 1616 Anesthesia Stop: 1817    Procedure: LEFT HIP ROGELIO ARTHROPLASTY (Left Hip) Diagnosis:     Surgeons: Anthony Nelson MD Provider: Marty Carter MD    Anesthesia Type: general ASA Status: 3          Anesthesia Type: general    Vitals  Vitals Value Taken Time   /67 10/08/21 1931   Temp 37.3 °C (99.1 °F) 10/08/21 1814   Pulse 85 10/08/21 1936   Resp 16 10/08/21 1900   SpO2 92 % 10/08/21 1936   Vitals shown include unvalidated device data.        Post Anesthesia Care and Evaluation    Patient location during evaluation: PACU  Patient participation: complete - patient participated  Level of consciousness: awake  Pain score: 2  Pain management: adequate  Airway patency: patent  Anesthetic complications: No anesthetic complications  PONV Status: none  Cardiovascular status: acceptable  Respiratory status: acceptable  Hydration status: acceptable

## 2021-10-08 NOTE — OP NOTE
Left hip hemiarthroplasty    Kristyn Severino  10/8/2021    Pre-op Diagnosis: Left displaced femoral neck fracture  Post-op Diagnosis: Same  Procedure: Left hip hemiarthroplasty anterior approach for displaced femoral neck fracture CPT code 94456  Surgeon:  Anthony Nelson MD  Anesthesia: General, Anesthesiologist: Marty Carter MD; Pradip Holt MD  CRNA: Erin Vogel CRNA  Staff: Circulator: Elizabeth Travis RN; Rachel Lujan RN  Radiology Technologist: Abigail Olivares; Renee Jacobo  Scrub Person: Thai Xavier; Brianna Todd  Vendor Representative: Tray Voss  Orderly: Leeroy Ventura CFA    Assistant: Phil Briseno NP, [facility]  The services of a first assist were necessary for performing the procedure safely and expeditiously.  The first assist was present for the entire duration of the case and helped with positioning, retraction and closure of the incision.    Estimated Blood Loss: 100ml  Specimens: * No orders in the log *  Drains: none  Complications: None    Components Utilized:    Implant Name Type Inv. Item Serial No.  Lot No. LRB No. Used Action   DEV WND/CLS CONTRL TISS STRATAFIX SPIRAL MNCRYL SH 2/0 20CM - [id number] Implant DEV WND/CLS CONTRL TISS STRATAFIX SPIRAL MNCRYL SH 2/0 20CM  [employer]  [employer]  Left 1 Implanted   DEV CONTRL TISS STRATAFIX SYMM PDS PLUS JACQUELINE CT-1 45CM - [id number] Implant DEV CONTRL TISS STRATAFIX SYMM PDS PLUS JACQUELINE CT-1 45CM  [employer]  [employer]  Left 1 Implanted   DEV CONTRL TISS STRATAFIXSPIRALMNCRYL PLSPS2 REV3/0 45CM - [id number] Implant DEV CONTRL TISS STRATAFIXSPIRALMNCRYL PLSPS2 REV3/0 45CM  [employer]  DIV [employer] J  Left 1 Implanted   CMT BONE PALACOS R HI/VISC 1X40 - [id number] Implant CMT BONE PALACOS R HI/VISC 1X40  [facility] [id number] Left 1 Implanted   CMT BONE PALACOS R HI/VISC 1X40 - [id number] Implant CMT BONE PALACOS R HI/VISC 1X40  [facility] [id number] Left 1 Implanted   STEM FEM/HIP AVENIR  CMT LAT SZ4 - [id number] Implant STEM FEM/HIP AVENIR CMT LAT SZ4  [employer] [id number] Left 1 Implanted   SHLL VERSYS M/BIPOL MTL OD 48MM - [id number] Implant SHLL VERSYS M/BIPOL MTL OD 48MM  [employer] [id number] Left 1 Implanted   HD FEM COCR UNIV 3.5MIN 28MM - [id number] Implant HD FEM COCR UNIV 3.5MIN 28MM  [employer] [id number] Left 1 Implanted   LINER VERSYS ASMBL POLY 47/48/49MM OD X28MM - [id number] Implant LINER VERSYS ASMBL POLY 47/48/49MM OD X28MM  [employer] [id number] Left 1 Implanted   DEV CONTRL TISS STRATAFIX SYMM PDS PLUS JACQUELINE CT-1 45CM - [id number] Implant DEV CONTRL TISS STRATAFIX SYMM PDS PLUS JACQUELINE CT-1 45CM  [employer]  DIV OF J AND J [id number] Left 1 Implanted       Indication for Procedure:  This patient is a 89 y.o. female who lives independently in her own apartment in an assisted living community.  She had a mechanical fall.  Radiographs demonstrated a displaced left femoral neck fracture.  Patient was anticoagulated on Eliquis.  Surgical options and nonsurgical options were discussed in detail and to the patient's satisfaction.  Surgical intervention was recommended based on the patient's injury and functional status.  We did advise the patient that we would need to wait 36 hours for Eliquis reversal prior to operative intervention.    The risks and benefits of surgery were discussed with patient and informed consent was obtained.  Risks include but are not limited to, infection, bleeding, nerve injury, blood clots, risks associated with anesthesia, need for further surgery, persistent pain, leg length discrepancy, instability of the hip, fracture and possibly death.    Protocols for intravenous antibiotics and venous thrombosis were followed for this patient.  IV antibiotics were infused prior to surgery and will be discontinued within 24 hours of completion of the surgical procedure.       DESCRIPTION OF PROCEDURE:     The patient was seen in Preoperative Holding Area where their surgical  site was marked. Preoperative antibiotics were received. H&P and consent updated. They were taken to the Operating Room and provided general anesthesia on the Operating Room table.  Patient was then placed on the [facility] operating table.  Traction boots were applied.  The perineal post was placed.  All bony prominences were well-padded.  The extremity was prepped and draped in usual sterile fashion using alcohol followed by ChloraPrep.  At this point the legs were set in neutral rotation in neutral flexion.  I applied gentle traction on the operative extremity to restore the normal hip anatomy.  I then draped the surgical site using the shower curtain as well as towels and ancillary drapes.  At this point a formal surgical timeout was carried out by all members of the team confirming correct patient edification, procedure be performed as well as antibiotic status.  The patient received Ancef perioperatively. Tranexamic acid was also administered.  Next the anterior superior iliac spine was marked out incision was made extending from about 2 fingerbreadths distal and 2 fingerbreadths lateral from the anterior superior iliac spine in a distal longitudinal fashion.  I elevated full-thickness flaps medially and laterally until I found the fascia over the tensor fascia sedrick muscle belly.  I then sharply incised this and I retracted the muscle belly laterally.  I then identified the superficial fascia of the rectus femoris.  I released this all the way proximal to the pelvis and the reflected head of the rectus.  I reflected the rectus medially.  I then identified the floor of the rectus femoris I incised this using Bovie electrocautery and then used a Florentino elevator to dissect this tissue further and identified the descending branch of the lateral femoral circumflex vessels.  These were ligated using 2-0 silk sutures and then bipolar electrocautery.  I then identified the iliocapsularis and elevated this off the anterior hip  capsule and retracted it medially.  And then placed extracapsular retractors superiorly and inferiorly.  I then made an L-shaped capsulotomy and reflected the anterior hip capsule superiorly to the level of the intertrochanteric tubercle and saddle.  I placed my retractors intracapsularly.  I then compared my preoperative radiographs and templating to my intraoperative findings and marked out my femoral neck osteotomy level.  This was distal to the level of the fracture.  I then used a sagittal saw to make this cut followed by reciprocating saw to make the counter cut.  I then remove the femoral head using the Trident spike as well as a Unique bone clamp.  I then directed my attention towards the pubo-femoral ligament.  This was released off the medial calcar back to the level of the lesser trochanter.  I then sized the acetabulum at a size 48 which had excellent fill and was all the way down on bone which I assessed with a tonsil.  I then placed the femoral elevator proximal to the gluteus kelly insertion.  I delivered the femur through a posterior capsular rent that was made in the ischiofemoral ligament.  The piriformis was reflected over the top of the greater trochanter.  The conjoined tendon was also reflected over the greater trochanter.  The obturator externus was left intact.  The trochanter was then delivered through the capsular rent.  The femur was externally rotated.  I then used a box osteotome to open the femur.  I used a rongeur to debride more lateral bone.  I then used opening rasp and then sequentially broached the femur up to a size 4 which had good stability.  I placed a standard offset, 0 neck based upon preoperative templating. I then reduced the hip I assessed stability at 90 degrees of external rotation with lateral traction with a bone hook and the hip was very stable.  I then used the image intensifier to compare my leg lengths.  At this point, I did feel she was slightly long and I had  not quite restored her native offset on the contralateral side so I did retrial with a -3.5 mm head ball with a high offset neck this was very stable with 90 degrees of lateral traction with no subluxation and leg length was even. I was satisfied with the sizing of the broach.  Lateral imaging was also taken and confirmed appropriate sizing of the broach.  I then dislocated the hip I delivered the femur again and assessed my broach for stability.    She did have decent stability however given the patient's osteopenia and age I elected to cement.  I then removed the broach I placed a cement restrictor distally I then irrigated the canal using pulsatile saline lavage the canal was then dried using suction followed by vaginal packing.  Cement was prepared on the back table in the gun.  The cement was then pressurized in the canal using digital pressurization.  After a few minutes of past I then placed the implant gently into the canal, pain careful attention to maintain my proper neck length as well as version.  I held the  in place while the cement fully hardened on the back table.  Once the cement had fully cured I again re-trialed.  I again assessessed stability at 90 degrees of external rotation with lateral traction with a bone hook and the hip was very stable.  Length felt appropriate.  I used the image intensifier to compare leg lengths using an interischiall line.  I was satisfied that leg lengths were appropriate.  Offset have been restored.  The head was appropriately sized.    The femoral component was fitting the canal nicely.  I again visualized this on the lateral view was satisfied.  The hip was again dislocated the femur was delivered and definitive bipolar head was impacted on the trunnion.  The acetabulum was then irrigated and cleansed of any residual bony debris or blood.  The hip was dislocated stability was again assessed and the hip was very stable.  Final AP and lateral imaging was taken  and this concluded the procedure.  Next the wound was irrigated using copious sterile saline by pulsatile saline lavage.  I then placed periarticular injection into the tensor muscle belly as well as rectus and iliocapsularis.  The fascia was closed using a #1 Stratafix suture.  The deep layer was closed using 0 Vicryl suture followed by 2-0 Vicryl suture in interrupted inverted fashion followed by 3-0 Monocryl strata fix suture the subcuticular layer and Dermabond.  A sterile silver-impregnated occlusive dressing was applied.  All counts are correct at the end the procedure.  The patient was transferred back to the hospital bed in the postoperative care unit in satisfactory condition     Postoperative Plan:  Protocols for intravenous antibiotics and venous thrombosis were followed for this patient.  IV antibiotics were infused prior to surgery and will be discontinued within 24 hours of completion of the surgical procedure.      Patient received routine Ancef for postoperative antibiotic prophylaxis  She will receive aspirin 81 mg twice daily for postoperative DVT prophylaxis.  She will be weightbearing as tolerated to the left lower extremity and will receive physical therapy and Occupational Therapy.     Anthony Nelson MD  Orthopaedic Surgeon    [facility]  [phone]

## 2021-10-08 NOTE — PROGRESS NOTES
I did have a conversation with the patient and Dr Carter from anesthesia.  The patient clearly confirms her wishes to remain DNR status throughout her time in the OR today.    Anthony Nelson MD  Orthopaedic Surgeon    [facility]  [phone]

## 2021-10-08 NOTE — PLAN OF CARE
Goal Outcome Evaluation:  Plan of Care Reviewed With: patient     Progress: no change  Outcome Summary: VSS. NPO after midnight. Turned q2hrs. To go for surgery this afternoon, consent signed and in the chart. NS infusing @ 100 mL/hr. Will continue to monitor.

## 2021-10-08 NOTE — DISCHARGE PLACEMENT REQUEST
"Kristyn Coreas (89 y.o. Female)     Date of Birth Social Security Number Address Home Phone MRN    09/07/1924  [id number]  [place] [id number] [phone] [id number]    Tenriism Marital Status          None        Admission Date Admission Type Admitting Provider Attending Provider Department, Room/Bed    10/7/21 Emergency Rylee Contreras DO George, Katherine, DO [address], [address]/1    Discharge Date Discharge Disposition Discharge Destination                       Attending Provider: Rylee Contreras DO    Allergies: No Known Allergies    Isolation: None   Infection: None   Code Status: No CPR    Ht: 162.6 cm (64\")   Wt: 55.3 kg (122 lb)    Admission Cmt: None   Principal Problem: Left displaced femoral neck fracture (HCC) [S72.002A]                 Active Insurance as of 10/7/2021     Primary Coverage     Payor Plan Insurance Group Employer/Plan Group    MEDICARE MEDICARE A & B      Payor Plan Address Payor Plan Phone Number Payor Plan Fax Number Effective Dates    [address] [phone]  9/1/1989 - None Entered    [facility] [address]       Subscriber Name Subscriber Birth Date Member ID       KRISTYN COREAS 9/7/1924 [id number]           Secondary Coverage     Payor Plan Insurance Group Employer/Plan Group    ANTHEM BLUE CROSS ANTHEM BLUE CROSS BLUE SHIELD PPO [id number]     Payor Plan Address Payor Plan Phone Number Payor Plan Fax Number Effective Dates    [address] [phone]  1/1/2012 - None Entered    [facility] [id number]       Subscriber Name Subscriber Birth Date Member ID       KRISTYN COREAS 9/7/1924 [id number]                 Emergency Contacts      (Rel.) Home Phone Work Phone Mobile Phone    Kya Dunn (Daughter) [phone] -- [phone]    MaycolRosa (Daughter) [phone] -- --              "

## 2021-10-09 PROBLEM — D72.829 LEUKOCYTOSIS: Status: ACTIVE | Noted: 2021-10-09

## 2021-10-09 PROBLEM — D62 POSTOPERATIVE ANEMIA DUE TO ACUTE BLOOD LOSS: Status: ACTIVE | Noted: 2021-10-09

## 2021-10-09 LAB
ANION GAP SERPL CALCULATED.3IONS-SCNC: 11.4 MMOL/L (ref 5–15)
BASOPHILS # BLD AUTO: 0.05 10*3/MM3 (ref 0–0.2)
BASOPHILS NFR BLD AUTO: 0.3 % (ref 0–1.5)
BUN SERPL-MCNC: 21 MG/DL (ref 8–23)
BUN/CREAT SERPL: 26.9 (ref 7–25)
CALCIUM SPEC-SCNC: 8.9 MG/DL (ref 8.2–9.6)
CHLORIDE SERPL-SCNC: 101 MMOL/L (ref 98–107)
CO2 SERPL-SCNC: 23.6 MMOL/L (ref 22–29)
CREAT SERPL-MCNC: 0.78 MG/DL (ref 0.57–1)
DEPRECATED RDW RBC AUTO: 43 FL (ref 37–54)
EOSINOPHIL # BLD AUTO: 0 10*3/MM3 (ref 0–0.4)
EOSINOPHIL NFR BLD AUTO: 0 % (ref 0.3–6.2)
ERYTHROCYTE [DISTWIDTH] IN BLOOD BY AUTOMATED COUNT: 13 % (ref 12.3–15.4)
GFR SERPL CREATININE-BSD FRML MDRD: 68 ML/MIN/1.73
GLUCOSE SERPL-MCNC: 102 MG/DL (ref 65–99)
HCT VFR BLD AUTO: 32.4 % (ref 34–46.6)
HGB BLD-MCNC: 10.9 G/DL (ref 12–15.9)
IMM GRANULOCYTES # BLD AUTO: 0.1 10*3/MM3 (ref 0–0.05)
IMM GRANULOCYTES NFR BLD AUTO: 0.6 % (ref 0–0.5)
LYMPHOCYTES # BLD AUTO: 1.58 10*3/MM3 (ref 0.7–3.1)
LYMPHOCYTES NFR BLD AUTO: 8.8 % (ref 19.6–45.3)
MAGNESIUM SERPL-MCNC: 2.1 MG/DL (ref 1.7–2.3)
MCH RBC QN AUTO: 31.1 PG (ref 26.6–33)
MCHC RBC AUTO-ENTMCNC: 33.6 G/DL (ref 31.5–35.7)
MCV RBC AUTO: 92.3 FL (ref 79–97)
MONOCYTES # BLD AUTO: 2.22 10*3/MM3 (ref 0.1–0.9)
MONOCYTES NFR BLD AUTO: 12.4 % (ref 5–12)
NEUTROPHILS NFR BLD AUTO: 14.01 10*3/MM3 (ref 1.7–7)
NEUTROPHILS NFR BLD AUTO: 77.9 % (ref 42.7–76)
NRBC BLD AUTO-RTO: 0.1 /100 WBC (ref 0–0.2)
PHOSPHATE SERPL-MCNC: 3.1 MG/DL (ref 2.5–4.5)
PLATELET # BLD AUTO: 243 10*3/MM3 (ref 140–450)
PMV BLD AUTO: 10.4 FL (ref 6–12)
POTASSIUM SERPL-SCNC: 4.5 MMOL/L (ref 3.5–5.2)
RBC # BLD AUTO: 3.51 10*6/MM3 (ref 3.77–5.28)
SODIUM SERPL-SCNC: 136 MMOL/L (ref 136–145)
WBC # BLD AUTO: 17.96 10*3/MM3 (ref 3.4–10.8)

## 2021-10-09 PROCEDURE — 84100 ASSAY OF PHOSPHORUS: CPT | Performed by: ORTHOPAEDIC SURGERY

## 2021-10-09 PROCEDURE — 97110 THERAPEUTIC EXERCISES: CPT | Performed by: PHYSICAL THERAPIST

## 2021-10-09 PROCEDURE — 97161 PT EVAL LOW COMPLEX 20 MIN: CPT | Performed by: PHYSICAL THERAPIST

## 2021-10-09 PROCEDURE — 25010000003 CEFAZOLIN IN DEXTROSE 2-4 GM/100ML-% SOLUTION: Performed by: ORTHOPAEDIC SURGERY

## 2021-10-09 PROCEDURE — 80048 BASIC METABOLIC PNL TOTAL CA: CPT | Performed by: ORTHOPAEDIC SURGERY

## 2021-10-09 PROCEDURE — 85025 COMPLETE CBC W/AUTO DIFF WBC: CPT | Performed by: ORTHOPAEDIC SURGERY

## 2021-10-09 PROCEDURE — 83735 ASSAY OF MAGNESIUM: CPT | Performed by: ORTHOPAEDIC SURGERY

## 2021-10-09 PROCEDURE — 97530 THERAPEUTIC ACTIVITIES: CPT | Performed by: PHYSICAL THERAPIST

## 2021-10-09 RX ADMIN — SODIUM CHLORIDE, PRESERVATIVE FREE 10 ML: 5 INJECTION INTRAVENOUS at 01:02

## 2021-10-09 RX ADMIN — HYDROCODONE BITARTRATE AND ACETAMINOPHEN 1 TABLET: 5; 325 TABLET ORAL at 05:31

## 2021-10-09 RX ADMIN — DOCUSATE SODIUM 50MG AND SENNOSIDES 8.6MG 2 TABLET: 8.6; 5 TABLET, FILM COATED ORAL at 08:40

## 2021-10-09 RX ADMIN — APIXABAN 2.5 MG: 2.5 TABLET, FILM COATED ORAL at 21:47

## 2021-10-09 RX ADMIN — SODIUM CHLORIDE, PRESERVATIVE FREE 10 ML: 5 INJECTION INTRAVENOUS at 21:47

## 2021-10-09 RX ADMIN — ASPIRIN 81 MG: 81 TABLET, COATED ORAL at 08:39

## 2021-10-09 RX ADMIN — LEVOTHYROXINE SODIUM 75 MCG: 0.07 TABLET ORAL at 05:31

## 2021-10-09 RX ADMIN — DIGOXIN 125 MCG: 125 TABLET ORAL at 11:08

## 2021-10-09 RX ADMIN — HYDROCODONE BITARTRATE AND ACETAMINOPHEN 1 TABLET: 5; 325 TABLET ORAL at 14:12

## 2021-10-09 RX ADMIN — CEFAZOLIN SODIUM 2 G: 2 INJECTION, SOLUTION INTRAVENOUS at 03:00

## 2021-10-09 RX ADMIN — SODIUM CHLORIDE, PRESERVATIVE FREE 10 ML: 5 INJECTION INTRAVENOUS at 08:39

## 2021-10-09 RX ADMIN — METOPROLOL TARTRATE 37.5 MG: 25 TABLET, FILM COATED ORAL at 08:39

## 2021-10-09 RX ADMIN — CEFAZOLIN SODIUM 2 G: 2 INJECTION, SOLUTION INTRAVENOUS at 14:01

## 2021-10-09 NOTE — PROGRESS NOTES
Procedure(s):  LEFT HIP ROGELIO ARTHROPLASTY     LOS: 2 days     Subjective :   Comfortable.  Hasn't gotten out of bed yet with P.T.    Objective :    Vital signs in last 24 hours:  Vitals:    10/09/21 0410 10/09/21 0700 10/09/21 1108 10/09/21 1117   BP: 124/73 120/72  118/78   BP Location: Left arm Left arm  Left arm   Patient Position: Lying Lying  Lying   Pulse: 63 66 82 82   Resp: 18 18  18   Temp: 98 °F (36.7 °C) 98.3 °F (36.8 °C)  98.3 °F (36.8 °C)   TempSrc: Skin Skin  Skin   SpO2: 100% 100%  100%   Weight:       Height:           PHYSICAL EXAM:  Patient is calm, in no acute distress, awake and oriented x 3.  Dressing is clean, dry and intact.  No signs of infection.  Swelling is appropriate in amount.  Ecchymosis is appropriate in amount.  Homans test is negative.  Patient is neurovascularly intact distally.    LABS:  Results from last 7 days   Lab Units 10/09/21  0808   WBC 10*3/mm3 17.96*   HEMOGLOBIN g/dL 10.9*   HEMATOCRIT % 32.4*   PLATELETS 10*3/mm3 243     Results from last 7 days   Lab Units 10/09/21  0808   SODIUM mmol/L 136   POTASSIUM mmol/L 4.5   CHLORIDE mmol/L 101   CO2 mmol/L 23.6   BUN mg/dL 21   CREATININE mg/dL 0.78   GLUCOSE mg/dL 102*   CALCIUM mg/dL 8.9     Results from last 7 days   Lab Units 10/07/21  0756   INR  1.07   APTT seconds 27.2         ASSESSMENT:  Status post Procedure(s):  LEFT HIP ROGELIO ARTHROPLASTY      Plan:  Physical Therapy, increase mobility and range of motion as tolerated.  WBAT left leg.  Continue SCDs, Continue DVT prophylaxis.  Aspirin 81 mg BID after discharge for 30 days.  Dispo planning for nursing home when medically stable.  Follow up in office in 2 weeks with Dr. Nelson.  [phone]  Keep dressing on for 1 week and then may d/c.  Dressing is waterproof.  May shower.  Call for any questions or concerns.  Will sign off.    Tre Murray MD    Date: 10/9/2021  Time: 12:00 EDT

## 2021-10-09 NOTE — PLAN OF CARE
Goal Outcome Evaluation:  Plan of Care Reviewed With: patient           Outcome Summary: Patient is a 89 yo female who suffered a mechanical fall resulting in a L hip fracture. R hip hemiarthroplasty anterior performed. She required mod A for mobility and was able to tolerate standing at bedside using RW x 2 attempts with moderate assist. Unable to take steps today due to pain and weakness. Skilled therapy recommended to progress mobility. Recommend inpatient rehab at [facility] prior to returning to independent living situation.

## 2021-10-09 NOTE — PLAN OF CARE
Goal Outcome Evaluation:  Plan of Care Reviewed With: patient        Progress: improving  Outcome Summary: VSS. patient with no c/o pain. IVF d/c'd. pt resting well but still with limited activity. PT to hopefully see today.   DISPLAY PLAN FREE TEXT

## 2021-10-09 NOTE — PROGRESS NOTES
Name: Kristyn Severino ADMIT: 10/7/2021   : 1924  PCP: Cary Espinal PA-C    MRN: [id number] LOS: 2 days   AGE/SEX: 89 y.o. female  ROOM: [facility]     Subjective   Subjective   Resting comfortably.  Some postoperative pain but well controlled with the current regimen.  Waiting to work with physical therapy.  Denies chest pain, nausea, diarrhea    Review of Systems  As above     Objective   Objective   Vital Signs  Temp:  [97 °F (36.1 °C)-99.1 °F (37.3 °C)] 98.3 °F (36.8 °C)  Heart Rate:  [] 82  Resp:  [16-24] 18  BP: ()/(57-80) 118/78  SpO2:  [90 %-100 %] 100 %  on  Flow (L/min):  [2-4] 2;   Device (Oxygen Therapy): nasal cannula  Body mass index is 20.94 kg/m².  Physical Exam  Constitutional:       General: She is not in acute distress.     Appearance: She is not diaphoretic.      Comments: Frail   HENT:      Ears:      Comments: Very hard of hearing     Mouth/Throat:      Mouth: Mucous membranes are moist.   Cardiovascular:      Rate and Rhythm: Normal rate and regular rhythm.   Pulmonary:      Effort: Pulmonary effort is normal. No respiratory distress.      Breath sounds: No wheezing or rhonchi.   Abdominal:      Palpations: Abdomen is soft.      Tenderness: There is no abdominal tenderness. There is no guarding.   Skin:     General: Skin is warm and dry.   Neurological:      Mental Status: She is alert.         Results Review     I reviewed the patient's new clinical results.  Results from last 7 days   Lab Units 10/09/21  0808 10/08/21  0449 10/07/21  0756   WBC 10*3/mm3 17.96* 7.74 9.81   HEMOGLOBIN g/dL 10.9* 10.8* 11.3*   PLATELETS 10*3/mm3 243 261 312     Results from last 7 days   Lab Units 10/09/21  0808 10/08/21  0449 10/07/21  0756   SODIUM mmol/L 136 137 138   POTASSIUM mmol/L 4.5 4.2 4.0   CHLORIDE mmol/L 101 102 97*   CO2 mmol/L 23.6 27.6 31.8*   BUN mg/dL 21 23 23   CREATININE mg/dL 0.78 0.99 0.93   GLUCOSE mg/dL 102* 76 89   Estimated Creatinine Clearance: 35.1 mL/min (by C-G  formula based on SCr of 0.78 mg/dL).  Results from last 7 days   Lab Units 10/07/21  0756   ALBUMIN g/dL 4.10   BILIRUBIN mg/dL 0.7   ALK PHOS U/L 113   AST (SGOT) U/L 63*   ALT (SGPT) U/L 27     Results from last 7 days   Lab Units 10/09/21  0808 10/08/21  0449 10/07/21  0756   CALCIUM mg/dL 8.9 8.9 10.0*   ALBUMIN g/dL  --   --  4.10   MAGNESIUM mg/dL 2.1 2.3  --    PHOSPHORUS mg/dL 3.1 3.2  --        COVID19   Date Value Ref Range Status   10/07/2021 Not Detected Not Detected - Ref. Range Final     No results found for: HGBA1C, POCGLU    XR Hip With or Without Pelvis 1 View Left  AP PELVIS AND LEFT HIP     HISTORY: Left hip arthroplasty.     FINDINGS: There has been left hip hemiarthroplasty and the components  appear in good position. Recent surgical changes include soft tissue  gas.     This report was finalized on 10/8/2021 7:22 PM by Dr. Suraj Da Silva M.D.     XR Hip With or Without Pelvis 1 View Left  PELVIS/LEFT HIP     HISTORY: Left arthroplasty.     Four intraoperative views were obtained for localization and control  during anterior approach left total hip arthroplasty. The fluoroscopy  time was 14 seconds     This report was finalized on 10/8/2021 6:43 PM by Dr. Suraj Da Silva M.D.     FL C Arm During Surgery  This procedure was auto-finalized with no dictation required.    I reviewed the patient's daily medications.  Scheduled Medications  aspirin, 81 mg, Oral, BID  ceFAZolin, 2 g, Intravenous, Q12H  digoxin, 125 mcg, Oral, Daily  levothyroxine, 75 mcg, Oral, Q AM  metoprolol tartrate, 37.5 mg, Oral, Daily  senna-docusate sodium, 2 tablet, Oral, Daily  sodium chloride, 10 mL, Intravenous, Q12H    Infusions   Diet  Diet Regular       Assessment/Plan     Active Hospital Problems    Diagnosis  POA   • **Left displaced femoral neck fracture (HCC) [S72.002A]  Yes   • Leukocytosis [D72.829]  No   • Postoperative anemia due to acute blood loss [D62]  No   • Chronic anticoagulation [Z79.01]  Not  68 year old woman with   nonobstructive CAD, mild myocardial bridge of the LAD, menigiom admitted with infectious colitis      cv stable no chest pain or sob.  hypotensive x 2 readings, asymptomatic likely secondary to decrease PO intake / infection  - encourage PO intake   - resume low dose beta blocker for myocardial bridge once BP normotensive   resume low dose asa for CAD when OK by GI  IV abx  GI and Sx F/U Applicable   • Hard of hearing [H91.90]  Yes   • History of colon cancer [Z85.038]  Yes   • Hypothyroidism [E03.9]  Yes   • Chronic atrial fibrillation (HCC) [I48.20]  Yes   • DNR (do not resuscitate) [Z66]  Yes   • Irritable bowel syndrome [K58.9]  Yes   • HLD (hyperlipidemia) [E78.5]  Yes   • CKD (chronic kidney disease) stage 3, GFR 30-59 ml/min (HCC) [N18.30]  Yes      Resolved Hospital Problems   No resolved problems to display.       89-year-old female with history of A. fib who presents after mechanical fall resulting in left hip fracture.    Left hip fracture s/p repair 10/8  -Follow up in office in 2 weeks with Dr. Nelson.  [phone]  -Physical therapy to see today.  Expect will need rehab. pain well controlled on current regimen.  Bowel regimen  -Ortho recommending 30 days of aspirin 81 twice daily.  She takes Eliquis at home, will reach out to Ortho and ask if we could just resume her home Eliquis  -She is high risk for hospital-acquired delirium due to her age.  Continue delirium precautions    Leukocytosis  -She denies any cough, abdominal pain, dysuria.  She has not had any fevers or tachycardia.  Suspect that this is reactive.  Monitor CBC tomorrow    Postoperative anemia  -Hemoglobin stable at 10.  Recheck tomorrow    Chronic atrial fibrillation  -Continue home digoxin and metoprolol.  Takes Eliquis at home    CKD 3  -Stable at baseline    Hypothyroid  -Resume home Synthroid      · DVT prophylaxis as above  · DNR.  · Discussed with patient.  · Anticipate discharge to SNU facility in 1 to 2 days      Rylee Contreras DO  [facility]  10/09/21  14:12 EDT    Patient was wearing facemask when I entered the room and throughout our encounter.  I wore protective equipment throughout this patient encounter including a face mask, gloves and protective eyewear.  Hand hygiene was performed before donning protective equipment and after removal when leaving the room.

## 2021-10-09 NOTE — THERAPY EVALUATION
Patient Name: Kristyn Severino  : 1924    MRN: [id number]                              Today's Date: 10/9/2021       Admit Date: 10/7/2021    Visit Dx:     ICD-10-CM ICD-9-CM   1. Left displaced femoral neck fracture (HCC)  S72.002A 820.8     Patient Active Problem List   Diagnosis   • Hirsutism   • HLD (hyperlipidemia)   • Hypothyroidism   • Insomnia   • Osteoarthritis   • CKD (chronic kidney disease) stage 3, GFR 30-59 ml/min (HCC)   • Seborrheic keratosis   • Vitamin D deficiency   • Iron deficiency anemia due to chronic blood loss   • Irritable bowel syndrome   • Colitis   • Vomiting   • DNR (do not resuscitate)   • 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   • Multiple joint pain   • Malignant neoplasm of ascending colon (HCC)   • Glaucoma associated with chamber angle anomalies, left, severe stage   • Autoimmune thyroiditis   • Generalized muscle weakness   • Anemia   • Unspecified intestinal obstruction, unspecified as to partial versus complete obstruction (HCC)   • Other microscopic colitis   • Hypothyroidism   • Insomnia, unspecified   • Other irritable bowel syndrome   • Arthritis   • Other abnormalities of gait and mobility   • Shortness of breath   • Chronic atrial fibrillation (HCC)   • Weakness   • Hypothyroidism   • Hypothyroidism, unspecified   • Arthritis   • Muscle wasting and atrophy, not elsewhere classified, multiple sites   • Polyarthritis, unspecified   • Left displaced femoral neck fracture (HCC)   • Chronic anticoagulation   • Hard of hearing   • History of colon cancer   • Leukocytosis   • Postoperative anemia due to acute blood loss     Past Medical History:   Diagnosis Date   • Anemia of chronic illness    • Atrial fibrillation (HCC)    • Colon cancer (HCC)     Invasive adenocarcinoma of right colon, grade 2 (5.3 cm)   • PALACIOS (dyspnea on exertion)    • Fatigue    • Hirsutism    • History of bone density study     few years; normal   • History of colonoscopy     never   • History  of prior pregnancies     x4   • HLD (hyperlipidemia)    • Hypothyroidism     Hashimoto's diagnosed age 19   • Insomnia    • Lightheadedness    • Osteoarthritis    • Postmenopausal    • Renal insufficiency    • Rhinorrhea    • Seborrheic keratosis    • Stage 3a chronic kidney disease (HCC)    • Vitamin D deficiency      Past Surgical History:   Procedure Laterality Date   • APPENDECTOMY N/A 03/2010    Dr. Addy De Oliveira   • BREAST BIOPSY     • CARPAL TUNNEL RELEASE Right    • CARPAL TUNNEL RELEASE Left 2/28/2020    Procedure: DECOMPRESSION OF CARPAL TUNNEL;  Surgeon: Marty Plata MD;  Location: [facility];  Service: Plastics;  Laterality: Left;   • COLON RESECTION N/A 10/9/2018    Procedure: right hemicolectomy;  Surgeon: Jung Barillas MD;  Location: [facility];  Service: General   • MAMMO BILATERAL      many years ago   • PAP SMEAR      many years ago   • REPLACEMENT TOTAL KNEE Left 02/07/2011    Dr. Marty Gold      General Information     Row Name 10/09/21 1738          Physical Therapy Time and Intention    Document Type evaluation  -CF     Mode of Treatment physical therapy  -     Row Name 10/09/21 1738          General Information    Patient Profile Reviewed yes  -CF     Prior Level of Function independent:  -CF     Existing Precautions/Restrictions hip, anterior  -CF     Barriers to Rehab physical barrier; cognitive status  -CF     Row Name 10/09/21 1738          Living Environment    Lives With alone; other (see comments)  Independent living facility  -CF     Row Name 10/09/21 1738          Home Main Entrance    Number of Stairs, Main Entrance none  -CF     Row Name 10/09/21 1738          Cognition    Orientation Status (Cognition) oriented x 3  -CF     Row Name 10/09/21 1738          Safety Issues, Functional Mobility    Safety Issues Affecting Function (Mobility) awareness of need for assistance; insight into deficits/self-awareness  -CF     Impairments Affecting Function (Mobility)  balance; endurance/activity tolerance; pain; strength  -           User Key  (r) = Recorded By, (t) = Taken By, (c) = Cosigned By    Initials Name Provider Type     Miguel Jurado, PT Physical Therapist               Mobility     Row Name 10/09/21 1739          Bed Mobility    Bed Mobility supine-sit-supine  -CF     Supine-Sit-Supine [place] (Bed Mobility) moderate assist (50% patient effort); 1 person assist  -     Assistive Device (Bed Mobility) bed rails; draw sheet  -     Row Name 10/09/21 1739          Sit-Stand Transfer    Sit-Stand [place] (Transfers) minimum assist (75% patient effort); moderate assist (50% patient effort); 1 person assist  -     Assistive Device (Sit-Stand Transfers) walker, front-wheeled  -     Row Name 10/09/21 1739          Gait/Stairs (Locomotion)    Deviations/Abnormal Patterns (Gait) antalgic  -     Bilateral Gait Deviations forward flexed posture  -     Row Name 10/09/21 1739          Mobility    Extremity Weight-bearing Status left lower extremity  -CF     Left Lower Extremity (Weight-bearing Status) weight-bearing as tolerated (WBAT)  -           User Key  (r) = Recorded By, (t) = Taken By, (c) = Cosigned By    Initials Name Provider Type     Miguel Jurado, PT Physical Therapist               Obj/Interventions     Row Name 10/09/21 1740          Range of Motion Comprehensive    General Range of Motion lower extremity range of motion deficits identified  -[facility] Name 10/09/21 1740          Strength Comprehensive (MMT)    General Manual Muscle Testing (MMT) Assessment lower extremity strength deficits identified  -     Row Name 10/09/21 1740          Motor Skills    Therapeutic Exercise hip; other (see comments)  ALBERTINA protocol x 10 reps in supine  -     Row Name 10/09/21 1740          Balance    Balance Assessment sitting static balance; standing static balance  -     Static Sitting Balance WFL  -     Dynamic Sitting Balance mild  impairment  -CF     Static Standing Balance moderate impairment  -     Row Name 10/09/21 1740          Sensory Assessment (Somatosensory)    Sensory Assessment (Somatosensory) sensation intact  -CF           User Key  (r) = Recorded By, (t) = Taken By, (c) = Cosigned By    Initials Name Provider Type     Miguel Jurado, PT Physical Therapist               Goals/Plan     Row Name 10/09/21 1743          Bed Mobility Goal 1 (PT)    Activity/Assistive Device (Bed Mobility Goal 1, PT) bed mobility activities, all  -CF     Door Level/Cues Needed (Bed Mobility Goal 1, PT) minimum assist (75% or more patient effort); 1 person assist  -CF     Time Frame (Bed Mobility Goal 1, PT) short term goal (STG); 2 days  -CF     [facility] Name 10/09/21 1743          Transfer Goal 1 (PT)    Activity/Assistive Device (Transfer Goal 1, PT) sit-to-stand/stand-to-sit  -CF     Door Level/Cues Needed (Transfer Goal 1, PT) minimum assist (75% or more patient effort); 1 person assist  -CF     Time Frame (Transfer Goal 1, PT) short term goal (STG); 2 days  -CF     [facility] Name 10/09/21 1743          Gait Training Goal 1 (PT)    Activity/Assistive Device (Gait Training Goal 1, PT) gait (walking locomotion); assistive device use  -CF     Door Level (Gait Training Goal 1, PT) minimum assist (75% or more patient effort); 1 person assist  -CF     Distance (Gait Training Goal 1, PT) 80  -CF     Time Frame (Gait Training Goal 1, PT) long term goal (LTG); 4 days  -CF           User Key  (r) = Recorded By, (t) = Taken By, (c) = Cosigned By    Initials Name Provider Type     Miguel Jurado, PT Physical Therapist               Clinical Impression     Row Name 10/09/21 1741          Pain    Additional Documentation Pain Scale: FACES Pre/Post-Treatment (Group)  -[facility] Name 10/09/21 1741          Pain Scale: Numbers Pre/Post-Treatment    Pain Intervention(s) Repositioned  -CF     Row Name 10/09/21 1741          Pain Scale:  FACES Pre/Post-Treatment    Pain: FACES Scale, Pretreatment 4-->hurts little more  -CF     Posttreatment Pain Rating 4-->hurts little more  -CF     Pain Location - Side Left  -CF     Pain Location hip  -CF     Row Name 10/09/21 1741          Plan of Care Review    Plan of Care Reviewed With patient  -CF     Outcome Summary Patient is a 89 yo female who suffered a mechanical fall resulting in a L hip fracture. R hip hemiarthroplasty anterior performed. She required mod A for mobility and was able to tolerate standing at bedside using RW x 2 attempts with moderate assist. Unable to take steps today due to pain and weakness. Skilled therapy recommended to progress mobility. Recommend inpatient rehab at [place] prior to returning to independent living situation.  -CF     Row Name 10/09/21 1741          Therapy Assessment/Plan (PT)    Criteria for Skilled Interventions Met (PT) yes  -CF     Row Name 10/09/21 1741          Positioning and Restraints    Pre-Treatment Position in bed  -CF     Post Treatment Position bed  -CF     In Bed supine; call light within reach; encouraged to call for assist; notified nsg; exit alarm on  -CF           User Key  (r) = Recorded By, (t) = Taken By, (c) = Cosigned By    Initials Name Provider Type    CF Miguel Jurado, PT Physical Therapist               Outcome Measures     Row Name 10/09/21 1744          How much help from another person do you currently need...    Turning from your back to your side while in flat bed without using bedrails? 2  -CF     Moving from lying on back to sitting on the side of a flat bed without bedrails? 2  -CF     Moving to and from a bed to a chair (including a wheelchair)? 2  -CF     Standing up from a chair using your arms (e.g., wheelchair, bedside chair)? 2  -CF     Climbing 3-5 steps with a railing? 1  -CF     To walk in hospital room? 1  -CF     AM-PAC 6 Clicks Score (PT) 10  -CF     Row Name 10/09/21 1744          Functional Assessment    Outcome  Measure Options AM-PAC 6 Clicks Basic Mobility (PT)  -CF           User Key  (r) = Recorded By, (t) = Taken By, (c) = Cosigned By    Initials Name Provider Type    CF Miguel Jurado, PT Physical Therapist                             Physical Therapy Education                 Title: PT OT SLP Therapies (Done)     Topic: Physical Therapy (Done)     Point: Mobility training (Done)     Learning Progress Summary           Patient Acceptance, E,TB, VU,NR by  at 10/9/2021 1744                   Point: Home exercise program (Done)     Learning Progress Summary           Patient Acceptance, E,TB, VU,NR by CF at 10/9/2021 1744                   Point: Body mechanics (Done)     Learning Progress Summary           Patient Acceptance, E,TB, VU,NR by  at 10/9/2021 1744                   Point: Precautions (Done)     Learning Progress Summary           Patient Acceptance, E,TB, VU,NR by  at 10/9/2021 1744                               User Key     Initials Effective Dates Name Provider Type Discipline     06/16/21 -  Miguel Jurado, PT Physical Therapist PT              PT Recommendation and Plan  Planned Therapy Interventions (PT): balance training, bed mobility training, gait training, home exercise program, patient/family education, strengthening, transfer training  Plan of Care Reviewed With: patient  Outcome Summary: Patient is a 89 yo female who suffered a mechanical fall resulting in a L hip fracture. R hip hemiarthroplasty anterior performed. She required mod A for mobility and was able to tolerate standing at bedside using RW x 2 attempts with moderate assist. Unable to take steps today due to pain and weakness. Skilled therapy recommended to progress mobility. Recommend inpatient rehab at [place] prior to returning to independent living situation.     Time Calculation:    PT Charges     Row Name 10/09/21 1744             Time Calculation    Start Time 1400  -      Stop Time 1435  -CF      Time Calculation  (min) 35 min  -CF      PT - Next Appointment 10/10/21  -CF              Time Calculation- PT    Total Timed Code Minutes- PT 35 minute(s)  -CF            User Key  (r) = Recorded By, (t) = Taken By, (c) = Cosigned By    Initials Name Provider Type    CF Miguel Jurado, PT Physical Therapist              Therapy Charges for Today     Code Description Service Date Service Provider Modifiers Qty    64161271730 HC PT EVAL LOW COMPLEXITY 3 10/9/2021 Miguel Jurado, PT GP 1    95891933382 HC PT THER PROC EA 15 MIN 10/9/2021 Miguel Jurado, PT GP 1    39703124593 HC PT THERAPEUTIC ACT EA 15 MIN 10/9/2021 Miguel Jurado, PT GP 1          PT G-Codes  Outcome Measure Options: AM-PAC 6 Clicks Basic Mobility (PT)  AM-PAC 6 Clicks Score (PT): 10    Miguel Jurado PT  10/9/2021

## 2021-10-10 LAB
ANION GAP SERPL CALCULATED.3IONS-SCNC: 11.5 MMOL/L (ref 5–15)
BASOPHILS # BLD AUTO: 0.02 10*3/MM3 (ref 0–0.2)
BASOPHILS NFR BLD AUTO: 0.2 % (ref 0–1.5)
BUN SERPL-MCNC: 23 MG/DL (ref 8–23)
BUN/CREAT SERPL: 26.1 (ref 7–25)
CALCIUM SPEC-SCNC: 8.3 MG/DL (ref 8.2–9.6)
CHLORIDE SERPL-SCNC: 99 MMOL/L (ref 98–107)
CO2 SERPL-SCNC: 22.5 MMOL/L (ref 22–29)
CREAT SERPL-MCNC: 0.88 MG/DL (ref 0.57–1)
DEPRECATED RDW RBC AUTO: 41.6 FL (ref 37–54)
EOSINOPHIL # BLD AUTO: 0.12 10*3/MM3 (ref 0–0.4)
EOSINOPHIL NFR BLD AUTO: 1 % (ref 0.3–6.2)
ERYTHROCYTE [DISTWIDTH] IN BLOOD BY AUTOMATED COUNT: 12.7 % (ref 12.3–15.4)
GFR SERPL CREATININE-BSD FRML MDRD: 59 ML/MIN/1.73
GLUCOSE SERPL-MCNC: 87 MG/DL (ref 65–99)
HCT VFR BLD AUTO: 25.9 % (ref 34–46.6)
HGB BLD-MCNC: 8.9 G/DL (ref 12–15.9)
IMM GRANULOCYTES # BLD AUTO: 0.07 10*3/MM3 (ref 0–0.05)
IMM GRANULOCYTES NFR BLD AUTO: 0.6 % (ref 0–0.5)
LYMPHOCYTES # BLD AUTO: 2.71 10*3/MM3 (ref 0.7–3.1)
LYMPHOCYTES NFR BLD AUTO: 22.3 % (ref 19.6–45.3)
MAGNESIUM SERPL-MCNC: 2.1 MG/DL (ref 1.7–2.3)
MCH RBC QN AUTO: 30.8 PG (ref 26.6–33)
MCHC RBC AUTO-ENTMCNC: 34.4 G/DL (ref 31.5–35.7)
MCV RBC AUTO: 89.6 FL (ref 79–97)
MONOCYTES # BLD AUTO: 1.01 10*3/MM3 (ref 0.1–0.9)
MONOCYTES NFR BLD AUTO: 8.3 % (ref 5–12)
NEUTROPHILS NFR BLD AUTO: 67.6 % (ref 42.7–76)
NEUTROPHILS NFR BLD AUTO: 8.2 10*3/MM3 (ref 1.7–7)
NRBC BLD AUTO-RTO: 0 /100 WBC (ref 0–0.2)
PHOSPHATE SERPL-MCNC: 2.3 MG/DL (ref 2.5–4.5)
PLATELET # BLD AUTO: 178 10*3/MM3 (ref 140–450)
PMV BLD AUTO: 10.8 FL (ref 6–12)
POTASSIUM SERPL-SCNC: 4.5 MMOL/L (ref 3.5–5.2)
RBC # BLD AUTO: 2.89 10*6/MM3 (ref 3.77–5.28)
SODIUM SERPL-SCNC: 133 MMOL/L (ref 136–145)
WBC # BLD AUTO: 12.13 10*3/MM3 (ref 3.4–10.8)

## 2021-10-10 PROCEDURE — 80048 BASIC METABOLIC PNL TOTAL CA: CPT | Performed by: STUDENT IN AN ORGANIZED HEALTH CARE EDUCATION/TRAINING PROGRAM

## 2021-10-10 PROCEDURE — 25010000003 CEFAZOLIN IN DEXTROSE 2-4 GM/100ML-% SOLUTION: Performed by: ORTHOPAEDIC SURGERY

## 2021-10-10 PROCEDURE — 85025 COMPLETE CBC W/AUTO DIFF WBC: CPT | Performed by: STUDENT IN AN ORGANIZED HEALTH CARE EDUCATION/TRAINING PROGRAM

## 2021-10-10 PROCEDURE — 97110 THERAPEUTIC EXERCISES: CPT

## 2021-10-10 PROCEDURE — 84100 ASSAY OF PHOSPHORUS: CPT | Performed by: STUDENT IN AN ORGANIZED HEALTH CARE EDUCATION/TRAINING PROGRAM

## 2021-10-10 PROCEDURE — 83735 ASSAY OF MAGNESIUM: CPT | Performed by: STUDENT IN AN ORGANIZED HEALTH CARE EDUCATION/TRAINING PROGRAM

## 2021-10-10 RX ORDER — MAGNESIUM SULFATE HEPTAHYDRATE 40 MG/ML
2 INJECTION, SOLUTION INTRAVENOUS AS NEEDED
Status: DISCONTINUED | OUTPATIENT
Start: 2021-10-10 | End: 2021-10-11 | Stop reason: HOSPADM

## 2021-10-10 RX ORDER — MAGNESIUM SULFATE HEPTAHYDRATE 40 MG/ML
4 INJECTION, SOLUTION INTRAVENOUS AS NEEDED
Status: DISCONTINUED | OUTPATIENT
Start: 2021-10-10 | End: 2021-10-11 | Stop reason: HOSPADM

## 2021-10-10 RX ORDER — POTASSIUM CHLORIDE 750 MG/1
40 TABLET, FILM COATED, EXTENDED RELEASE ORAL AS NEEDED
Status: DISCONTINUED | OUTPATIENT
Start: 2021-10-10 | End: 2021-10-11 | Stop reason: HOSPADM

## 2021-10-10 RX ORDER — POTASSIUM CHLORIDE 1.5 G/1.77G
40 POWDER, FOR SOLUTION ORAL AS NEEDED
Status: DISCONTINUED | OUTPATIENT
Start: 2021-10-10 | End: 2021-10-11 | Stop reason: HOSPADM

## 2021-10-10 RX ADMIN — CEFAZOLIN SODIUM 2 G: 2 INJECTION, SOLUTION INTRAVENOUS at 14:22

## 2021-10-10 RX ADMIN — HYDROCODONE BITARTRATE AND ACETAMINOPHEN 1 TABLET: 5; 325 TABLET ORAL at 00:59

## 2021-10-10 RX ADMIN — DOCUSATE SODIUM 50MG AND SENNOSIDES 8.6MG 2 TABLET: 8.6; 5 TABLET, FILM COATED ORAL at 08:05

## 2021-10-10 RX ADMIN — METOPROLOL TARTRATE 37.5 MG: 25 TABLET, FILM COATED ORAL at 08:05

## 2021-10-10 RX ADMIN — Medication 2 PACKET: at 14:21

## 2021-10-10 RX ADMIN — HYDROCODONE BITARTRATE AND ACETAMINOPHEN 1 TABLET: 5; 325 TABLET ORAL at 21:00

## 2021-10-10 RX ADMIN — LEVOTHYROXINE SODIUM 75 MCG: 0.07 TABLET ORAL at 05:23

## 2021-10-10 RX ADMIN — HYDROCODONE BITARTRATE AND ACETAMINOPHEN 1 TABLET: 5; 325 TABLET ORAL at 08:05

## 2021-10-10 RX ADMIN — APIXABAN 2.5 MG: 2.5 TABLET, FILM COATED ORAL at 21:00

## 2021-10-10 RX ADMIN — APIXABAN 2.5 MG: 2.5 TABLET, FILM COATED ORAL at 08:05

## 2021-10-10 RX ADMIN — SODIUM CHLORIDE, PRESERVATIVE FREE 10 ML: 5 INJECTION INTRAVENOUS at 08:05

## 2021-10-10 RX ADMIN — POLYETHYLENE GLYCOL 3350 17 G: 17 POWDER, FOR SOLUTION ORAL at 08:09

## 2021-10-10 RX ADMIN — DIGOXIN 125 MCG: 125 TABLET ORAL at 11:12

## 2021-10-10 RX ADMIN — CEFAZOLIN SODIUM 2 G: 2 INJECTION, SOLUTION INTRAVENOUS at 02:09

## 2021-10-10 NOTE — PLAN OF CARE
Goal Outcome Evaluation:  Plan of Care Reviewed With: patient           Outcome Summary: POD#2 OF A LEFT HIP ROGELIO. DRESSING IS DRY/ INTACT. BP WNL. INCREASE HR. HX OF A- FIB. PO PAIN MEDICATION HELPING WITH PAIN. VOIDING PER [place]. TAKES PILLS WHOLE. EDUCATION PROVIDED ON SAFETY AND VITAL SIGNS MONITORING.

## 2021-10-10 NOTE — PLAN OF CARE
Goal Outcome Evaluation:  Plan of Care Reviewed With: patient        Progress: improving  Outcome Summary: POD 2 of lt hip ronald. drsg CDI. VSS. PRN PO pain meds conrolling pain well. pt not tolerating movement and advancing activity well and often refuses. will ctm and provide education.

## 2021-10-10 NOTE — THERAPY TREATMENT NOTE
Patient Name: Kristyn Severino  : 1924    MRN: [id number]                              Today's Date: 10/10/2021       Admit Date: 10/7/2021    Visit Dx:     ICD-10-CM ICD-9-CM   1. Left displaced femoral neck fracture (HCC)  S72.002A 820.8     Patient Active Problem List   Diagnosis   • Hirsutism   • HLD (hyperlipidemia)   • Hypothyroidism   • Insomnia   • Osteoarthritis   • CKD (chronic kidney disease) stage 3, GFR 30-59 ml/min (HCC)   • Seborrheic keratosis   • Vitamin D deficiency   • Iron deficiency anemia due to chronic blood loss   • Irritable bowel syndrome   • Colitis   • Vomiting   • DNR (do not resuscitate)   • 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   • Multiple joint pain   • Malignant neoplasm of ascending colon (HCC)   • Glaucoma associated with chamber angle anomalies, left, severe stage   • Autoimmune thyroiditis   • Generalized muscle weakness   • Anemia   • Unspecified intestinal obstruction, unspecified as to partial versus complete obstruction (HCC)   • Other microscopic colitis   • Hypothyroidism   • Insomnia, unspecified   • Other irritable bowel syndrome   • Arthritis   • Other abnormalities of gait and mobility   • Shortness of breath   • Chronic atrial fibrillation (HCC)   • Weakness   • Hypothyroidism   • Hypothyroidism, unspecified   • Arthritis   • Muscle wasting and atrophy, not elsewhere classified, multiple sites   • Polyarthritis, unspecified   • Left displaced femoral neck fracture (HCC)   • Chronic anticoagulation   • Hard of hearing   • History of colon cancer   • Leukocytosis   • Postoperative anemia due to acute blood loss     Past Medical History:   Diagnosis Date   • Anemia of chronic illness    • Atrial fibrillation (HCC)    • Colon cancer (HCC)     Invasive adenocarcinoma of right colon, grade 2 (5.3 cm)   • PALACIOS (dyspnea on exertion)    • Fatigue    • Hirsutism    • History of bone density study     few years; normal   • History of colonoscopy     never   • History  of prior pregnancies     x4   • HLD (hyperlipidemia)    • Hypothyroidism     Hashimoto's diagnosed age 19   • Insomnia    • Lightheadedness    • Osteoarthritis    • Postmenopausal    • Renal insufficiency    • Rhinorrhea    • Seborrheic keratosis    • Stage 3a chronic kidney disease (HCC)    • Vitamin D deficiency      Past Surgical History:   Procedure Laterality Date   • APPENDECTOMY N/A 03/2010    Dr. Addy De Oliveira   • BREAST BIOPSY     • CARPAL TUNNEL RELEASE Right    • CARPAL TUNNEL RELEASE Left 2/28/2020    Procedure: DECOMPRESSION OF CARPAL TUNNEL;  Surgeon: Marty Plata MD;  Location: [facility];  Service: Plastics;  Laterality: Left;   • COLON RESECTION N/A 10/9/2018    Procedure: right hemicolectomy;  Surgeon: Jung Barillas MD;  Location[facility];  Service: General   • MAMMO BILATERAL      many years ago   • PAP SMEAR      many years ago   • REPLACEMENT TOTAL KNEE Left 02/07/2011    Dr. Marty Gold      General Information     Row Name 10/10/21 1532          Physical Therapy Time and Intention    Document Type therapy note (daily note)  -     Mode of Treatment physical therapy  -     Row Name 10/10/21 1532          General Information    Existing Precautions/Restrictions hip, anterior; fall  -     Row Name 10/10/21 1532          Safety Issues, Functional Mobility    Impairments Affecting Function (Mobility) balance; endurance/activity tolerance; strength; pain; range of motion (ROM)  -PH           User Key  (r) = Recorded By, (t) = Taken By, (c) = Cosigned By    Initials Name Provider Type    PH Wayne, Karely, PTA Physical Therapy Assistant               Mobility     Row Name 10/10/21 1532          Bed Mobility    Bed Mobility supine-sit  -PH     Supine-Sit [place] (Bed Mobility) moderate assist (50% patient effort); verbal cues; nonverbal cues (demo/gesture)  -     Assistive Device (Bed Mobility) bed rails; head of bed elevated  -PH     Comment (Bed Mobility)  LLE held off bed w/ pt moving slightly  -PH     Row Name 10/10/21 1532          Transfers    Comment (Transfers) few unsteady steps to stairs  -PH     Row Name 10/10/21 1532          Bed-Chair Transfer    Bed-Chair [place] (Transfers) moderate assist (50% patient effort); 2 person assist; verbal cues; nonverbal cues (demo/gesture); minimum assist (75% patient effort)  -PH     Assistive Device (Bed-Chair Transfers) walker, front-wheeled  -PH     Row Name 10/10/21 1532          Sit-Stand Transfer    Sit-Stand [place] (Transfers) minimum assist (75% patient effort); 2 person assist; verbal cues; nonverbal cues (demo/gesture)  -PH     Assistive Device (Sit-Stand Transfers) walker, front-wheeled  -PH     Row Name 10/10/21 1532          Gait/Stairs (Locomotion)    [place] Level (Gait) unable to assess  -PH     [place] Level (Stairs) unable to assess  -PH     Row Name 10/10/21 1532          Mobility    Extremity Weight-bearing Status left lower extremity  -PH     Left Lower Extremity (Weight-bearing Status) weight-bearing as tolerated (WBAT)  -PH           User Key  (r) = Recorded By, (t) = Taken By, (c) = Cosigned By    Initials Name Provider Type    Karely Obregon PTA Physical Therapy Assistant               Obj/Interventions     Row Name 10/10/21 1533          Motor Skills    Therapeutic Exercise other (see comments)  R [place] protocol x 10 reps w/ assist; limited by pain  -PH     Row Name 10/10/21 1533          Balance    Dynamic Sitting Balance mild impairment; supported; sitting, edge of bed  -PH     Static Standing Balance moderate impairment; supported; standing  -PH           User Key  (r) = Recorded By, (t) = Taken By, (c) = Cosigned By    Initials Name Provider Type     Karely Black PTA Physical Therapy Assistant               Goals/Plan    No documentation.                Clinical Impression     Row Name 10/10/21 1534          Pain    Additional Documentation Pain  Scale: FACES Pre/Post-Treatment (Group)  -PH     Row Name 10/10/21 1534          Pain Scale: FACES Pre/Post-Treatment    Pain: FACES Scale, Pretreatment 2-->hurts little bit  -PH     Posttreatment Pain Rating 6-->hurts even more  -PH     Pain Location - Side Left  -PH     Pain Location - Orientation incisional  -PH     Pain Location hip  -PH     Row Name 10/10/21 1534          Plan of Care Review    Plan of Care Reviewed With patient  -PH     Progress improving  -PH     Outcome Summary Pt improving w/ short transfer to chair w/ mod A x 2 / HHA. Pt sat up to EOB w/ mod A and stood w/ min A x 2 / HHA. Pt limited by pain and fatigue. Pt performed R TKA protocol w/ assist in chair. PT will prog as pt marielos.  -PH     Row Name 10/10/21 1534          Positioning and Restraints    Pre-Treatment Position in bed  -PH     Post Treatment Position chair  -PH     In Chair reclined; call light within reach; encouraged to call for assist; exit alarm on  -PH           User Key  (r) = Recorded By, (t) = Taken By, (c) = Cosigned By    Initials Name Provider Type    PH Karely Black PTA Physical Therapy Assistant               Outcome Measures     Row Name 10/10/21 1536          How much help from another person do you currently need...    Turning from your back to your side while in flat bed without using bedrails? 2  -PH     Moving from lying on back to sitting on the side of a flat bed without bedrails? 2  -PH     Moving to and from a bed to a chair (including a wheelchair)? 2  -PH     Standing up from a chair using your arms (e.g., wheelchair, bedside chair)? 2  -PH     Climbing 3-5 steps with a railing? 1  -PH     To walk in hospital room? 1  -PH     AM-PAC 6 Clicks Score (PT) 10  -PH     Row Name 10/10/21 1536          Functional Assessment    Outcome Measure Options AM-PAC 6 Clicks Basic Mobility (PT)  -PH           User Key  (r) = Recorded By, (t) = Taken By, (c) = Cosigned By    Initials Name Provider Type    PH  Kraely Black PTA Physical Therapy Assistant                             Physical Therapy Education                 Title: PT OT SLP Therapies (Done)     Topic: Physical Therapy (Done)     Point: Mobility training (Done)     Learning Progress Summary           Patient Acceptance, E,D, VU,NR by  at 10/10/2021 1536    Acceptance, E,TB, VU,NR by  at 10/9/2021 1744                   Point: Home exercise program (Done)     Learning Progress Summary           Patient Acceptance, E,D, VU,NR by  at 10/10/2021 1536    Acceptance, E,TB, VU,NR by CF at 10/9/2021 1744                   Point: Body mechanics (Done)     Learning Progress Summary           Patient Acceptance, E,D, VU,NR by  at 10/10/2021 1536    Acceptance, E,TB, VU,NR by  at 10/9/2021 1744                   Point: Precautions (Done)     Learning Progress Summary           Patient Acceptance, E,D, VU,NR by  at 10/10/2021 1536    Acceptance, E,TB, VU,NR by  at 10/9/2021 1744                               User Key     Initials Effective Dates Name Provider Type Discipline     06/16/21 -  Miguel Jurado, PT Physical Therapist PT     06/16/21 -  Karely Black PTA Physical Therapy Assistant PT              PT Recommendation and Plan     Plan of Care Reviewed With: patient  Progress: improving  Outcome Summary: Pt improving w/ short transfer to chair w/ mod A x 2 / HHA. Pt sat up to EOB w/ mod A and stood w/ min A x 2 / HHA. Pt limited by pain and fatigue. Pt performed R TKA protocol w/ assist in chair. PT will prog as pt marielos.     Time Calculation:    PT Charges     Row Name 10/10/21 1537             Time Calculation    Start Time 1521  -PH      Stop Time 1541  -PH      Time Calculation (min) 20 min  -      PT Received On 10/10/21  -      PT - Next Appointment 10/11/21  -              Timed Charges    10789 - PT Therapeutic Exercise Minutes 10  -PH      19576 - PT Therapeutic Activity Minutes 10  -PH              Total  Minutes    Timed Charges Total Minutes 20  -PH       Total Minutes 20  -PH            User Key  (r) = Recorded By, (t) = Taken By, (c) = Cosigned By    Initials Name Provider Type    Karely Obregon PTA Physical Therapy Assistant              Therapy Charges for Today     Code Description Service Date Service Provider Modifiers Qty    23222105456 HC PT THER PROC EA 15 MIN 10/10/2021 Karely Black PTA GP 1    40695781982 HC PT THER SUPP EA 15 MIN 10/10/2021 Karely Black PTA GP 1          PT G-Codes  Outcome Measure Options: AM-PAC 6 Clicks Basic Mobility (PT)  AM-PAC 6 Clicks Score (PT): 10    Karely Black PTA  10/10/2021

## 2021-10-10 NOTE — PLAN OF CARE
Goal Outcome Evaluation:  Plan of Care Reviewed With: patient        Progress: improving  Outcome Summary: Pt improving w/ short transfer to chair w/ mod A x 2 / HHA. Pt sat up to EOB w/ mod A and stood w/ min A x 2 / HHA. Pt limited by pain and fatigue. Pt performed R TKA protocol w/ assist in chair. PT will prog as pt marielos.    Patient was not wearing a face mask during this therapy encounter. Therapist used appropriate personal protective equipment including eye protection, mask, and gloves.  Mask used was standard procedure mask. Appropriate PPE was worn during the entire therapy session. Hand hygiene was completed before and after therapy session. Patient is not in enhanced droplet precautions.  PT tech: Matty.

## 2021-10-10 NOTE — PROGRESS NOTES
Name: Kristyn Severino ADMIT: 10/7/2021   : 1924  PCP: Cary Espinal PA-C    MRN: [id number] LOS: 3 days   AGE/SEX: 89 y.o. female  ROOM: [facility]     Subjective   Subjective   Doing well today.  Still having some pain with movement, but tolerable.  Informed her that she will work with physical therapy today and anticipate that she will go to rehab.  No new complaints    Review of Systems  No chest pain, fevers, nausea or diarrhea     Objective   Objective   Vital Signs  Temp:  [97.7 °F (36.5 °C)-98.5 °F (36.9 °C)] 98.3 °F (36.8 °C)  Heart Rate:  [] 93  Resp:  [16-18] 16  BP: (101-118)/(56-78) 104/78  SpO2:  [93 %-100 %] 93 %  on  Flow (L/min):  [2-3] 3;   Device (Oxygen Therapy): nasal cannula  Body mass index is 20.94 kg/m².  Physical Exam  Constitutional:       General: She is not in acute distress.     Appearance: She is not diaphoretic.      Comments: Frail   HENT:      Ears:      Comments: Very hard of hearing     Mouth/Throat:      Mouth: Mucous membranes are moist.   Cardiovascular:      Rate and Rhythm: Normal rate and regular rhythm.   Pulmonary:      Effort: Pulmonary effort is normal. No respiratory distress.      Breath sounds: No wheezing or rhonchi.   Abdominal:      Palpations: Abdomen is soft.      Tenderness: There is no abdominal tenderness. There is no guarding.   Skin:     General: Skin is warm and dry.   Neurological:      Mental Status: She is alert.         Results Review     I reviewed the patient's new clinical results.  Results from last 7 days   Lab Units 10/10/21  0655 10/09/21  0808 10/08/21  0449 10/07/21  075   WBC 10*3/mm3 12.13* 17.96* 7.74 9.81   HEMOGLOBIN g/dL 8.9* 10.9* 10.8* 11.3*   PLATELETS 10*3/mm3 178 243 261 312     Results from last 7 days   Lab Units 10/10/21  0655 10/09/21  0808 10/08/21  0449 10/07/21  0756   SODIUM mmol/L 133* 136 137 138   POTASSIUM mmol/L 4.5 4.5 4.2 4.0   CHLORIDE mmol/L 99 101 102 97*   CO2 mmol/L 22.5 23.6 27.6 31.8*   BUN mg/dL  23 21 23 23   CREATININE mg/dL 0.88 0.78 0.99 0.93   GLUCOSE mg/dL 87 102* 76 89   Estimated Creatinine Clearance: 31.9 mL/min (by C-G formula based on SCr of 0.88 mg/dL).  Results from last 7 days   Lab Units 10/07/21  0756   ALBUMIN g/dL 4.10   BILIRUBIN mg/dL 0.7   ALK PHOS U/L 113   AST (SGOT) U/L 63*   ALT (SGPT) U/L 27     Results from last 7 days   Lab Units 10/10/21  0655 10/09/21  0808 10/08/21  0449 10/07/21  0756   CALCIUM mg/dL 8.3 8.9 8.9 10.0*   ALBUMIN g/dL  --   --   --  4.10   MAGNESIUM mg/dL 2.1 2.1 2.3  --    PHOSPHORUS mg/dL 2.3* 3.1 3.2  --        COVID19   Date Value Ref Range Status   10/07/2021 Not Detected Not Detected - Ref. Range Final     No results found for: HGBA1C, POCGLU    XR Hip With or Without Pelvis 1 View Left  AP PELVIS AND LEFT HIP     HISTORY: Left hip arthroplasty.     FINDINGS: There has been left hip hemiarthroplasty and the components  appear in good position. Recent surgical changes include soft tissue  gas.     This report was finalized on 10/8/2021 7:22 PM by Dr. Suraj Da Silva M.D.     XR Hip With or Without Pelvis 1 View Left  PELVIS/LEFT HIP     HISTORY: Left arthroplasty.     Four intraoperative views were obtained for localization and control  during anterior approach left total hip arthroplasty. The fluoroscopy  time was 14 seconds     This report was finalized on 10/8/2021 6:43 PM by Dr. Suraj Da Silva M.D.     FL C Arm During Surgery  This procedure was auto-finalized with no dictation required.    I reviewed the patient's daily medications.  Scheduled Medications  apixaban, 2.5 mg, Oral, Q12H  digoxin, 125 mcg, Oral, Daily  levothyroxine, 75 mcg, Oral, Q AM  metoprolol tartrate, 37.5 mg, Oral, Daily  senna-docusate sodium, 2 tablet, Oral, Daily  sodium chloride, 10 mL, Intravenous, Q12H    Infusions   Diet  Diet Regular       Assessment/Plan     Active Hospital Problems    Diagnosis  POA   • **Left displaced femoral neck fracture (HCC) [S72.002A]  Yes   •  Leukocytosis [D72.829]  No   • Postoperative anemia due to acute blood loss [D62]  No   • Chronic anticoagulation [Z79.01]  Not Applicable   • Hard of hearing [H91.90]  Yes   • History of colon cancer [Z85.038]  Yes   • Hypothyroidism [E03.9]  Yes   • Chronic atrial fibrillation (HCC) [I48.20]  Yes   • DNR (do not resuscitate) [Z66]  Yes   • Irritable bowel syndrome [K58.9]  Yes   • HLD (hyperlipidemia) [E78.5]  Yes   • CKD (chronic kidney disease) stage 3, GFR 30-59 ml/min (HCC) [N18.30]  Yes      Resolved Hospital Problems   No resolved problems to display.       89-year-old female with history of A. fib who presents after mechanical fall resulting in left hip fracture.    Left hip fracture s/p repair 10/8  -Follow up in office in 2 weeks with Dr. Nelson.  [phone]  - pain well controlled on current regimen.  Bowel regimen  -Eliquis 2.5 twice daily for DVT prophylaxis.  This is her home dose which she was taking for A. fib  -She is high risk for hospital-acquired delirium due to her age.  Continue delirium precautions    Leukocytosis  -Downtrending today.  Continue to monitor    Postoperative anemia  -Slowly downtrending from 11-9.  Continue to monitor    Chronic atrial fibrillation  -Continue home digoxin metoprolol, Eliquis    CKD 3  -Stable at baseline    Hypothyroid  -Resume home Synthroid      · DVT prophylaxis as above  · DNR.  · Discussed with patient.  · Anticipate discharge to SNU facility.  She is medically ready for discharge when an accepting facility is available      Rylee Contreras DO  [facility]  10/10/21  15:22 EDT    Patient was wearing facemask when I entered the room and throughout our encounter.  I wore protective equipment throughout this patient encounter including a face mask, gloves and protective eyewear.  Hand hygiene was performed before donning protective equipment and after removal when leaving the room.

## 2021-10-11 VITALS
TEMPERATURE: 98 F | DIASTOLIC BLOOD PRESSURE: 60 MMHG | BODY MASS INDEX: 20.83 KG/M2 | RESPIRATION RATE: 18 BRPM | HEART RATE: 79 BPM | HEIGHT: 64 IN | SYSTOLIC BLOOD PRESSURE: 95 MMHG | WEIGHT: 122 LBS | OXYGEN SATURATION: 95 %

## 2021-10-11 PROBLEM — D72.829 LEUKOCYTOSIS: Status: RESOLVED | Noted: 2021-10-09 | Resolved: 2021-10-11

## 2021-10-11 LAB
ANION GAP SERPL CALCULATED.3IONS-SCNC: 8.4 MMOL/L (ref 5–15)
BASOPHILS # BLD AUTO: 0.03 10*3/MM3 (ref 0–0.2)
BASOPHILS NFR BLD AUTO: 0.3 % (ref 0–1.5)
BUN SERPL-MCNC: 22 MG/DL (ref 8–23)
BUN/CREAT SERPL: 26.8 (ref 7–25)
CALCIUM SPEC-SCNC: 8.4 MG/DL (ref 8.2–9.6)
CHLORIDE SERPL-SCNC: 97 MMOL/L (ref 98–107)
CO2 SERPL-SCNC: 26.6 MMOL/L (ref 22–29)
CREAT SERPL-MCNC: 0.82 MG/DL (ref 0.57–1)
DEPRECATED RDW RBC AUTO: 46.8 FL (ref 37–54)
EOSINOPHIL # BLD AUTO: 0.13 10*3/MM3 (ref 0–0.4)
EOSINOPHIL NFR BLD AUTO: 1.3 % (ref 0.3–6.2)
ERYTHROCYTE [DISTWIDTH] IN BLOOD BY AUTOMATED COUNT: 13.1 % (ref 12.3–15.4)
GFR SERPL CREATININE-BSD FRML MDRD: 65 ML/MIN/1.73
GLUCOSE SERPL-MCNC: 91 MG/DL (ref 65–99)
HCT VFR BLD AUTO: 27.2 % (ref 34–46.6)
HGB BLD-MCNC: 8.5 G/DL (ref 12–15.9)
IMM GRANULOCYTES # BLD AUTO: 0.04 10*3/MM3 (ref 0–0.05)
IMM GRANULOCYTES NFR BLD AUTO: 0.4 % (ref 0–0.5)
LYMPHOCYTES # BLD AUTO: 1.74 10*3/MM3 (ref 0.7–3.1)
LYMPHOCYTES NFR BLD AUTO: 17.3 % (ref 19.6–45.3)
MAGNESIUM SERPL-MCNC: 2.2 MG/DL (ref 1.7–2.3)
MCH RBC QN AUTO: 30.1 PG (ref 26.6–33)
MCHC RBC AUTO-ENTMCNC: 31.3 G/DL (ref 31.5–35.7)
MCV RBC AUTO: 96.5 FL (ref 79–97)
MONOCYTES # BLD AUTO: 1.48 10*3/MM3 (ref 0.1–0.9)
MONOCYTES NFR BLD AUTO: 14.7 % (ref 5–12)
NEUTROPHILS NFR BLD AUTO: 6.63 10*3/MM3 (ref 1.7–7)
NEUTROPHILS NFR BLD AUTO: 66 % (ref 42.7–76)
NRBC BLD AUTO-RTO: 0 /100 WBC (ref 0–0.2)
PHOSPHATE SERPL-MCNC: 2 MG/DL (ref 2.5–4.5)
PLATELET # BLD AUTO: 218 10*3/MM3 (ref 140–450)
PMV BLD AUTO: 10.7 FL (ref 6–12)
POTASSIUM SERPL-SCNC: 4.3 MMOL/L (ref 3.5–5.2)
RBC # BLD AUTO: 2.82 10*6/MM3 (ref 3.77–5.28)
SODIUM SERPL-SCNC: 132 MMOL/L (ref 136–145)
WBC # BLD AUTO: 10.05 10*3/MM3 (ref 3.4–10.8)

## 2021-10-11 PROCEDURE — 80048 BASIC METABOLIC PNL TOTAL CA: CPT | Performed by: STUDENT IN AN ORGANIZED HEALTH CARE EDUCATION/TRAINING PROGRAM

## 2021-10-11 PROCEDURE — 85025 COMPLETE CBC W/AUTO DIFF WBC: CPT | Performed by: STUDENT IN AN ORGANIZED HEALTH CARE EDUCATION/TRAINING PROGRAM

## 2021-10-11 PROCEDURE — 84100 ASSAY OF PHOSPHORUS: CPT | Performed by: STUDENT IN AN ORGANIZED HEALTH CARE EDUCATION/TRAINING PROGRAM

## 2021-10-11 PROCEDURE — 83735 ASSAY OF MAGNESIUM: CPT | Performed by: STUDENT IN AN ORGANIZED HEALTH CARE EDUCATION/TRAINING PROGRAM

## 2021-10-11 RX ORDER — POLYETHYLENE GLYCOL 3350 17 G/17G
17 POWDER, FOR SOLUTION ORAL DAILY
Status: DISCONTINUED | OUTPATIENT
Start: 2021-10-11 | End: 2021-10-11 | Stop reason: HOSPADM

## 2021-10-11 RX ORDER — AMOXICILLIN 250 MG
2 CAPSULE ORAL 2 TIMES DAILY
Start: 2021-10-11

## 2021-10-11 RX ORDER — HYDROCODONE BITARTRATE AND ACETAMINOPHEN 5; 325 MG/1; MG/1
1 TABLET ORAL EVERY 4 HOURS PRN
Qty: 18 TABLET | Refills: 0 | Status: SHIPPED | OUTPATIENT
Start: 2021-10-11 | End: 2021-10-11

## 2021-10-11 RX ORDER — HYDROCODONE BITARTRATE AND ACETAMINOPHEN 5; 325 MG/1; MG/1
1 TABLET ORAL EVERY 4 HOURS PRN
Qty: 18 TABLET | Refills: 0 | Status: SHIPPED | OUTPATIENT
Start: 2021-10-11 | End: 2021-10-14

## 2021-10-11 RX ORDER — MECLIZINE HYDROCHLORIDE 25 MG/1
25 TABLET ORAL 3 TIMES DAILY PRN
Status: DISCONTINUED | OUTPATIENT
Start: 2021-10-11 | End: 2021-10-11 | Stop reason: HOSPADM

## 2021-10-11 RX ORDER — POLYETHYLENE GLYCOL 3350 17 G/17G
17 POWDER, FOR SOLUTION ORAL DAILY
Start: 2021-10-12

## 2021-10-11 RX ADMIN — HYDROCODONE BITARTRATE AND ACETAMINOPHEN 1 TABLET: 5; 325 TABLET ORAL at 03:21

## 2021-10-11 RX ADMIN — DIGOXIN 125 MCG: 125 TABLET ORAL at 11:21

## 2021-10-11 RX ADMIN — SODIUM CHLORIDE, PRESERVATIVE FREE 10 ML: 5 INJECTION INTRAVENOUS at 08:21

## 2021-10-11 RX ADMIN — METOPROLOL TARTRATE 37.5 MG: 25 TABLET, FILM COATED ORAL at 08:21

## 2021-10-11 RX ADMIN — APIXABAN 2.5 MG: 2.5 TABLET, FILM COATED ORAL at 08:20

## 2021-10-11 RX ADMIN — POLYETHYLENE GLYCOL 3350 17 G: 17 POWDER, FOR SOLUTION ORAL at 11:21

## 2021-10-11 RX ADMIN — DOCUSATE SODIUM 50MG AND SENNOSIDES 8.6MG 2 TABLET: 8.6; 5 TABLET, FILM COATED ORAL at 08:20

## 2021-10-11 RX ADMIN — LEVOTHYROXINE SODIUM 75 MCG: 0.07 TABLET ORAL at 06:28

## 2021-10-11 RX ADMIN — SODIUM CHLORIDE 1000 ML: 9 INJECTION, SOLUTION INTRAVENOUS at 10:30

## 2021-10-11 NOTE — DISCHARGE SUMMARY
[facility]    Date of Admission: 10/7/2021  Date of Discharge:  10/11/2021    PCP: Cary Espinal PA-C    Discharge Diagnosis:   Active Hospital Problems    Diagnosis  POA   • **Left displaced femoral neck fracture (HCC) [S72.002A]  Yes   • Postoperative anemia due to acute blood loss [D62]  No   • Chronic anticoagulation [Z79.01]  Not Applicable   • Hard of hearing [H91.90]  Yes   • History of colon cancer [Z85.038]  Yes   • Hypothyroidism [E03.9]  Yes   • Chronic atrial fibrillation (HCC) [I48.20]  Yes   • DNR (do not resuscitate) [Z66]  Yes   • Irritable bowel syndrome [K58.9]  Yes   • HLD (hyperlipidemia) [E78.5]  Yes   • CKD (chronic kidney disease) stage 3, GFR 30-59 ml/min (HCC) [N18.30]  Yes      Resolved Hospital Problems    Diagnosis Date Resolved POA   • Leukocytosis [D72.829] 10/11/2021 No     Procedures Performed  Procedure(s):  LEFT HIP ROGELIO ARTHROPLASTY     Consults     Date and Time Order Name Status Description    10/7/2021  8:25 AM Ortho (on-call MD unless specified) Completed     10/7/2021  8:25 AM LHA (on-call MD unless specified) Details Completed         Hospital Course  Please see history and physical for details. Patient is a 89 y.o. female that initially presented to the hospital after a mechanical fall at home. She was found to have a left displaced femoral neck fracture and admitted for further care. Orthopedic surgery was consulted.   The patient underwent a left hip rogelio arthroplasty with Dr. Nelson on 10/8/21. She tolerated the surgery well with some expected post op blood loss anemia as well as expected pain. Her hemoglobin has been stable for the last couple days and she has not required a transfusion. Her pain has been controlled with oral therapies and she worked with PT who recommended rehab. She did have some leukocytosis that has resolved and felt reactive. She is afebrile with some mildly low BP today. She is chronically on  metoprolol and digoxin given her atrial fibrillation. We will reduce the metoprolol for now and add holding parameters. Her Eliquis was held prior to surgery and restarted at the lowest dose given her age. She will need to follow up with her Cardiologist as scheduled.   Orthopedic surgery cleared her for rehab. She has tolerated a diet, but not had a BM. We will increase her bowel regimen prior to discharge and this can further be monitored at rehab. She denies any chest pain, dyspnea, cough, fever or chills. She is having some mild pain in the hip. She will follow up with Dr. Nelson in two weeks and keep her waterproof dressing for 1 week. She is WBAT and allowed to shower with the dressing. She will have her home Eliquis dosing for VTE ppx. Otherwise, she is eager to leave and should follow up with her PCP in 1-2 weeks thereafter. Her Lasix is currently on hold and should be resumed at the discretion of the rehab providers. Recommend repeat BMP and CBC in the next couple days for monitoring.     I discussed the patient's findings and my recommendations with patient, nursing staff and Dr. Contreras.    Condition on Discharge: Improved.     Temp:  [97.1 °F (36.2 °C)-98.2 °F (36.8 °C)] 97.1 °F (36.2 °C)  Heart Rate:  [] 83  Resp:  [14-18] 18  BP: ()/(48-75) 96/62  Body mass index is 20.94 kg/m².    Physical Exam  Vitals and nursing note reviewed.   Constitutional:       Appearance: She is ill-appearing (frail/elderly). She is not toxic-appearing.   HENT:      Head: Normocephalic and atraumatic.   Cardiovascular:      Rate and Rhythm: Normal rate. Rhythm irregular.      Pulses: Normal pulses.      Heart sounds: Normal heart sounds.   Pulmonary:      Effort: Pulmonary effort is normal. No respiratory distress.      Comments: Diminished throughout. On 3 L at 100%. Turned down to 2 L and tolerated. Wean oxygen as able at facility.  Abdominal:      General: Bowel sounds are normal. There is distension (mild).       Palpations: Abdomen is soft.      Tenderness: There is no abdominal tenderness.   Musculoskeletal:         General: Swelling (mild BLE) and tenderness (left hip) present. Normal range of motion.      Cervical back: Normal range of motion and neck supple.   Skin:     General: Skin is warm and dry.      Comments: Left hip dressing intact with ice in place.   Neurological:      Mental Status: She is alert and oriented to person, place, and time.      Sensory: No sensory deficit.      Motor: Weakness (generalized. no focal deficit.) present.      Coordination: Coordination normal.      Comments: [facility]   Psychiatric:         Mood and Affect: Mood normal.         Behavior: Behavior normal.        Discharge Medications      New Medications      Instructions Start Date   HYDROcodone-acetaminophen 5-325 MG per tablet  Commonly known as: NORCO   1 tablet, Oral, Every 4 Hours PRN      polyethylene glycol 17 g packet  Commonly known as: MIRALAX   17 g, Oral, Daily   Start Date: October 12, 2021     sennosides-docusate 8.6-50 MG per tablet  Commonly known as: PERICOLACE   2 tablets, Oral, 2 times daily         Changes to Medications      Instructions Start Date   metoprolol tartrate 25 MG tablet  Commonly known as: LOPRESSOR  What changed:   · medication strength  · how much to take  · additional instructions   25 mg, Oral, Daily, Hold for SBP < 100 or HR < 60   Start Date: October 12, 2021        Continue These Medications      Instructions Start Date   Align capsule   1 capsule, Oral, Daily      apixaban 2.5 MG tablet tablet  Commonly known as: ELIQUIS   2.5 mg, Oral, Every 12 Hours Scheduled, For a fib      aspirin 81 MG EC tablet   81 mg, Oral, Daily      digoxin 125 MCG tablet  Commonly known as: Lanoxin   125 mcg, Oral, Daily Digoxin, For a fib      levothyroxine 75 MCG tablet  Commonly known as: SYNTHROID, LEVOTHROID   75 mcg, Oral, Daily, For thyroid      multivitamin with minerals tablet tablet  Generic drug:  multivitamin with minerals   1 tablet, Oral, Daily      rOPINIRole 0.5 MG tablet  Commonly known as: Requip   1/2 tab PO at HS X 2 days then routine 1 PO 1 hour before bedtime for RLS      SUPER B COMPLEX PO   Oral      vitamin D 1.25 MG (80266 UT) capsule capsule  Commonly known as: ERGOCALCIFEROL   50,000 Units, Oral, Every 7 Days         Stop These Medications    furosemide 40 MG tablet  Commonly known as: LASIX     glucosamine-chondroitin 500-400 MG capsule capsule     loperamide 2 MG capsule  Commonly known as: Imodium A-D     potassium chloride 10 MEQ CR tablet     potassium chloride 20 MEQ CR tablet  Commonly known as: K-DUR,KLOR-CON           Diet Instructions     Diet: Regular, Cardiac      Discharge Diet:  Regular  Cardiac            Activity Instructions     Activity as Tolerated      Measure Weight      Measure weight daily at rehab. Resume Lasix and potassium when clinically appropriate.         Additional Instructions for the Follow-ups that You Need to Schedule     Discharge Follow-up with PCP   As directed       Currently Documented PCP:    Cary Espinal PA-C    PCP Phone Number:    [phone]     Follow Up Details: see in 1 week         Discharge Follow-up with Specified Provider: Dr. Nelson; 2 Weeks   As directed      To: Dr. Nelson    Follow Up: 2 Weeks            Contact information for follow-up providers     Anthony Nelson MD Follow up in 3 week(s).    Specialty: Orthopedic Surgery  Contact information:  [address] [facility] [id number] [phone]             Cary Espinal PA-C .    Specialty: Family Medicine  Why: see in 1 week  Contact information:  [address]  [facility] [id number] [phone]                   Contact information for after-discharge care     Destination     Critical access hospital .    Service: Skilled Nursing  Contact information:  [address] [facility] [address] [phone]                            Test Results Pending at Discharge     AZRA Monte  10/11/21  12:00 EDT    Discharge time spent greater than 30 minutes.

## 2021-10-11 NOTE — DISCHARGE INSTRUCTIONS
Dr. Anthony Nelson  Anterior Partial Hip Replacement Discharge Instructions:    Office Phone Number: [phone]    I. ACTIVITIES:  1. Exercises:  ? Complete exercise program as taught by the hospital physical therapist 2 times per day.  You may wean off the walker to a cane when directed by the physical therapist.  ? Exercise program will be advanced by the physical therapist  ? During the day be up ambulating every 2 hours (while awake) for short distances  ? Complete the ankle pump exercises at least 10 times per hour (while awake)  ? Elevate legs most of the day the first week post operatively and thereafter elevate legs when in bed and for at least 30 minutes during the day. Use cold packs 20-30 minutes approximately 5 times per day. This should be done before and after completing your exercises and at any time you are experiencing pain/ stiffness in your operative extremity.      2. Activities of Daily Living:  ? No tub baths, hot tubs, or swimming pools for 4 weeks  ? The clear dressing with thin white gauze strip dressing is waterproof.  You may shower without covering the dressing beginning 3 days after the operation.  After 7 days you may remove the dressing.  If the dressing becomes saturated prior to day 7, it may be changed.  After dressing removal, do not scrub or rub the incision. Allow skin glue to fall off over the next few weeks.  After the dressing is removed, simply let the water run over the incision and pat dry.    II. Restrictions  ? Follow any movement restrictions that was discussed with you by either Dr. Nelson or the physical therapist.     ? Dr. Nelson will discuss with you when you will be able to drive again at your first post-op appointment.  ? Weight bearing is as tolerated.  ? First week stay inside on even terrain. May go up and down stairs one stair at a time utilizing the hand rail.  ? Once you feel confident, you may venture outside.    III. Precautions:  ? Everyone that  comes near you should wash their hands  ? No elective dental, genital-urinary, or colon procedures or surgical procedures for 12 weeks after surgery unless absolutely necessary.  ?  If dental work or surgical procedure is deemed absolutely necessary within 12 weeks of surgery, you will need to contact Dr. Nelson's office as you will need to take antibiotics 1 hour prior to any dental work (including teeth cleanings).  ? Dr. Nelson will prescribe prophylactic antibiotics for all dental procedures for one year  as a precautionary measure to minimize risk of infection.  If you are a diabetic or take immunosuppressive medication, you may have to take prophylactic antibiotics the remainder of your life before dental work.    ? Avoid sick people. If you must be around someone who is ill, they should wear a mask.  ? Avoid visits to the Emergency Room or Urgent Care unless you are having a life threatening event.   ? If you have leg swelling you may wear leg compression stocking.   Stockings are to be placed on in the morning and removed at night. Monitor the stockings to ensure that any swelling is not causing the stockings to become too tight. In this case, remove stockings immediately.    IV. INCISION CARE:  ? Dr. Nelson takes great care in closing your incision to give you the best opportunity for a healthy incision with minimal scarring. He places sutures below the skin surface that will eventually dissolve.  The incision is then covered with a skin glue which makes the incision water tight, and minimizes bleeding onto the dressing.  No staples are used.  Occasionally one of the buried stitches may come to the skin surface and may need to be removed.  Please resist the temptation of removing the stitch by yourself.   will be happy to remove it for you.  Bruising around the incision and thigh is normal and to be expected.  Please keep dressing in place at least until post-op day 7. You may remove and replace  dressing before day 7 if the dressing begins to fall off or becomes saturated. Wash your hands and under your finger nails prior to dressing changes.  After day 7 as long as incision is dry and intact, you may leave the dressing off and open to air.  You will need to find underwear that does not rub on the incision for a few weeks after the surgery.  You may find it more comfortable to place a dressing on to keep your underwear from rubbing the incision.       ? If dressing must be changed, utilize dry gauze and paper tape. Avoid touching the side of the gauze that goes against the incision with your hands.  ? No creams or ointments to the incision until permission given by Dr. Nelson.  ? Do not touch or pick at the incision, or try to remove any sutures or skin glue.  ? Check dressing every day and notify surgeon immediately if any of the following signs or symptoms are noted:  o Increase in redness  o Increase in swelling of the entire extremity that does not go away with elevation.  Notify office that you may have a blood clot.    o Drainage oozing from the incision  o Pulling apart of the edges of the incision  o Increase in overall body temperature (greater than 100.5 degrees)    V. Medications:   1. Anticoagulants: You will be discharged on an anticoagulant. This is a prophylactic medication that helps prevent blood clots during your post-operative period. The type and length of dosage varies based on your individual needs, procedure performed, and Dr. Nelson's preference.  ? While taking the anticoagulant, you should avoid taking any additional aspirin than what is prescribed.   ? Notify surgeon immediately if any sridhar bleeding is noted in the urine, stool, emesis, or from the nose or the incision. Blood in the stool will often appear as black rather than red. Blood in urine may appear as pink. Blood in emesis may appear as brown/black like coffee grounds.  ? You will need to apply pressure for longer  periods of time to any cuts or abrasions to stop bleeding  ? Avoid alcohol while taking anticoagulants.    2. Stool Softeners: You will be at greater risk of constipation after surgery due to being less mobile and the pain medications.   ? Take stool softeners as instructed by your surgeon while on pain medications. Over the counter Colace 100 mg 1-2 capsules twice daily.   ? If stools become too loose or too frequent, please decreases the dosage or stop the stool softener.  ? If constipation occurs despite use of stool softeners, you are to continue the stool softeners and add a laxative (Milk of Magnesia 1 ounce daily as needed).  If no bowel movement occurs past 3 days, then purchase Magnesium citrate and drink 1/2 bottle every 8 hours (on ice tastes better) until success. If no bowel movement by post-operative day 5 please call Dr. Maravilla office for further instructions.   You may need to decrease or stop your pain medications if bowel movements to not occur.     ? Drink plenty of fluids, and eat fruits and vegetables during your recovery time.    3. Pain Medications utilized after surgery are narcotics and the law requires that the following information be given to all patients that are prescribed narcotics:  ? CLASSIFICATION: Pain medications are called Opioids and are narcotics  ? LEGALITIES: It is illegal to share narcotics with others and to drive within 24 hours of taking narcotics  ? POTENTIAL SIDE EFFECTS: Potential side effects of opioids include: nausea, vomiting, itching, dizziness, drowsiness, dry mouth, constipation, and difficulty urinating.  ? POTENTIAL ADVERSE EFFECTS:   o Opioid tolerance can develop with use of pain medications and this simply means that it requires more and more of the medication to control pain; however, this is seen more in patients that use opioids for longer periods of time.  o Opioid dependence can develop with use of Opioids and this simply means that to stop the  "medication can cause withdrawal symptoms; however, this is seen with patients that use Opioids for longer periods of time.  o Opioid addiction can develop with use of Opioids and the incidence of this is very unlikely in patients who take the medications as ordered and stop the medications as instructed.  o Opioid overdose can be dangerous, but is unlikely when the medication is taken as ordered and stopped when ordered. It is important not to mix opioids with alcohol or with and type of sedative such as Benadryl as this can lead to over sedation and respiratory difficulty.  ? DOSAGE:   o Pain medications will need to be taken consistently for the first few days to decrease pain and promote adequate pain relief and participation in physical therapy.  o After the initial surgical pain begins to resolve, you may begin to decrease the pain medication. By the end of 6 weeks, you should be off of pain medications except for before physical therapy or to help with pain when attempting to fall asleep.  Pain medications will be tapered to lesser dosages as you are further from your surgical day.  No pain medications will be provided after 3 months from surgery.     o Refills will not be given by the office during evening hours, or weekends.  o To seek refills on pain medications during the post-operative period, you must call the office 48 hours in advance to request the refill. The office will then notify you when to  the prescription. DO NOT wait until you are out of the medication to request a refill.  They can not be \"called in\" to the pharmacy.      How to Wean Off Pain Medication:   As you begin to feel better, gradually wean off the narcotic pain medication and begin to use it only for breakthrough pain.  · Gradually reduce the total number of pills you take each day.  This can be done by taking fewer pills at a time or by increasing the amount of time between each pill.    · For example, if you were taking 2 " pills every 6 hours you would be taking a total of 8 pills per day.  Reduce this to 6 pills per day, then 4-5 and so on.  This can be done by taking 1 pill at a time instead of 2, or by taking the pills every 8 hours instead of every 6.    · As you begin to wean from the narcotic pain medication, begin substituting with over the counter tylenol when you are not taking the narcotic.  Limit total tylenol dosage to less than 4 grams per day.    V. FOLLOW-UP VISITS:  ? You will need to follow up in the office with Dr. Nelson at 3 weeks.  Please call [phone] if you need to confirm or reschedule your appointment time.   ? If you have any concerns or suspected complications prior to your follow up visit, please call your surgeons office. Do not wait until your appointment time if you suspect complications. These will need to be addressed in the office promptly.

## 2021-10-11 NOTE — PLAN OF CARE
Goal Outcome Evaluation:  Plan of Care Reviewed With: patient           Outcome Summary: VSS overnight, pain well controlled with PO medication.  Pt able to sleep throughout the night with minimal disturbance in order to prevent delirium.

## 2021-10-11 NOTE — CASE MANAGEMENT/SOCIAL WORK
Case Management Discharge Note      Final Note: Skilled bed at [facility] via  van. Patrizia Sachin CSW    Provided Post Acute Provider List?: Refused  Refused Provider List Comment: Pt requests to go to [facility] at D/C. States she has been there in the past  Provided Post Acute Provider Quality & Resource List?: Refused    Selected Continued Care - Discharged on 10/11/2021 Admission date: 10/7/2021 - Discharge disposition: Skilled Nursing Facility (DC - External)    Destination Coordination complete.    Service Provider Selected Services Address Phone Fax Patient Preferred    [facility]  Skilled Nursing [address] [address] [phone] --          Durable Medical Equipment    No services have been selected for the patient.              Dialysis/Infusion    No services have been selected for the patient.              Home Medical Care    No services have been selected for the patient.              Therapy    No services have been selected for the patient.              Community Resources    No services have been selected for the patient.              Community & DME    No services have been selected for the patient.                       Final Discharge Disposition Code: 03 - skilled nursing facility (SNF)

## 2021-10-11 NOTE — CASE MANAGEMENT/SOCIAL WORK
Continued Stay Note  [facility]     Patient Name: Kristyn Severino  MRN: [id number]  Today's Date: 10/11/2021    Admit Date: 10/7/2021     Discharge Plan     Row Name 10/11/21 1107       Plan    Plan Skilled bed at [facility]; WC van for 3:00 P.M    Plan Comments CCP spoke with [facility]; bed available today. CCP discussed with patient; patient agreeable and requested CCP contact her daughters; Kya and Rosa. CCP left voicemail for Rosa. CCP spoke with Kya; she is in agreement to [facility]. CCP arranged [facility] van for 3:00 PM this afternoon. Patient and family aware of transport time. Packet placed in [facility] and RN updated. Patrizia CRUZ               Discharge Codes    No documentation.               Expected Discharge Date and Time     Expected Discharge Date Expected Discharge Time    Oct 10, 2021             NANCY Grace

## 2021-10-18 NOTE — TELEPHONE ENCOUNTER
[address]                      [address], [address] [facility]                                  CONSULTATION    PATIENT NAME: Jen Monroy                   :        1941  MED REC NO:   [id number]                            ROOM:       32  ACCOUNT NO:   [de-identified]                           ADMIT DATE: 10/18/2021  PROVIDER:     Francheska Madden MD    CONSULT DATE:  10/18/Pt called stating she still has diarrhea needs to know what she should do before she see's gastro?    10/18/2021 14:23:30     [email or web address]  Job#: [id number]     Doc#: [id number]    CC:

## 2021-12-12 ENCOUNTER — APPOINTMENT (OUTPATIENT)
Dept: GENERAL RADIOLOGY | Facility: HOSPITAL | Age: 86
End: 2021-12-12

## 2021-12-12 ENCOUNTER — HOSPITAL ENCOUNTER (EMERGENCY)
Facility: HOSPITAL | Age: 86
Discharge: HOME | End: 2021-12-12
Attending: EMERGENCY MEDICINE | Admitting: EMERGENCY MEDICINE

## 2021-12-12 ENCOUNTER — APPOINTMENT (OUTPATIENT)
Dept: CT IMAGING | Facility: HOSPITAL | Age: 86
End: 2021-12-12

## 2021-12-12 VITALS
OXYGEN SATURATION: 98 % | RESPIRATION RATE: 14 BRPM | HEART RATE: 84 BPM | BODY MASS INDEX: 20.83 KG/M2 | TEMPERATURE: 98.2 F | HEIGHT: 64 IN | SYSTOLIC BLOOD PRESSURE: 112 MMHG | DIASTOLIC BLOOD PRESSURE: 70 MMHG | WEIGHT: 122 LBS

## 2021-12-12 DIAGNOSIS — Z86.79 HISTORY OF HEART FAILURE: ICD-10-CM

## 2021-12-12 DIAGNOSIS — R29.6 RECURRENT FALLS: Primary | ICD-10-CM

## 2021-12-12 DIAGNOSIS — Z86.79 HISTORY OF ATRIAL FIBRILLATION: ICD-10-CM

## 2021-12-12 DIAGNOSIS — M25.552 LEFT HIP PAIN: ICD-10-CM

## 2021-12-12 PROCEDURE — 73502 X-RAY EXAM HIP UNI 2-3 VIEWS: CPT

## 2021-12-12 PROCEDURE — 70450 CT HEAD/BRAIN W/O DYE: CPT

## 2021-12-12 PROCEDURE — 99283 EMERGENCY DEPT VISIT LOW MDM: CPT

## 2021-12-12 RX ORDER — LORAZEPAM 1 MG/1
1 TABLET ORAL ONCE
Status: DISCONTINUED | OUTPATIENT
Start: 2021-12-12 | End: 2021-12-13 | Stop reason: HOSPADM

## 2021-12-12 RX ORDER — HYDROCODONE BITARTRATE AND ACETAMINOPHEN 7.5; 325 MG/1; MG/1
1 TABLET ORAL ONCE
Status: COMPLETED | OUTPATIENT
Start: 2021-12-12 | End: 2021-12-12

## 2021-12-12 RX ADMIN — HYDROCODONE BITARTRATE AND ACETAMINOPHEN 1 TABLET: 7.5; 325 TABLET ORAL at 17:10

## 2021-12-12 NOTE — ED TRIAGE NOTES
Pt from home daughter takes care of her, pt has had 3 falls in past 4 days, slid out of chair, today was trying to transfer from bed to wheelchair and fell. Pt is on pallative care with Hospice. Pt reports left hip pain. Pt is A&Ox4. Pt daughter is blind. Pt is having increasingly hard time taking care of patient at home per EMS and asking for information regarding additional care.     Pt arrives in triage with mask on. Triage staff wearing N95 masks and goggles.

## 2021-12-12 NOTE — ED PROVIDER NOTES
EMERGENCY DEPARTMENT ENCOUNTER    Room Number:  35/35  Date of encounter:  12/12/2021  PCP: Cary Espinal PA-C  Historian: Patient, EMS      HPI:  Chief Complaint: Left hip pain  A complete HPI/ROS/PMH/PSH/SH/FH are unobtainable due to: None    Context: Kristyn Severino is a 89 y.o. female who presents to the ED via [facility] from home for left hip pain after having multiple falls this week with transferring from her wheelchair to toilet and bed.  Patient is otherwise mostly nonambulatory.  Reportedly she is on hospice though unclear as to why at this moment.  Patient's daughter is legally blind, takes care of the patient, but told EMS that she does not like she can continue to care for her at home at this time.  Patient denies any head injury or LOC, denies any other pains or issues at this time.      MEDICAL RECORD REVIEW    Chart review in epic shows a left hemiarthroplasty in October 2021 for left femoral neck fracture.  Also shows patient is anticoagulated with Eliquis.    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 10/06/2018   • Multiple joint pain 01/16/2020   • Malignant neoplasm of ascending colon (HCC) 10/26/2020   • Glaucoma associated with chamber angle anomalies, left, severe stage 09/26/2018   • 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   • Generalized muscle weakness 10/17/2018   • Anemia 09/26/2018   • Unspecified intestinal obstruction, unspecified as to partial versus complete obstruction (HCC) 10/17/2018   • Other  microscopic colitis 09/26/2018   • Hypothyroidism 05/05/2021   • Insomnia, unspecified 09/26/2018   • Other irritable bowel syndrome 09/26/2018   • Arthritis 05/05/2021   • Other abnormalities of gait and mobility 05/17/2021   • Shortness of breath 05/16/2021   • Chronic atrial fibrillation (HCC) 05/17/2021   • Weakness 05/17/2021   • Hypothyroidism 06/22/2021   • Hypothyroidism, unspecified 05/17/2021   • Arthritis 06/22/2021   • Muscle wasting and atrophy, not elsewhere classified, multiple sites 05/17/2021   • Polyarthritis, unspecified 05/16/2021   • Left displaced femoral neck fracture (HCC) 10/07/2021   • Chronic anticoagulation 10/07/2021   • Hard of hearing 10/07/2021   • History of colon cancer 10/07/2021   • Postoperative anemia due to acute blood loss 10/09/2021     Resolved Ambulatory Problems     Diagnosis Date Noted   • Hypercalcemia 08/24/2017   • Acidosis 10/10/2018   • Leukocytosis 10/09/2021     Past Medical History:   Diagnosis Date   • Anemia of chronic illness    • Atrial fibrillation (HCC)    • Colon cancer (HCC)    • PALACIOS (dyspnea on exertion)    • Fatigue    • History of bone density study    • History of colonoscopy    • History of prior pregnancies    • Lightheadedness    • Postmenopausal    • Renal insufficiency    • Rhinorrhea    • Stage 3a chronic kidney disease (HCC)          PAST SURGICAL HISTORY  Past Surgical History:   Procedure Laterality Date   • APPENDECTOMY N/A 03/2010    Dr. Addy De Oliveira   • BREAST BIOPSY     • CARPAL TUNNEL RELEASE Right    • CARPAL TUNNEL RELEASE Left 2/28/2020    Procedure: DECOMPRESSION OF CARPAL TUNNEL;  Surgeon: Marty Plata MD;  Location: [facility];  Service: Plastics;  Laterality: Left;   • COLON RESECTION N/A 10/9/2018    Procedure: right hemicolectomy;  Surgeon: Jung Barillas MD;  Location: [facility];  Service: General   • HIP ENDOPROSTHESIS Left 10/8/2021    Procedure: LEFT HIP ROGELIO ARTHROPLASTY;  Surgeon: Anthony Nelson MD;   Location: [facility] OR;  Service: Orthopedics;  Laterality: Left;   • MAMMO BILATERAL      many years ago   • PAP SMEAR      many years ago   • REPLACEMENT TOTAL KNEE Left 02/07/2011    Dr. Marty Gold         FAMILY HISTORY  Family History   Problem Relation Age of Onset   • Basal cell carcinoma Mother         Ages 35-96   • Nephrolithiasis Mother    • Heart disease Mother    • Hypertension Mother    • Diabetes Son          SOCIAL HISTORY  Social History     Socioeconomic History   • Marital status:    • Number of children: 3   • Years of education: Some College   Tobacco Use   • Smoking status: Never Smoker   • Smokeless tobacco: Never Used   • Tobacco comment: caffeine use 1/2 cup coffee and occas green tea   Substance and Sexual Activity   • Alcohol use: Yes     Comment: Occasional   • Drug use: No   • Sexual activity: Defer         ALLERGIES  Patient has no known allergies.        REVIEW OF SYSTEMS  Review of Systems     All systems reviewed and negative except for those discussed in HPI.       PHYSICAL EXAM    I have reviewed the triage vital signs and nursing notes.    ED Triage Vitals [12/12/21 1142]   Temp Heart Rate Resp BP SpO2   97.7 °F (36.5 °C) 100 18 124/63 98 %      Temp src Heart Rate Source Patient Position BP Location FiO2 (%)   -- -- -- -- --       Physical Exam  General: No acute distress, nontoxic  HEENT: Mucous membranes moist, no obvious scalp hematoma or laceration, EOMI  Neck: Full ROM, nontender  Pulm: Symmetric chest rise, nonlabored, lungs CTAB  Cardiovascular: Regular rate and rhythm, intact distal pulses  GI: Soft, nontender, nondistended, no rebound, no guarding, bowel sounds present  MSK: Left hip pain to palpation but no obvious shortening or malrotation  Skin: Warm, dry  Neuro: Awake, alert, oriented x 4, GCS 15, moving all extremities, no focal deficits  Psych: Calm, cooperative      N95, protective eye goggles, and gloves used during this encounter. Patient in surgical  mask.      LAB RESULTS  No results found for this or any previous visit (from the past 24 hour(s)).          RADIOLOGY  CT Head Without Contrast    Result Date: 12/12/2021  CT BRAIN WITHOUT CONTRAST  HISTORY: Recurrent falls and possible head injury. On anticoagulation.  TECHNIQUE/FINDINGS: The CT scan was performed as an emergency procedure through the brain without contrast. There is prominent diffuse atrophy and chronic small vessel ischemic change. There is no evidence of acute intracranial hemorrhage or mass effect. The sinuses and mastoid air cells are clear.      Radiation dose reduction techniques were utilized, including automated exposure control and exposure modulation based on body size.  This report was finalized on 12/12/2021 2:31 PM by Dr. Say Osorio M.D.      XR Hip With or Without Pelvis 2 - 3 View Left    Result Date: 12/12/2021  TWO-VIEW LEFT HIP AND ONE VIEW AP PELVIS  HISTORY: Fell. Left hip pain.  FINDINGS: The patient has previous total hip replacement on the left and no acute fracture is seen. There is a small ossific density lateral to the lateral margin of the acetabulum which cannot be seen on the postoperative study of 10/08/2021. This may represent some new heterotopic bone formation and would be a rather unusual location for avulsion fracture.  This report was finalized on 12/12/2021 12:43 PM by Dr. Say Osorio M.D.        I ordered the above noted radiological studies. Reviewed by me.  See dictation for official radiology interpretation.      PROCEDURES    Procedures      MEDICATIONS GIVEN IN ER    Medications - No data to display      PROGRESS, DATA ANALYSIS, CONSULTS, AND MEDICAL DECISION MAKING    All labs have been independently reviewed by me.  All radiology studies have been reviewed by me and discussed with radiologist dictating the report.   EKG's independently viewed and interpreted by me.  Discussion below represents my analysis of pertinent findings related to  patient's condition, differential diagnosis, treatment plan and final disposition.    Initial concern for possible hip fracture, complication with recent hemiarthroplasty, pelvic fracture, among others.    ED Course as of 12/12/21 1548   Sun Dec 12, 2021   [facility] has evaluated, [facility] will be evaluating at bedside to discuss with daughter available options for more intensive home care vs placement [DC]   1531 [facility] has evaluated, will work on transfer to inpatient [facility] downw.  [DC]      ED Course User Index  [DC] Lukas Little MD       AS OF 15:48 EST VITALS:    BP - 108/63  HR - 88  TEMP - 97.7 °F (36.5 °C)  02 SATS - 95%        DIAGNOSIS  Final diagnoses:   Recurrent falls   Left hip pain   History of atrial fibrillation   History of heart failure         DISPOSITION  DISCHARGE    Patient discharged in stable condition.    Reviewed implications of results, diagnosis, meds, responsibility to follow up, warning signs and symptoms of possible worsening, potential complications and reasons to return to ER.    Patient/Family voiced understanding of above instructions.    Discussed plan for discharge, as there is no emergent indication for admission. Patient referred to primary care provider for BP management due to today's BP. Pt/family is agreeable and understands need for follow up and repeat testing.  Pt is aware that discharge does not mean that nothing is wrong but it indicates no emergency is present that requires admission and they must continue care with follow-up as given below or physician of their choice.     FOLLOW-UP  [facility]  [address] [facility] [address] [phone]    As needed, If symptoms worsen         Medication List      No changes were made to your prescriptions during this visit.                    Lukas Little MD  12/12/21 1540

## 2021-12-12 NOTE — PROGRESS NOTES
[facility] Visit Report    Kristyn Severino  [id number]  12/12/2021    Reason for [facility] Admission: Acute on chronic systolic congestive heart failure.     Review of Visit: Patient came to [facility] ED s/p fall at home.  Daughter at bedside says she has been having increased weakness, increased hallucinations, and increased restlessness.  Discussed with daughter options for discharge, and plan at this time is to go to [facility] Inpatient Unit downtown until [facility] can help daughter facilitate LTC placement.     Collaborated with Staff JAMIL Kaufman,  ED phys Dr. Little, and  ED GUANAKO Grossman.  All in agreement to transfer to [facility] for EOL planning.     Information faxed to [facility].     Mayra Etienne RN  [facility]  Scattered Bed Team

## 2021-12-12 NOTE — CASE MANAGEMENT/SOCIAL WORK
Mayra w/ [facility] to arrive shortly. Daughter Kya stated she has spoken w/ [facility] and decided that she prefers facility placement rather 24/7 home care. Mayra from [facility] to discuss respite admission to inpatient [facility] unit while [facility] working on facility placement

## 2021-12-12 NOTE — CASE MANAGEMENT/SOCIAL WORK
Message left for [facility] to arrange transport [facility] after 1900 tonight arranged by Mayra from [facility]

## 2021-12-12 NOTE — CASE MANAGEMENT/SOCIAL WORK
Discharge Planning Assessment  [facility]     Patient Name: Kristyn Severino  MRN: [id number]  Today's Date: 12/12/2021    Admit Date: 12/12/2021     Discharge Needs Assessment     Row Name 12/12/21 1331       Living Environment    Lives With child(geronimo), adult    Name(s) of Who Lives With Patient wagner Boland    Current Living Arrangements home/apartment/condo    Primary Care Provided by child(geronimo)    Provides Primary Care For no one, unable/limited ability to care for self    Caregiving Concerns Wagner Boland is caregiver, but is blind and is having trouble providing all care. Has a privately paid helper who comes in several days a week for 5-6 hours    Family Caregiver if Needed child(geronimo), adult       Resource/Environmental Concerns    Resource/Environmental Concerns none       Transition Planning    Patient/Family Anticipates Transition to home with help/services    Patient/Family Anticipated Services at Transition hospice care; other (see comments)  Followed by [facility], and interested in 24/7 care    Transportation Anticipated health plan transportation       Discharge Needs Assessment    Equipment Currently Used at Home hospital bed; wheelchair; commode    Concerns to be Addressed basic needs    Concerns Comments Daughter states that patient needs 24/7 care, which she needs help with. Current w/ [facility]    Anticipated Changes Related to Illness none               Discharge Plan     Row Name 12/12/21 2679       Plan    Plan Introduced self and explained role of CCP. PPE used    Plan Comments Patient lives w/ wagner Boland provides care and has a privately paid caregiver who comes in several times a week for 5-6 hours, but she canot come more often. Current w/ [facility]. Has no one else available to help at home and would like to keep patient at home if possible. Spoke w/ Danielle, on call nurse for [facility], who states that  has discussed care options w/ wagner Boland states she has  not called any of the personal care agencies yet, but was plaaning to begin today. Aware of cost and that cost is not covered by [facility]. Danielle called and stated that Mayra from [facility] will be here to speak w/ daughter and Danielle will be working on respite care. CCP will follow. MD, daughter and RN updated              Continued Care and Services - Admitted Since 12/12/2021    Coordination has not been started for this encounter.     Selected Continued Care - Prior Encounters Includes selections from prior encounters from 9/13/2021 to 12/12/2021    Discharged on 10/11/2021 Admission date: 10/7/2021 - Discharge disposition: Skilled Nursing Facility (DC - External)    Destination     Service Provider Selected Services Address Phone Fax Patient Preferred    [facility]  Sk[facility] [facility] [address] [phone] --                       Demographic Summary    No documentation.                Functional Status    No documentation.                Psychosocial    No documentation.                Abuse/Neglect    No documentation.                Legal    No documentation.                Substance Abuse    No documentation.                Patient Forms    No documentation.                   Emily Chandler, RN

## 2024-12-18 NOTE — PLAN OF CARE
Problem: Patient Care Overview  Goal: Plan of Care Review  Outcome: Ongoing (interventions implemented as appropriate)   10/12/18 6498   Coping/Psychosocial   Plan of Care Reviewed With patient   OTHER   Outcome Summary Pt ambulated today, pain meds x2, pt very weak and unwilling to try to move at times despite encouragement   Plan of Care Review   Progress improving       Problem: Skin Injury Risk (Adult)  Goal: Skin Health and Integrity  Outcome: Ongoing (interventions implemented as appropriate)         previous_has_had_previous_treatment

## 2025-01-29 NOTE — PATIENT INSTRUCTIONS
Stop Ambien if you develop excessive daytime drowsiness and/or sleep walking.  Avoid driving if drowsy.  Do not drink alcohol with this medication.  Ambien is a controlled substance and requires you to be seen for an appointment every 3 months for a medication check refill.  Use the lowest effective dose.      Exercising to Stay Healthy  Exercising regularly is important. It has many health benefits, such as:  · Improving your overall fitness, flexibility, and endurance.  · Increasing your bone density.  · Helping with weight control.  · Decreasing your body fat.  · Increasing your muscle strength.  · Reducing stress and tension.  · Improving your overall health.    In order to become healthy and stay healthy, it is recommended that you do moderate-intensity and vigorous-intensity exercise. You can tell that you are exercising at a moderate intensity if you have a higher heart rate and faster breathing, but you are still able to hold a conversation. You can tell that you are exercising at a vigorous intensity if you are breathing much harder and faster and cannot hold a conversation while exercising.  How often should I exercise?  Choose an activity that you enjoy and set realistic goals. Your health care provider can help you to make an activity plan that works for you. Exercise regularly as directed by your health care provider. This may include:  · Doing resistance training twice each week, such as:  ? Push-ups.  ? Sit-ups.  ? Lifting weights.  ? Using resistance bands.  · Doing a given intensity of exercise for a given amount of time. Choose from these options:  ? 150 minutes of moderate-intensity exercise every week.  ? 75 minutes of vigorous-intensity exercise every week.  ? A mix of moderate-intensity and vigorous-intensity exercise every week.    Children, pregnant women, people who are out of shape, people who are overweight, and older adults may need to consult a health care provider for individual  recommendations. If you have any sort of medical condition, be sure to consult your health care provider before starting a new exercise program.  What are some exercise ideas?  Some moderate-intensity exercise ideas include:  · Walking at a rate of 1 mile in 15 minutes.  · Biking.  · Hiking.  · Golfing.  · Dancing.    Some vigorous-intensity exercise ideas include:  · Walking at a rate of at least 4.5 miles per hour.  · Jogging or running at a rate of 5 miles per hour.  · Biking at a rate of at least 10 miles per hour.  · Lap swimming.  · Roller-skating or in-line skating.  · Cross-country skiing.  · Vigorous competitive sports, such as football, basketball, and soccer.  · Jumping rope.  · Aerobic dancing.    What are some everyday activities that can help me to get exercise?  · Yard work, such as:  ? Pushing a .  ? Raking and bagging leaves.  · Washing and waxing your car.  · Pushing a stroller.  · Shoveling snow.  · Gardening.  · Washing windows or floors.  How can I be more active in my day-to-day activities?  · Use the stairs instead of the elevator.  · Take a walk during your lunch break.  · If you drive, park your car farther away from work or school.  · If you take public transportation, get off one stop early and walk the rest of the way.  · Make all of your phone calls while standing up and walking around.  · Get up, stretch, and walk around every 30 minutes throughout the day.  What guidelines should I follow while exercising?  · Do not exercise so much that you hurt yourself, feel dizzy, or get very short of breath.  · Consult your health care provider before starting a new exercise program.  · Wear comfortable clothes and shoes with good support.  · Drink plenty of water while you exercise to prevent dehydration or heat stroke. Body water is lost during exercise and must be replaced.  · Work out until you breathe faster and your heart beats faster.  This information is not intended to replace  advice given to you by your health care provider. Make sure you discuss any questions you have with your health care provider.  Document Released: 01/20/2012 Document Revised: 05/25/2017 Document Reviewed: 05/21/2015  [employer] Patient Education © 2018 [employer]      Fall Prevention in the Home, Adult  Falls can cause injuries and can affect people from all age groups. There are many simple things that you can do to make your home safe and to help prevent falls. Ask for help when making these changes, if needed.  What actions can I take to prevent falls?  General instructions  · Use good lighting in all rooms. Replace any light bulbs that burn out.  · Turn on lights if it is dark. Use night-lights.  · Place frequently used items in easy-to-reach places. Lower the shelves around your home if necessary.  · Set up furniture so that there are clear paths around it. Avoid moving your furniture around.  · Remove throw rugs and other tripping hazards from the floor.  · Avoid walking on wet floors.  · Fix any uneven floor surfaces.  · Add color or contrast paint or tape to grab bars and handrails in your home. Place contrasting color strips on the first and last steps of stairways.  · When you use a stepladder, make sure that it is completely opened and that the sides are firmly locked. Have someone hold the ladder while you are using it. Do not climb a closed stepladder.  · Be aware of any and all pets.  What can I do in the bathroom?  · Keep the floor dry. Immediately clean up any water that spills onto the floor.  · Remove soap buildup in the tub or shower on a regular basis.  · Use non-skid mats or decals on the floor of the tub or shower.  · Attach bath mats securely with double-sided, non-slip rug tape.  · If you need to sit down while you are in the shower, use a plastic, non-slip stool.  · Install grab bars by the toilet and in the tub and shower. Do not use towel bars as grab bars.  What can I do in  the bedroom?  · Make sure that a bedside light is easy to reach.  · Do not use oversized bedding that drapes onto the floor.  · Have a firm chair that has side arms to use for getting dressed.  What can I do in the kitchen?  · Clean up any spills right away.  · If you need to reach for something above you, use a sturdy step stool that has a grab bar.  · Keep electrical cables out of the way.  · Do not use floor polish or wax that makes floors slippery. If you must use wax, make sure that it is non-skid floor wax.  What can I do in the stairways?  · Do not leave any items on the stairs.  · Make sure that you have a light switch at the top of the stairs and the bottom of the stairs. Have them installed if you do not have them.  · Make sure that there are handrails on both sides of the stairs. Fix handrails that are broken or loose. Make sure that handrails are as long as the stairways.  · Install non-slip stair treads on all stairs in your home.  · Avoid having throw rugs at the top or bottom of stairways, or secure the rugs with carpet tape to prevent them from moving.  · Choose a carpet design that does not hide the edge of steps on the stairway.  · Check any carpeting to make sure that it is firmly attached to the stairs. Fix any carpet that is loose or worn.  What can I do on the outside of my home?  · Use bright outdoor lighting.  · Regularly repair the edges of walkways and driveways and fix any cracks.  · Remove high doorway thresholds.  · Trim any shrubbery on the main path into your home.  · Regularly check that handrails are securely fastened and in good repair. Both sides of any steps should have handrails.  · Install guardrails along the edges of any raised decks or porches.  · Clear walkways of debris and clutter, including tools and rocks.  · Have leaves, snow, and ice cleared regularly.  · Use sand or salt on walkways during winter months.  · In the garage, clean up any spills right away, including  grease or oil spills.  What other actions can I take?  · Wear closed-toe shoes that fit well and support your feet. Wear shoes that have rubber soles or low heels.  · Use mobility aids as needed, such as canes, walkers, scooters, and crutches.  · Review your medicines with your health care provider. Some medicines can cause dizziness or changes in blood pressure, which increase your risk of falling.  Talk with your health care provider about other ways that you can decrease your risk of falls. This may include working with a physical therapist or  to improve your strength, balance, and endurance.  Where to find more information  · Centers for Disease Control and Prevention, [place]: https://www.cdc.gov  · [place] [employer] [place]: https://zx0huaq.all.nih.gov  Contact a health care provider if:  · You are afraid of falling at home.  · You feel weak, drowsy, or dizzy at home.  · You fall at home.  Summary  · There are many simple things that you can do to make your home safe and to help prevent falls.  · Ways to make your home safe include removing tripping hazards and installing grab bars in the bathroom.  · Ask for help when making these changes in your home.  This information is not intended to replace advice given to you by your health care provider. Make sure you discuss any questions you have with your health care provider.  Document Released: 12/08/2003 Document Revised: 08/02/2018 Document Reviewed: 08/02/2018  [employer] Interactive Patient Education © 2019 [employer]     No.

## (undated) DEVICE — GAUZE,SPONGE,FLUFF,6"X6.75",STRL,10/TRAY: Brand: MEDLINE

## (undated) DEVICE — SUT TEVDEX 5 K61 30IN 79745

## (undated) DEVICE — COVER,MAYO STAND,STERILE: Brand: MEDLINE

## (undated) DEVICE — PK ORTHO MINOR TOWER 40

## (undated) DEVICE — ADHS SKIN SURG TISS VISC PREMIERPRO EXOFIN HI/VISC FAST/DRY

## (undated) DEVICE — COATED BRAIDED POLYESTER: Brand: TI-CRON

## (undated) DEVICE — DRSNG SLVR/ANTIBAC PRIMASEAL POST/OP ADHS 3.5X10IN

## (undated) DEVICE — DRSNG WND GZ PAD BORDERED 4X8IN STRL

## (undated) DEVICE — 3M™ IOBAN™ 2 ANTIMICROBIAL INCISE DRAPE 6640EZ: Brand: IOBAN™ 2

## (undated) DEVICE — SYR LUERLOK 30CC

## (undated) DEVICE — GLV SURG BIOGEL LTX PF 7 1/2

## (undated) DEVICE — SKIN PREP TRAY W/CHG: Brand: MEDLINE INDUSTRIES, INC.

## (undated) DEVICE — SYR LL TP 10ML STRL

## (undated) DEVICE — COAXIAL FEMORAL CANAL TIP

## (undated) DEVICE — SUT GUT CHRM 3/0 SH 36IN G172H

## (undated) DEVICE — GLV SURG SIGNATURE ESSENTIAL PF LTX SZ8

## (undated) DEVICE — DRAPE,HIP,W/POUCHES,STERILE: Brand: MEDLINE

## (undated) DEVICE — ANTIBACTERIAL UNDYED BRAIDED (POLYGLACTIN 910), SYNTHETIC ABSORBABLE SUTURE: Brand: COATED VICRYL

## (undated) DEVICE — SOL NACL 0.9PCT 100ML SGL

## (undated) DEVICE — SOL ISO/ALC RUB 70PCT 4OZ

## (undated) DEVICE — 3M™ IOBAN™ 2 ANTIMICROBIAL INCISE DRAPE 6650EZ: Brand: IOBAN™ 2

## (undated) DEVICE — BARRIER, ABSORBABLE, ADHESION: Brand: SEPRAFILM® PROCEDURE PACK

## (undated) DEVICE — ELECTRD BLD EDGE/INSUL1P 2.4X5.1MM STRL

## (undated) DEVICE — SUT VIC 3/0 TIES 18IN J110T

## (undated) DEVICE — STPLR SKIN VISISTAT WD 35CT

## (undated) DEVICE — VAGINAL PACKING: Brand: DEROYAL

## (undated) DEVICE — DRAPE,U/ SHT,SPLIT,PLAS,STERIL: Brand: MEDLINE

## (undated) DEVICE — HANDPIECE SET WITH COAXIAL HIGH FLOW TIP AND SUCTION TUBE: Brand: INTERPULSE

## (undated) DEVICE — 6617 IOBAN II PATIENT ISOLATION DRAPE 5/BX,4BX/CS: Brand: STERI-DRAPE™ IOBAN™ 2

## (undated) DEVICE — ELECTRD NDL EZ CLN MOD 2.75IN

## (undated) DEVICE — BANDAGE,GAUZE,BULKEE II,4.5"X4.1YD,STRL: Brand: MEDLINE

## (undated) DEVICE — TRY SKINPREP DRYPREP

## (undated) DEVICE — MAT FLR ABSORBENT LG 4FT 10 2.5FT

## (undated) DEVICE — SUT ETHIB 2 CV V37 MS/4 30IN MX69G

## (undated) DEVICE — PK HIP TOTL 40

## (undated) DEVICE — SUT PDS 0 CT1 36IN Z346H

## (undated) DEVICE — PENCL E/S ULTRAVAC TELESCP NOSE HOLSTR 10FT

## (undated) DEVICE — WOUND RETRACTOR AND PROTECTOR: Brand: ALEXIS WOUND PROTECTOR-RETRACTOR

## (undated) DEVICE — GLV SURG BIOGEL LTX PF 8

## (undated) DEVICE — GLV SURG PREMIERPRO ORTHO LTX PF SZ8 BRN

## (undated) DEVICE — SUT ETHLN 5/0 P3 18IN 698G

## (undated) DEVICE — GLV SURG BIOGEL LTX PF 8 1/2

## (undated) DEVICE — NEEDLE, QUINCKE, 20GX3.5": Brand: MEDLINE

## (undated) DEVICE — CEMENT MIXING SYSTEM WITH FEMORAL BREAKWAY NOZZLE: Brand: REVOLUTION

## (undated) DEVICE — STRAP STIRUP WO/ RNG

## (undated) DEVICE — GOWN,NON-REINFORCED,SIRUS,SET IN SLV,XXL: Brand: MEDLINE

## (undated) DEVICE — APPL CHLORAPREP W/TINT 26ML ORNG

## (undated) DEVICE — MEDI-VAC YANKAUER SUCTION HANDLE W/BULBOUS TIP: Brand: CARDINAL HEALTH

## (undated) DEVICE — DRSNG WND GZ CURAD OIL EMULSION 3X3IN STRL

## (undated) DEVICE — IRRIGATOR BULB ASEPTO 60CC STRL

## (undated) DEVICE — BNDG ELAS ELITE V/CLOSE 3IN 5YD LF STRL

## (undated) DEVICE — ELECTRD BLD EXT EDGE/INSUL 6IN

## (undated) DEVICE — PREP IM ENCHANCED TOTAL HIP BONE                                    PREPARATION KIT: Brand: PREP-IM

## (undated) DEVICE — PK PROC MAJ 40

## (undated) DEVICE — TOTAL TRAY, 16FR 10ML SIL FOLEY, URN: Brand: MEDLINE

## (undated) DEVICE — TRAP FLD MINIVAC MEGADYNE 100ML

## (undated) DEVICE — HEWSON SUTURE RETRIEVER: Brand: HEWSON SUTURE RETRIEVER

## (undated) DEVICE — APPL CHLORAPREP HI/LITE 26ML ORNG

## (undated) DEVICE — SHEET, DRAPE, SPLIT, STERILE: Brand: MEDLINE